# Patient Record
Sex: MALE | Race: WHITE | NOT HISPANIC OR LATINO | Employment: FULL TIME | URBAN - METROPOLITAN AREA
[De-identification: names, ages, dates, MRNs, and addresses within clinical notes are randomized per-mention and may not be internally consistent; named-entity substitution may affect disease eponyms.]

---

## 2017-03-13 ENCOUNTER — OFFICE VISIT (OUTPATIENT)
Dept: FAMILY MEDICINE | Facility: CLINIC | Age: 42
End: 2017-03-13
Payer: COMMERCIAL

## 2017-03-13 ENCOUNTER — LAB VISIT (OUTPATIENT)
Dept: LAB | Facility: HOSPITAL | Age: 42
End: 2017-03-13
Attending: FAMILY MEDICINE
Payer: COMMERCIAL

## 2017-03-13 VITALS
SYSTOLIC BLOOD PRESSURE: 126 MMHG | HEIGHT: 72 IN | BODY MASS INDEX: 35.1 KG/M2 | OXYGEN SATURATION: 99 % | WEIGHT: 259.13 LBS | HEART RATE: 56 BPM | TEMPERATURE: 98 F | DIASTOLIC BLOOD PRESSURE: 68 MMHG

## 2017-03-13 DIAGNOSIS — Z23 NEED FOR TDAP VACCINATION: ICD-10-CM

## 2017-03-13 DIAGNOSIS — Z23 NEED FOR PROPHYLACTIC VACCINATION AND INOCULATION AGAINST INFLUENZA: ICD-10-CM

## 2017-03-13 DIAGNOSIS — Z00.00 ROUTINE PHYSICAL EXAMINATION: ICD-10-CM

## 2017-03-13 DIAGNOSIS — Z00.00 ROUTINE PHYSICAL EXAMINATION: Primary | ICD-10-CM

## 2017-03-13 DIAGNOSIS — J30.9 ALLERGIC RHINITIS, UNSPECIFIED ALLERGIC RHINITIS TRIGGER, UNSPECIFIED RHINITIS SEASONALITY: ICD-10-CM

## 2017-03-13 LAB
ALBUMIN SERPL BCP-MCNC: 4 G/DL
ALP SERPL-CCNC: 71 U/L
ALT SERPL W/O P-5'-P-CCNC: 22 U/L
ANION GAP SERPL CALC-SCNC: 10 MMOL/L
AST SERPL-CCNC: 30 U/L
BASOPHILS # BLD AUTO: 0.07 K/UL
BASOPHILS NFR BLD: 1.1 %
BILIRUB SERPL-MCNC: 0.8 MG/DL
BUN SERPL-MCNC: 10 MG/DL
CALCIUM SERPL-MCNC: 9.8 MG/DL
CHLORIDE SERPL-SCNC: 105 MMOL/L
CHOLEST/HDLC SERPL: 3.9 {RATIO}
CO2 SERPL-SCNC: 29 MMOL/L
CREAT SERPL-MCNC: 0.9 MG/DL
DIFFERENTIAL METHOD: ABNORMAL
EOSINOPHIL # BLD AUTO: 0.3 K/UL
EOSINOPHIL NFR BLD: 3.9 %
ERYTHROCYTE [DISTWIDTH] IN BLOOD BY AUTOMATED COUNT: 13 %
EST. GFR  (AFRICAN AMERICAN): >60 ML/MIN/1.73 M^2
EST. GFR  (NON AFRICAN AMERICAN): >60 ML/MIN/1.73 M^2
GLUCOSE SERPL-MCNC: 92 MG/DL
HAV IGM SERPL QL IA: NEGATIVE
HBV CORE IGM SERPL QL IA: NEGATIVE
HBV SURFACE AG SERPL QL IA: NEGATIVE
HCT VFR BLD AUTO: 42.8 %
HCV AB SERPL QL IA: NEGATIVE
HDL/CHOLESTEROL RATIO: 25.4 %
HDLC SERPL-MCNC: 177 MG/DL
HDLC SERPL-MCNC: 45 MG/DL
HGB BLD-MCNC: 14.1 G/DL
HIV 1+2 AB+HIV1 P24 AG SERPL QL IA: NEGATIVE
LDLC SERPL CALC-MCNC: 123.4 MG/DL
LYMPHOCYTES # BLD AUTO: 1.8 K/UL
LYMPHOCYTES NFR BLD: 27.2 %
MCH RBC QN AUTO: 30.8 PG
MCHC RBC AUTO-ENTMCNC: 32.9 %
MCV RBC AUTO: 93 FL
MONOCYTES # BLD AUTO: 0.8 K/UL
MONOCYTES NFR BLD: 11.4 %
NEUTROPHILS # BLD AUTO: 3.8 K/UL
NEUTROPHILS NFR BLD: 56.2 %
NONHDLC SERPL-MCNC: 132 MG/DL
PLATELET # BLD AUTO: 310 K/UL
PMV BLD AUTO: 9.6 FL
POTASSIUM SERPL-SCNC: 4.1 MMOL/L
PROT SERPL-MCNC: 7.5 G/DL
RBC # BLD AUTO: 4.58 M/UL
SODIUM SERPL-SCNC: 144 MMOL/L
T4 FREE SERPL-MCNC: 0.93 NG/DL
TRIGL SERPL-MCNC: 43 MG/DL
TSH SERPL DL<=0.005 MIU/L-ACNC: 2.56 UIU/ML
WBC # BLD AUTO: 6.66 K/UL

## 2017-03-13 PROCEDURE — 36415 COLL VENOUS BLD VENIPUNCTURE: CPT | Mod: PO

## 2017-03-13 PROCEDURE — 90472 IMMUNIZATION ADMIN EACH ADD: CPT | Mod: S$GLB,,, | Performed by: FAMILY MEDICINE

## 2017-03-13 PROCEDURE — 84439 ASSAY OF FREE THYROXINE: CPT

## 2017-03-13 PROCEDURE — 85025 COMPLETE CBC W/AUTO DIFF WBC: CPT

## 2017-03-13 PROCEDURE — 90715 TDAP VACCINE 7 YRS/> IM: CPT | Mod: S$GLB,,, | Performed by: FAMILY MEDICINE

## 2017-03-13 PROCEDURE — 80074 ACUTE HEPATITIS PANEL: CPT

## 2017-03-13 PROCEDURE — 99395 PREV VISIT EST AGE 18-39: CPT | Mod: S$GLB,,, | Performed by: FAMILY MEDICINE

## 2017-03-13 PROCEDURE — 84443 ASSAY THYROID STIM HORMONE: CPT

## 2017-03-13 PROCEDURE — 80061 LIPID PANEL: CPT

## 2017-03-13 PROCEDURE — 86703 HIV-1/HIV-2 1 RESULT ANTBDY: CPT

## 2017-03-13 PROCEDURE — 90471 IMMUNIZATION ADMIN: CPT | Mod: S$GLB,,, | Performed by: FAMILY MEDICINE

## 2017-03-13 PROCEDURE — 80053 COMPREHEN METABOLIC PANEL: CPT

## 2017-03-13 PROCEDURE — 90686 IIV4 VACC NO PRSV 0.5 ML IM: CPT | Mod: S$GLB,,, | Performed by: FAMILY MEDICINE

## 2017-03-13 PROCEDURE — 83036 HEMOGLOBIN GLYCOSYLATED A1C: CPT

## 2017-03-13 PROCEDURE — 86592 SYPHILIS TEST NON-TREP QUAL: CPT

## 2017-03-13 PROCEDURE — 99999 PR PBB SHADOW E&M-EST. PATIENT-LVL III: CPT | Mod: PBBFAC,,, | Performed by: FAMILY MEDICINE

## 2017-03-13 RX ORDER — FLUTICASONE PROPIONATE 50 MCG
SPRAY, SUSPENSION (ML) NASAL
Qty: 1 BOTTLE | Refills: 2 | Status: ON HOLD | OUTPATIENT
Start: 2017-03-13 | End: 2018-05-14

## 2017-03-13 NOTE — PROGRESS NOTES
[facility]  Progress Note    SUBJECTIVE:     Chief Complaint   Patient presents with    Annual Exam       HPI   Jered Contreras  is a 41 y.o. male here for routine physical. He is doing well, still losing weight. He used to be over 400 lbs, now 259 today. Gave positive reinforcement. Patient has no other new complaints/problems at this time.      Review of patient's allergies indicates:   Allergen Reactions    Ceclor [cefaclor] Anaphylaxis    Penicillins        Past Medical History:   Diagnosis Date    Allergic rhinitis     Asthma     Morbid obesity      Past Surgical History:   Procedure Laterality Date    APPENDECTOMY       Family History   Problem Relation Age of Onset    Diabetes Father     Hypertension Father     Melanoma Neg Hx      Social History   Substance Use Topics    Smoking status: Never Smoker    Smokeless tobacco: Never Used    Alcohol use 0.6 oz/week     1 Cans of beer per week        Review of Systems   Constitutional: Negative for chills, diaphoresis and fever.   HENT: Negative for congestion, ear pain and sore throat.    Eyes: Negative for photophobia and discharge.   Respiratory: Negative for cough, shortness of breath and wheezing.    Cardiovascular: Negative for chest pain and palpitations.   Gastrointestinal: Negative for abdominal pain, constipation, diarrhea, nausea and vomiting.   Genitourinary: Negative for dysuria and hematuria.   Musculoskeletal: Negative for back pain and myalgias.   Skin: Negative for itching and rash.   Neurological: Negative for dizziness, sensory change, focal weakness, weakness and headaches.   All other systems reviewed and are negative.    OBJECTIVE:     Vitals:    03/13/17 0809   BP: 126/68   Pulse: (!) 56   Temp: 97.6 °F (36.4 °C)     Body mass index is 35.15 kg/(m^2).    Physical Exam   Constitutional: He is oriented to person, place, and time and well-developed, well-nourished, and in no distress. No distress.   HENT:   Head:  Normocephalic and atraumatic.   Right Ear: Tympanic membrane is not perforated, not erythematous and not bulging. No hemotympanum.   Left Ear: Tympanic membrane is not perforated, not erythematous and not bulging. No hemotympanum.   Mouth/Throat: Oropharynx is clear and moist. No oropharyngeal exudate.   Eyes: Conjunctivae and EOM are normal. Pupils are equal, round, and reactive to light.   Neck: No thyromegaly present.   Cardiovascular: Normal rate, regular rhythm and normal heart sounds.  Exam reveals no gallop and no friction rub.    No murmur heard.  Pulmonary/Chest: Effort normal and breath sounds normal. No respiratory distress. He has no wheezes. He has no rales.   Abdominal: Soft. Bowel sounds are normal. He exhibits no distension. There is no tenderness. There is no rebound and no guarding.   Musculoskeletal: Normal range of motion. He exhibits no edema or tenderness.   Lymphadenopathy:     He has no cervical adenopathy.   Neurological: He is alert and oriented to person, place, and time.   Skin: Skin is warm. No rash noted. He is not diaphoretic. No erythema.       Old records were reviewed. Labs and/or images were independently reviewed.    ASSESSMENT     1. Routine physical examination    2. Allergic rhinitis, unspecified allergic rhinitis trigger, unspecified rhinitis seasonality    3. Need for prophylactic vaccination and inoculation against influenza        PLAN:     Routine physical examination  -     CBC auto differential; Future  -     Comprehensive metabolic panel; Future  -     Hemoglobin A1c; Future  -     Lipid panel; Future  -     TSH; Future  -     T4, free; Future  -     Hepatitis panel, acute; Future; Expected date: 3/13/17  -     HIV-1 and HIV-2 antibodies; Future; Expected date: 3/13/17  -     RPR; Future; Expected date: 3/13/17  -     We briefly discussed diet, exercise, and routine preventive exams. All questions and comments addressed.    Allergic rhinitis, unspecified allergic  rhinitis trigger, unspecified rhinitis seasonality  -     fluticasone (FLONASE) 50 mcg/actuation nasal spray; 1-2 sprays @ lateral nostril once  a day  Dispense: 1 Bottle; Refill: 2  -     Stable. Continue current regimen.    Need for prophylactic vaccination and inoculation against influenza  -     Influenza - Quadrivalent (3 years & older) (PF)      RTC PRN    Vik Santana MD  03/13/2017 8:55 AM

## 2017-03-13 NOTE — PROGRESS NOTES
Influenza vaccine administered, familia well. Instructed to wait 15mins for observation, no reaction noted @ time of discharge.

## 2017-03-14 ENCOUNTER — OFFICE VISIT (OUTPATIENT)
Dept: SLEEP MEDICINE | Facility: CLINIC | Age: 42
End: 2017-03-14
Payer: COMMERCIAL

## 2017-03-14 VITALS
BODY MASS INDEX: 35.13 KG/M2 | HEART RATE: 57 BPM | WEIGHT: 259 LBS | SYSTOLIC BLOOD PRESSURE: 136 MMHG | DIASTOLIC BLOOD PRESSURE: 78 MMHG | OXYGEN SATURATION: 100 %

## 2017-03-14 DIAGNOSIS — G47.33 OSA (OBSTRUCTIVE SLEEP APNEA): Primary | ICD-10-CM

## 2017-03-14 DIAGNOSIS — E66.09 NON MORBID OBESITY DUE TO EXCESS CALORIES: ICD-10-CM

## 2017-03-14 LAB
ESTIMATED AVG GLUCOSE: 91 MG/DL
HBA1C MFR BLD HPLC: 4.8 %
RPR SER QL: NORMAL

## 2017-03-14 PROCEDURE — 99999 PR PBB SHADOW E&M-EST. PATIENT-LVL III: CPT | Mod: PBBFAC,,, | Performed by: INTERNAL MEDICINE

## 2017-03-14 PROCEDURE — 1160F RVW MEDS BY RX/DR IN RCRD: CPT | Mod: S$GLB,,, | Performed by: INTERNAL MEDICINE

## 2017-03-14 PROCEDURE — 99214 OFFICE O/P EST MOD 30 MIN: CPT | Mod: S$GLB,,, | Performed by: INTERNAL MEDICINE

## 2017-03-14 NOTE — PROGRESS NOTES
Jered Contreras  was seen as a follow up.    CHIEF COMPLAINT:    Chief Complaint   Patient presents with    Sleep Apnea       HISTORY OF PRESENT ILLNESS: Jered Contreras is a 41 y.o. male is here for sleep evaluation.   Patient with loud snoring and witnessed sleep apnea.  +fatigue upon awakening 3-4 times per week.  No sleepiness a/w driving.  Occasional sleep talking.  No cataplexy.    [place] Sleepiness Scale score during initial sleep evaluation was 11.     During last appointment 10/14, patient was recommended psg.  Sleep study was delayed in part due to change in insurance.  Patient s/p sleep study 8/15 with ahi of 27.  Patient was set up cpap 9 cm H20.  Doing well with cpap.  Feeling rested with new cpap.  Patient lost significant weight with exercise.      SLEEP ROUTINE:  Activity the hour prior to sleep: talk and face book in bed    Bed partner:  josé manuel  Time to bed:  10 pm   Lights off:  yes  Sleep onset latency:  10 minutes        Disruptions or awakenings:    0-1 time (no difficulty going back to sleep)    Wakeup time:      5 am   Perceived sleep quality:  Rested upon awake.         Daytime naps:      none  Weekend sleep routine:      11 pm till 7 am (still tire)  Caffeine use: 3-4 cups of coffee per day  exercise habit:   Walking 8 miles per day.    PAST MEDICAL HISTORY:    Active Ambulatory Problems     Diagnosis Date Noted    AR (allergic rhinitis) 09/12/2013    Morbid obesity with BMI of 45.0-49.9, adult 12/27/2013    Heart palpitations 12/27/2013     Resolved Ambulatory Problems     Diagnosis Date Noted    No Resolved Ambulatory Problems     Past Medical History:   Diagnosis Date    Allergic rhinitis     Asthma     Morbid obesity                 PAST SURGICAL HISTORY:    Past Surgical History:   Procedure Laterality Date    APPENDECTOMY           FAMILY HISTORY:                Family History   Problem Relation Age of Onset    Diabetes Father     Hypertension Father     Melanoma  Neg Hx        SOCIAL HISTORY:          Tobacco:   History   Smoking Status    Never Smoker   Smokeless Tobacco    Never Used       alcohol use:    History   Alcohol Use    0.6 oz/week    1 Cans of beer per week                 Occupation:  IT at [employer]    ALLERGIES:    Review of patient's allergies indicates:   Allergen Reactions    Ceclor [cefaclor] Anaphylaxis    Penicillins        CURRENT MEDICATIONS:    Current Outpatient Prescriptions   Medication Sig Dispense Refill    cetirizine (ZYRTEC) 10 MG tablet Take 10 mg by mouth once daily.      fluticasone (FLONASE) 50 mcg/actuation nasal spray 1-2 sprays @ lateral nostril once  a day 1 Bottle 2     No current facility-administered medications for this visit.                   REVIEW OF SYSTEMS:     Sleep related symptoms as per HPI.  CONST: 25# weight gain since 2014  HEENT: + sinus congestion txed with flonase and zyrtec  PULM: Denies dyspnea  CARD:  Denies palpitations   GI:  occasional acid reflux  : Denies polyuria  NEURO: Denies headaches  PSYCH: Denies mood disturbance  HEME: Denies anemia   Otherwise, a balance of systems reviewed is negative.          PHYSICAL EXAM:  Vitals:    03/14/17 0907   BP: 136/78   Pulse: (!) 57   SpO2: 100%   Weight: 117.5 kg (259 lb)   PainSc: 0-No pain     Body mass index is 35.13 kg/(m^2).     GENERAL: Normal development, well groomed  HEENT:  Conjunctivae are non-erythematous; Pupils equal, round, and reactive to light; Nose is symmetrical; Nasal mucosa is pink and moist; Septum is midline; Inferior turbinates are normal; Nasal airflow is normal; Posterior pharynx is pink; Modified Mallampati: 2; Posterior palate is normal; Tonsils +2; Uvula is normal and pink;Tongue is normal; Dentition is fair; No TMJ tenderness; Jaw opening and protrusion without click and without discomfort.  retronagthia  NECK: Supple. Neck circumference is 16.5 inches. No thyromegaly. No palpable nodes.     SKIN: On face and neck: No abrasions,  no rashes, no lesions.  No subcutaneous nodules are palpable.  RESPIRATORY: Chest is clear to auscultation.  Normal chest expansion and non-labored breathing at rest.  CARDIOVASCULAR: Normal S1, S2.  No murmurs, gallops or rubs. No carotid bruits bilaterally.  EXTREMITIES: No edema. No clubbing. No cyanosis. Station normal. Gait normal.        NEURO/PSYCH: Oriented to time, place and person. Normal attention span and concentration. Affect is full. Mood is normal.                                              DATA   8/10/15 ahi of 26 with optimal cpap of 9    Lab Results   Component Value Date    TSH 2.559 03/13/2017     ASSESSMENT    ICD-10-CM ICD-9-CM    1. TAYO (obstructive sleep apnea) G47.33 327.23 Ambulatory consult to Dentistry   2. Non morbid obesity due to excess calories E66.09 278.00        PLAN:    Sleep Apnea - compliance requirements d/w patient.   Doing well with cpap when using it.   Will switch to apap trial to re-explore new settings.  Patient is interested in alternative therapy.  Will refer to dentist.      Obesity - s/p nutritional evaluation.  Patient will resume a more intensive exercise regimen.  Lost 60# since exercising.     Sinus congestion - zyrtec and flonase.  Recommend sinus irrigation.      Education: During our discussion today, we talked about the etiology of obstructive sleep apnea as well as the potential ramifications of untreated sleep apnea, which could include daytime sleepiness, hypertension, heart disease and/or stroke.        Patient will Return in about 6 months (around 9/14/2017).

## 2017-04-15 ENCOUNTER — OFFICE VISIT (OUTPATIENT)
Dept: INTERNAL MEDICINE | Facility: CLINIC | Age: 42
End: 2017-04-15
Payer: COMMERCIAL

## 2017-04-15 VITALS
DIASTOLIC BLOOD PRESSURE: 74 MMHG | BODY MASS INDEX: 35.35 KG/M2 | HEART RATE: 91 BPM | SYSTOLIC BLOOD PRESSURE: 138 MMHG | TEMPERATURE: 100 F | HEIGHT: 72 IN | RESPIRATION RATE: 16 BRPM | WEIGHT: 261 LBS | OXYGEN SATURATION: 99 %

## 2017-04-15 DIAGNOSIS — H66.92 LEFT OTITIS MEDIA, UNSPECIFIED CHRONICITY, UNSPECIFIED OTITIS MEDIA TYPE: Primary | ICD-10-CM

## 2017-04-15 PROCEDURE — 1160F RVW MEDS BY RX/DR IN RCRD: CPT | Mod: S$GLB,,, | Performed by: INTERNAL MEDICINE

## 2017-04-15 PROCEDURE — 99213 OFFICE O/P EST LOW 20 MIN: CPT | Mod: S$GLB,,, | Performed by: INTERNAL MEDICINE

## 2017-04-15 PROCEDURE — 99999 PR PBB SHADOW E&M-EST. PATIENT-LVL III: CPT | Mod: PBBFAC,,, | Performed by: INTERNAL MEDICINE

## 2017-04-15 RX ORDER — AZITHROMYCIN 250 MG/1
TABLET, FILM COATED ORAL
Qty: 6 TABLET | Refills: 0 | Status: SHIPPED | OUTPATIENT
Start: 2017-04-15 | End: 2017-04-21

## 2017-04-15 NOTE — PROGRESS NOTES
Subjective:       Patient ID: Jered Contreras is a 41 y.o. male.    Chief Complaint: Cough; Nasal Congestion; and Headache    HPI - 3 days of cough, congestion, runny nose and ear pain/congestion.  Green mucous production.  Low grade fever.  No abdominal pain.  Not a smoker.      Pmh/meds:  Reviewed and reconciled in EPIC with patient during visit today.    Review of Systems   Constitutional: Negative for fever.   HENT: Negative for congestion.    Respiratory: Positive for cough.    Cardiovascular: Negative for chest pain.   Gastrointestinal: Negative for abdominal pain and diarrhea.   Genitourinary: Negative for difficulty urinating.   Musculoskeletal: Negative for arthralgias.   Skin: Negative for rash.   Neurological: Negative for headaches.   Psychiatric/Behavioral: Negative for sleep disturbance.       Objective:      Physical Exam   Constitutional: He is oriented to person, place, and time. He appears well-developed and well-nourished. No distress.   HENT:   Head: Normocephalic and atraumatic.   Mouth/Throat: No oropharyngeal exudate.   Right TM with serous effusion; left erythematous, bulging - grayish effusion c/w otitis media   Neck: No thyromegaly present.   Cardiovascular: Normal rate, regular rhythm and normal heart sounds.  Exam reveals no gallop and no friction rub.    No murmur heard.  Pulmonary/Chest: No respiratory distress. He has no wheezes. He has no rales. He exhibits no tenderness.   Lymphadenopathy:     He has no cervical adenopathy.   Neurological: He is alert and oriented to person, place, and time.   Skin: Skin is warm. He is not diaphoretic. No erythema.   Psychiatric: He has a normal mood and affect. Thought content normal.   Nursing note and vitals reviewed.      Assessment:       1. Left otitis media, unspecified chronicity, unspecified otitis media type        Plan:       Jered was seen today for cough, nasal congestion and headache.    Diagnoses and all orders for this  visit:    Left otitis media, unspecified chronicity, unspecified otitis media type -  some OTC cough syrup.  Stay warm, dry.  Fluids, tylenol for aches and pains.  Rest, stay off work until recovered.  -     azithromycin (Z-KELY) 250 MG tablet; Take 2 tablets by mouth on day 1; Take 1 tablet by mouth on days 2-5    rtc prn.    G Mike Lozano MD MPH  Staff Internist

## 2017-04-15 NOTE — MR AVS SNAPSHOT
[facility]  [address]  [facility] [address]  Phone: [phone]  Fax: [phone]                  Jered Contreras   4/15/2017 8:00 AM   Office Visit    Description:  Male : 1975   Provider:  Roman Lozano II, MD   Department:  [facility]           Reason for Visit     Cough     Nasal Congestion     Headache           Diagnoses this Visit        Comments    Left otitis media, unspecified chronicity, unspecified otitis media type    -  Primary            To Do List           Future Appointments        Provider Department Dept Phone    2017 9:45 AM Cristian Dc Jr., MD [facility]rology 4th Floor [phone]      Goals (5 Years of Data)     None      Follow-Up and Disposition     Return if symptoms worsen or fail to improve.       These Medications        Disp Refills Start End    azithromycin (Z-KELY) 250 MG tablet 6 tablet 0 4/15/2017 2017    Take 2 tablets by mouth on day 1; Take 1 tablet by mouth on days 2-5    Pharmacy: [facility] [address] [place] [address] AT [facility] #: [phone]         [facility]     [facility] On Call Nurse Care Line -  Assistance  Unless otherwise directed by your provider, please contact [facility], our nurse care line that is available for  assistance.     Registered nurses in the [facility] provide: appointment scheduling, clinical advisement, health education, and other advisory services.  Call: [phone] (toll free)               Medications           Message regarding Medications     Verify the changes and/or additions to your medication regime listed below are the same as discussed with your clinician today.  If any of these changes or additions are incorrect, please notify your healthcare provider.        START taking these NEW medications        Refills    azithromycin (Z-KELY) 250 MG tablet 0    Sig: Take 2  tablets by mouth on day 1; Take 1 tablet by mouth on days 2-5    Class: Normal           Verify that the below list of medications is an accurate representation of the medications you are currently taking.  If none reported, the list may be blank. If incorrect, please contact your healthcare provider. Carry this list with you in case of emergency.           Current Medications     azithromycin (Z-KELY) 250 MG tablet Take 2 tablets by mouth on day 1; Take 1 tablet by mouth on days 2-5    cetirizine (ZYRTEC) 10 MG tablet Take 10 mg by mouth once daily.    fluticasone (FLONASE) 50 mcg/actuation nasal spray 1-2 sprays @ lateral nostril once  a day           Clinical Reference Information           Your Vitals Were     BP Pulse Temp Resp Height Weight    138/74 (BP Location: Left arm, Patient Position: Sitting) 91 99.7 °F (37.6 °C) (Oral) 16 6' (1.829 m) 118.4 kg (261 lb 0.4 oz)    SpO2 BMI             99% 35.4 kg/m2         Blood Pressure          Most Recent Value    BP  138/74      Allergies as of 4/15/2017     Ceclor [Cefaclor]    Penicillins      Immunizations Administered on Date of Encounter - 4/15/2017     None      Language Assistance Services     ATTENTION: Language assistance services are available, free of charge. Please call [phone].      ATENCIÓN: Si marcela chandrika, tiene a dumont disposición servicios gratuitos de asistencia lingüística. Llame al [phone].     DANA Ý: N?u b?n nói Ti?ng Vi?t, có các d?ch v? h? tr? ngôn ng? mi?n phí dành cho b?n. G?i s? [phone].         [facility] complies with applicable Federal civil rights laws and does not discriminate on the basis of race, color, national origin, age, disability, or sex.

## 2017-04-17 ENCOUNTER — TELEPHONE (OUTPATIENT)
Dept: INTERNAL MEDICINE | Facility: CLINIC | Age: 42
End: 2017-04-17

## 2017-04-17 NOTE — TELEPHONE ENCOUNTER
No.  I gave him a prescription, and he was supposed to  over the counter cough syrup.    Please call and inform him

## 2017-04-17 NOTE — TELEPHONE ENCOUNTER
----- Message from Evelina Diallo sent at 4/15/2017  9:22 AM CDT -----  Contact: Self/[phone]  Pt states that he was suppose to be prescribed two scripts and when he arrived at the pharmacy they stated they only received one.   Please advise.

## 2017-04-17 NOTE — TELEPHONE ENCOUNTER
Patient seen you on Saturday and states he was supposed to get two medications from you but when he got to the pharmacy it was only the z donny. Please clarify

## 2017-04-18 ENCOUNTER — PATIENT MESSAGE (OUTPATIENT)
Dept: INTERNAL MEDICINE | Facility: CLINIC | Age: 42
End: 2017-04-18

## 2017-04-19 NOTE — TELEPHONE ENCOUNTER
Hello, i did not see the patient for this problem, and i am currently out of office for the whole week.

## 2017-04-20 ENCOUNTER — PATIENT MESSAGE (OUTPATIENT)
Dept: INTERNAL MEDICINE | Facility: CLINIC | Age: 42
End: 2017-04-20

## 2017-04-21 ENCOUNTER — NURSE TRIAGE (OUTPATIENT)
Dept: ADMINISTRATIVE | Facility: CLINIC | Age: 42
End: 2017-04-21

## 2017-04-21 ENCOUNTER — OFFICE VISIT (OUTPATIENT)
Dept: INTERNAL MEDICINE | Facility: CLINIC | Age: 42
End: 2017-04-21
Payer: COMMERCIAL

## 2017-04-21 ENCOUNTER — PATIENT MESSAGE (OUTPATIENT)
Dept: INTERNAL MEDICINE | Facility: CLINIC | Age: 42
End: 2017-04-21

## 2017-04-21 VITALS
WEIGHT: 265.63 LBS | TEMPERATURE: 98 F | BODY MASS INDEX: 35.98 KG/M2 | DIASTOLIC BLOOD PRESSURE: 76 MMHG | OXYGEN SATURATION: 98 % | HEART RATE: 56 BPM | SYSTOLIC BLOOD PRESSURE: 123 MMHG | HEIGHT: 72 IN

## 2017-04-21 DIAGNOSIS — J30.9 ALLERGIC RHINITIS, UNSPECIFIED ALLERGIC RHINITIS TRIGGER, UNSPECIFIED RHINITIS SEASONALITY: Primary | ICD-10-CM

## 2017-04-21 DIAGNOSIS — E66.01 MORBID OBESITY WITH BMI OF 45.0-49.9, ADULT: ICD-10-CM

## 2017-04-21 DIAGNOSIS — R05.8 ALLERGIC COUGH: ICD-10-CM

## 2017-04-21 PROCEDURE — 99999 PR PBB SHADOW E&M-EST. PATIENT-LVL IV: CPT | Mod: PBBFAC,,, | Performed by: NURSE PRACTITIONER

## 2017-04-21 PROCEDURE — 99213 OFFICE O/P EST LOW 20 MIN: CPT | Mod: S$GLB,,, | Performed by: NURSE PRACTITIONER

## 2017-04-21 PROCEDURE — 1160F RVW MEDS BY RX/DR IN RCRD: CPT | Mod: S$GLB,,, | Performed by: NURSE PRACTITIONER

## 2017-04-21 RX ORDER — LEVOCETIRIZINE DIHYDROCHLORIDE 5 MG/1
5 TABLET, FILM COATED ORAL NIGHTLY
Qty: 30 TABLET | Refills: 3 | Status: SHIPPED | OUTPATIENT
Start: 2017-04-21 | End: 2018-05-10 | Stop reason: CLARIF

## 2017-04-21 RX ORDER — AZELASTINE 1 MG/ML
1 SPRAY, METERED NASAL 2 TIMES DAILY
Qty: 30 ML | Refills: 0 | Status: SHIPPED | OUTPATIENT
Start: 2017-04-21 | End: 2018-03-08 | Stop reason: DRUGHIGH

## 2017-04-21 NOTE — PATIENT INSTRUCTIONS
Allergic Rhinitis  Allergic rhinitis is an allergic reaction that affects the nose, and often the eyes. Its often known as nasal allergies. Nasal allergies are often due to things in the environment that are breathed in. Depending what you are sensitive to, nasal allergies may occur only during certain seasons. Or they may occur year round. Common indoor allergens include house dust mites, mold, cockroaches, and pet dander. Outdoor allergens include pollen from trees, grasses, and weeds.   Symptoms include a drippy, stuffy, and itchy nose. They also include sneezing and red and itchy eyes. You may feel tired more often. Severe allergies may also affect your breathing and trigger a condition called asthma.   Tests can be done to see what allergens are affecting you. You may be referred to an allergy specialist for testing and further evaluation.  Home care  The healthcare provider may prescribe medicines to help relieve allergy symptoms.   Ask the provider for advice on how to avoid substances that you are allergic to. Below are a few tips for each type of allergen.  Pet dander:  · Do not have pets with fur and feathers.  · If you cannot avoid having a pet, keep it out of your bedroom and off upholstered furniture.  Pollen:  · When pollen counts are high, keep windows of your car and home closed. If possible, use an air conditioner instead.  · Wear a filter mask when mowing or doing yard work.  House dust mites:  · Wash bedding every week in warm water and detergent and dry on a hot setting.  · Cover the mattress, box spring, and pillows with allergy covers.   · If possible, sleep in a room with no carpet, curtains, or upholstered furniture.  Cockroaches:  · Store food in sealed containers.  · Remove garbage from the home promptly.  · Fix water leaks  Mold:  · Keep humidity low by using a dehumidifier or air conditioner. Keep the dehumidifier and air conditioner clean and free of mold.  · Clean moldy areas with  bleach and water.  In general:  · Vacuum once or twice a week. If possible, use a vacuum with a high-efficiency particulate air (HEPA) filter.  · Do not smoke. Avoid cigarette smoke. Cigarette smoke is an irritant that can make symptoms worse.  Follow-up care  Follow up as advised by the health care provider or our staff. If you were referred to an allergy specialist, make this appointment promptly.  When to seek medical advice  Call your healthcare provider right away if the following occur:  · Coughing or wheezing  · Fever greater than 100.4°F (38°C)  · Continuing symptoms, new symptoms, or worsening symptoms  Call 911 right away if you have:  · Trouble breathing  · Hives (raised red bumps)  · Severe swelling of the face or severe itching of the eyes or mouth  Date Last Reviewed: 4/26/2015  © 9723-7430 [employer]. [address] [place] [address], [place] [address]. All rights reserved. This information is not intended as a substitute for professional medical care. Always follow your healthcare professional's instructions.

## 2017-04-21 NOTE — PROGRESS NOTES
Subjective:       Patient ID: Jered Contreras is a 41 y.o. male.    Chief Complaint: Cough    Cough   This is a new problem. The current episode started 1 to 4 weeks ago (2 weeks ago). The problem has been unchanged. The problem occurs constantly. The cough is non-productive. Associated symptoms include postnasal drip, rhinorrhea and a sore throat. Pertinent negatives include no chest pain, chills, ear pain, eye redness, fever, headaches, myalgias, rash, shortness of breath or wheezing. Treatments tried: z-pack, sudafed, robitussin. The treatment provided no relief.     Review of Systems   Constitutional: Negative for activity change, appetite change, chills, fever and unexpected weight change.   HENT: Positive for congestion, postnasal drip, rhinorrhea, sinus pressure, sneezing, sore throat and voice change. Negative for ear discharge, ear pain and nosebleeds.    Eyes: Positive for discharge (watery) and itching. Negative for photophobia, redness and visual disturbance.   Respiratory: Positive for cough. Negative for chest tightness, shortness of breath, wheezing and stridor.    Cardiovascular: Negative for chest pain, palpitations and leg swelling.   Gastrointestinal: Negative for abdominal distention, abdominal pain, blood in stool, constipation, diarrhea, nausea and vomiting.   Genitourinary: Negative for dysuria, frequency, hematuria, scrotal swelling, testicular pain and urgency.   Musculoskeletal: Negative for arthralgias, back pain, myalgias, neck pain and neck stiffness.   Skin: Negative for rash and wound.   Neurological: Negative for dizziness, light-headedness, numbness and headaches.   Hematological: Negative for adenopathy. Does not bruise/bleed easily.   Psychiatric/Behavioral: Negative for suicidal ideas.       Objective:      Physical Exam   Constitutional: He is oriented to person, place, and time. Vital signs are normal. He appears well-developed and well-nourished. He is cooperative.   Non-toxic appearance. He does not have a sickly appearance. He does not appear ill. No distress.   HENT:   Head: Normocephalic and atraumatic.   Right Ear: Hearing, tympanic membrane, external ear and ear canal normal.   Left Ear: Hearing, tympanic membrane, external ear and ear canal normal.   Nose: Nose normal.   Mouth/Throat: Oropharynx is clear and moist.   Eyes: Conjunctivae and EOM are normal. Pupils are equal, round, and reactive to light. Right eye exhibits no discharge. Left eye exhibits no discharge. No scleral icterus.   Neck: Normal range of motion. Neck supple.   Cardiovascular: Normal rate, regular rhythm, normal heart sounds and intact distal pulses.  Exam reveals no gallop and no friction rub.    No murmur heard.  Pulmonary/Chest: Effort normal and breath sounds normal. No respiratory distress. He has no decreased breath sounds. He has no wheezes. He has no rhonchi. He has no rales. He exhibits no tenderness.   Abdominal: Soft. Bowel sounds are normal. He exhibits no distension. There is no guarding.   Musculoskeletal: Normal range of motion.   Neurological: He is alert and oriented to person, place, and time. He has normal reflexes. No cranial nerve deficit. Coordination normal.   Skin: Skin is warm and dry.       Assessment:       1. Allergic rhinitis, unspecified allergic rhinitis trigger, unspecified rhinitis seasonality    2. Allergic cough    3. Morbid obesity with BMI of 45.0-49.9, adult        Plan:       Jered was seen today for cough.    Diagnoses and all orders for this visit:    Allergic rhinitis, unspecified allergic rhinitis trigger, unspecified rhinitis seasonality  -     azelastine (ASTELIN) 137 mcg (0.1 %) nasal spray; 1 spray (137 mcg total) by Nasal route 2 (two) times daily.  -     levocetirizine (XYZAL) 5 MG tablet; Take 1 tablet (5 mg total) by mouth every evening.    Allergic cough  -     azelastine (ASTELIN) 137 mcg (0.1 %) nasal spray; 1 spray (137 mcg total) by Nasal route  "2 (two) times daily.  -     levocetirizine (XYZAL) 5 MG tablet; Take 1 tablet (5 mg total) by mouth every evening.    Morbid obesity with BMI of 45.0-49.9, adult  -     Ambulatory Referral to Bariatric Medicine        Pt has been given instructions populated from [employer] database and has verbalized understanding of the after visit summary and information contained wherein.    Follow up with a primary care provider. May go to ER for acute shortness of breath, lightheadedness, fever, or any other emergent complaints or changes in condition.    "This note will be shared with the patient"    The [employer] medical Dictation software was used during the dictation of portions or the entirety of this medical record.  Phonetic or grammatic errors may exist due to the use of this software. For clarification, refer to the author of the document.             "

## 2017-04-21 NOTE — MR AVS SNAPSHOT
[facility]  [address]  [facility] [address]  Phone: [phone]  Fax: [phone]                  Jered Contreras   2017 6:00 PM   Office Visit    Description:  Male : 1975   Provider:  Sly Reed DNP   Department:  [facility]           Reason for Visit     Cough           Diagnoses this Visit        Comments    Allergic rhinitis, unspecified allergic rhinitis trigger, unspecified rhinitis seasonality    -  Primary     Allergic cough         Morbid obesity with BMI of 45.0-49.9, adult                To Do List           Future Appointments        Provider Department Dept Phone    2017 9:45 AM MD Juan Thomas Jr. [facility] - Urology 4th Floor [phone]      Goals (5 Years of Data)     None       These Medications        Disp Refills Start End    azelastine (ASTELIN) 137 mcg (0.1 %) nasal spray 30 mL 0 2017    1 spray (137 mcg total) by Nasal route 2 (two) times daily. - Nasal    Pharmacy: [facility] [address] [place] [address] [place] [facility] Ph #: [phone]       levocetirizine (XYZAL) 5 MG tablet 30 tablet 3 2017    Take 1 tablet (5 mg total) by mouth every evening. - Oral    Pharmacy: [facility] [employer] [facility] [facility] [address] [place] [address] [place] [facility] Ph #: [phone]         [facility]     [facility] -  Assistance  Unless otherwise directed by your provider, please contact [facility], our nurse care line that is available for  assistance.     Registered nurses in the [facility] provide: appointment scheduling, clinical advisement, health education, and other advisory services.  Call: [phone] (toll free)               Medications           Message regarding Medications     Verify the changes and/or additions to your medication regime listed below are the same as  discussed with your clinician today.  If any of these changes or additions are incorrect, please notify your healthcare provider.        START taking these NEW medications        Refills    azelastine (ASTELIN) 137 mcg (0.1 %) nasal spray 0    Si spray (137 mcg total) by Nasal route 2 (two) times daily.    Class: Normal    Route: Nasal    levocetirizine (XYZAL) 5 MG tablet 3    Sig: Take 1 tablet (5 mg total) by mouth every evening.    Class: Normal    Route: Oral      STOP taking these medications     azithromycin (Z-KELY) 250 MG tablet Take 2 tablets by mouth on day 1; Take 1 tablet by mouth on days 2-5    cetirizine (ZYRTEC) 10 MG tablet Take 10 mg by mouth once daily.           Verify that the below list of medications is an accurate representation of the medications you are currently taking.  If none reported, the list may be blank. If incorrect, please contact your healthcare provider. Carry this list with you in case of emergency.           Current Medications     azelastine (ASTELIN) 137 mcg (0.1 %) nasal spray 1 spray (137 mcg total) by Nasal route 2 (two) times daily.    fluticasone (FLONASE) 50 mcg/actuation nasal spray 1-2 sprays @ lateral nostril once  a day    levocetirizine (XYZAL) 5 MG tablet Take 1 tablet (5 mg total) by mouth every evening.           Clinical Reference Information           Your Vitals Were     BP Pulse Temp Height Weight SpO2    123/76 56 98.3 °F (36.8 °C) (Oral) 6' (1.829 m) 120.5 kg (265 lb 10.5 oz) 98%    BMI                36.03 kg/m2          Blood Pressure          Most Recent Value    BP  123/76      Allergies as of 2017     Ceclor [Cefaclor]    Penicillins      Immunizations Administered on Date of Encounter - 2017     None      Orders Placed During Today's Visit      Normal Orders This Visit    Ambulatory Referral to Bariatric Medicine       Instructions      Allergic Rhinitis  Allergic rhinitis is an allergic reaction that affects the nose, and often the  eyes. Its often known as nasal allergies. Nasal allergies are often due to things in the environment that are breathed in. Depending what you are sensitive to, nasal allergies may occur only during certain seasons. Or they may occur year round. Common indoor allergens include house dust mites, mold, cockroaches, and pet dander. Outdoor allergens include pollen from trees, grasses, and weeds.   Symptoms include a drippy, stuffy, and itchy nose. They also include sneezing and red and itchy eyes. You may feel tired more often. Severe allergies may also affect your breathing and trigger a condition called asthma.   Tests can be done to see what allergens are affecting you. You may be referred to an allergy specialist for testing and further evaluation.  Home care  The healthcare provider may prescribe medicines to help relieve allergy symptoms.   Ask the provider for advice on how to avoid substances that you are allergic to. Below are a few tips for each type of allergen.  Pet dander:  · Do not have pets with fur and feathers.  · If you cannot avoid having a pet, keep it out of your bedroom and off upholstered furniture.  Pollen:  · When pollen counts are high, keep windows of your car and home closed. If possible, use an air conditioner instead.  · Wear a filter mask when mowing or doing yard work.  House dust mites:  · Wash bedding every week in warm water and detergent and dry on a hot setting.  · Cover the mattress, box spring, and pillows with allergy covers.   · If possible, sleep in a room with no carpet, curtains, or upholstered furniture.  Cockroaches:  · Store food in sealed containers.  · Remove garbage from the home promptly.  · Fix water leaks  Mold:  · Keep humidity low by using a dehumidifier or air conditioner. Keep the dehumidifier and air conditioner clean and free of mold.  · Clean moldy areas with bleach and water.  In general:  · Vacuum once or twice a week. If possible, use a vacuum with a  high-efficiency particulate air (HEPA) filter.  · Do not smoke. Avoid cigarette smoke. Cigarette smoke is an irritant that can make symptoms worse.  Follow-up care  Follow up as advised by the health care provider or our staff. If you were referred to an allergy specialist, make this appointment promptly.  When to seek medical advice  Call your healthcare provider right away if the following occur:  · Coughing or wheezing  · Fever greater than 100.4°F (38°C)  · Continuing symptoms, new symptoms, or worsening symptoms  Call 911 right away if you have:  · Trouble breathing  · Hives (raised red bumps)  · Severe swelling of the face or severe itching of the eyes or mouth  Date Last Reviewed: 4/26/2015 © 2000-2016 [employer]. [address] [place] [address], [place] [address]. All rights reserved. This information is not intended as a substitute for professional medical care. Always follow your healthcare professional's instructions.             Language Assistance Services     ATTENTION: Language assistance services are available, free of charge. Please call [phone].      ATENCIÓN: Si marcela chandrika, tiene a dumont disposición servicios gratuitos de asistencia lingüística. Llame al [phone].     DANA Ý: N?u b?n nói Ti?ng Vi?t, có các d?ch v? h? tr? ngôn ng? mi?n phí dành cho b?n. G?i s? [phone].         [facility] complies with applicable Federal civil rights laws and does not discriminate on the basis of race, color, national origin, age, disability, or sex.

## 2017-04-22 NOTE — TELEPHONE ENCOUNTER
Pt called re rx. Pharm having problem with escribed scripts. Pt would like rx left on VM for am. Call back with questions.     Reason for Disposition   Caller has medication question about med not prescribed by PCP and triager unable to answer question (e.g., compatibility with other med, storage)    Protocols used: ST MEDICATION QUESTION CALL-A-AH    Left rx on pharm VM at 908 pm.

## 2017-04-24 ENCOUNTER — OFFICE VISIT (OUTPATIENT)
Dept: UROLOGY | Facility: CLINIC | Age: 42
End: 2017-04-24
Payer: COMMERCIAL

## 2017-04-24 VITALS
HEIGHT: 72 IN | BODY MASS INDEX: 35.95 KG/M2 | HEART RATE: 51 BPM | SYSTOLIC BLOOD PRESSURE: 138 MMHG | WEIGHT: 265.44 LBS | DIASTOLIC BLOOD PRESSURE: 79 MMHG

## 2017-04-24 DIAGNOSIS — Z30.09 VASECTOMY EVALUATION: Primary | ICD-10-CM

## 2017-04-24 PROCEDURE — 99203 OFFICE O/P NEW LOW 30 MIN: CPT | Mod: S$GLB,,, | Performed by: UROLOGY

## 2017-04-24 PROCEDURE — 1160F RVW MEDS BY RX/DR IN RCRD: CPT | Mod: S$GLB,,, | Performed by: UROLOGY

## 2017-04-24 PROCEDURE — 99999 PR PBB SHADOW E&M-EST. PATIENT-LVL III: CPT | Mod: PBBFAC,,, | Performed by: UROLOGY

## 2017-04-24 RX ORDER — DIAZEPAM 10 MG/1
10 TABLET ORAL ONCE
Qty: 2 TABLET | Refills: 0 | Status: SHIPPED | OUTPATIENT
Start: 2017-04-24 | End: 2018-05-10 | Stop reason: CLARIF

## 2017-04-24 NOTE — PROGRESS NOTES
Subjective:       Patient ID: Jered Contreras is a 41 y.o. male.    Chief Complaint: Advice Only (pt here to discuss poss vasectomy he state he have no children . he would like to adopted a child.)    HPI patient is here for evaluation for vasectomy.  He has no children and would possibly like to adopted 1 Sunday.  We just did discuss the possibility of sperm banking he will let me know if he is interested in this.    Past Medical History:   Diagnosis Date    Allergic rhinitis     Asthma     Morbid obesity        Past Surgical History:   Procedure Laterality Date    APPENDECTOMY         Family History   Problem Relation Age of Onset    Diabetes Father     Hypertension Father     Melanoma Neg Hx        Social History     Social History    Marital status: Single     Spouse name: N/A    Number of children: N/A    Years of education: N/A     Occupational History     [employer]     Social History Main Topics    Smoking status: Never Smoker    Smokeless tobacco: Never Used    Alcohol use 0.6 oz/week     1 Cans of beer per week    Drug use: No    Sexual activity: Yes     Partners: Female     Other Topics Concern    Not on file     Social History Narrative       Allergies:  Ceclor [cefaclor] and Penicillins    Medications:    Current Outpatient Prescriptions:     azelastine (ASTELIN) 137 mcg (0.1 %) nasal spray, 1 spray (137 mcg total) by Nasal route 2 (two) times daily., Disp: 30 mL, Rfl: 0    fluticasone (FLONASE) 50 mcg/actuation nasal spray, 1-2 sprays @ lateral nostril once  a day, Disp: 1 Bottle, Rfl: 2    levocetirizine (XYZAL) 5 MG tablet, Take 1 tablet (5 mg total) by mouth every evening., Disp: 30 tablet, Rfl: 3    diazePAM (VALIUM) 10 MG Tab, Take 1 tablet (10 mg total) by mouth once. Take 2 pills 1 hour before procedure, Disp: 2 tablet, Rfl: 0    Review of Systems   Constitutional: Negative for activity change, appetite change, chills, diaphoresis, fatigue, fever  and unexpected weight change.   HENT: Negative for congestion, dental problem, hearing loss, mouth sores, postnasal drip, rhinorrhea, sinus pressure and trouble swallowing.    Eyes: Negative for pain, discharge and itching.   Respiratory: Negative for apnea, cough, choking, chest tightness, shortness of breath and wheezing.    Cardiovascular: Negative for chest pain, palpitations and leg swelling.   Gastrointestinal: Negative for abdominal distention, abdominal pain, anal bleeding, blood in stool, constipation, diarrhea, nausea, rectal pain and vomiting.   Endocrine: Negative for polydipsia and polyuria.   Genitourinary: Negative for decreased urine volume, difficulty urinating, discharge, dysuria, enuresis, flank pain, frequency, genital sores, hematuria, penile pain, penile swelling, scrotal swelling, testicular pain and urgency.   Musculoskeletal: Negative for arthralgias, back pain and myalgias.   Skin: Negative for color change, rash and wound.   Neurological: Negative for dizziness, syncope, speech difficulty, light-headedness and headaches.   Hematological: Negative for adenopathy. Does not bruise/bleed easily.   Psychiatric/Behavioral: Negative for behavioral problems, confusion, hallucinations and sleep disturbance.       Objective:      Physical Exam   Constitutional: He appears well-developed.   HENT:   Head: Normocephalic.   Cardiovascular: Normal rate.    Pulmonary/Chest: Effort normal.   Abdominal: Soft.   Genitourinary:   Genitourinary Comments: Bilateral vasa are palpable one on the right is slightly tender and difficult to grasp but definitely present   Neurological: He is alert.   Skin: Skin is warm.     Psychiatric: He has a normal mood and affect.       Assessment:       1. Vasectomy evaluation        Plan:       Jered was seen today for advice only.    Diagnoses and all orders for this visit:    Vasectomy evaluation  -     Vasectomy; Future    Other orders  -     diazePAM (VALIUM) 10 MG Tab;  Take 1 tablet (10 mg total) by mouth once. Take 2 pills 1 hour before procedure        The risks and benefits of vasectomy were discussed with the patient in detail.  The risks include bleeding, infection, pain, scarring, chronic pain, sperm granuloma, recannulization and loss of testicular function.  The patient was informed that the long term effects of vasectomy are unknown and could be associated with a higher risk of prostate cancer.  He will make sure he is off all blood thinners 1 week prior.  Consent was obtained.  The patient will be scheduled for vasectomy.   A prescription was written for Valium and Doxycyline pre-op and Hydrocodone post-vasectomy.

## 2017-04-24 NOTE — MR AVS SNAPSHOT
[facility] - Urology 4th Floor  [address]  [facility] [address]  Phone: [phone]                  Jered Contreras   2017 9:45 AM   Office Visit    Description:  Male : 1975   Provider:  Cristian Dc Jr., MD   Department:  [facility] - Urolog 4th Floor           Reason for Visit     Advice Only           Diagnoses this Visit        Comments    Vasectomy evaluation    -  Primary            To Do List           Future Appointments        Provider Department Dept Phone    2017 8:00 AM Belinda Collier MD [facility] - Bariatric Surgery [phone]      Goals (5 Years of Data)     None       These Medications        Disp Refills Start End    diazePAM (VALIUM) 10 MG Tab 2 tablet 0 2017    Take 1 tablet (10 mg total) by mouth once. Take 2 pills 1 hour before procedure - Oral    Pharmacy: [facility] [address] [place] [address] AT [facility] Ph #: [phone]         [facility]     [facility] -  Assistance  Unless otherwise directed by your provider, please contact [facility], our nurse care line that is available for  assistance.     Registered nurses in the [facility] provide: appointment scheduling, clinical advisement, health education, and other advisory services.  Call: [phone] (toll free)               Medications           Message regarding Medications     Verify the changes and/or additions to your medication regime listed below are the same as discussed with your clinician today.  If any of these changes or additions are incorrect, please notify your healthcare provider.        START taking these NEW medications        Refills    diazePAM (VALIUM) 10 MG Tab 0    Sig: Take 1 tablet (10 mg total) by mouth once. Take 2 pills 1 hour before procedure    Class: Print    Route: Oral           Verify that the below list of medications is an accurate  representation of the medications you are currently taking.  If none reported, the list may be blank. If incorrect, please contact your healthcare provider. Carry this list with you in case of emergency.           Current Medications     azelastine (ASTELIN) 137 mcg (0.1 %) nasal spray 1 spray (137 mcg total) by Nasal route 2 (two) times daily.    fluticasone (FLONASE) 50 mcg/actuation nasal spray 1-2 sprays @ lateral nostril once  a day    levocetirizine (XYZAL) 5 MG tablet Take 1 tablet (5 mg total) by mouth every evening.    diazePAM (VALIUM) 10 MG Tab Take 1 tablet (10 mg total) by mouth once. Take 2 pills 1 hour before procedure           Clinical Reference Information           Your Vitals Were     BP Pulse Height Weight BMI    138/79 51 6' (1.829 m) 120.4 kg (265 lb 6.9 oz) 36 kg/m2      Blood Pressure          Most Recent Value    BP  138/79      Allergies as of 4/24/2017     Ceclor [Cefaclor]    Penicillins      Immunizations Administered on Date of Encounter - 4/24/2017     None      Orders Placed During Today's Visit     Future Labs/Procedures Expected by Expires    Vasectomy  As directed 4/24/2018      Language Assistance Services     ATTENTION: Language assistance services are available, free of charge. Please call [phone].      ATENCIÓN: Si piter chandrika, tiene a dumont disposición servicios gratuitos de asistencia lingüística. Llame al [phone].     DANA Ý: N?u b?n nói Ti?ng Vi?t, có các d?ch v? h? tr? ngôn ng? mi?n phí dành cho b?n. G?i s? [phone].         [facility] 4[facility] complies with applicable Federal civil rights laws and does not discriminate on the basis of race, color, national origin, age, disability, or sex.

## 2017-05-02 ENCOUNTER — TELEPHONE (OUTPATIENT)
Dept: BARIATRICS | Facility: CLINIC | Age: 42
End: 2017-05-02

## 2017-05-02 NOTE — TELEPHONE ENCOUNTER
Called patient to set up med wt loss appt from referral received. Left vm and my tele number. While leaving message saw that he has appt already. Left msg stating I saw he already had the appt. And apologized for the call.

## 2017-05-03 ENCOUNTER — PATIENT MESSAGE (OUTPATIENT)
Dept: INTERNAL MEDICINE | Facility: CLINIC | Age: 42
End: 2017-05-03

## 2017-05-03 RX ORDER — CODEINE PHOSPHATE AND GUAIFENESIN 10; 100 MG/5ML; MG/5ML
5-10 SOLUTION ORAL NIGHTLY
Qty: 120 ML | Refills: 0 | Status: SHIPPED | OUTPATIENT
Start: 2017-05-03 | End: 2017-05-30 | Stop reason: ALTCHOICE

## 2017-05-03 RX ORDER — BENZONATATE 200 MG/1
200 CAPSULE ORAL NIGHTLY
Qty: 30 CAPSULE | Refills: 0 | Status: SHIPPED | OUTPATIENT
Start: 2017-05-03 | End: 2017-05-03

## 2017-05-05 ENCOUNTER — OFFICE VISIT (OUTPATIENT)
Dept: INTERNAL MEDICINE | Facility: CLINIC | Age: 42
End: 2017-05-05
Payer: COMMERCIAL

## 2017-05-05 ENCOUNTER — PATIENT MESSAGE (OUTPATIENT)
Dept: INTERNAL MEDICINE | Facility: CLINIC | Age: 42
End: 2017-05-05

## 2017-05-05 VITALS
HEART RATE: 60 BPM | BODY MASS INDEX: 35.74 KG/M2 | SYSTOLIC BLOOD PRESSURE: 132 MMHG | OXYGEN SATURATION: 99 % | RESPIRATION RATE: 16 BRPM | DIASTOLIC BLOOD PRESSURE: 74 MMHG | WEIGHT: 263.88 LBS | HEIGHT: 72 IN | TEMPERATURE: 98 F

## 2017-05-05 DIAGNOSIS — J31.0 CHRONIC RHINITIS: Primary | ICD-10-CM

## 2017-05-05 PROCEDURE — 99213 OFFICE O/P EST LOW 20 MIN: CPT | Mod: S$GLB,,, | Performed by: INTERNAL MEDICINE

## 2017-05-05 PROCEDURE — 1160F RVW MEDS BY RX/DR IN RCRD: CPT | Mod: S$GLB,,, | Performed by: INTERNAL MEDICINE

## 2017-05-05 PROCEDURE — 99999 PR PBB SHADOW E&M-EST. PATIENT-LVL IV: CPT | Mod: PBBFAC,,, | Performed by: INTERNAL MEDICINE

## 2017-05-05 NOTE — PROGRESS NOTES
"Subjective:       Patient ID: Jered Contreras is a 41 y.o. male.    Chief Complaint: Cough and Nasal Congestion    HPI Comments: Alos request something for his "nerves" which I declined    Cough   This is a new problem. The current episode started more than 1 month ago. The problem has been unchanged. The problem occurs every few minutes. The cough is non-productive. Associated symptoms include nasal congestion and rhinorrhea. Pertinent negatives include no chest pain, fever, headaches, postnasal drip, sore throat, shortness of breath, sweats, weight loss or wheezing. Nothing aggravates the symptoms. He has tried cool air and OTC cough suppressant (z pack, OTC allergy meds, rx cough syrup) for the symptoms. The treatment provided no relief. His past medical history is significant for environmental allergies.     Review of Systems   Constitutional: Negative for activity change, appetite change, fever and weight loss.   HENT: Positive for rhinorrhea. Negative for congestion, postnasal drip and sore throat.    Respiratory: Positive for cough. Negative for shortness of breath and wheezing.    Cardiovascular: Negative for chest pain and palpitations.   Gastrointestinal: Negative for abdominal pain, blood in stool, constipation, diarrhea, nausea and vomiting.   Genitourinary: Negative for decreased urine volume, difficulty urinating, flank pain and frequency.   Musculoskeletal: Negative for arthralgias.   Allergic/Immunologic: Positive for environmental allergies.   Neurological: Negative for dizziness, weakness and headaches.       Objective:      Physical Exam   Constitutional: He is oriented to person, place, and time. He appears well-developed and well-nourished. No distress.   HENT:   Head: Normocephalic and atraumatic.   Right Ear: External ear normal.   Left Ear: External ear normal.   Eyes: Conjunctivae and EOM are normal. Pupils are equal, round, and reactive to light.   Neck: Normal range of motion. Neck " supple. No thyromegaly present.   Cardiovascular: Normal rate and regular rhythm.    Pulmonary/Chest: Effort normal and breath sounds normal.   Abdominal: Soft. Bowel sounds are normal. He exhibits no mass. There is no tenderness. There is no rebound and no guarding.   Musculoskeletal: Normal range of motion.   Lymphadenopathy:     He has no cervical adenopathy.   Neurological: He is alert and oriented to person, place, and time. He has normal reflexes. He displays normal reflexes. No cranial nerve deficit. He exhibits normal muscle tone. Coordination normal.   Skin: Skin is warm and dry.       Assessment:       1. Chronic rhinitis        Plan:   Jered was seen today for cough and nasal congestion.    Diagnoses and all orders for this visit:    Chronic rhinitis  -     Ambulatory consult to ENT  -     Ambulatory consult to Allergy

## 2017-05-09 ENCOUNTER — OFFICE VISIT (OUTPATIENT)
Dept: INTERNAL MEDICINE | Facility: CLINIC | Age: 42
End: 2017-05-09
Payer: COMMERCIAL

## 2017-05-09 VITALS
WEIGHT: 269.81 LBS | HEART RATE: 73 BPM | HEIGHT: 72 IN | SYSTOLIC BLOOD PRESSURE: 148 MMHG | BODY MASS INDEX: 36.54 KG/M2 | DIASTOLIC BLOOD PRESSURE: 78 MMHG

## 2017-05-09 DIAGNOSIS — F32.A DEPRESSION, UNSPECIFIED DEPRESSION TYPE: ICD-10-CM

## 2017-05-09 DIAGNOSIS — R03.0 ELEVATED BLOOD PRESSURE READING IN OFFICE WITHOUT DIAGNOSIS OF HYPERTENSION: Primary | ICD-10-CM

## 2017-05-09 DIAGNOSIS — F41.9 ANXIETY: ICD-10-CM

## 2017-05-09 PROCEDURE — 99999 PR PBB SHADOW E&M-EST. PATIENT-LVL III: CPT | Mod: PBBFAC,,, | Performed by: INTERNAL MEDICINE

## 2017-05-09 PROCEDURE — 99213 OFFICE O/P EST LOW 20 MIN: CPT | Mod: S$GLB,,, | Performed by: INTERNAL MEDICINE

## 2017-05-09 PROCEDURE — 1160F RVW MEDS BY RX/DR IN RCRD: CPT | Mod: S$GLB,,, | Performed by: INTERNAL MEDICINE

## 2017-05-09 RX ORDER — ESCITALOPRAM OXALATE 10 MG/1
10 TABLET ORAL DAILY
Qty: 30 TABLET | Refills: 5 | Status: SHIPPED | OUTPATIENT
Start: 2017-05-09 | End: 2017-10-27 | Stop reason: SDUPTHER

## 2017-05-09 RX ORDER — HYDROXYZINE HYDROCHLORIDE 25 MG/1
25 TABLET, FILM COATED ORAL 3 TIMES DAILY PRN
Qty: 45 TABLET | Refills: 1 | Status: SHIPPED | OUTPATIENT
Start: 2017-05-09 | End: 2017-05-24

## 2017-05-09 NOTE — MR AVS SNAPSHOT
[facility]  [address]  [facility] [address]  Phone: [phone]  Fax: [phone]                  Jered Contreras   2017 2:45 PM   Office Visit    Description:  Male : 1975   Provider:  DIANN Flynn   Department:  [facility] Internal Medicine           Reason for Visit     Stress           Diagnoses this Visit        Comments    Anxiety         Depression, unspecified depression type                To Do List           Future Appointments        Provider Department Dept Phone    2017 12:30 PM MAHESH, UROLOGY [facility] [phone]      Goals (5 Years of Data)     None      Follow-Up and Disposition     Return in about 5 weeks (around 2017).       These Medications        Disp Refills Start End    escitalopram oxalate (LEXAPRO) 10 MG tablet 30 tablet 5 2017    Take 1 tablet (10 mg total) by mouth once daily. - Oral    Pharmacy: [facility] [address] [place] [address] EXP AT [facility] Ph #: [phone]       hydrOXYzine HCl (ATARAX) 25 MG tablet 45 tablet 1 2017    Take 1 tablet (25 mg total) by mouth 3 (three) times daily as needed for Itching. - Oral    Pharmacy: [facility] [address] EXP AT [facility] Ph #: [phone]         [facility]     [facility] -  Assistance  Unless otherwise directed by your provider, please contact [facility], our nurse care line that is available for  assistance.     Registered nurses in the [facility] provide: appointment scheduling, clinical advisement, health education, and other advisory services.  Call: [phone] (toll free)               Medications           Message regarding Medications     Verify the changes and/or additions to your medication regime listed below are the same as discussed with your  clinician today.  If any of these changes or additions are incorrect, please notify your healthcare provider.        START taking these NEW medications        Refills    escitalopram oxalate (LEXAPRO) 10 MG tablet 5    Sig: Take 1 tablet (10 mg total) by mouth once daily.    Class: Normal    Route: Oral    hydrOXYzine HCl (ATARAX) 25 MG tablet 1    Sig: Take 1 tablet (25 mg total) by mouth 3 (three) times daily as needed for Itching.    Class: Normal    Route: Oral           Verify that the below list of medications is an accurate representation of the medications you are currently taking.  If none reported, the list may be blank. If incorrect, please contact your healthcare provider. Carry this list with you in case of emergency.           Current Medications     azelastine (ASTELIN) 137 mcg (0.1 %) nasal spray 1 spray (137 mcg total) by Nasal route 2 (two) times daily.    diazePAM (VALIUM) 10 MG Tab Take 1 tablet (10 mg total) by mouth once. Take 2 pills 1 hour before procedure    escitalopram oxalate (LEXAPRO) 10 MG tablet Take 1 tablet (10 mg total) by mouth once daily.    fluticasone (FLONASE) 50 mcg/actuation nasal spray 1-2 sprays @ lateral nostril once  a day    guaifenesin-codeine 100-10 mg/5 ml (TUSSI-ORGANIDIN NR)  mg/5 mL syrup Take 5-10 mLs by mouth every evening.    hydrOXYzine HCl (ATARAX) 25 MG tablet Take 1 tablet (25 mg total) by mouth 3 (three) times daily as needed for Itching.    levocetirizine (XYZAL) 5 MG tablet Take 1 tablet (5 mg total) by mouth every evening.           Clinical Reference Information           Your Vitals Were     BP Pulse Height Weight BMI    148/78 73 6' (1.829 m) 122.4 kg (269 lb 13.5 oz) 36.6 kg/m2      Blood Pressure          Most Recent Value    BP  (!)  148/78      Allergies as of 5/9/2017     Ceclor [Cefaclor]    Penicillins      Immunizations Administered on Date of Encounter - 5/9/2017     None      Language Assistance Services     ATTENTION: Language  assistance services are available, free of charge. Please call [phone].      ATENCIÓN: Si habla chandrika, tiene a dumont disposición servicios gratuitos de asistencia lingüística. Llame al [phone].     CHÚ Ý: N?u b?n nói Ti?ng Vi?t, có các d?ch v? h? tr? ngôn ng? mi?n phí dành cho b?n. G?i s? [phone].         [facility] complies with applicable Federal civil rights laws and does not discriminate on the basis of race, color, national origin, age, disability, or sex.

## 2017-05-10 NOTE — PROGRESS NOTES
Subjective:       Patient ID: Jered Contreras is a 41 y.o. male.    Chief Complaint: Stress    HPI     This is a pleasant 41 year old male with hx of morbid obesity and TAYO on CPAP at night who comes in today to clinic for dysphoric mood. He notes that over the past 2 months he has been faced with multitude of stress that began with his mother's illness followed by his wife's in addition to increasing load of work that coincided with his graduate studies' finals. He reports having more arguments with his wife and being short-tempered.Of note, he denies any SI or HI. He is requesting for a pharmacological treatment that will help him through this difficult time.    Review of Systems   Constitutional: Negative for activity change and unexpected weight change.   HENT: Negative for rhinorrhea and trouble swallowing.    Eyes: Negative for discharge and visual disturbance.   Respiratory: Negative for chest tightness and wheezing.    Cardiovascular: Negative for chest pain and palpitations.   Gastrointestinal: Negative for blood in stool, constipation, diarrhea and vomiting.   Endocrine: Negative for polydipsia and polyuria.   Genitourinary: Negative for difficulty urinating, hematuria and urgency.   Musculoskeletal: Negative for arthralgias and joint swelling.   Neurological: Negative for weakness and headaches.   Psychiatric/Behavioral: Positive for dysphoric mood. Negative for confusion.       Objective:      Physical Exam    General: obese male in NAD  Eyes: conjunctivae/corneas clear. PERRL..  Neck: supple, symmetrical, trachea midline, no JVD  Cardiovascular: Heart: regular rate and rhythm, S1, S2 normal, no murmur, click, rub or gallop.  Lungs: clear to auscultation bilaterally and normal respiratory effort  Chest Wall: no tenderness  Abdomen/Rectal: Abdomen: Non distended. + BS. No masses. No TTP. No rebound or guarding. Negative Spencer's sign. Rectal: not examined  Extremities: no edema, redness or  tenderness in the calves or thighs. Pulses: 2+ and symmetric  Skin: Skin color, texture, turgor normal. No rashes or lesions  Musculoskeletal: full range of motion of joints  Lymph Nodes: No cervical or supraclavicular adenopathy  Psych/Behavioral: Normal. Alert and oriented, appropriate affect.    Assessment:       1. Anxiety    2. Depression, unspecified depression type        Plan:       1. Mood disorder:  With this he has scored 10 on PHQ9 and 9 on WAYNE 7 (moderate symptoms of anxiety and depression)  -- likely stress related with adjustment disorder.  -- starting lexapro 10mg and increasing gradually to max dose of 20mg daily. Atarax 25-50mg tidprn until lexapro reaches effective pharmacokinetics.    Follow in clinic in 5 weeks    Case and plan d.w Dr. Lopez.      DIANN Platt, PGY-2  [phone]

## 2017-05-11 NOTE — PROGRESS NOTES
Preceptor note    Patient's history and physical discussed, please refer to resident physician's note for specific details. Pt seen with resident physician. Medical record reviewed. I agree with resident's assessment and plan. If no symptom relief on maximum dose within 12 weeks of starting Lexapro, then will need to re-evaluate regimen at that time.

## 2017-05-12 ENCOUNTER — PATIENT MESSAGE (OUTPATIENT)
Dept: FAMILY MEDICINE | Facility: CLINIC | Age: 42
End: 2017-05-12

## 2017-05-12 ENCOUNTER — PATIENT MESSAGE (OUTPATIENT)
Dept: SLEEP MEDICINE | Facility: CLINIC | Age: 42
End: 2017-05-12

## 2017-05-30 ENCOUNTER — OFFICE VISIT (OUTPATIENT)
Dept: ALLERGY | Facility: CLINIC | Age: 42
End: 2017-05-30
Payer: COMMERCIAL

## 2017-05-30 ENCOUNTER — LAB VISIT (OUTPATIENT)
Dept: LAB | Facility: HOSPITAL | Age: 42
End: 2017-05-30
Payer: COMMERCIAL

## 2017-05-30 VITALS
DIASTOLIC BLOOD PRESSURE: 62 MMHG | WEIGHT: 273.13 LBS | SYSTOLIC BLOOD PRESSURE: 104 MMHG | HEART RATE: 56 BPM | HEIGHT: 72 IN | BODY MASS INDEX: 36.99 KG/M2

## 2017-05-30 DIAGNOSIS — J31.0 CHRONIC RHINITIS: Primary | ICD-10-CM

## 2017-05-30 DIAGNOSIS — J31.0 CHRONIC RHINITIS: ICD-10-CM

## 2017-05-30 DIAGNOSIS — Z86.19 FREQUENT INFECTIONS: ICD-10-CM

## 2017-05-30 DIAGNOSIS — H10.403 CHRONIC CONJUNCTIVITIS OF BOTH EYES, UNSPECIFIED CHRONIC CONJUNCTIVITIS TYPE: ICD-10-CM

## 2017-05-30 DIAGNOSIS — J45.909 CHILDHOOD ASTHMA, UNSPECIFIED ASTHMA SEVERITY, UNCOMPLICATED: ICD-10-CM

## 2017-05-30 DIAGNOSIS — G47.30 SLEEP APNEA, UNSPECIFIED TYPE: ICD-10-CM

## 2017-05-30 PROCEDURE — 36415 COLL VENOUS BLD VENIPUNCTURE: CPT

## 2017-05-30 PROCEDURE — 86632 CHLAMYDIA IGM ANTIBODY: CPT

## 2017-05-30 PROCEDURE — 99999 PR PBB SHADOW E&M-EST. PATIENT-LVL III: CPT | Mod: PBBFAC,,, | Performed by: ALLERGY & IMMUNOLOGY

## 2017-05-30 PROCEDURE — 86317 IMMUNOASSAY INFECTIOUS AGENT: CPT | Mod: 59

## 2017-05-30 PROCEDURE — 99244 OFF/OP CNSLTJ NEW/EST MOD 40: CPT | Mod: S$GLB,,, | Performed by: ALLERGY & IMMUNOLOGY

## 2017-05-30 PROCEDURE — 82785 ASSAY OF IGE: CPT

## 2017-05-30 PROCEDURE — 86631 CHLAMYDIA ANTIBODY: CPT | Mod: 91

## 2017-05-30 NOTE — PROGRESS NOTES
"Jered Contreras is referred by Dr. Yudi Bae for a consult regarding chronic rhinitis and a history of asthma. He is here alone.    He grew up in [place], [place]. He first developed asthma when he was about [age] years old. He remembers sleeping under a tent. He also had an inhaler.    He thinks he saw an allergist and had testing done. He was on immunotherapy weekly from about age 5 to age 11. He took them in his pediatrician's office, Dr. Carrillo. He does think that he improved.    In high school he needed an inhaler when he played sports. He has not had any asthma since around age 16.    He has had recurrent rhinitis with pruritus of the nose, eyes, ears, and throat, sneezing, bilateral nasal congestion that alternates, postnasal drip, throat clearing, hoarseness, and occasional cough.    He is currently taking Xyzal and Flonase. These do help but does not completely control his symptoms. He has had Astelin in the past but just recently found his prescription.    He has seen Dr. De La Rosa in the past and told that he had a nasal septal deviation. It is more congested on the left.    He has a "sinus infection" about every 3 months. He would like to try to prevent these.    For ROS, FH, SH please see Allergy and Asthma Questionnaire dated today.    Some relevant pertinent positives:    Review of Systems:  He has sleep apnea and wears CPAP.  He has lost about 150 pounds over the last year 2. He now runs and watches his diet. He has had reflux in the past and taking Zantac and Tagamet particularly when he was in high school. He has not needed this recently.    Family History: His mother has seasonal rhinitis.     Home environment: He has lived in the same location for the past 3 years. There was no water damage. There is no evidence of mold. There is carpeting in the bedroom. There is one cat and 2 dogs that live inside the house. He does not have any problems around the " pets.    Social History: He works in [place] for [employer]. He just graduated with a master's in [employer] arts from [employer]. He likes to write. He is a nonsmoker.    Physical Examination:  General: Well-developed, well-nourished, no acute distress. 273 pounds.  Head: No sinus tenderness.  Eyes: Conjunctivae:  No bulbar or palpebral conjunctival injection.  Ears: EAC's clear.  TM's clear.  No pre-auricular nodes.  Nose: Nasal Mucosa:  Pink.  Septum: Deviation present.  Turbinates:  No significant edema.  Polyps/Mass:  None visible.  Teeth/Gums:  No bleeding noted.  Oropharynx: No exudates.  Neck: Supple without thyromegaly. No cervical lymphadenopathy.    Respiratory/Chest: Effort: Good.  Auscultation:  Clear bilaterally.  Cardiovascular:  No murmur, rubs, or gallop heard.   GI:  Non-tender.  No masses.  No organomegaly.  Extremities:  No swelling.  No cyanosis, clubbing, or edema.  Skin: Good turgor.  No urticaria or angioedema.  Neuro/Psych: Oriented x 3.    Assessment:  1. Chronic rhinitis, consider allergic.  2. Chronic conjunctivitis, consider allergic.  3. NSD.  4. Recurrent upper respiratory infections.  5. Childhood asthma.  6. Sleep apnea on CPAP.  7. History of GERD.    Recommendations:  1. Laboratory as ordered.  2. Continue Xyzal for now.  3. Continue Flonase daily.  4. Azelastine 1-2 sprays each nostril twice a day when necessary rhinitis.  5. Return to clinic in 2 weeks.

## 2017-05-31 LAB — IGE SERPL-ACNC: <35 IU/ML

## 2017-06-03 LAB
Lab: NORMAL

## 2017-06-05 LAB
DEPRECATED S PNEUM 1 IGG SER-MCNC: <0.3 MCG/ML
DEPRECATED S PNEUM12 IGG SER-MCNC: 0.6 MCG/ML
DEPRECATED S PNEUM14 IGG SER-MCNC: 2.3 MCG/ML
DEPRECATED S PNEUM19 IGG SER-MCNC: 0.8 MCG/ML
DEPRECATED S PNEUM23 IGG SER-MCNC: <0.3 MCG/ML
DEPRECATED S PNEUM3 IGG SER-MCNC: 0.4 MCG/ML
DEPRECATED S PNEUM4 IGG SER-MCNC: <0.3 MCG/ML
DEPRECATED S PNEUM5 IGG SER-MCNC: <0.3 MCG/ML
DEPRECATED S PNEUM8 IGG SER-MCNC: <0.3 MCG/ML
DEPRECATED S PNEUM9 IGG SER-MCNC: 0.4 MCG/ML
S PNEUM DA 18C IGG SER-MCNC: <0.3 MCG/ML
S PNEUM DA 6B IGG SER-MCNC: <0.3 MCG/ML
S PNEUM DA 7F IGG SER-MCNC: 0.3 MCG/ML
S PNEUM DA 9V IGG SER-MCNC: <0.3 MCG/ML

## 2017-06-13 ENCOUNTER — OFFICE VISIT (OUTPATIENT)
Dept: INTERNAL MEDICINE | Facility: CLINIC | Age: 42
End: 2017-06-13
Payer: COMMERCIAL

## 2017-06-13 ENCOUNTER — OFFICE VISIT (OUTPATIENT)
Dept: ALLERGY | Facility: CLINIC | Age: 42
End: 2017-06-13
Payer: COMMERCIAL

## 2017-06-13 ENCOUNTER — LAB VISIT (OUTPATIENT)
Dept: LAB | Facility: HOSPITAL | Age: 42
End: 2017-06-13
Attending: ALLERGY & IMMUNOLOGY
Payer: COMMERCIAL

## 2017-06-13 VITALS
DIASTOLIC BLOOD PRESSURE: 70 MMHG | HEART RATE: 60 BPM | BODY MASS INDEX: 38.85 KG/M2 | SYSTOLIC BLOOD PRESSURE: 120 MMHG | HEIGHT: 72 IN | WEIGHT: 286.81 LBS

## 2017-06-13 VITALS
OXYGEN SATURATION: 97 % | SYSTOLIC BLOOD PRESSURE: 134 MMHG | HEART RATE: 79 BPM | WEIGHT: 287.25 LBS | DIASTOLIC BLOOD PRESSURE: 62 MMHG | BODY MASS INDEX: 38.96 KG/M2

## 2017-06-13 DIAGNOSIS — Z86.19 FREQUENT INFECTIONS: ICD-10-CM

## 2017-06-13 DIAGNOSIS — E66.01 MORBID OBESITY WITH BMI OF 45.0-49.9, ADULT: ICD-10-CM

## 2017-06-13 DIAGNOSIS — H10.403 CHRONIC CONJUNCTIVITIS OF BOTH EYES, UNSPECIFIED CHRONIC CONJUNCTIVITIS TYPE: ICD-10-CM

## 2017-06-13 DIAGNOSIS — F32.A DEPRESSION, UNSPECIFIED DEPRESSION TYPE: ICD-10-CM

## 2017-06-13 DIAGNOSIS — R76.0 ABNORMAL ANTIBODY TITER: ICD-10-CM

## 2017-06-13 DIAGNOSIS — J31.0 CHRONIC RHINITIS: Primary | ICD-10-CM

## 2017-06-13 DIAGNOSIS — F41.9 ANXIETY: Primary | ICD-10-CM

## 2017-06-13 DIAGNOSIS — M25.561 ACUTE PAIN OF RIGHT KNEE: ICD-10-CM

## 2017-06-13 DIAGNOSIS — J31.0 CHRONIC RHINITIS: ICD-10-CM

## 2017-06-13 LAB
IGA SERPL-MCNC: 219 MG/DL
IGG SERPL-MCNC: 1152 MG/DL
IGM SERPL-MCNC: 115 MG/DL

## 2017-06-13 PROCEDURE — 99214 OFFICE O/P EST MOD 30 MIN: CPT | Mod: 25,S$GLB,, | Performed by: ALLERGY & IMMUNOLOGY

## 2017-06-13 PROCEDURE — 82784 ASSAY IGA/IGD/IGG/IGM EACH: CPT | Mod: 59

## 2017-06-13 PROCEDURE — 99999 PR PBB SHADOW E&M-EST. PATIENT-LVL III: CPT | Mod: PBBFAC,,, | Performed by: ALLERGY & IMMUNOLOGY

## 2017-06-13 PROCEDURE — 82784 ASSAY IGA/IGD/IGG/IGM EACH: CPT

## 2017-06-13 PROCEDURE — 86003 ALLG SPEC IGE CRUDE XTRC EA: CPT

## 2017-06-13 PROCEDURE — 99213 OFFICE O/P EST LOW 20 MIN: CPT | Mod: S$GLB,,, | Performed by: INTERNAL MEDICINE

## 2017-06-13 PROCEDURE — 86003 ALLG SPEC IGE CRUDE XTRC EA: CPT | Mod: 59

## 2017-06-13 PROCEDURE — 99999 PR PBB SHADOW E&M-EST. PATIENT-LVL III: CPT | Mod: PBBFAC,,, | Performed by: INTERNAL MEDICINE

## 2017-06-13 PROCEDURE — 90732 PPSV23 VACC 2 YRS+ SUBQ/IM: CPT | Mod: S$GLB,,, | Performed by: ALLERGY & IMMUNOLOGY

## 2017-06-13 PROCEDURE — 90471 IMMUNIZATION ADMIN: CPT | Mod: S$GLB,,, | Performed by: ALLERGY & IMMUNOLOGY

## 2017-06-13 NOTE — PROGRESS NOTES
Subjective:       Patient ID: Jered Contreras is a 41 y.o. male.    Chief Complaint: Follow-up and Knee Pain (right)    HPI     This is a 41 year old male with hx of obesity, TAYO on CPAP, who comes in today for a 5 week follow up. He has done great in the interim on lexapro. He has only required 10mg and has not used atrax except once. He has progressed well at work, family wise, and with his studies. He has been able to graduate and earn a Masters degree. His wife is in today with him who concurs and is appreciative of the changes her  has made since being on lexapro.     He has developed sudden onset right sided knee pain after waking up in the morning 4 days ago. With this he has noticed sensory loss to fine touch. No weakness.     Review of Systems    Constitutional: no fever or chills, + 19 lbs weight gain.   ENT: no nasal congestion or sore throat  Respiratory: no cough or shortness of breath  Cardiovascular: no chest pain or palpitations  Gastrointestinal: no nausea or vomiting, no abdominal pain or abdominal distention   Genitourinary: no hematuria or dysuria  Integument/Breast: no rash or pruritis  Hematologic/Lymphatic: no easy bruising or lymphadenopathy  Musculoskeletal: no arthralgias or myalgias  Neurological: no seizures or tremors  Endocrine: no heat or cold intolerance    Objective:      Physical Exam    General: obese, NAD  Eyes: conjunctivae/corneas clear. PERRL..  Neck: supple, symmetrical, trachea midline, no JVD  Cardiovascular: Heart: regular rate and rhythm, S1, S2 normal, no murmur, click, rub or gallop.  Lungs: clear to auscultation bilaterally and normal respiratory effort  Chest Wall: no tenderness  Abdomen/Rectal: Abdomen: Non distended. + BS. No masses. No TTP. No rebound or guarding. Negative Spencer's sign. Rectal: not examined  Extremities: no edema, redness or tenderness in the calves or thighs. Pulses: 2+ and symmetric  Skin: Skin color, texture, turgor normal. No  rashes or lesions  Musculoskeletal: full range of motion of joints  Lymph Nodes: No cervical or supraclavicular adenopathy  Psych/Behavioral: Normal. Alert and oriented, appropriate affect.    Assessment:       1. Anxiety    2. Depression, unspecified depression type    3. Morbid obesity with BMI of 45.0-49.9, adult    4. Acute pain of right knee        Plan:         Mood disorder:  -- continue lexapro 10mg daily, follow up in 6 months  -- d/c atarax    Weight gain:  -- 18 lbs gain in 5 weeks  -- likely side effect of lexapro  -- counseled on life style modifications  -- he is undergoing workup for gastric balloon    Knee pain:  -- likely msk  -- tylenol and motrin prn  -- exercise  -- call with no improvement       Follow in 6 months    Case and plan d/w Dr. sina Travis, DIANN, PGY-2  [phone]

## 2017-06-13 NOTE — PROGRESS NOTES
I have personally interviewed and examined this patient and agree with resident's note as below.   Clarisa Wagner MD

## 2017-06-13 NOTE — PROGRESS NOTES
Jered Contreras returns to clinic today for continued evaluation of chronic rhinitis and a history of asthma. He is here with his wife Erica. He was last seen May 30, 2017.    After his last visit, he started taking Xyzal, Flonase, and azelastine. He is tolerating these without any difficulty.    He says that his rhinitis is now completely controlled. He is no longer having any nasal congestion, postnasal drip, throat clearing, or cough. He is not having any conjunctivitis. He denies any wheezing or shortness of breath.    His wife is very pleased. She says that he is symptom-free now.    He has not had any upper respiratory infections.    They do have 2 indoor dogs. He continues to have a cat that lives in a separate residence. He visits the cat by himself. If his wife go she will have increased rhinitis.    They are both getting to graduate degrees.    [facility] PEQ ALLERGY QUESTIONNAIRE SHORT 6/13/2017   Are you taking any new medications since your last visit? No   Constitution: Unexpected weight change   Head or facial pain: No symptoms   Eyes: No symptoms   Ears: No symptoms   Nose: Snoring   Throat: Frequent clearing   Sinuses: No symptoms   Lungs: Cough, Asthma   Skin: No symptoms   Cardiovascular: No symptoms   Gastrointestinal: No symptoms   Genital/ urinary No symptoms   Musculoskeletal: Joint pain   Neurologic: No symptoms   Endocrine: No symptoms   Hematologic: No symptoms     Physical Examination:  General: Well-developed, well-nourished, no acute distress.   Head: No sinus tenderness.  Eyes: Conjunctivae:  No bulbar or palpebral conjunctival injection.  Ears: EAC's clear.  TM's clear.  No pre-auricular nodes.  Nose: Nasal Mucosa:  Pink.  Septum: Deviation present.  Turbinates:  No significant edema.  Polyps/Mass:  None visible.  Teeth/Gums:  No bleeding noted.  Oropharynx: No exudates.  Neck: Supple without thyromegaly. No cervical lymphadenopathy.    Respiratory/Chest: Effort: Good.  Auscultation:   Clear bilaterally.  Skin: Good turgor.  No urticaria or angioedema.  Neuro/Psych: Oriented x 3.    Laboratory 5/30/2017:  IgE level: Less than 35.  ImmunoCAP: Not done.  Pneumococcal titers: Not protective.    Assessment:  1. Chronic rhinitis, consider allergic, improved.  2. Chronic conjunctivitis, consider allergic, improved.  3. NSD.  4. Recurrent upper respiratory infections, improved.  5. Childhood asthma.  6. Sleep apnea on CPAP.  7. History of GERD.  8. Low pneumococcal titers.    Recommendations:  1. Continue Xyzal daily.  2. Continue Flonase daily.  3. Azelastine as needed.  4. Laboratory as ordered today.  5. Pneumovax.  6. Repeat titers in 6-8 weeks.  7. Return to clinic in 6-8 weeks.

## 2017-06-15 LAB
A ALTERNATA IGE QN: <0.35 KU/L
A FUMIGATUS IGE QN: <0.35 KU/L
BERMUDA GRASS IGE QN: <0.35 KU/L
CAT DANDER IGE QN: <0.35 KU/L
CEDAR IGE QN: <0.35 KU/L
D FARINAE IGE QN: <0.35 KU/L
D PTERONYSS IGE QN: <0.35 KU/L
DEPRECATED A ALTERNATA IGE RAST QL: NORMAL
DEPRECATED A FUMIGATUS IGE RAST QL: NORMAL
DEPRECATED BERMUDA GRASS IGE RAST QL: NORMAL
DEPRECATED CAT DANDER IGE RAST QL: NORMAL
DEPRECATED CEDAR IGE RAST QL: NORMAL
DEPRECATED D FARINAE IGE RAST QL: NORMAL
DEPRECATED D PTERONYSS IGE RAST QL: NORMAL
DEPRECATED DOG DANDER IGE RAST QL: NORMAL
DEPRECATED ENGL PLANTAIN IGE RAST QL: NORMAL
DEPRECATED MARSH ELDER IGE RAST QL: NORMAL
DEPRECATED ROACH IGE RAST QL: NORMAL
DEPRECATED TIMOTHY IGE RAST QL: NORMAL
DEPRECATED WHITE OAK IGE RAST QL: NORMAL
DOG DANDER IGE QN: <0.35 KU/L
ENGL PLANTAIN IGE QN: <0.35 KU/L
MARSH ELDER IGE QN: <0.35 KU/L
RAGWEED, WESTERN IGE: <0.35 KU/L
RAGWEED, WESTERN, CLASS: NORMAL
ROACH IGE QN: <0.35 KU/L
TIMOTHY IGE QN: <0.35 KU/L
WHITE OAK IGE QN: <0.35 KU/L

## 2017-06-21 ENCOUNTER — HOSPITAL ENCOUNTER (OUTPATIENT)
Dept: UROLOGY | Facility: HOSPITAL | Age: 42
Discharge: HOME | End: 2017-06-21
Attending: UROLOGY
Payer: COMMERCIAL

## 2017-06-21 VITALS
SYSTOLIC BLOOD PRESSURE: 130 MMHG | WEIGHT: 287.25 LBS | BODY MASS INDEX: 38.91 KG/M2 | TEMPERATURE: 98 F | RESPIRATION RATE: 18 BRPM | HEART RATE: 53 BPM | DIASTOLIC BLOOD PRESSURE: 69 MMHG | HEIGHT: 72 IN

## 2017-06-21 DIAGNOSIS — Z30.2 ENCOUNTER FOR VASECTOMY: Primary | ICD-10-CM

## 2017-06-21 DIAGNOSIS — Z30.09 VASECTOMY EVALUATION: ICD-10-CM

## 2017-06-21 PROCEDURE — 27200994 HC ELECTROCAUTERY DEVICE

## 2017-06-21 PROCEDURE — 55250 REMOVAL OF SPERM DUCT(S): CPT | Mod: ,,, | Performed by: UROLOGY

## 2017-06-21 PROCEDURE — 55250 REMOVAL OF SPERM DUCT(S): CPT

## 2017-06-21 RX ORDER — DOXYCYCLINE 100 MG/1
100 CAPSULE ORAL DAILY
Qty: 3 CAPSULE | Refills: 0 | Status: SHIPPED | OUTPATIENT
Start: 2017-06-21 | End: 2017-06-24

## 2017-06-21 RX ORDER — OXYCODONE AND ACETAMINOPHEN 10; 325 MG/1; MG/1
1 TABLET ORAL EVERY 4 HOURS PRN
Qty: 20 TABLET | Refills: 0 | Status: SHIPPED | OUTPATIENT
Start: 2017-06-21 | End: 2017-07-11

## 2017-06-21 RX ORDER — LIDOCAINE HYDROCHLORIDE 20 MG/ML
10 INJECTION, SOLUTION INFILTRATION; PERINEURAL ONCE
Status: COMPLETED | OUTPATIENT
Start: 2017-06-21 | End: 2017-06-21

## 2017-06-21 RX ADMIN — LIDOCAINE HYDROCHLORIDE 10 ML: 20 INJECTION, SOLUTION INFILTRATION; PERINEURAL at 01:06

## 2017-06-21 NOTE — OP NOTE
This note has been moved from another encounter. If you have any questions, please contact [facility] at [phone]. Thank you.

## 2017-06-21 NOTE — PATIENT INSTRUCTIONS
What to Expect After a Vasectomy  You cannot drive or operate heavy machinery on the day of the procedure.    Apply ice packs to the scrotal area for 24-48 hours. Avoid direct contact of the ice pack with the skin. Scrotal supports, jock straps, or fitted underwear help elevate the scrotum and reduce discomfort.    You may shower the next day. Gently apply soapy water to the scrotum to wash. Rinse and dry yourself by blotting the skin, not rubbing.    Avoid strenuous physical exercises or sexual relations for at least one week after a vasectomy.    Continue to use birth control for at least 6 weeks or 10-20 ejaculations. You are still considered fertile until your urologist examines a post-vasectomy semen analysis at 6 weeks and perhaps one at 8 weeks as well. Drop off the specimen at the 4th floor [facility] between 8am and 4pm.    Do NOT resume unprotected sexual activity until your physician finds no sperm in your semen.    All stitches will dissolve on their own in 1-2 weeks.    Signs and Symptoms to Report  · A large amount of bleeding at the site  · An unusual amount of pain  · A large amount of swelling in the scrotum  · Fever and chills  · Any signs of infection, such as redness at the site or foul-smelling drainage    Risks  The risks of complication after vasectomy are very low. A few of the risks include:  · Bleeding  · Infection  · Scrotal hematoma - a collection of blood in the scrotum  · Inflammation of the epididymis - inflammation of a structure next to the testicle that helps in maturation of sperm  · Sperm granuloma - a collection of sperm that leaks out from the vas deferens, forming a small nodule or lump. This does not usually cause any discomfort but you may feel it in the scrotum.  · Recanalization - the restoration of the lumen or transport tube between the two ends of the vas deferens, possibly causing fertility    If you have any questions or concerns, please call your [facility]ist at [phone].

## 2017-06-21 NOTE — OP NOTE
DATE OF PROCEDURE:  06/21/2017    PREOPERATIVE DIAGNOSIS:  Undesired fertility.    POSTOPERATIVE DIAGNOSIS:  Undesired fertility.    OPERATION PERFORMED:  Bilateral vasectomy.    DESCRIPTION OF PROCEDURE:  The patient was prepped and draped in supine   position.  The left vas was grasped and rotated to the midline.  An infiltration   of 3 mL of 2% plain Xylocaine was made into the skin and subcutaneous tissue,   vas and perivasal tissue.  A stab wound was made.  A 2.5 cm was excised after   being delivered into the field.  The ends were fulgurated, suture ligated,   folded back upon themselves, and buried separately.  The right side was done   through the same incision, 3-0 chromic was used throughout the procedure, and to   close the skin in an interrupted type fashion.  The patient tolerated the   procedure well.  He was discharged on Percocet and doxycycline and has the usual   post-vas precautions.  He will use some method of birth control and bring us in   his post vas specimen at 6 weeks.      PAULINO  dd: 06/21/2017 14:10:04 (CDT)  td: 06/21/2017 18:25:16 (MARLONT)  Doc ID   #[id number]  Job ID #[id number]    CC:

## 2017-07-11 ENCOUNTER — PATIENT MESSAGE (OUTPATIENT)
Dept: INTERNAL MEDICINE | Facility: CLINIC | Age: 42
End: 2017-07-11

## 2017-08-17 ENCOUNTER — PATIENT MESSAGE (OUTPATIENT)
Dept: INTERNAL MEDICINE | Facility: CLINIC | Age: 42
End: 2017-08-17

## 2017-08-21 ENCOUNTER — PATIENT MESSAGE (OUTPATIENT)
Dept: CRITICAL CARE MEDICINE | Facility: HOSPITAL | Age: 42
End: 2017-08-21

## 2017-08-21 RX ORDER — HYDROXYZINE HYDROCHLORIDE 25 MG/1
25 TABLET, FILM COATED ORAL 3 TIMES DAILY
Qty: 90 TABLET | Refills: 2 | Status: SHIPPED | OUTPATIENT
Start: 2017-08-21 | End: 2017-09-20

## 2017-10-23 ENCOUNTER — PATIENT MESSAGE (OUTPATIENT)
Dept: INTERNAL MEDICINE | Facility: CLINIC | Age: 42
End: 2017-10-23

## 2017-10-27 RX ORDER — ESCITALOPRAM OXALATE 10 MG/1
10 TABLET ORAL DAILY
Qty: 30 TABLET | Refills: 5 | Status: SHIPPED | OUTPATIENT
Start: 2017-10-27 | End: 2017-12-19

## 2017-10-31 ENCOUNTER — LAB VISIT (OUTPATIENT)
Dept: LAB | Facility: HOSPITAL | Age: 42
End: 2017-10-31
Payer: COMMERCIAL

## 2017-10-31 ENCOUNTER — OFFICE VISIT (OUTPATIENT)
Dept: INTERNAL MEDICINE | Facility: CLINIC | Age: 42
End: 2017-10-31
Payer: COMMERCIAL

## 2017-10-31 VITALS
SYSTOLIC BLOOD PRESSURE: 120 MMHG | HEART RATE: 50 BPM | HEIGHT: 72 IN | DIASTOLIC BLOOD PRESSURE: 70 MMHG | OXYGEN SATURATION: 99 % | BODY MASS INDEX: 38.22 KG/M2 | WEIGHT: 282.19 LBS

## 2017-10-31 DIAGNOSIS — G25.9 ABNORMAL LEG MOVEMENT: Primary | ICD-10-CM

## 2017-10-31 DIAGNOSIS — G25.9 ABNORMAL LEG MOVEMENT: ICD-10-CM

## 2017-10-31 LAB
ALBUMIN SERPL BCP-MCNC: 3.8 G/DL
ALP SERPL-CCNC: 68 U/L
ALT SERPL W/O P-5'-P-CCNC: 18 U/L
ANION GAP SERPL CALC-SCNC: 7 MMOL/L
AST SERPL-CCNC: 22 U/L
BASOPHILS # BLD AUTO: 0.09 K/UL
BASOPHILS NFR BLD: 1.2 %
BILIRUB SERPL-MCNC: 0.6 MG/DL
BUN SERPL-MCNC: 14 MG/DL
CALCIUM SERPL-MCNC: 9.7 MG/DL
CHLORIDE SERPL-SCNC: 104 MMOL/L
CO2 SERPL-SCNC: 29 MMOL/L
CREAT SERPL-MCNC: 1 MG/DL
DIFFERENTIAL METHOD: ABNORMAL
EOSINOPHIL # BLD AUTO: 0.4 K/UL
EOSINOPHIL NFR BLD: 4.8 %
ERYTHROCYTE [DISTWIDTH] IN BLOOD BY AUTOMATED COUNT: 12.8 %
EST. GFR  (AFRICAN AMERICAN): >60 ML/MIN/1.73 M^2
EST. GFR  (NON AFRICAN AMERICAN): >60 ML/MIN/1.73 M^2
FERRITIN SERPL-MCNC: 103 NG/ML
FOLATE SERPL-MCNC: 13.4 NG/ML
GLUCOSE SERPL-MCNC: 84 MG/DL
HCT VFR BLD AUTO: 42.1 %
HGB BLD-MCNC: 13.7 G/DL
IRON SERPL-MCNC: 81 UG/DL
LYMPHOCYTES # BLD AUTO: 3 K/UL
LYMPHOCYTES NFR BLD: 39.5 %
MAGNESIUM SERPL-MCNC: 1.9 MG/DL
MCH RBC QN AUTO: 30.4 PG
MCHC RBC AUTO-ENTMCNC: 32.5 G/DL
MCV RBC AUTO: 93 FL
MONOCYTES # BLD AUTO: 0.7 K/UL
MONOCYTES NFR BLD: 9.2 %
NEUTROPHILS # BLD AUTO: 3.4 K/UL
NEUTROPHILS NFR BLD: 45.2 %
NRBC BLD-RTO: 0 /100 WBC
PHOSPHATE SERPL-MCNC: 3 MG/DL
PLATELET # BLD AUTO: 297 K/UL
PMV BLD AUTO: 9.9 FL
POTASSIUM SERPL-SCNC: 4.1 MMOL/L
PROT SERPL-MCNC: 7.4 G/DL
RBC # BLD AUTO: 4.51 M/UL
SATURATED IRON: 22 %
SODIUM SERPL-SCNC: 140 MMOL/L
TOTAL IRON BINDING CAPACITY: 373 UG/DL
TRANSFERRIN SERPL-MCNC: 252 MG/DL
VIT B12 SERPL-MCNC: 419 PG/ML
WBC # BLD AUTO: 7.57 K/UL

## 2017-10-31 PROCEDURE — 36415 COLL VENOUS BLD VENIPUNCTURE: CPT

## 2017-10-31 PROCEDURE — 84100 ASSAY OF PHOSPHORUS: CPT

## 2017-10-31 PROCEDURE — 83540 ASSAY OF IRON: CPT

## 2017-10-31 PROCEDURE — 85025 COMPLETE CBC W/AUTO DIFF WBC: CPT

## 2017-10-31 PROCEDURE — 82746 ASSAY OF FOLIC ACID SERUM: CPT

## 2017-10-31 PROCEDURE — 83735 ASSAY OF MAGNESIUM: CPT

## 2017-10-31 PROCEDURE — 99213 OFFICE O/P EST LOW 20 MIN: CPT | Mod: S$GLB,,, | Performed by: INTERNAL MEDICINE

## 2017-10-31 PROCEDURE — 82728 ASSAY OF FERRITIN: CPT

## 2017-10-31 PROCEDURE — 82607 VITAMIN B-12: CPT

## 2017-10-31 PROCEDURE — 99999 PR PBB SHADOW E&M-EST. PATIENT-LVL IV: CPT | Mod: PBBFAC,,, | Performed by: INTERNAL MEDICINE

## 2017-10-31 PROCEDURE — 80053 COMPREHEN METABOLIC PANEL: CPT

## 2017-10-31 NOTE — PROGRESS NOTES
Subjective:       Patient ID: Jered Contreras is a 42 y.o. male.    Chief Complaint: Follow-up    HPI     A 42 year old male with hx of obesity and WAYNE who comes in for an urgent evaluation for recent leg movements. Reports his symptoms started 2 weeks ago in the form of ankle/leg jerking. Symptoms occur overnight and patient may not be aware of it. Reports most of jerking occurs late in the night around 3-4am. Reports worse occurring two nights ago waking up patient's wife. Denies any confusion following episodes. No fevers or chills. No recent changes in meds. Was wondering if Lexapro could be causing this. Has read about RLS.     Patient has underwent OBALON ballooning of the stomach for weight loss started 6/2017 and concluded late 7/2017 with a loss of ~55-60 ibs so far.     Review of Systems   Constitutional: Negative for activity change and unexpected weight change.   HENT: Negative for hearing loss, rhinorrhea and trouble swallowing.    Eyes: Negative for discharge and visual disturbance.   Respiratory: Negative for chest tightness and wheezing.    Cardiovascular: Negative for chest pain and palpitations.   Gastrointestinal: Negative for blood in stool, constipation, diarrhea and vomiting.   Endocrine: Negative for polydipsia and polyuria.   Genitourinary: Negative for difficulty urinating, hematuria and urgency.   Musculoskeletal: Negative for arthralgias, joint swelling and neck pain.   Neurological: Negative for weakness and headaches.   Psychiatric/Behavioral: Positive for dysphoric mood. Negative for confusion.       Objective:      Physical Exam    General: overweight patient. appears comfortable and in no apparent distress   Eyes: conjunctivae/corneas clear. PERRL. EOMI.  Neck: supple, symmetrical, trachea midline, no JVD  Cardiovascular: Heart: regular rate and rhythm, S1, S2 normal, no murmur, click, rub or gallop.  Lungs: clear to auscultation bilaterally and normal respiratory effort  Chest  Wall: no tenderness  Abdomen/Rectal: Abdomen: Non distended. + BS. No masses. No TTP. No rebound or guarding. Negative Spencer's sign. Rectal: not examined  Extremities: no edema, redness or tenderness in the calves or thighs. Pulses: 2+ and symmetric  Skin: Skin color, texture, turgor normal. No rashes or lesions  Musculoskeletal: full range of motion of joints  Lymph Nodes: No cervical or supraclavicular adenopathy  Psych/Behavioral: Normal. Alert and oriented, appropriate affect.    Assessment:       1. Abnormal leg movement        Plan:         Jered was seen today for follow-up.    Diagnoses and all orders for this visit:    Abnormal leg movement  -     Myoclonus vs RLS. Given recent bariatric procedure with acute weight loss suspect electrolyte derangement. Proceed with following:  -     Comprehensive metabolic panel; Future  -     Magnesium; Future  -     PHOSPHORUS; Future  -     CBC auto differential; Future  -     Iron and TIBC; Future  -     Ferritin; Future  -     Folate; Future  -     Vitamin B12; Future    Case and plan d/w DR. Zhang.    DIANN Platt  PGY-3  Internal Medicine  Pager: [phone]     RTC in 3 months

## 2017-11-01 ENCOUNTER — TELEPHONE (OUTPATIENT)
Dept: INTERNAL MEDICINE | Facility: CLINIC | Age: 42
End: 2017-11-01

## 2017-11-01 RX ORDER — ROPINIROLE 0.5 MG/1
0.5 TABLET, FILM COATED ORAL NIGHTLY
Qty: 30 TABLET | Refills: 5 | Status: SHIPPED | OUTPATIENT
Start: 2017-11-01 | End: 2017-12-19 | Stop reason: SDUPTHER

## 2017-11-01 NOTE — TELEPHONE ENCOUNTER
Called patient and informed about normal lab results. I informed him that I have searched about his symptoms being a result of his recent bariatric ballooning but found no good correlation. I further recommended him about discussing these new symptoms with his bariatric surgeon.     Will proceed with starting Requip 0.5mg qhs and follow up on patient's symptoms.     DIANN Platt  PGY-3  Internal Medicine  Pager: [phone]

## 2017-11-08 ENCOUNTER — OFFICE VISIT (OUTPATIENT)
Dept: INTERNAL MEDICINE | Facility: CLINIC | Age: 42
End: 2017-11-08
Payer: COMMERCIAL

## 2017-11-08 VITALS
DIASTOLIC BLOOD PRESSURE: 68 MMHG | TEMPERATURE: 98 F | OXYGEN SATURATION: 98 % | SYSTOLIC BLOOD PRESSURE: 110 MMHG | BODY MASS INDEX: 38.66 KG/M2 | HEART RATE: 74 BPM | WEIGHT: 285.06 LBS

## 2017-11-08 DIAGNOSIS — R05.9 COUGH: ICD-10-CM

## 2017-11-08 DIAGNOSIS — J06.9 UPPER RESPIRATORY TRACT INFECTION, UNSPECIFIED TYPE: ICD-10-CM

## 2017-11-08 DIAGNOSIS — R09.81 HEAD CONGESTION: Primary | ICD-10-CM

## 2017-11-08 DIAGNOSIS — J30.9 CHRONIC ALLERGIC RHINITIS, UNSPECIFIED SEASONALITY, UNSPECIFIED TRIGGER: ICD-10-CM

## 2017-11-08 DIAGNOSIS — J02.9 SORE THROAT: ICD-10-CM

## 2017-11-08 LAB
DEPRECATED S PYO AG THROAT QL EIA: NEGATIVE
FLUAV AG SPEC QL IA: NEGATIVE
FLUBV AG SPEC QL IA: NEGATIVE
SPECIMEN SOURCE: NORMAL

## 2017-11-08 PROCEDURE — 99213 OFFICE O/P EST LOW 20 MIN: CPT | Mod: S$GLB,,, | Performed by: INTERNAL MEDICINE

## 2017-11-08 PROCEDURE — 87880 STREP A ASSAY W/OPTIC: CPT

## 2017-11-08 PROCEDURE — 99999 PR PBB SHADOW E&M-EST. PATIENT-LVL IV: CPT | Mod: PBBFAC,,, | Performed by: INTERNAL MEDICINE

## 2017-11-08 PROCEDURE — 87081 CULTURE SCREEN ONLY: CPT

## 2017-11-08 PROCEDURE — 87400 INFLUENZA A/B EACH AG IA: CPT | Mod: 59

## 2017-11-08 NOTE — PROGRESS NOTES
Subjective:       Patient ID: Jered Contreras is a 42 y.o. male.    Chief Complaint: Nasal Congestion; Ear Problem; and Cough    Scratchy throat, ear congestion, sinus pressure. Mild headache and cough. Runny nose. Started about 48 hours ago. Throat was first, used antihistamine/decongestant. No known exposure to the Flu. He is exposed to people at his work who may have been ill.   Possible low-grade or subjective temperature He is worried about flu, strep throat.  He has a big art project coming up through work and says he has difficulty taking time off because of this obligation.      Cough   Associated symptoms include a fever ( possible low-grade subjective), headaches, postnasal drip, rhinorrhea and a sore throat. Pertinent negatives include no chest pain, chills, rash, shortness of breath or wheezing.     Review of Systems   Constitutional: Positive for fatigue and fever ( possible low-grade subjective). Negative for chills and unexpected weight change.   HENT: Positive for congestion, postnasal drip, rhinorrhea, sinus pressure and sore throat. Negative for trouble swallowing.    Eyes: Negative for visual disturbance.   Respiratory: Positive for cough. Negative for shortness of breath and wheezing.    Cardiovascular: Negative for chest pain and palpitations.   Gastrointestinal: Negative for abdominal pain, constipation, diarrhea, nausea and vomiting.   Genitourinary: Negative for difficulty urinating.   Musculoskeletal: Negative for neck pain.   Skin: Negative for rash.   Neurological: Positive for headaches. Negative for dizziness.       Objective:      Physical Exam   Constitutional: He is oriented to person, place, and time. He appears well-developed and well-nourished. No distress.   HENT:   Head: Normocephalic and atraumatic.   Right Ear: External ear normal.   Left Ear: External ear normal.   Mouth/Throat: No oropharyngeal exudate.   TM's clear, pharynx Red with small amount of exudate.  Rapid  strep swab was taken along with rapid flu swab.   Eyes: Conjunctivae and EOM are normal. Pupils are equal, round, and reactive to light. No scleral icterus.   Neck: Normal range of motion. Neck supple. No thyromegaly present.   No supraclavicular nodes palpated   Cardiovascular: Normal rate, regular rhythm and normal heart sounds.    No murmur heard.  Pulmonary/Chest: Effort normal and breath sounds normal. He has no wheezes.   Abdominal: Soft. Bowel sounds are normal. He exhibits no mass. There is no tenderness.   Musculoskeletal: He exhibits no edema.   Lymphadenopathy:     He has no cervical adenopathy.   Neurological: He is alert and oriented to person, place, and time.   Skin: No pallor.   Psychiatric: He has a normal mood and affect.       Assessment:       1. Head congestion    2. Chronic allergic rhinitis, unspecified seasonality, unspecified trigger    3. Cough    4. Upper respiratory tract infection, unspecified type    5. Sore throat        Plan:       Jered was seen today for nasal congestion, ear problem and cough.    Diagnoses and all orders for this visit:    Head congestion  -     Influenza antigen Nasal Swab  -     Throat Screen, Rapid    Chronic allergic rhinitis, unspecified seasonality, unspecified trigger  -     Influenza antigen Nasal Swab  -     Throat Screen, Rapid    Cough  -     Influenza antigen Nasal Swab  -     Throat Screen, Rapid    Upper respiratory tract infection, unspecified type  -     Influenza antigen Nasal Swab  -     Throat Screen, Rapid    Sore throat  -     Influenza antigen Nasal Swab  -     Throat Screen, Rapid    Other orders  -     Strep A culture, throat         Cap review results and treat appropriately.  Symptomatic treatment.     addendum:  Flu and strep were negative.  Patient notified.  Symptomatic treatment

## 2017-11-10 LAB — BACTERIA THROAT CULT: NORMAL

## 2017-12-19 ENCOUNTER — OFFICE VISIT (OUTPATIENT)
Dept: INTERNAL MEDICINE | Facility: CLINIC | Age: 42
End: 2017-12-19
Payer: COMMERCIAL

## 2017-12-19 VITALS
HEIGHT: 72 IN | WEIGHT: 301.81 LBS | HEART RATE: 61 BPM | SYSTOLIC BLOOD PRESSURE: 126 MMHG | BODY MASS INDEX: 40.88 KG/M2 | OXYGEN SATURATION: 98 % | DIASTOLIC BLOOD PRESSURE: 78 MMHG

## 2017-12-19 DIAGNOSIS — E66.01 MORBID OBESITY WITH BMI OF 45.0-49.9, ADULT: ICD-10-CM

## 2017-12-19 DIAGNOSIS — F32.A DEPRESSION, UNSPECIFIED DEPRESSION TYPE: ICD-10-CM

## 2017-12-19 DIAGNOSIS — F41.9 ANXIETY: Primary | ICD-10-CM

## 2017-12-19 PROCEDURE — 99999 PR PBB SHADOW E&M-EST. PATIENT-LVL IV: CPT | Mod: PBBFAC,,, | Performed by: INTERNAL MEDICINE

## 2017-12-19 PROCEDURE — 99214 OFFICE O/P EST MOD 30 MIN: CPT | Mod: S$GLB,,, | Performed by: INTERNAL MEDICINE

## 2017-12-19 RX ORDER — HYDROXYZINE HYDROCHLORIDE 25 MG/1
TABLET, FILM COATED ORAL
COMMUNITY
Start: 2017-11-19 | End: 2017-12-19 | Stop reason: SDUPTHER

## 2017-12-19 RX ORDER — OMEPRAZOLE 40 MG/1
CAPSULE, DELAYED RELEASE ORAL
COMMUNITY
Start: 2017-11-15 | End: 2018-05-10 | Stop reason: CLARIF

## 2017-12-19 RX ORDER — ROPINIROLE 0.5 MG/1
0.5 TABLET, FILM COATED ORAL NIGHTLY
Qty: 30 TABLET | Refills: 5 | Status: SHIPPED | OUTPATIENT
Start: 2017-12-19 | End: 2018-06-11

## 2017-12-19 RX ORDER — ESCITALOPRAM OXALATE 20 MG/1
20 TABLET ORAL DAILY
Qty: 30 TABLET | Refills: 11 | Status: SHIPPED | OUTPATIENT
Start: 2017-12-19 | End: 2018-06-11

## 2017-12-19 RX ORDER — HYDROXYZINE HYDROCHLORIDE 25 MG/1
25 TABLET, FILM COATED ORAL 3 TIMES DAILY PRN
Qty: 30 TABLET | Refills: 5 | Status: SHIPPED | OUTPATIENT
Start: 2017-12-19 | End: 2018-01-18

## 2017-12-19 NOTE — PROGRESS NOTES
Answers for HPI/ROS submitted by the patient on 12/18/2017   activity change: No  unexpected weight change: No  neck pain: No  hearing loss: No  rhinorrhea: No  trouble swallowing: No  eye discharge: No  visual disturbance: No  chest tightness: No  wheezing: No  chest pain: No  palpitations: No  blood in stool: No  constipation: No  vomiting: No  diarrhea: No  polydipsia: No  polyuria: No  difficulty urinating: No  urgency: No  hematuria: No  joint swelling: No  arthralgias: No  headaches: No  weakness: No  confusion: No  dysphoric mood: Yes

## 2017-12-19 NOTE — PROGRESS NOTES
Subjective:       Patient ID: Jered Contreras is a 42 y.o. male.    Chief Complaint: Follow-up    HPI       This is a 42 year old male with hx of WAYNE, obesity undergoing Obalon gastric ballooning, TAYO on CPAP, and RLS who comes in today for 2 month follow-up. Reports symptoms of anxiety have recurred around last month. Currently with short attention span, multiple quarrels with wife, and decreased focus capacity.   Has gained weight over past 2 months. Reports not always pertaining to strict 1400 evonne per day.  Reports symptoms of RLS improved with Requip.    Review of Systems   Constitutional: Negative for activity change and unexpected weight change.   HENT: Negative for hearing loss, rhinorrhea and trouble swallowing.    Eyes: Negative for discharge and visual disturbance.   Respiratory: Negative for chest tightness and wheezing.    Cardiovascular: Negative for chest pain and palpitations.   Gastrointestinal: Negative for blood in stool, constipation, diarrhea and vomiting.   Endocrine: Negative for polydipsia and polyuria.   Genitourinary: Negative for difficulty urinating, hematuria and urgency.   Musculoskeletal: Negative for arthralgias, joint swelling and neck pain.   Neurological: Negative for weakness and headaches.   Psychiatric/Behavioral: Positive for dysphoric mood. Negative for confusion.       Objective:      Physical Exam    General: obese male, appears to be in NAD  Eyes: conjunctivae/corneas clear. PERRL. EOMI.  Neck: supple, symmetrical, trachea midline, no JVD  Cardiovascular: Heart: regular rate and rhythm, S1, S2 normal, no murmur, click, rub or gallop.  Lungs: clear to auscultation bilaterally and normal respiratory effort  Chest Wall: no tenderness  Abdomen/Rectal: Abdomen: Non distended. + BS. No masses. No TTP. No rebound or guarding. Negative Spencer's sign. Rectal: not examined  Extremities: no edema, redness or tenderness in the calves or thighs. Pulses: 2+ and symmetric  Skin: Skin  color, texture, turgor normal. No rashes or lesions  Musculoskeletal: full range of motion of joints  Lymph Nodes: No cervical or supraclavicular adenopathy  Psych/Behavioral: Normal. Alert and oriented, appropriate affect.    Assessment:       1. Anxiety    2. Depression, unspecified depression type    3. Morbid obesity with BMI of 45.0-49.9, adult        Plan:       Jered was seen today for follow-up.    Diagnoses and all orders for this visit:    Anxiety   - Increase lexapro to 20mg daily. Consider adding wellbutrin if symptoms not improved.     Depression, unspecified depression type    Morbid obesity with BMI of 45.0-49.9, adult   - Concern over weight gain. Recommended continue weight loss through exercise.     Other orders  -     hydrOXYzine HCl (ATARAX) 25 MG tablet; Take 1 tablet (25 mg total) by mouth 3 (three) times daily as needed for Itching.  -     escitalopram oxalate (LEXAPRO) 20 MG tablet; Take 1 tablet (20 mg total) by mouth once daily.  -     rOPINIRole (REQUIP) 0.5 MG tablet; Take 1 tablet (0.5 mg total) by mouth every evening.        Case and plan d/w Dr. Meredith.

## 2018-02-15 ENCOUNTER — OFFICE VISIT (OUTPATIENT)
Dept: OTOLARYNGOLOGY | Facility: CLINIC | Age: 43
End: 2018-02-15
Payer: COMMERCIAL

## 2018-02-15 VITALS
SYSTOLIC BLOOD PRESSURE: 130 MMHG | HEIGHT: 72 IN | DIASTOLIC BLOOD PRESSURE: 78 MMHG | TEMPERATURE: 99 F | HEART RATE: 60 BPM

## 2018-02-15 DIAGNOSIS — R43.8 HYPOSMIA: ICD-10-CM

## 2018-02-15 DIAGNOSIS — R09.81 NASAL CONGESTION: Primary | ICD-10-CM

## 2018-02-15 DIAGNOSIS — G47.33 OSA ON CPAP: ICD-10-CM

## 2018-02-15 PROCEDURE — 99204 OFFICE O/P NEW MOD 45 MIN: CPT | Mod: S$GLB,,, | Performed by: OTOLARYNGOLOGY

## 2018-02-15 PROCEDURE — 3008F BODY MASS INDEX DOCD: CPT | Mod: S$GLB,,, | Performed by: OTOLARYNGOLOGY

## 2018-02-15 RX ORDER — AZELASTINE 1 MG/ML
2 SPRAY, METERED NASAL 2 TIMES DAILY
Qty: 30 ML | Refills: 2 | Status: ON HOLD | OUTPATIENT
Start: 2018-02-15 | End: 2018-05-14

## 2018-02-15 RX ORDER — CETIRIZINE HYDROCHLORIDE 5 MG/1
5 TABLET ORAL DAILY
COMMUNITY
End: 2018-06-12 | Stop reason: ALTCHOICE

## 2018-02-15 RX ORDER — HYDROXYZINE HYDROCHLORIDE 10 MG/1
25 TABLET, FILM COATED ORAL 3 TIMES DAILY PRN
COMMUNITY
End: 2018-06-19

## 2018-02-15 NOTE — PROGRESS NOTES
Subjective:       Patient ID: Jered Contreras is a 42 y.o. male.    Chief Complaint: Other (left nasal passage is blocked)    He is a new patient for me here today complaining of nasal blockage on and off for several years and gradually worsening.  He has been on Flonase and Zyrtec regularly for several years.  However overall he believes his symptoms have worsened and he feels more blocked more often with some reduction in his sense of smell at times.  There is also occasional sneezing although he states he underwent ALLERGY testing about a year to year and a half ago which was all negative.  He has been on CPAP nightly with a nasal interface.  He states he was unable to tolerate the full face mask but has been able to comply with the nasal interface using it nightly.  He believes he does not snore with this in place except occasionally according to his wife although there has been a 50 pound weight gain over the past few months and has been working with his PCP on this.  He reports compliance with cleaning and maintenance of the CPAP machine and no environmental changes.  There are no other otolaryngologic complaints.          Review of Systems   Ears: Positive for ear pressure and family history of hearing loss.  Negative for hearing loss, ear pain, ringing in ear, ear discharge, ear infections, dizziness, head trauma and taken gentramycin/streptomycin.    Nose:  Positive for nasal obstruction, loss of smell, postnasal drip and snoring. Negative for nosebleeds and nasal or sinus surgery.    Mouth/Throat: Negative for pain swallowing, impaired swallowing, hoarse voice, throat mass, neck mass, oral ulcers and neck lumps.   Constitutional: Negative for recent unexplained weight loss, fever, chills and night sweats.    Eyes:  Negative for history of glaucoma and visual change.   Cardiovascular:  Negative for chest pain, palpitations and history of high blood pressure.   Respiratory:  Positive for asthma.  Negative for emphysema, history of tuberculosis, recent cough and shortness of breath.    Gastrointestinal:  Positive for history of stomach ulcers or pain. Negative for acid reflux, indigestion and blood in stool.   Other:  Negative for kidney problem, bladder problem, prostate disease, arthritis, new or changing moles, weakness, disturbances in coordination, slurred, confusion, swollen glands, anemia and persistent infections.           Objective:      Physical Exam   Constitutional: He is oriented to person, place, and time. He appears well-developed and well-nourished. No distress.   HENT:   Head: Normocephalic and atraumatic.   Right Ear: Tympanic membrane, external ear and ear canal normal.   Left Ear: Tympanic membrane, external ear and ear canal normal.   Nose: No rhinorrhea or nasal deformity. No epistaxis.   Mouth/Throat: Uvula is midline, oropharynx is clear and moist and mucous membranes are normal. No oral lesions. No trismus in the jaw. No uvula swelling. No oropharyngeal exudate, posterior oropharyngeal edema or posterior oropharyngeal erythema.   There is mild nasal mucosal congestion anteriorly with mucoid secretions bilaterally.   Eyes: Conjunctivae and EOM are normal. Right eye exhibits no discharge. Left eye exhibits no discharge. No scleral icterus.   Neck: Normal range of motion and phonation normal. Neck supple. No tracheal deviation present. No thyromegaly present.   Pulmonary/Chest: Effort normal. No stridor. No respiratory distress.   Musculoskeletal: Normal range of motion. He exhibits no deformity.   Lymphadenopathy:     He has no cervical adenopathy.   Neurological: He is alert and oriented to person, place, and time. He displays no weakness. Coordination normal.   Skin: Skin is warm and dry. He is not diaphoretic.   Psychiatric: He has a normal mood and affect. His behavior is normal. His speech is not slurred.       Assessment:       1. Nasal congestion    2. TAYO on CPAP    3. Hyposmia         Plan:        Reviewed above and considerations and recommendations.  Azelastine nasal spray 2 sprays in each nostril twice a day.  Okay to continue Zyrtec versus Zyrtec-D regularly which he states does not keep him awake or impair his sleep in any way.  Elevate head as tolerated when sleeping.  Continue recommended care and maintenance of CPAP machine.  Follow-up in 3 weeks.

## 2018-02-15 NOTE — PATIENT INSTRUCTIONS
Azelastine nasal spray 2 sprays in each nostril twice a day.  Elevate head when sleeping as tolerated.  Okay to continue OTC Zyrtec vs Zyrtec-D daily.  Care and maintenance of CPAP.  Follow up in 3 weeks.

## 2018-03-08 ENCOUNTER — PATIENT MESSAGE (OUTPATIENT)
Dept: OTOLARYNGOLOGY | Facility: CLINIC | Age: 43
End: 2018-03-08

## 2018-03-08 ENCOUNTER — OFFICE VISIT (OUTPATIENT)
Dept: OTOLARYNGOLOGY | Facility: CLINIC | Age: 43
End: 2018-03-08
Payer: COMMERCIAL

## 2018-03-08 VITALS
DIASTOLIC BLOOD PRESSURE: 80 MMHG | HEIGHT: 72 IN | SYSTOLIC BLOOD PRESSURE: 127 MMHG | TEMPERATURE: 98 F | HEART RATE: 49 BPM

## 2018-03-08 DIAGNOSIS — E66.01 MORBID OBESITY WITH BMI OF 45.0-49.9, ADULT: ICD-10-CM

## 2018-03-08 DIAGNOSIS — G47.33 OSA ON CPAP: ICD-10-CM

## 2018-03-08 DIAGNOSIS — R09.81 NASAL CONGESTION: Primary | ICD-10-CM

## 2018-03-08 DIAGNOSIS — R43.8 HYPOSMIA: ICD-10-CM

## 2018-03-08 DIAGNOSIS — J34.2 NASAL SEPTAL DEVIATION: ICD-10-CM

## 2018-03-08 PROCEDURE — 31231 NASAL ENDOSCOPY DX: CPT | Mod: S$GLB,,, | Performed by: OTOLARYNGOLOGY

## 2018-03-08 PROCEDURE — 99214 OFFICE O/P EST MOD 30 MIN: CPT | Mod: 25,S$GLB,, | Performed by: OTOLARYNGOLOGY

## 2018-03-08 NOTE — PROGRESS NOTES
Subjective:       Patient ID: Jered Contreras is a 42 y.o. male.    Chief Complaint: Other (back for follow up/scope/weather change nose running)    He is here for follow-up.  He took the azelastine nasal spray twice a day and continued Zyrtec-D regularly as well.  His nose is more open but there is still nasal congestion and more so on the left.  His sense of smell is somewhat improved but remains reduced.  He continues with CPAP every evening for the past 2 years using a nasal interface.  At his last visit he reported tolerating this well with only occasional breakthrough snoring and mouth opening.  However since then he realizes he probably has a bit more problems with the nasal interface due to his nasal symptoms and is considering trying a fullface mask again as previously discussed.          Review of Systems   Ears: Positive for family history of hearing loss.  Negative for hearing loss, ear pain, ear pressure, ringing in ear, ear discharge, ear infections, dizziness, head trauma and taken gentramycin/streptomycin.    Nose:  Positive for nasal obstruction, loss of smell, postnasal drip and snoring. Negative for nosebleeds and nasal or sinus surgery.    Mouth/Throat: Negative for pain swallowing, impaired swallowing, hoarse voice, throat mass, neck mass, oral ulcers and neck lumps.   Constitutional: Negative for recent unexplained weight loss, fever, chills and night sweats.    Eyes:  Negative for history of glaucoma and visual change.   Cardiovascular:  Negative for chest pain, palpitations and history of high blood pressure.   Respiratory:  Negative for asthma, emphysema, history of tuberculosis, recent cough and shortness of breath.    Gastrointestinal:  Positive for history of stomach ulcers or pain. Negative for acid reflux, indigestion and blood in stool.   Other:  Negative for kidney problem, bladder problem, prostate disease, arthritis, new or changing moles, weakness, disturbances in  coordination, slurred, confusion, swollen glands, anemia and persistent infections.           Objective:      Physical Exam   Constitutional: He is oriented to person, place, and time. He appears well-developed and well-nourished. No distress.   HENT:   Head: Normocephalic and atraumatic.   Right Ear: Tympanic membrane, external ear and ear canal normal.   Left Ear: Tympanic membrane, external ear and ear canal normal.   Nose: No rhinorrhea or nasal deformity. No epistaxis.   Mouth/Throat: Uvula is midline, oropharynx is clear and moist and mucous membranes are normal. No oral lesions. No trismus in the jaw. No uvula swelling. No oropharyngeal exudate, posterior oropharyngeal edema or posterior oropharyngeal erythema.   See nasal endoscopy below.  Posterior tonsillar pillars are generous with tonsils 2+ out of 4 bilaterally.  Somewhat receding chin.   Eyes: Conjunctivae and EOM are normal. Right eye exhibits no discharge. Left eye exhibits no discharge. No scleral icterus.   Neck: Normal range of motion and phonation normal. Neck supple. No tracheal deviation present. No thyromegaly present.   Pulmonary/Chest: Effort normal. No stridor. No respiratory distress.   Musculoskeletal: Normal range of motion. He exhibits no deformity.   Lymphadenopathy:     He has no cervical adenopathy.   Neurological: He is alert and oriented to person, place, and time. He displays no weakness. Coordination normal.   Skin: Skin is warm and dry. He is not diaphoretic.   Psychiatric: He has a normal mood and affect. His behavior is normal. His speech is not slurred.       Assessment:       1. Nasal congestion    2. Nasal septal deviation    3. TAYO on CPAP    4. Hyposmia    5. Morbid obesity with BMI of 45.0-49.9, adult        Plan:        Reviewed above and nasal endoscopy and he would like to proceed.  See procedure note.    Reviewed all above and options and pros and cons and answered questions.  Discussed surgery as well as continued  nonsurgical options.  He states he will think it over but for now would like to continue with nonsurgical therapies.  Will continue azelastine nasal spray and Zyrtec-D.  Add over-the-counter Flonase nasal spray 2 sprays in each nostril every evening.  Continue CPAP every evening and consider fullface mask as discussed.  Continue to work on weight loss and keep follow up with sleep disorders specialist and PCP.  Surgical evaluation discussed versus follow-up with me in 4-6 weeks.

## 2018-03-08 NOTE — PATIENT INSTRUCTIONS
Continue azelastine nasal spray 2 sprays in each nostril twice a day.  Add Flonase nasal spray 2 sprays in each nostril every evening.  Elevate head when sleeping as tolerated.  Continue OTC Zyrtec vs Zyrtec-D.  Full face mask for CPAP.  Surgery discussed.  Otherwise follow up in 4 - 6 weeks.

## 2018-03-08 NOTE — PROCEDURES
Nasal/sinus endoscopy  Date/Time: 3/8/2018 5:33 PM  Performed by: BJORN EDWARDS  Authorized by: BJORN EDWARDS     Consent Done?:  Yes (Verbal)  Anesthesia:     Local anesthetic:  Lidocaine 2% and Ethan-Synephrine 1/2%    Patient tolerance:  Patient tolerated the procedure well with no immediate complications  Nose:     Procedure Performed:  Nasal Endoscopy  External:      No external nasal deformity  Intranasal:      Mucosa no polyps     Mucosa ulcers not present     No mucosa lesions present     Septum gross deformity  Nasopharynx:      No mucosa lesions     Adenoids not present     Posterior choanae patent     Bilateral nasal endoscopy performed revealing nasal mucosa and secretions clear bilaterally, nasal septal deviation with spurring to the right, and large mid to posterior septal spur on the left with narrowing of the nasal airway left greater than right.

## 2018-03-21 ENCOUNTER — PATIENT MESSAGE (OUTPATIENT)
Dept: INTERNAL MEDICINE | Facility: CLINIC | Age: 43
End: 2018-03-21

## 2018-03-22 ENCOUNTER — OFFICE VISIT (OUTPATIENT)
Dept: INTERNAL MEDICINE | Facility: CLINIC | Age: 43
End: 2018-03-22
Payer: COMMERCIAL

## 2018-03-22 VITALS
HEART RATE: 65 BPM | HEIGHT: 73 IN | WEIGHT: 315 LBS | DIASTOLIC BLOOD PRESSURE: 75 MMHG | SYSTOLIC BLOOD PRESSURE: 132 MMHG | BODY MASS INDEX: 41.75 KG/M2 | OXYGEN SATURATION: 97 %

## 2018-03-22 DIAGNOSIS — E66.01 MORBID OBESITY: ICD-10-CM

## 2018-03-22 DIAGNOSIS — Z00.00 HEALTHCARE MAINTENANCE: ICD-10-CM

## 2018-03-22 DIAGNOSIS — F41.9 ANXIETY: Primary | ICD-10-CM

## 2018-03-22 DIAGNOSIS — E66.01 MORBID OBESITY WITH BMI OF 45.0-49.9, ADULT: ICD-10-CM

## 2018-03-22 PROCEDURE — 99214 OFFICE O/P EST MOD 30 MIN: CPT | Mod: S$GLB,,, | Performed by: INTERNAL MEDICINE

## 2018-03-22 PROCEDURE — 99999 PR PBB SHADOW E&M-EST. PATIENT-LVL IV: CPT | Mod: PBBFAC,,, | Performed by: INTERNAL MEDICINE

## 2018-03-22 RX ORDER — BUPROPION HYDROCHLORIDE 150 MG/1
150 TABLET ORAL DAILY
Qty: 30 TABLET | Refills: 11 | Status: CANCELLED | OUTPATIENT
Start: 2018-03-22 | End: 2019-03-22

## 2018-03-22 NOTE — PROGRESS NOTES
Subjective:       Patient ID: Jered Contreras is a 42 y.o. male.    Chief Complaint: Follow-up    HPI     This is a 42 year old male with hx of WAYNE, morbid obesity completed Obalon gastric ballooning1/2018, TAYO on CPAP, and RLS who comes in today for 3 month follow-up. Reports symptoms of anxiety remarkably improved after alast month. Currently his mood is otherwise controlled and he has found coping measures that has helped him greatly especially after having some hardships with his ex-wife last week.   He has unfortunately gained 13 kg since his visit 3 months ago. Reports not adhering to strict 1400 evonne per day and not exercising due to the cold weather.   Reports symptoms of RLS improved with Requip.    Review of Systems   Constitutional: Negative for activity change and unexpected weight change.   HENT: Negative for hearing loss, rhinorrhea and trouble swallowing.    Eyes: Negative for discharge and visual disturbance.   Respiratory: Negative for chest tightness and wheezing.    Cardiovascular: Negative for chest pain and palpitations.   Gastrointestinal: Negative for blood in stool, constipation, diarrhea and vomiting.   Endocrine: Negative for polydipsia and polyuria.   Genitourinary: Negative for difficulty urinating, hematuria and urgency.   Musculoskeletal: Negative for arthralgias, joint swelling and neck pain.   Neurological: Negative for weakness and headaches.   Psychiatric/Behavioral: Positive for dysphoric mood. Negative for confusion.       Objective:      Physical Exam    General: obese male, appears to be in NAD.   Eyes: conjunctivae/corneas clear. PERRL. EOMI.  Neck: supple, symmetrical, trachea midline, no JVD.  Cardiovascular: Heart: regular rate and rhythm, S1, S2 normal, no murmur, click, rub or gallop.  Lungs: clear to auscultation bilaterally and normal respiratory effort.  Chest Wall: no tenderness.  Abdomen/Rectal: Abdomen: Non distended. + BS. No masses. No TTP. No rebound or  guarding. Negative Spencer's sign. Rectal: not examined  Extremities: no edema, redness or tenderness in the calves or thighs. Pulses: 2+ and symmetric.  Skin: Skin color, texture, turgor normal. No rashes or lesions.  Musculoskeletal: full range of motion of joints.  Lymph Nodes: No cervical or supraclavicular adenopathy.  Psych/Behavioral: Normal. Alert and oriented, appropriate affect.    Assessment:       1. Anxiety    2. Morbid obesity    3. Morbid obesity with BMI of 45.0-49.9, adult    4. Healthcare maintenance        Plan:         Jered was seen today for follow-up.    Diagnoses and all orders for this visit:    Anxiety   - Better controlled however patient is still using atarax once weekly for better control of symptoms.    - Hold off on Wellbutrin. Continue lexapro 20mg daily.    Morbid obesity with BMI of 45.0-49.9, adult   - Counseled patient at length. Patient motivated to begin calorie counting, exercise, and hopefully better sleep following ENT procedure.    - Patient no longer interested in following with Bariatric surgery for surgical solution.   - Refer to bariatric medicine.     Healthcare maintenance   - hgb a1c, and lipid panel. Recommended receiving flu shot next year. Wellness visit 3 months from now.    Other orders  -     Hemoglobin A1c; Future  -     Lipid panel; Future      Case and plan d/w Dr. Flynn.       DIANN Platt  PGY-3  Internal Medicine  Pager: [phone]

## 2018-04-23 ENCOUNTER — OFFICE VISIT (OUTPATIENT)
Dept: OTOLARYNGOLOGY | Facility: CLINIC | Age: 43
End: 2018-04-23
Payer: COMMERCIAL

## 2018-04-23 VITALS
WEIGHT: 315 LBS | DIASTOLIC BLOOD PRESSURE: 76 MMHG | HEIGHT: 73 IN | HEART RATE: 56 BPM | BODY MASS INDEX: 41.75 KG/M2 | SYSTOLIC BLOOD PRESSURE: 119 MMHG

## 2018-04-23 DIAGNOSIS — J30.9 ALLERGIC RHINITIS, UNSPECIFIED SEASONALITY, UNSPECIFIED TRIGGER: ICD-10-CM

## 2018-04-23 DIAGNOSIS — E66.01 MORBID OBESITY WITH BMI OF 40.0-44.9, ADULT: ICD-10-CM

## 2018-04-23 DIAGNOSIS — J34.89 NASAL OBSTRUCTION WITHOUT CHOANAL ATRESIA: ICD-10-CM

## 2018-04-23 DIAGNOSIS — G47.33 OBSTRUCTIVE SLEEP APNEA TREATED WITH CONTINUOUS POSITIVE AIRWAY PRESSURE (CPAP): ICD-10-CM

## 2018-04-23 DIAGNOSIS — J34.3 HYPERTROPHY OF BOTH INFERIOR NASAL TURBINATES: ICD-10-CM

## 2018-04-23 DIAGNOSIS — J34.2 NASAL SEPTAL DEVIATION: Primary | ICD-10-CM

## 2018-04-23 PROCEDURE — 99213 OFFICE O/P EST LOW 20 MIN: CPT | Mod: S$GLB,,, | Performed by: SPECIALIST

## 2018-04-23 RX ORDER — HYDROXYZINE HYDROCHLORIDE 25 MG/1
TABLET, FILM COATED ORAL
COMMUNITY
Start: 2018-03-27 | End: 2018-05-10 | Stop reason: CLARIF

## 2018-04-23 NOTE — PATIENT INSTRUCTIONS
Nasal Surgery: Septoplasty    Youre scheduled to have nasal surgery. The type of nasal surgery youre having is called septoplasty. Read on to learn more about what to expect during this surgery.During surgery, the surgeon may remove cartilage and bone to reshape the deviated septum. After surgery, there is more breathing space. Enough cartilage and bone remain to give the nose support.  What to expect during septoplasty  This surgery repairs a blockage inside the nose caused by a deviated septum. With a deviated septum, there is a problem with the wall that divides the nose into two chambers. A deviated septum may block air coming through one or both nostrils. This makes it harder for you to breathe through your nose. During septoplasty, the surgeon makes incisions inside the nose. Then the surgeon trims, reshapes, moves, or removes cartilage and sometimes bone from the septum.  Risks and possible complications  As with any surgery, nasal surgery has some risks. These include a slight risk of bleeding and infection. Your doctor will discuss any other risks and complications with you.   After septoplasty  After septoplasty, youll be taken to a recovery area or to your hospital room. Your experience may be as follows:  · You may have packing material inside your nose. This reduces bleeding and promotes healing. You may also have bandages (dressings) on the outside of your nose.  · Its normal to have some mucus and blood drain from your nose. Until packing is removed, you may have to breathe through your mouth.  · You may have some swelling or bruising around the eyelids if a rhinoplasty was also done.  · Expect some throat dryness and irritation.  · Pain medicine will be prescribed as needed. Dont take medicine that contains aspirin or ibuprofen. These can cause increased bleeding.  Follow-up care  Youll need to follow up with your doctor within a week after your surgery. Here is what to expect:  · Any  packing, splint, or dressings will probably be removed. You may feel slight discomfort and bleed a little when this is done.  · After the splint or packing is removed, youll most likely breathe better than you did before surgery.  · You may have minor numbness, pain, swelling, and a little stiffness under the tip of the nose.  · In a few days, the inside of your nose may swell and briefly block your breathing. Or a scab or crust may block breathing for a short time. Leave the scab alone. Your doctor can remove it. Using saline (irrigation or aerosol) regularly after surgery helps to reduce the amount of crusting at each visit.  · Contact your healthcare provider if you have any questions or concerns.  Date Last Reviewed: 11/1/2016  © 4699-8252 [employer], [email or web address]. [address] [place] [address], [place] [address]. All rights reserved. This information is not intended as a substitute for professional medical care. Always follow your healthcare professional's instructions.

## 2018-04-23 NOTE — LETTER
April 25, 2018      Sasha Perea MD  [address]  [address]  [facility] [address]           [facility]  [address], [address]  [facility] [address]  Phone: [phone]  Fax: [phone]          Patient: Jered Contreras   MR Number: [id number]   YOB: 1975   Date of Visit: 4/23/2018       Dear Dr. Sasha Perea:    Thank you for referring Jered Contreras to me for evaluation. Attached you will find relevant portions of my assessment and plan of care.    If you have questions, please do not hesitate to call me. I look forward to following Jered Cotnreras along with you.    Sincerely,    JAN Arredondo MD    Enclosure  CC:  No Recipients    If you would like to receive this communication electronically, please contact [email or web address] or [phone] to request more information on [employer] access.    For providers and/or their staff who would like to refer a patient to [facility], please contact us through our one-stop-shop provider referral line, [facility], at [phone].    If you feel you have received this communication in error or would no longer like to receive these types of communications, please e-mail [email or web address]

## 2018-04-25 ENCOUNTER — TELEPHONE (OUTPATIENT)
Dept: OTOLARYNGOLOGY | Facility: CLINIC | Age: 43
End: 2018-04-25

## 2018-04-25 NOTE — PROGRESS NOTES
Subjective:       Patient ID: Jered Contreras is a 42 y.o. male.    Chief Complaint: Sinus Problem    HPI  Review of Systems    Objective:      Physical Exam    Assessment:       1. Nasal septal deviation    2. Allergic rhinitis, unspecified seasonality, unspecified trigger    3. Hypertrophy of both inferior nasal turbinates    4. Nasal obstruction without choanal atresia    5. Obstructive sleep apnea treated with continuous positive airway pressure (CPAP)        Plan:       ***

## 2018-04-25 NOTE — PROGRESS NOTES
Subjective:       Patient ID: Jered Contreras is a 42 y.o. male.    Chief Complaint: Sinus Problem    The patient uses CPAP for obstructive sleep apnea.  He has chronic nasal obstruction that varies in degree.  Usually the left side is worse than the right.  He is referred here for evaluation of surgical correction of his airway.  He does use Astelin and Flonase on a daily basis.  Although they do it improve his nasal breathing it is never clear.  In the past she has used a [place] pot with little improvement in symptoms.      Review of Systems   Constitutional: Positive for fatigue. Negative for activity change, appetite change, chills, fever and unexpected weight change.   HENT: Positive for congestion, postnasal drip, sinus pressure and sore throat. Negative for ear discharge, ear pain, facial swelling, hearing loss, mouth sores, rhinorrhea, sinus pain, sneezing, tinnitus, trouble swallowing and voice change.    Eyes: Negative for photophobia, pain, discharge, redness, itching and visual disturbance.   Respiratory: Negative for apnea, cough, choking, shortness of breath and wheezing.    Cardiovascular: Negative for chest pain and palpitations.   Gastrointestinal: Negative for abdominal distention, abdominal pain, nausea and vomiting.   Musculoskeletal: Negative for arthralgias, myalgias, neck pain and neck stiffness.   Skin: Negative.  Negative for color change, pallor and rash.   Allergic/Immunologic: Negative for environmental allergies, food allergies and immunocompromised state.   Neurological: Positive for headaches. Negative for dizziness, facial asymmetry, speech difficulty, weakness, light-headedness and numbness.   Hematological: Negative for adenopathy. Does not bruise/bleed easily.   Psychiatric/Behavioral: Negative for agitation, confusion, decreased concentration and sleep disturbance.       Objective:      Physical Exam   Constitutional: He is oriented to person, place, and time. He appears  well-developed and well-nourished. He is cooperative.   HENT:   Head: Normocephalic.   Right Ear: External ear and ear canal normal. Tympanic membrane is retracted.   Left Ear: External ear and ear canal normal. Tympanic membrane is retracted.   Nose: Mucosal edema (cyanotic, boggy, hypertrophic and obstructive inferior turbinates bilaterally ), rhinorrhea (clear mucus bilaterally) and septal deviation (deviated to the left with 90% obstruction of the left nasal airway) present.   Mouth/Throat: Uvula is midline, oropharynx is clear and moist and mucous membranes are normal. No oral lesions.   Oral cavity-Costello class III, long thin palate with thick uvula, enlargement of the base of tongue with narrowing of the oropharyngeal inlet   Eyes: EOM and lids are normal. Pupils are equal, round, and reactive to light. Right eye exhibits no discharge and no exudate. Left eye exhibits no discharge and no exudate. Right conjunctiva is injected. Left conjunctiva is injected.   Neck: Trachea normal and normal range of motion. No muscular tenderness present. No tracheal deviation present. No thyroid mass and no thyromegaly present.   Cardiovascular: Normal rate, regular rhythm, normal heart sounds and normal pulses.    Pulmonary/Chest: Effort normal and breath sounds normal. No stridor. He has no wheezes. He has no rhonchi. He has no rales.   Abdominal: Soft. Bowel sounds are normal. There is no tenderness.   Musculoskeletal: Normal range of motion.   Lymphadenopathy:        Head (right side): No submental, no submandibular, no preauricular, no posterior auricular and no occipital adenopathy present.        Head (left side): No submental, no submandibular, no preauricular, no posterior auricular and no occipital adenopathy present.     He has no cervical adenopathy.   Neurological: He is alert and oriented to person, place, and time. He has normal strength. No cranial nerve deficit or sensory deficit. Gait normal.   Skin: Skin  is warm and dry. No petechiae and no rash noted. No cyanosis. Nails show no clubbing.   Psychiatric: He has a normal mood and affect. His speech is normal and behavior is normal. Judgment and thought content normal. Cognition and memory are normal.       Assessment:       1. Nasal septal deviation    2. Allergic rhinitis, unspecified seasonality, unspecified trigger    3. Hypertrophy of both inferior nasal turbinates    4. Nasal obstruction without choanal atresia    5. Obstructive sleep apnea treated with continuous positive airway pressure (CPAP)    6. Morbid obesity with BMI of 40.0-44.9, adult        Plan:       I have discussed with the patient the potential benefit to him of performing nasal surgery to improve his airway.  His CPAP should be more effective and should be able to operate at a lower pressure.  I discussed the risks and benefits of septoplasty and submucous resection of the inferior turbinates with the patient in full detail, discussing each complication listed on the operative consent.  I've answered all of his questions.  The operative consent was signed and witnessed.  He would like to proceed with surgery.

## 2018-04-25 NOTE — PROGRESS NOTES
"History & Physical    SUBJECTIVE:     History of Present Illness:  Patient is a 42 y.o. male presents with ***. Onset of symptoms was {desc; hpi onset:69038} with {Desc; clinical condition:17::"unchanged"} course since that time. Patient denies ***. Symptoms are aggravated by ***. Symptoms improve with ***. ***     Chief Complaint   Patient presents with    Sinus Problem       Review of patient's allergies indicates:   Allergen Reactions    Ceclor [cefaclor] Anaphylaxis    Penicillins        Current Outpatient Prescriptions   Medication Sig Dispense Refill    ASCORBATE CALCIUM (VITAMIN C ORAL) Take by mouth.      azelastine (ASTELIN) 137 mcg (0.1 %) nasal spray 2 sprays (274 mcg total) by Nasal route 2 (two) times daily. 30 mL 2    cetirizine (ZYRTEC) 5 MG tablet Take 5 mg by mouth once daily.      escitalopram oxalate (LEXAPRO) 20 MG tablet Take 1 tablet (20 mg total) by mouth once daily. 30 tablet 11    fluticasone (FLONASE) 50 mcg/actuation nasal spray 1-2 sprays @ lateral nostril once  a day 1 Bottle 2    hydrOXYzine HCl (ATARAX) 10 MG Tab Take 25 mg by mouth 3 (three) times daily as needed.      hydrOXYzine HCl (ATARAX) 25 MG tablet       multivitamin capsule Take 1 capsule by mouth once daily.      omeprazole (PRILOSEC) 40 MG capsule       diazePAM (VALIUM) 10 MG Tab Take 1 tablet (10 mg total) by mouth once. Take 2 pills 1 hour before procedure 2 tablet 0    levocetirizine (XYZAL) 5 MG tablet Take 1 tablet (5 mg total) by mouth every evening. 30 tablet 3    rOPINIRole (REQUIP) 0.5 MG tablet Take 1 tablet (0.5 mg total) by mouth every evening. 30 tablet 5     No current facility-administered medications for this visit.        Past Medical History:   Diagnosis Date    Allergic rhinitis     Asthma     Morbid obesity      Past Surgical History:   Procedure Laterality Date    APPENDECTOMY       Family History   Problem Relation Age of Onset    Diabetes Father     Hypertension Father     " "Melanoma Neg Hx      Social History   Substance Use Topics    Smoking status: Never Smoker    Smokeless tobacco: Never Used    Alcohol use 0.6 oz/week     1 Cans of beer per week        Review of Systems:  Review of Systems    OBJECTIVE:     Vital Signs (Most Recent)  Pulse: (!) 56 (04/23/18 1517)  BP: 119/76 (04/23/18 1517)  6' 1" (1.854 m)  (!) 150.1 kg (331 lb)     Physical Exam:  Physical Exam    Laboratory  {Labs Reviewed:08273::"***"}    Diagnostic Results:  {Results; Diagnostics:34655187::"***"}    ASSESSMENT/PLAN:     ***    PLAN:Plan     {Plan; Diagnostics:92785::"***"}       "

## 2018-04-26 DIAGNOSIS — J34.2 DEVIATED NASAL SEPTUM: Primary | ICD-10-CM

## 2018-04-26 DIAGNOSIS — J34.89 NASAL OBSTRUCTION: ICD-10-CM

## 2018-04-26 DIAGNOSIS — J34.3 NASAL TURBINATE HYPERTROPHY: ICD-10-CM

## 2018-05-10 ENCOUNTER — ANESTHESIA EVENT (OUTPATIENT)
Dept: SURGERY | Facility: OTHER | Age: 43
End: 2018-05-10
Payer: COMMERCIAL

## 2018-05-10 ENCOUNTER — HOSPITAL ENCOUNTER (OUTPATIENT)
Dept: PREADMISSION TESTING | Facility: OTHER | Age: 43
Discharge: HOME | End: 2018-05-10
Attending: SPECIALIST
Payer: COMMERCIAL

## 2018-05-10 VITALS
HEIGHT: 72 IN | TEMPERATURE: 98 F | DIASTOLIC BLOOD PRESSURE: 66 MMHG | SYSTOLIC BLOOD PRESSURE: 135 MMHG | WEIGHT: 310 LBS | HEART RATE: 64 BPM | OXYGEN SATURATION: 97 % | BODY MASS INDEX: 41.99 KG/M2

## 2018-05-10 RX ORDER — SODIUM CHLORIDE, SODIUM LACTATE, POTASSIUM CHLORIDE, CALCIUM CHLORIDE 600; 310; 30; 20 MG/100ML; MG/100ML; MG/100ML; MG/100ML
INJECTION, SOLUTION INTRAVENOUS CONTINUOUS
Status: CANCELLED | OUTPATIENT
Start: 2018-05-10

## 2018-05-10 RX ORDER — FAMOTIDINE 20 MG/1
20 TABLET, FILM COATED ORAL
Status: CANCELLED | OUTPATIENT
Start: 2018-05-10 | End: 2018-05-10

## 2018-05-10 RX ORDER — OXYCODONE HYDROCHLORIDE 5 MG/1
5 TABLET ORAL ONCE AS NEEDED
Status: CANCELLED | OUTPATIENT
Start: 2018-05-10 | End: 2018-05-10

## 2018-05-10 RX ORDER — LIDOCAINE HYDROCHLORIDE 10 MG/ML
0.5 INJECTION, SOLUTION EPIDURAL; INFILTRATION; INTRACAUDAL; PERINEURAL ONCE
Status: CANCELLED | OUTPATIENT
Start: 2018-05-10 | End: 2018-05-10

## 2018-05-10 NOTE — ANESTHESIA PREPROCEDURE EVALUATION
05/10/2018  Jered Contreras is a 42 y.o., male.    Anesthesia Evaluation    I have reviewed the Patient Summary Reports.    I have reviewed the Nursing Notes.   I have reviewed the Medications.     Review of Systems  Anesthesia Hx:  No problems with previous Anesthesia    Social:  Social Alcohol Use, Non-Smoker    Hematology/Oncology:  Hematology Normal   Oncology Normal     EENT/Dental:   chronic allergic rhinitis   Cardiovascular:  Cardiovascular Normal Exercise tolerance: good     Pulmonary:   Asthma asymptomatic Childhood asthma.   Renal/:  Renal/ Normal     Hepatic/GI:  Hepatic/GI Normal    Musculoskeletal:  Musculoskeletal Normal    Neurological:  Neurology Normal    Endocrine:  Endocrine Normal    Psych:   anxiety depression          Physical Exam  General:  Morbid Obesity    Airway/Jaw/Neck:  Airway Findings: Mouth Opening: Normal Tongue: Normal  General Airway Assessment: Adult  Mallampati: I  TM Distance: Normal, at least 6 cm  Jaw/Neck Findings:  Neck Findings:      Dental:  Dental Findings: In tact        Mental Status:  Mental Status Findings:  Cooperative, Alert and Oriented         Anesthesia Plan  Type of Anesthesia, risks & benefits discussed:  Anesthesia Type:  general  Patient's Preference:   Intra-op Monitoring Plan: standard ASA monitors  Intra-op Monitoring Plan Comments:   Post Op Pain Control Plan: per primary service following discharge from PACU  Post Op Pain Control Plan Comments:   Induction:    Beta Blocker:         Informed Consent: Patient understands risks and agrees with Anesthesia plan.  Questions answered. Anesthesia consent signed with patient.  ASA Score: 3     Day of Surgery Review of History & Physical:    H&P update referred to the surgeon.     Anesthesia Plan Notes: No labs.        Ready For Surgery From Anesthesia Perspective.

## 2018-05-10 NOTE — DISCHARGE INSTRUCTIONS
PRE-ADMIT TESTING -  [phone]    [address]  [place] [facility]          Your surgery has been scheduled at [facility]. We are pleased to have the opportunity to serve you. For Further Information please call [phone].    On the day of surgery please report to the Information Desk on the 1st floor.    · CONTACT YOUR PHYSICIAN'S OFFICE THE DAY PRIOR TO YOUR SURGERY TO OBTAIN YOUR ARRIVAL TIME.     · The evening before surgery do not eat anything after 9 p.m. ( this includes hard candy, chewing gum and mints).  You may only have GATORADE, POWERADE AND WATER  from 9 p.m. until you leave your home.   DO NOT DRINK ANY LIQUIDS ON THE WAY TO THE HOSPITAL.      SPECIAL MEDICATION INSTRUCTIONS: TAKE medications checked off by the Anesthesiologist on your Medication List.    Angiogram Patients: Take medications as instructed by your physician, including aspirin.     Surgery Patients:    If you take ASPIRIN - Your PHYSICIAN/SURGEON will need to inform you IF/OR when you need to stop taking aspirin prior to your surgery.     Do Not take any medications containing IBUPROFEN.  Do Not Wear any make-up or dark nail polish   (especially eye make-up) to surgery. If you come to surgery with makeup on you will be required to remove the makeup or nail polish.    Do not shave your surgical area at least 5 days prior to your surgery. The surgical prep will be performed at the hospital according to Infection Control regulations.    Leave all valuables at home.   Do Not wear any jewelry or watches, including any metal in body piercings.  Contact Lens must be removed before surgery. Either do not wear the contact lens or bring a case and solution for storage.  Please bring a container for eyeglasses or dentures as required.  Bring any paperwork your physician has provided, such as consent forms,  history and physicals, doctor's orders, etc.   Bring comfortable clothes that are loose fitting to wear upon  discharge. Take into consideration the type of surgery being performed.  Maintain your diet as advised per your physician the day prior to surgery.      Adequate rest the night before surgery is advised.   Park in the Parking lot behind the hospital or in the [facility] across the street from the parking lot. Parking is complimentary.  If you will be discharged the same day as your procedure, please arrange for a responsible adult to drive you home or to accompany you if traveling by taxi.   YOU WILL NOT BE PERMITTED TO DRIVE OR TO LEAVE THE HOSPITAL ALONE AFTER SURGERY.   It is strongly recommended that you arrange for someone to remain with you for the first 24 hrs following your surgery.       Thank you for your cooperation.  The Staff of [facility].        Bathing Instructions                                                                 Please shower the evening before and morning of your procedure with    ANTIBACTERIAL SOAP. ( DIAL, etc )  Concentrate on the surgical area   for at least 3 minutes and rinse completely. Dry off as usual.   Do not use any deodorant, powder, body lotions, perfume, after shave or    cologne.

## 2018-05-11 ENCOUNTER — PATIENT MESSAGE (OUTPATIENT)
Dept: SURGERY | Facility: OTHER | Age: 43
End: 2018-05-11

## 2018-05-11 ENCOUNTER — TELEPHONE (OUTPATIENT)
Dept: OTOLARYNGOLOGY | Facility: CLINIC | Age: 43
End: 2018-05-11

## 2018-05-11 NOTE — TELEPHONE ENCOUNTER
----- Message from Theresa Garcia sent at 5/11/2018  3:30 PM CDT -----  Contact: SHARMILA GAUTAM [[id number]]            Name of Who is Calling:SHARMILA GAUTAM [[id number]]    What is the request in detail: Returning a call.    Can the clinic reply by [facility]: no    What Number to Call Back if not in [facility]: [phone]

## 2018-05-14 ENCOUNTER — ANESTHESIA (OUTPATIENT)
Dept: SURGERY | Facility: OTHER | Age: 43
End: 2018-05-14
Payer: COMMERCIAL

## 2018-05-14 ENCOUNTER — HOSPITAL ENCOUNTER (OUTPATIENT)
Facility: OTHER | Age: 43
LOS: 1 days | Discharge: HOME | End: 2018-05-15
Attending: SPECIALIST | Admitting: SPECIALIST
Payer: COMMERCIAL

## 2018-05-14 ENCOUNTER — SURGERY (OUTPATIENT)
Age: 43
End: 2018-05-14

## 2018-05-14 DIAGNOSIS — J34.2 NASAL SEPTAL DEVIATION: Primary | ICD-10-CM

## 2018-05-14 DIAGNOSIS — E66.01 MORBID OBESITY WITH BMI OF 40.0-44.9, ADULT: ICD-10-CM

## 2018-05-14 DIAGNOSIS — G47.33 OBSTRUCTIVE SLEEP APNEA TREATED WITH CONTINUOUS POSITIVE AIRWAY PRESSURE (CPAP): ICD-10-CM

## 2018-05-14 PROCEDURE — 63600175 PHARM REV CODE 636 W HCPCS: Performed by: NURSE ANESTHETIST, CERTIFIED REGISTERED

## 2018-05-14 PROCEDURE — S0077 INJECTION, CLINDAMYCIN PHOSP: HCPCS | Performed by: NURSE ANESTHETIST, CERTIFIED REGISTERED

## 2018-05-14 PROCEDURE — 25000003 PHARM REV CODE 250: Performed by: NURSE ANESTHETIST, CERTIFIED REGISTERED

## 2018-05-14 PROCEDURE — 25000003 PHARM REV CODE 250: Performed by: SPECIALIST

## 2018-05-14 PROCEDURE — 37000008 HC ANESTHESIA 1ST 15 MINUTES: Performed by: SPECIALIST

## 2018-05-14 PROCEDURE — 25000003 PHARM REV CODE 250: Performed by: ANESTHESIOLOGY

## 2018-05-14 PROCEDURE — 63600175 PHARM REV CODE 636 W HCPCS: Performed by: SPECIALIST

## 2018-05-14 PROCEDURE — 94761 N-INVAS EAR/PLS OXIMETRY MLT: CPT

## 2018-05-14 PROCEDURE — 37000009 HC ANESTHESIA EA ADD 15 MINS: Performed by: SPECIALIST

## 2018-05-14 PROCEDURE — 71000039 HC RECOVERY, EACH ADD'L HOUR: Performed by: SPECIALIST

## 2018-05-14 PROCEDURE — 30140 RESECT INFERIOR TURBINATE: CPT | Mod: 50,51,, | Performed by: SPECIALIST

## 2018-05-14 PROCEDURE — 94799 UNLISTED PULMONARY SVC/PX: CPT

## 2018-05-14 PROCEDURE — 30520 REPAIR OF NASAL SEPTUM: CPT | Mod: ,,, | Performed by: SPECIALIST

## 2018-05-14 PROCEDURE — 36000707: Performed by: SPECIALIST

## 2018-05-14 PROCEDURE — 71000033 HC RECOVERY, INTIAL HOUR: Performed by: SPECIALIST

## 2018-05-14 PROCEDURE — 36000706: Performed by: SPECIALIST

## 2018-05-14 RX ORDER — HYDROXYZINE HYDROCHLORIDE 25 MG/1
25 TABLET, FILM COATED ORAL 3 TIMES DAILY PRN
Status: DISCONTINUED | OUTPATIENT
Start: 2018-05-14 | End: 2018-05-15 | Stop reason: HOSPADM

## 2018-05-14 RX ORDER — OXYMETAZOLINE HCL 0.05 %
SPRAY, NON-AEROSOL (ML) NASAL
Status: DISCONTINUED | OUTPATIENT
Start: 2018-05-14 | End: 2018-05-14 | Stop reason: HOSPADM

## 2018-05-14 RX ORDER — DEXAMETHASONE SODIUM PHOSPHATE 4 MG/ML
INJECTION, SOLUTION INTRA-ARTICULAR; INTRALESIONAL; INTRAMUSCULAR; INTRAVENOUS; SOFT TISSUE
Status: DISCONTINUED | OUTPATIENT
Start: 2018-05-14 | End: 2018-05-14

## 2018-05-14 RX ORDER — ESCITALOPRAM OXALATE 10 MG/1
20 TABLET ORAL DAILY
Status: DISCONTINUED | OUTPATIENT
Start: 2018-05-15 | End: 2018-05-15 | Stop reason: HOSPADM

## 2018-05-14 RX ORDER — ASCORBIC ACID 250 MG
1000 TABLET ORAL 2 TIMES DAILY
Status: DISCONTINUED | OUTPATIENT
Start: 2018-05-14 | End: 2018-05-15 | Stop reason: HOSPADM

## 2018-05-14 RX ORDER — FAMOTIDINE 20 MG/1
20 TABLET, FILM COATED ORAL
Status: COMPLETED | OUTPATIENT
Start: 2018-05-14 | End: 2018-05-14

## 2018-05-14 RX ORDER — SODIUM CHLORIDE, SODIUM LACTATE, POTASSIUM CHLORIDE, CALCIUM CHLORIDE 600; 310; 30; 20 MG/100ML; MG/100ML; MG/100ML; MG/100ML
INJECTION, SOLUTION INTRAVENOUS CONTINUOUS
Status: DISCONTINUED | OUTPATIENT
Start: 2018-05-14 | End: 2018-05-15 | Stop reason: HOSPADM

## 2018-05-14 RX ORDER — ONDANSETRON 2 MG/ML
4 INJECTION INTRAMUSCULAR; INTRAVENOUS DAILY PRN
Status: DISCONTINUED | OUTPATIENT
Start: 2018-05-14 | End: 2018-05-14 | Stop reason: HOSPADM

## 2018-05-14 RX ORDER — NEOSTIGMINE METHYLSULFATE 1 MG/ML
INJECTION, SOLUTION INTRAVENOUS
Status: DISCONTINUED | OUTPATIENT
Start: 2018-05-14 | End: 2018-05-14

## 2018-05-14 RX ORDER — MEPERIDINE HYDROCHLORIDE 50 MG/ML
12.5 INJECTION INTRAMUSCULAR; INTRAVENOUS; SUBCUTANEOUS ONCE AS NEEDED
Status: DISCONTINUED | OUTPATIENT
Start: 2018-05-14 | End: 2018-05-14 | Stop reason: HOSPADM

## 2018-05-14 RX ORDER — FENTANYL CITRATE 50 UG/ML
INJECTION, SOLUTION INTRAMUSCULAR; INTRAVENOUS
Status: DISCONTINUED | OUTPATIENT
Start: 2018-05-14 | End: 2018-05-14

## 2018-05-14 RX ORDER — SODIUM CHLORIDE, SODIUM LACTATE, POTASSIUM CHLORIDE, CALCIUM CHLORIDE 600; 310; 30; 20 MG/100ML; MG/100ML; MG/100ML; MG/100ML
INJECTION, SOLUTION INTRAVENOUS CONTINUOUS
Status: DISCONTINUED | OUTPATIENT
Start: 2018-05-14 | End: 2018-05-14

## 2018-05-14 RX ORDER — HYDROCODONE BITARTRATE AND ACETAMINOPHEN 5; 325 MG/1; MG/1
1 TABLET ORAL EVERY 4 HOURS PRN
Status: DISCONTINUED | OUTPATIENT
Start: 2018-05-14 | End: 2018-05-15 | Stop reason: HOSPADM

## 2018-05-14 RX ORDER — CETIRIZINE HYDROCHLORIDE 5 MG/1
5 TABLET ORAL DAILY
Status: DISCONTINUED | OUTPATIENT
Start: 2018-05-15 | End: 2018-05-15 | Stop reason: HOSPADM

## 2018-05-14 RX ORDER — OXYCODONE HYDROCHLORIDE 5 MG/1
5 TABLET ORAL
Status: DISCONTINUED | OUTPATIENT
Start: 2018-05-14 | End: 2018-05-14 | Stop reason: HOSPADM

## 2018-05-14 RX ORDER — ACETAMINOPHEN 10 MG/ML
INJECTION, SOLUTION INTRAVENOUS
Status: DISCONTINUED | OUTPATIENT
Start: 2018-05-14 | End: 2018-05-14

## 2018-05-14 RX ORDER — LIDOCAINE HYDROCHLORIDE AND EPINEPHRINE 10; 10 MG/ML; UG/ML
INJECTION, SOLUTION INFILTRATION; PERINEURAL
Status: DISCONTINUED | OUTPATIENT
Start: 2018-05-14 | End: 2018-05-14 | Stop reason: HOSPADM

## 2018-05-14 RX ORDER — GLYCOPYRROLATE 0.2 MG/ML
INJECTION INTRAMUSCULAR; INTRAVENOUS
Status: DISCONTINUED | OUTPATIENT
Start: 2018-05-14 | End: 2018-05-14

## 2018-05-14 RX ORDER — OXYCODONE HYDROCHLORIDE 5 MG/1
5 TABLET ORAL ONCE AS NEEDED
Status: DISCONTINUED | OUTPATIENT
Start: 2018-05-14 | End: 2018-05-14 | Stop reason: HOSPADM

## 2018-05-14 RX ORDER — MIDAZOLAM HYDROCHLORIDE 1 MG/ML
INJECTION INTRAMUSCULAR; INTRAVENOUS
Status: DISCONTINUED | OUTPATIENT
Start: 2018-05-14 | End: 2018-05-14

## 2018-05-14 RX ORDER — HYDROMORPHONE HYDROCHLORIDE 2 MG/ML
0.4 INJECTION, SOLUTION INTRAMUSCULAR; INTRAVENOUS; SUBCUTANEOUS EVERY 5 MIN PRN
Status: DISCONTINUED | OUTPATIENT
Start: 2018-05-14 | End: 2018-05-14 | Stop reason: HOSPADM

## 2018-05-14 RX ORDER — PROPOFOL 10 MG/ML
VIAL (ML) INTRAVENOUS
Status: DISCONTINUED | OUTPATIENT
Start: 2018-05-14 | End: 2018-05-14

## 2018-05-14 RX ORDER — ONDANSETRON 2 MG/ML
4 INJECTION INTRAMUSCULAR; INTRAVENOUS EVERY 6 HOURS PRN
Status: DISCONTINUED | OUTPATIENT
Start: 2018-05-14 | End: 2018-05-15 | Stop reason: HOSPADM

## 2018-05-14 RX ORDER — LIDOCAINE HCL/PF 100 MG/5ML
SYRINGE (ML) INTRAVENOUS
Status: DISCONTINUED | OUTPATIENT
Start: 2018-05-14 | End: 2018-05-14

## 2018-05-14 RX ORDER — CLINDAMYCIN PHOSPHATE 600 MG/50ML
INJECTION, SOLUTION INTRAVENOUS
Status: DISCONTINUED | OUTPATIENT
Start: 2018-05-14 | End: 2018-05-14

## 2018-05-14 RX ORDER — ROPINIROLE 0.25 MG/1
0.5 TABLET, FILM COATED ORAL NIGHTLY
Status: DISCONTINUED | OUTPATIENT
Start: 2018-05-14 | End: 2018-05-15 | Stop reason: HOSPADM

## 2018-05-14 RX ORDER — LIDOCAINE HYDROCHLORIDE 10 MG/ML
0.5 INJECTION, SOLUTION EPIDURAL; INFILTRATION; INTRACAUDAL; PERINEURAL ONCE
Status: DISCONTINUED | OUTPATIENT
Start: 2018-05-14 | End: 2018-05-14 | Stop reason: HOSPADM

## 2018-05-14 RX ORDER — HYDROCODONE BITARTRATE AND ACETAMINOPHEN 10; 325 MG/1; MG/1
1 TABLET ORAL EVERY 4 HOURS PRN
Status: DISCONTINUED | OUTPATIENT
Start: 2018-05-14 | End: 2018-05-15 | Stop reason: HOSPADM

## 2018-05-14 RX ORDER — ONDANSETRON HYDROCHLORIDE 2 MG/ML
INJECTION, SOLUTION INTRAMUSCULAR; INTRAVENOUS
Status: DISCONTINUED | OUTPATIENT
Start: 2018-05-14 | End: 2018-05-14

## 2018-05-14 RX ORDER — FENTANYL CITRATE 50 UG/ML
25 INJECTION, SOLUTION INTRAMUSCULAR; INTRAVENOUS EVERY 5 MIN PRN
Status: DISCONTINUED | OUTPATIENT
Start: 2018-05-14 | End: 2018-05-14 | Stop reason: HOSPADM

## 2018-05-14 RX ORDER — ROCURONIUM BROMIDE 10 MG/ML
INJECTION, SOLUTION INTRAVENOUS
Status: DISCONTINUED | OUTPATIENT
Start: 2018-05-14 | End: 2018-05-14

## 2018-05-14 RX ORDER — SODIUM CHLORIDE 0.9 % (FLUSH) 0.9 %
3 SYRINGE (ML) INJECTION
Status: DISCONTINUED | OUTPATIENT
Start: 2018-05-14 | End: 2018-05-15 | Stop reason: HOSPADM

## 2018-05-14 RX ADMIN — PROPOFOL 300 MG: 10 INJECTION, EMULSION INTRAVENOUS at 01:05

## 2018-05-14 RX ADMIN — GLYCOPYRROLATE 0.8 MG: 0.2 INJECTION, SOLUTION INTRAMUSCULAR; INTRAVENOUS at 02:05

## 2018-05-14 RX ADMIN — MIDAZOLAM HYDROCHLORIDE 2 MG: 1 INJECTION, SOLUTION INTRAMUSCULAR; INTRAVENOUS at 01:05

## 2018-05-14 RX ADMIN — ONDANSETRON 4 MG: 2 INJECTION, SOLUTION INTRAMUSCULAR; INTRAVENOUS at 05:05

## 2018-05-14 RX ADMIN — FAMOTIDINE 20 MG: 20 TABLET ORAL at 12:05

## 2018-05-14 RX ADMIN — HYDROCODONE BITARTRATE AND ACETAMINOPHEN 1 TABLET: 10; 325 TABLET ORAL at 10:05

## 2018-05-14 RX ADMIN — FENTANYL CITRATE 100 MCG: 50 INJECTION, SOLUTION INTRAMUSCULAR; INTRAVENOUS at 02:05

## 2018-05-14 RX ADMIN — OXYMETAZOLINE HYDROCHLORIDE 30 ML: 0.05 SPRAY NASAL at 01:05

## 2018-05-14 RX ADMIN — ACETAMINOPHEN 1000 MG: 10 INJECTION, SOLUTION INTRAVENOUS at 01:05

## 2018-05-14 RX ADMIN — FENTANYL CITRATE 150 MCG: 50 INJECTION, SOLUTION INTRAMUSCULAR; INTRAVENOUS at 01:05

## 2018-05-14 RX ADMIN — CLINDAMYCIN PHOSPHATE 600 MG: 12 INJECTION, SOLUTION INTRAVENOUS at 01:05

## 2018-05-14 RX ADMIN — DEXAMETHASONE SODIUM PHOSPHATE 8 MG: 4 INJECTION, SOLUTION INTRAMUSCULAR; INTRAVENOUS at 01:05

## 2018-05-14 RX ADMIN — HYDROCODONE BITARTRATE AND ACETAMINOPHEN 1 TABLET: 10; 325 TABLET ORAL at 05:05

## 2018-05-14 RX ADMIN — ROCURONIUM BROMIDE 50 MG: 10 INJECTION, SOLUTION INTRAVENOUS at 01:05

## 2018-05-14 RX ADMIN — ONDANSETRON 4 MG: 2 INJECTION, SOLUTION INTRAMUSCULAR; INTRAVENOUS at 01:05

## 2018-05-14 RX ADMIN — OXYCODONE HYDROCHLORIDE 5 MG: 5 TABLET ORAL at 03:05

## 2018-05-14 RX ADMIN — NEOSTIGMINE METHYLSULFATE 5 MG: 1 INJECTION INTRAVENOUS at 02:05

## 2018-05-14 RX ADMIN — LIDOCAINE HYDROCHLORIDE AND EPINEPHRINE 40 ML: 10; 10 INJECTION, SOLUTION INFILTRATION; PERINEURAL at 01:05

## 2018-05-14 RX ADMIN — CARBOXYMETHYLCELLULOSE SODIUM 2 DROP: 2.5 SOLUTION/ DROPS OPHTHALMIC at 01:05

## 2018-05-14 RX ADMIN — ONDANSETRON 4 MG: 2 INJECTION, SOLUTION INTRAMUSCULAR; INTRAVENOUS at 02:05

## 2018-05-14 RX ADMIN — LIDOCAINE HYDROCHLORIDE 80 MG: 20 INJECTION, SOLUTION INTRAVENOUS at 01:05

## 2018-05-14 RX ADMIN — GLYCOPYRROLATE 0.2 MG: 0.2 INJECTION, SOLUTION INTRAMUSCULAR; INTRAVENOUS at 01:05

## 2018-05-14 RX ADMIN — SODIUM CHLORIDE, SODIUM LACTATE, POTASSIUM CHLORIDE, AND CALCIUM CHLORIDE: 600; 310; 30; 20 INJECTION, SOLUTION INTRAVENOUS at 01:05

## 2018-05-14 RX ADMIN — SODIUM CHLORIDE, SODIUM LACTATE, POTASSIUM CHLORIDE, AND CALCIUM CHLORIDE: .6; .31; .03; .02 INJECTION, SOLUTION INTRAVENOUS at 04:05

## 2018-05-14 NOTE — ANESTHESIA POSTPROCEDURE EVALUATION
Anesthesia Post Evaluation    Patient: Jered Contreras    Procedure(s) Performed: Procedure(s) (LRB):  SEPTOPLASTY (N/A)  RESECTION-TURBINATES (SMR) (N/A)    Final Anesthesia Type: general  Patient location during evaluation: PACU  Patient participation: Yes- Able to Participate  Level of consciousness: awake and alert  Post-procedure vital signs: reviewed and stable  Pain management: adequate  Airway patency: patent  PONV status at discharge: No PONV  Anesthetic complications: no      Cardiovascular status: blood pressure returned to baseline and stable  Respiratory status: unassisted, spontaneous ventilation and room air  Hydration status: euvolemic  Follow-up not needed.        Visit Vitals  BP (!) 164/78 (Patient Position: Lying)   Pulse (!) 54   Temp 36.7 °C (98.1 °F) (Oral)   Resp 18   Ht 6' (1.829 m)   Wt (!) 140.2 kg (309 lb)   SpO2 96%   BMI 41.91 kg/m²       Pain/Bridgette Score: Pain Assessment Performed: Yes (5/14/2018  4:10 PM)  Presence of Pain: complains of pain/discomfort (5/14/2018  4:10 PM)  Pain Rating Prior to Med Admin: 9 (5/14/2018  3:45 PM)  Pain Rating Post Med Admin: 5 (5/14/2018  4:10 PM)  Bridgette Score: 10 (5/14/2018  4:10 PM)  Modified Bridgette Score: 18 (5/14/2018  3:45 PM)

## 2018-05-14 NOTE — H&P (VIEW-ONLY)
Subjective:       Patient ID: Jered Contreras is a 42 y.o. male.    Chief Complaint: Sinus Problem    The patient uses CPAP for obstructive sleep apnea.  He has chronic nasal obstruction that varies in degree.  Usually the left side is worse than the right.  He is referred here for evaluation of surgical correction of his airway.  He does use Astelin and Flonase on a daily basis.  Although they do it improve his nasal breathing it is never clear.  In the past she has used a [place] pot with little improvement in symptoms.      Review of Systems   Constitutional: Positive for fatigue. Negative for activity change, appetite change, chills, fever and unexpected weight change.   HENT: Positive for congestion, postnasal drip, sinus pressure and sore throat. Negative for ear discharge, ear pain, facial swelling, hearing loss, mouth sores, rhinorrhea, sinus pain, sneezing, tinnitus, trouble swallowing and voice change.    Eyes: Negative for photophobia, pain, discharge, redness, itching and visual disturbance.   Respiratory: Negative for apnea, cough, choking, shortness of breath and wheezing.    Cardiovascular: Negative for chest pain and palpitations.   Gastrointestinal: Negative for abdominal distention, abdominal pain, nausea and vomiting.   Musculoskeletal: Negative for arthralgias, myalgias, neck pain and neck stiffness.   Skin: Negative.  Negative for color change, pallor and rash.   Allergic/Immunologic: Negative for environmental allergies, food allergies and immunocompromised state.   Neurological: Positive for headaches. Negative for dizziness, facial asymmetry, speech difficulty, weakness, light-headedness and numbness.   Hematological: Negative for adenopathy. Does not bruise/bleed easily.   Psychiatric/Behavioral: Negative for agitation, confusion, decreased concentration and sleep disturbance.       Objective:      Physical Exam   Constitutional: He is oriented to person, place, and time. He appears  well-developed and well-nourished. He is cooperative.   HENT:   Head: Normocephalic.   Right Ear: External ear and ear canal normal. Tympanic membrane is retracted.   Left Ear: External ear and ear canal normal. Tympanic membrane is retracted.   Nose: Mucosal edema (cyanotic, boggy, hypertrophic and obstructive inferior turbinates bilaterally ), rhinorrhea (clear mucus bilaterally) and septal deviation (deviated to the left with 90% obstruction of the left nasal airway) present.   Mouth/Throat: Uvula is midline, oropharynx is clear and moist and mucous membranes are normal. No oral lesions.   Oral cavity-Costello class III, long thin palate with thick uvula, enlargement of the base of tongue with narrowing of the oropharyngeal inlet   Eyes: EOM and lids are normal. Pupils are equal, round, and reactive to light. Right eye exhibits no discharge and no exudate. Left eye exhibits no discharge and no exudate. Right conjunctiva is injected. Left conjunctiva is injected.   Neck: Trachea normal and normal range of motion. No muscular tenderness present. No tracheal deviation present. No thyroid mass and no thyromegaly present.   Cardiovascular: Normal rate, regular rhythm, normal heart sounds and normal pulses.    Pulmonary/Chest: Effort normal and breath sounds normal. No stridor. He has no wheezes. He has no rhonchi. He has no rales.   Abdominal: Soft. Bowel sounds are normal. There is no tenderness.   Musculoskeletal: Normal range of motion.   Lymphadenopathy:        Head (right side): No submental, no submandibular, no preauricular, no posterior auricular and no occipital adenopathy present.        Head (left side): No submental, no submandibular, no preauricular, no posterior auricular and no occipital adenopathy present.     He has no cervical adenopathy.   Neurological: He is alert and oriented to person, place, and time. He has normal strength. No cranial nerve deficit or sensory deficit. Gait normal.   Skin: Skin  is warm and dry. No petechiae and no rash noted. No cyanosis. Nails show no clubbing.   Psychiatric: He has a normal mood and affect. His speech is normal and behavior is normal. Judgment and thought content normal. Cognition and memory are normal.       Assessment:       1. Nasal septal deviation    2. Allergic rhinitis, unspecified seasonality, unspecified trigger    3. Hypertrophy of both inferior nasal turbinates    4. Nasal obstruction without choanal atresia    5. Obstructive sleep apnea treated with continuous positive airway pressure (CPAP)    6. Morbid obesity with BMI of 40.0-44.9, adult        Plan:       I have discussed with the patient the potential benefit to him of performing nasal surgery to improve his airway.  His CPAP should be more effective and should be able to operate at a lower pressure.  I discussed the risks and benefits of septoplasty and submucous resection of the inferior turbinates with the patient in full detail, discussing each complication listed on the operative consent.  I've answered all of his questions.  The operative consent was signed and witnessed.  He would like to proceed with surgery.

## 2018-05-14 NOTE — TRANSFER OF CARE
Anesthesia Transfer of Care Note    Patient: Jered Contreras    Procedure(s) Performed: Procedure(s) (LRB):  SEPTOPLASTY (N/A)  RESECTION-TURBINATES (SMR) (N/A)    Patient location: PACU    Anesthesia Type: general    Transport from OR: Transported from OR on room air with adequate spontaneous ventilation    Post pain: adequate analgesia    Post assessment: no apparent anesthetic complications    Post vital signs: stable    Level of consciousness: responds to stimulation    Nausea/Vomiting: no nausea/vomiting    Transfer of care protocol was followed      Last vitals:   Visit Vitals  /71 (BP Location: Right arm, Patient Position: Sitting)   Pulse (!) 54   Temp 36.7 °C (98.1 °F) (Oral)   Resp 16   Ht 6' (1.829 m)   Wt (!) 140.2 kg (309 lb)   SpO2 95%   BMI 41.91 kg/m²

## 2018-05-14 NOTE — BRIEF OP NOTE
[facility]  Brief Operative Note     SUMMARY     Surgery Date: 5/14/2018     Surgeon(s) and Role:     * JAN Arredondo MD - Primary    Assisting Surgeon: None    Pre-op Diagnosis:  Deviated nasal septum [J34.2]  Nasal obstruction [J34.89]  Nasal turbinate hypertrophy [J34.3]    Post-op Diagnosis:  Post-Op Diagnosis Codes:     * Deviated nasal septum [J34.2]     * Nasal obstruction [J34.89]     * Nasal turbinate hypertrophy [J34.3]    Procedure(s) (LRB):  SEPTOPLASTY (N/A)  RESECTION-TURBINATES (SMR) (N/A)    Anesthesia: General    Description of the findings of the procedure: Nasal septal deviation    Findings/Key Components: same    Estimated Blood Loss: * No values recorded between 5/14/2018  1:46 PM and 5/14/2018  2:51 PM *         Specimens:   Specimen (12h ago through future)    Start     Ordered    05/14/18 1420  Specimen to Pathology - Surgery  Once     Comments:  Nasal bone and septum      05/14/18 8300

## 2018-05-14 NOTE — INTERVAL H&P NOTE
The patient has been examined and the H&P has been reviewed:    I concur with the findings and no changes have occurred since H&P was written.    Anesthesia/Surgery risks, benefits and alternative options discussed and understood by patient/family.          Active Hospital Problems    Diagnosis  POA    Nasal septal deviation [J34.2]  Yes      Resolved Hospital Problems    Diagnosis Date Resolved POA   No resolved problems to display.

## 2018-05-15 VITALS
DIASTOLIC BLOOD PRESSURE: 58 MMHG | SYSTOLIC BLOOD PRESSURE: 122 MMHG | RESPIRATION RATE: 18 BRPM | OXYGEN SATURATION: 95 % | BODY MASS INDEX: 41.85 KG/M2 | TEMPERATURE: 98 F | WEIGHT: 309 LBS | HEIGHT: 72 IN | HEART RATE: 60 BPM

## 2018-05-15 PROCEDURE — 88311 DECALCIFY TISSUE: CPT | Mod: 26,,, | Performed by: PATHOLOGY

## 2018-05-15 PROCEDURE — 88305 TISSUE EXAM BY PATHOLOGIST: CPT | Mod: 26,,, | Performed by: PATHOLOGY

## 2018-05-15 PROCEDURE — 25000003 PHARM REV CODE 250: Performed by: SPECIALIST

## 2018-05-15 PROCEDURE — 88305 TISSUE EXAM BY PATHOLOGIST: CPT | Performed by: PATHOLOGY

## 2018-05-15 RX ORDER — CLARITHROMYCIN 500 MG/1
500 TABLET, FILM COATED ORAL EVERY 12 HOURS
Qty: 20 TABLET | Refills: 0 | Status: SHIPPED | OUTPATIENT
Start: 2018-05-15 | End: 2018-06-19

## 2018-05-15 RX ORDER — HYDROCODONE BITARTRATE AND ACETAMINOPHEN 7.5; 325 MG/1; MG/1
TABLET ORAL
Qty: 30 TABLET | Refills: 0 | Status: SHIPPED | OUTPATIENT
Start: 2018-05-15 | End: 2018-06-19

## 2018-05-15 RX ADMIN — ESCITALOPRAM 20 MG: 10 TABLET, FILM COATED ORAL at 08:05

## 2018-05-15 RX ADMIN — CETIRIZINE HYDROCHLORIDE 5 MG: 5 TABLET, FILM COATED ORAL at 08:05

## 2018-05-15 RX ADMIN — THERA TABS 1 TABLET: TAB at 08:05

## 2018-05-15 RX ADMIN — Medication 1000 MG: at 08:05

## 2018-05-15 NOTE — DISCHARGE SUMMARY
[facility]  Otorhinolaryngology-Head & Neck Surgery  Discharge Summary      Patient Name: Jered Contreras  MRN: [id number]  Admission Date: 5/14/2018  Hospital Length of Stay: 1 days  Discharge Date and Time:  05/15/2018 7:34 AM  Attending Physician: JAN Arredondo MD   Discharging Provider: RUBY Arredondo MD  Primary Care Provider: Agustina Travis MD         Procedure(s) (LRB):  SEPTOPLASTY (N/A)  RESECTION-TURBINATES (SMR) (N/A)     Hospital Course: admitted for nasal surgery, which proceeded uneventfully. Patient has Obstructive sleep apnea so he was kept overnight for observation. There were no problems and he is being discharged the morning after surgery.        Pending Diagnostic Studies:     None        Final Active Diagnoses:    Diagnosis Date Noted POA    PRINCIPAL PROBLEM:  Nasal septal deviation [J34.2] 05/14/2018 Yes      Problems Resolved During this Admission:    Diagnosis Date Noted Date Resolved POA      Discharged Condition: good    Disposition: Home or Self Care    Follow Up:  Follow-up Information     RUBY Arredondo MD In 1 week.    Specialty:  Otolaryngology  Contact information:  [address] [facility] [address] [place] [id number]  [phone]                 Patient Instructions:     Diet Adult Regular     Lifting restrictions   Order Comments: Do not lift heavier than 10 pounds     Other restrictions (specify):   Order Comments: Cough or sneeze with open mouth     Lifting restrictions   Order Comments: No lifting heavier than 10 pounds     Other restrictions (specify):   Order Comments: No blowing nose for 2 weeks, Cough or sneeze with open mouth for 2 weeks     Notify your health care provider if you experience any of the following:  temperature >100.4     Notify your health care provider if you experience any of the following:  persistent nausea and vomiting or diarrhea     Notify your health care provider if you experience any of the following:  severe  uncontrolled pain     Notify your health care provider if you experience any of the following:  redness, tenderness, or signs of infection (pain, swelling, redness, odor or green/yellow discharge around incision site)     Notify your health care provider if you experience any of the following:  difficulty breathing or increased cough     Notify your health care provider if you experience any of the following:  severe persistent headache     Change dressing (specify)   Order Comments: Dressing change: as needed       Medications:  Reconciled Home Medications:      Medication List      START taking these medications    clarithromycin 500 MG tablet  Commonly known as:  BIAXIN  Take 1 tablet (500 mg total) by mouth every 12 (twelve) hours.     hydrocodone-acetaminophen 7.5-325mg 7.5-325 mg per tablet  Commonly known as:  NORCO  1-2 by mouth every 4-6 hours, as needed for pain        CONTINUE taking these medications    cetirizine 5 MG tablet  Commonly known as:  ZYRTEC  Take 5 mg by mouth once daily.     escitalopram oxalate 20 MG tablet  Commonly known as:  LEXAPRO  Take 1 tablet (20 mg total) by mouth once daily.     hydrOXYzine HCl 10 MG Tab  Commonly known as:  ATARAX  Take 25 mg by mouth 3 (three) times daily as needed.     multivitamin capsule  Take 1 capsule by mouth once daily.     rOPINIRole 0.5 MG tablet  Commonly known as:  REQUIP  Take 1 tablet (0.5 mg total) by mouth every evening.     VITAMIN C ORAL  Take by mouth.        STOP taking these medications    azelastine 137 mcg (0.1 %) nasal spray  Commonly known as:  ASTELIN     fluticasone 50 mcg/actuation nasal spray  Commonly known as:  NATALIE Arredondo MD  Otorhinolaryngology-Head & Neck Surgery  [facility]

## 2018-05-15 NOTE — NURSING
Pt alert, oriented, c/o pain once overnight and medicated with good relief, pt 02 sat >95% pt refused continuous pulse ox as did not want beeping per pt baseline heart rate is bradycardia, pt denied SOB throughout shift, dressing to nose with scant drainage, pt voiding, fall precautions maintained at all times, gait steady,  purposeful rounding q2 hrs, call bell at bedside, POC reviewed with pt and pt verbalized understanding, pt resting comfortably at this time, no signs of distress.

## 2018-05-15 NOTE — OP NOTE
DATE OF PROCEDURE:  05/14/2018    PREOPERATIVE DIAGNOSES:  1.  Nasal septal deviation.  2.  Inferior turbinate hypertrophy.  3.  Nasal airway obstruction bilaterally.  4.  Obstructive sleep apnea -- using CPAP.    POSTOPERATIVE DIAGNOSES:  1.  Nasal septal deviation.  2.  Inferior turbinate hypertrophy.  3.  Nasal airway obstruction bilaterally.  4.  Obstructive sleep apnea -- using CPAP.    PROCEDURE PERFORMED:  1.  Septoplasty.  2.  Submucous resection of inferior turbinates bilaterally.  3.  Outfracture of inferior turbinates.    SURGEON:  Dimitrios Arredondo MD.    ASSISTANT:  None.    ANESTHESIA:  General endotracheal.    PROCEDURE IN DETAIL:  The patient was placed on the operating table in supine   position.  Adequate general endotracheal anesthesia was administered.  He was   prepped and draped.  The face was prepped with Phisoderm and the nasal passages   packed with neurosurgical cottonoids moistened 1:1000 epinephrine.    Xylocaine 1% with 1:100,000 epinephrine was used as infiltration of the   columella, nasal base and sill.  The packs were removed from the nose and the   mucoperichondrial flaps of the septum.  The mucoperiosteal flaps of the nasal   floor and posterior septum were infiltrated with the same local anesthetic.    Packs moistened with epinephrine were replaced in the nose, the face was   reprepped and the patient was sterilely draped.  A left hemitransfixion incision   was made.  Mucoperichondrial and mucoperiosteal flaps were developed   bilaterally using minimum sharp and primarily blunt dissection.  On the left   side of the septum, the mucoperichondrial and mucoperiosteal flaps were   completely elevated off the septum.  An incision was made in the posterior   cartilage leaving an L-shaped strut to support the tip.  The contralateral   mucoperichondrial flap was elevated through this incision.  A swivel knife was   used to remove the posterior cartilaginous septum.  Mucoperichondrium and    mucoperiosteum were further elevated from the right side after removal of the   window of cartilage.  The perpendicular plate of the ethmoid and vomer bones   were cross cut with double action scissors and deviated portions of bony septum   were removed in a piecemeal fashion.  There was a very large bony spur on the   right, which was removed separately.  These maneuvers achieved a straightening   of the nasal septum.  The mucoperichondrial flaps were then replaced in their   anatomic position.  The hemitransfixion incision was closed with interrupted   simple stitches of 4-0 chromic catgut.  A 4-0 plain catgut on a Robbie needle was   used to make a large horizontal mattress anterior, posterior   through-and-through stitch to the septum to hold the mucoperichondrial flaps in   place.  An additional superior-inferior stitch of through and through   horizontal mattress stitch of 4-0 plain catgut was placed to further secure the   flaps.  The nose was suctioned of blood and secretion.  The turbinates were then   treated, first the left and then the right as follows.  The turbinate was   infiltrated with 3 mL of Xylocaine 1% with 1:100,000 epinephrine and a puncture   wound was made in its anterior end.  Using a [place] elevator, the mucosa was   dissected off the underlying soft tissue and bone and then a microdebrider with   a 2.9 mm turbinate blade was used to debride submucosally the inferior turbinate   from anteriorly to posteriorly.  When debridement was completed, the turbinate   was further outfractured with a [place]mann elevator.  After completion of the left   turbinate, the right inferior turbinate was treated in identical fashion.    Khanna Silastic splints were then coated on their medial surface with antibiotic   ointment and placed in the nose.  They were affixed each other anteriorly with a   horizontal mattress stitch of 2-0 silk.  The nose was suctioned of blood and   secretions as was the mouth and  the procedure terminated.  The face was cleansed   of blood and preop solution and a mustache dressing was placed beneath the   nose.    ESTIMATED BLOOD LOSS:  60 mL.    There were no complications.              /silviano [id number] blank(s)        RAMON/LOREE  dd: 05/14/2018 14:50:56 (CDT)  td: 05/14/2018 22:34:00 (CDT)  Doc ID   #[id number]  Job ID #[id number]    CC:

## 2018-05-15 NOTE — NURSING
Discharge instructions reviewed and patient verbalized understanding. Follow-up appointments reviewed with patient. Patient educated on new home medications and their side effects. IV catheter removed with tip intact and no signs of swelling or redness. Nose dressing changed. Patient is in room awaiting transport.

## 2018-05-16 ENCOUNTER — TELEPHONE (OUTPATIENT)
Dept: OTOLARYNGOLOGY | Facility: CLINIC | Age: 43
End: 2018-05-16

## 2018-05-16 NOTE — TELEPHONE ENCOUNTER
----- Message from Rain Metz sent at 5/16/2018  4:11 PM CDT -----  Contact: self  Pt is calling in regards to scheduling a post op appt. Pt would like a call back to do so.    Pt can be reached at [phone].    Thank you

## 2018-05-21 ENCOUNTER — OFFICE VISIT (OUTPATIENT)
Dept: OTOLARYNGOLOGY | Facility: CLINIC | Age: 43
End: 2018-05-21
Payer: COMMERCIAL

## 2018-05-21 VITALS
DIASTOLIC BLOOD PRESSURE: 85 MMHG | WEIGHT: 309 LBS | HEIGHT: 72 IN | BODY MASS INDEX: 41.85 KG/M2 | SYSTOLIC BLOOD PRESSURE: 132 MMHG | HEART RATE: 58 BPM | TEMPERATURE: 98 F

## 2018-05-21 DIAGNOSIS — J34.3 HYPERTROPHY OF BOTH INFERIOR NASAL TURBINATES: ICD-10-CM

## 2018-05-21 DIAGNOSIS — J30.9 ALLERGIC RHINITIS, UNSPECIFIED SEASONALITY, UNSPECIFIED TRIGGER: ICD-10-CM

## 2018-05-21 DIAGNOSIS — J34.2 NASAL SEPTAL DEVIATION: Primary | ICD-10-CM

## 2018-05-21 PROCEDURE — 99024 POSTOP FOLLOW-UP VISIT: CPT | Mod: S$GLB,,, | Performed by: SPECIALIST

## 2018-05-22 NOTE — PROGRESS NOTES
Subjective:       Patient ID: Jered Contreras is a 42 y.o. male.    Chief Complaint: Follow-up (Nasal Surgery)    The patient is now 1 week postop.  He is having significant pressure in the facial area from the nasal splints.  He has not required analgesic pain medicine for the last 3 days.      Review of Systems   HENT: Positive for postnasal drip, sinus pain and sinus pressure.        Objective:      Physical Exam   HENT:   Nose-Silastic splints removed without difficulty, midline septum, mucus suctioned from the nasal passages       Assessment:       1. Nasal septal deviation    2. Hypertrophy of both inferior nasal turbinates    3. Allergic rhinitis, unspecified seasonality, unspecified trigger        Plan:       I will recheck patient in 1 week.  He is to continue using saline spray and sniffing.  He should refrain from blowing his nose one additional week he should continue with light activity for 1 additional week.  He may begin non-exercise walking from 2-4 weeks postop, light aerobics for the 2 weeks following that and then full resistance exercise and swimming after 6 weeks postop.

## 2018-05-28 ENCOUNTER — OFFICE VISIT (OUTPATIENT)
Dept: OTOLARYNGOLOGY | Facility: CLINIC | Age: 43
End: 2018-05-28
Payer: COMMERCIAL

## 2018-05-28 VITALS
HEART RATE: 70 BPM | BODY MASS INDEX: 41.85 KG/M2 | SYSTOLIC BLOOD PRESSURE: 124 MMHG | HEIGHT: 72 IN | WEIGHT: 309 LBS | DIASTOLIC BLOOD PRESSURE: 81 MMHG

## 2018-05-28 DIAGNOSIS — J34.3 HYPERTROPHY OF BOTH INFERIOR NASAL TURBINATES: ICD-10-CM

## 2018-05-28 DIAGNOSIS — J30.9 ALLERGIC RHINITIS, UNSPECIFIED SEASONALITY, UNSPECIFIED TRIGGER: ICD-10-CM

## 2018-05-28 DIAGNOSIS — J34.89 NASAL CRUSTING: ICD-10-CM

## 2018-05-28 DIAGNOSIS — J34.2 NASAL SEPTAL DEVIATION: Primary | ICD-10-CM

## 2018-05-28 PROCEDURE — 31237 NSL/SINS NDSC SURG BX POLYPC: CPT | Mod: 79,LT,S$GLB, | Performed by: SPECIALIST

## 2018-05-28 PROCEDURE — 99213 OFFICE O/P EST LOW 20 MIN: CPT | Mod: 25,24,S$GLB, | Performed by: SPECIALIST

## 2018-05-28 PROCEDURE — 3008F BODY MASS INDEX DOCD: CPT | Mod: CPTII,S$GLB,, | Performed by: SPECIALIST

## 2018-05-28 RX ORDER — HYDROXYZINE HYDROCHLORIDE 25 MG/1
25 TABLET, FILM COATED ORAL 3 TIMES DAILY PRN
COMMUNITY
Start: 2018-05-26 | End: 2018-08-27 | Stop reason: SDUPTHER

## 2018-05-28 NOTE — PROCEDURES
"Nasal/sinus endoscopy  Date/Time: 5/28/2018 11:50 AM    Time out: Immediately prior to procedure a "time out" was called to verify the correct patient, procedure, equipment, support staff and site/side marked as required.  Performed by: JAN BONILLA  Authorized by: JAN BONILLA     Consent Done?:  Yes (Verbal)  Anesthesia:     Local anesthetic:  Lidocaine 2% and Ethan-Synephrine 1/2% (Topical aerosol)    Location: Bilateral rigid nasal endoscopy with video.    Endoscope type: 0 degree, 3 mm rigid nasal telescope.    Patient tolerance:  Patient tolerated the procedure well with no immediate complications  Nose:     Procedure Performed:  Removal of Debridement  External:      No external nasal deformity  Intranasal:      Mucosa no polyps     No mucosa lesions present (Clear mucus and debris in both nasal passages, large hard crust in left posterior nasal passage)     Enlarged turbinates ( significantly reduced from preoperative state)     No septum gross deformity ( midline septum)  Nasopharynx:      No mucosa lesions     Adenoids not present     Posterior choanae patent     Eustachian tube patent     Left nasal crusting removed under endoscopic guidance, nasal passages clear bilaterally under endoscopic guidance      "

## 2018-05-29 NOTE — PROGRESS NOTES
Subjective:       Patient ID: Jered Contreras is a 42 y.o. male.    Chief Complaint: Follow-up and Sinus Problem    The patient is now 2 weeks postop.  His breathing on the left side has actually worsened slightly since removal of the splints.  He is getting some hard crusted material from the left nasal passage.  He is not having significant headaches since removal of the splints.  He has returned to work and is doing well with regard to energy levels.  Thus far, he is pleased with his results.      Review of Systems   HENT: Positive for congestion, postnasal drip, rhinorrhea and sinus pressure.    Neurological: Positive for headaches.       Objective:      Physical Exam   HENT:   HEENT-midline septum, inferior turbinates smaller than preop, decreased airflow on the left with large crust present    Rigid nasal endoscopy with debridement-crusting on the left removed under endoscopic guidance, debris removed from the right under endoscopic guidance       Assessment:       1. Nasal septal deviation    2. Allergic rhinitis, unspecified seasonality, unspecified trigger    3. Nasal crusting    4. Hypertrophy of both inferior nasal turbinates        Plan:       I'll recheck patient in 2 weeks.  He is to continue with nasal irrigation.  He will resume his nasal steroid and antihistamine use.

## 2018-06-11 ENCOUNTER — OFFICE VISIT (OUTPATIENT)
Dept: INTERNAL MEDICINE | Facility: CLINIC | Age: 43
End: 2018-06-11
Payer: COMMERCIAL

## 2018-06-11 ENCOUNTER — LAB VISIT (OUTPATIENT)
Dept: LAB | Facility: HOSPITAL | Age: 43
End: 2018-06-11
Attending: INTERNAL MEDICINE
Payer: COMMERCIAL

## 2018-06-11 VITALS
HEIGHT: 72 IN | HEART RATE: 49 BPM | WEIGHT: 315 LBS | DIASTOLIC BLOOD PRESSURE: 76 MMHG | BODY MASS INDEX: 42.66 KG/M2 | SYSTOLIC BLOOD PRESSURE: 122 MMHG

## 2018-06-11 DIAGNOSIS — Z00.00 HEALTHCARE MAINTENANCE: ICD-10-CM

## 2018-06-11 DIAGNOSIS — R35.0 URINARY FREQUENCY: ICD-10-CM

## 2018-06-11 DIAGNOSIS — F41.9 ANXIETY: Primary | ICD-10-CM

## 2018-06-11 DIAGNOSIS — E66.01 MORBID OBESITY WITH BMI OF 40.0-44.9, ADULT: ICD-10-CM

## 2018-06-11 LAB
ALBUMIN SERPL BCP-MCNC: 3.6 G/DL
ALP SERPL-CCNC: 56 U/L
ALT SERPL W/O P-5'-P-CCNC: 19 U/L
ANION GAP SERPL CALC-SCNC: 5 MMOL/L
AST SERPL-CCNC: 22 U/L
BILIRUB SERPL-MCNC: 0.8 MG/DL
BUN SERPL-MCNC: 10 MG/DL
CALCIUM SERPL-MCNC: 9.1 MG/DL
CHLORIDE SERPL-SCNC: 106 MMOL/L
CHOLEST SERPL-MCNC: 203 MG/DL
CHOLEST/HDLC SERPL: 4.1 {RATIO}
CO2 SERPL-SCNC: 29 MMOL/L
CREAT SERPL-MCNC: 0.9 MG/DL
EST. GFR  (AFRICAN AMERICAN): >60 ML/MIN/1.73 M^2
EST. GFR  (NON AFRICAN AMERICAN): >60 ML/MIN/1.73 M^2
ESTIMATED AVG GLUCOSE: 91 MG/DL
GLUCOSE SERPL-MCNC: 86 MG/DL
HBA1C MFR BLD HPLC: 4.8 %
HDLC SERPL-MCNC: 50 MG/DL
HDLC SERPL: 24.6 %
LDLC SERPL CALC-MCNC: 137.2 MG/DL
NONHDLC SERPL-MCNC: 153 MG/DL
POTASSIUM SERPL-SCNC: 4.1 MMOL/L
PROT SERPL-MCNC: 6.3 G/DL
SODIUM SERPL-SCNC: 140 MMOL/L
TRIGL SERPL-MCNC: 79 MG/DL

## 2018-06-11 PROCEDURE — 99214 OFFICE O/P EST MOD 30 MIN: CPT | Mod: S$GLB,,, | Performed by: INTERNAL MEDICINE

## 2018-06-11 PROCEDURE — 99999 PR PBB SHADOW E&M-EST. PATIENT-LVL V: CPT | Mod: PBBFAC,,, | Performed by: INTERNAL MEDICINE

## 2018-06-11 PROCEDURE — 83036 HEMOGLOBIN GLYCOSYLATED A1C: CPT

## 2018-06-11 PROCEDURE — 80061 LIPID PANEL: CPT

## 2018-06-11 PROCEDURE — 36415 COLL VENOUS BLD VENIPUNCTURE: CPT

## 2018-06-11 PROCEDURE — 80053 COMPREHEN METABOLIC PANEL: CPT

## 2018-06-11 PROCEDURE — 3008F BODY MASS INDEX DOCD: CPT | Mod: CPTII,S$GLB,, | Performed by: INTERNAL MEDICINE

## 2018-06-11 RX ORDER — SERTRALINE HYDROCHLORIDE 25 MG/1
25 TABLET, FILM COATED ORAL DAILY
Qty: 30 TABLET | Refills: 6 | Status: SHIPPED | OUTPATIENT
Start: 2018-06-11 | End: 2019-01-11 | Stop reason: SDUPTHER

## 2018-06-11 NOTE — PROGRESS NOTES
Subjective:       Patient ID: Jered Contreras is a 42 y.o. male.    Chief Complaint: Follow-up    HPI     This is a 42 year old male with hx of WAYNE, morbid obesity completed Obalon gastric ballooning1/2018, and TAYO on CPAP who comes in today for 3 month follow-up. He has had a septoplasty on 5/14/2018 and currently is able to breathe more comfortably. He still has the same level of snoring though.   He has noted increase in his weight over the past 3 months. He reports not counting calories consistently and sometimes eating unhealthy dietary options. When he does this, he is always mindful of eating small proportions. He has not been able to see bariatric medicine as he has been focused on keeping his follow up appt with his ENT following the septoplasty.  Patient reports intolerance to Lexapro as his anxiety is not controlled as previously in the last two weeks. Despite this, he is not loosing his temper easily or having repeated domestic disputes with his wife. Also, he has been having worsening insomnia over the past 2 weeks, similarly. He is having a slightly increased stress level with his parents aging and having a new boss at work. He is still able to execute his work duties with no issues.   He is no longer having RLS and has not refilled Requip.  He also reports urinary frequency with needing to urinate every 1-2 hours. He denies any dysuria, hematuria, abdo or flank pain. No fevers or chills.       Review of Systems   Constitutional: Negative for activity change and unexpected weight change.   HENT: Negative for hearing loss, rhinorrhea and trouble swallowing.    Eyes: Negative for discharge and visual disturbance.   Respiratory: Negative for chest tightness and wheezing.    Cardiovascular: Negative for chest pain and palpitations.   Gastrointestinal: Negative for blood in stool, constipation, diarrhea and vomiting.   Endocrine: Negative for polydipsia and polyuria.   Genitourinary: Negative for  difficulty urinating, hematuria and urgency.   Musculoskeletal: Negative for arthralgias, joint swelling and neck pain.   Neurological: Negative for weakness and headaches.   Psychiatric/Behavioral: Positive for dysphoric mood. Negative for confusion.       Objective:      Physical Exam    Vitals:    06/11/18 1336   BP: 122/76   Pulse:        General: obese male, appears to be in NAD.   Eyes: conjunctivae/corneas clear. PERRL. EOMI.  Neck: supple, symmetrical, trachea midline, no JVD.  Cardiovascular: Heart: regular rhythm, bradycardic, S1, S2 normal, no murmur, click, rub or gallop.  Lungs: clear to auscultation bilaterally and normal respiratory effort.  Chest Wall: no tenderness.  Abdomen/Rectal: Abdomen: Non distended. + BS. No masses. No TTP. No rebound or guarding. Rectal: not examined  Extremities: no edema, redness or tenderness in the calves or thighs. Pulses: 2+ and symmetric.  Skin: Skin color, texture, turgor normal. No rashes or lesions.  Musculoskeletal: full range of motion of joints.  Lymph Nodes: No cervical or supraclavicular adenopathy.  Psych/Behavioral: Normal. Alert and oriented, appropriate affect.    Assessment:       1. Anxiety    2. Morbid obesity with BMI of 40.0-44.9, adult    3. Healthcare maintenance    4. Urinary frequency        Plan:         Jered was seen today for follow-up.    Diagnoses and all orders for this visit:    Anxiety   - Switching Lexapro to Zoloft 25mg and increasing 25mg per month until symptoms controlled.   - Continue atarax prn    Morbid obesity with BMI of 45.0-49.9, adult  TAYO   - Counseled patient at length. Patient motivated to begin calorie counting, and exercise.   - Patient no longer interested in following with Bariatric surgery for surgical solution.   - Refer to bariatric medicine.    - Refer to sleep medicine.     Healthcare maintenance   - hgb a1c, CMP, and lipid panel. Recommended receiving flu shot next year.     Urinary frequency   -     Likely due  to increasing abdominal girth. Weight loss. No need for UA at this time.       Case and plan d/w Dr. Lynn.    RTC in 3 months.      DIANN Platt  PGY-3  Internal Medicine  Pager: [phone]

## 2018-06-11 NOTE — PROGRESS NOTES
I have personally taken the history and examined Jeerd Contreras and agree with the findings, assessment and plan.

## 2018-06-12 ENCOUNTER — OFFICE VISIT (OUTPATIENT)
Dept: OTOLARYNGOLOGY | Facility: CLINIC | Age: 43
End: 2018-06-12
Payer: COMMERCIAL

## 2018-06-12 VITALS
DIASTOLIC BLOOD PRESSURE: 86 MMHG | HEART RATE: 55 BPM | TEMPERATURE: 98 F | BODY MASS INDEX: 42.66 KG/M2 | WEIGHT: 315 LBS | HEIGHT: 72 IN | SYSTOLIC BLOOD PRESSURE: 123 MMHG

## 2018-06-12 DIAGNOSIS — J30.9 ALLERGIC RHINITIS, UNSPECIFIED SEASONALITY, UNSPECIFIED TRIGGER: ICD-10-CM

## 2018-06-12 DIAGNOSIS — J34.2 NASAL SEPTAL DEVIATION: Primary | ICD-10-CM

## 2018-06-12 DIAGNOSIS — J34.3 HYPERTROPHY OF BOTH INFERIOR NASAL TURBINATES: ICD-10-CM

## 2018-06-12 PROCEDURE — 99024 POSTOP FOLLOW-UP VISIT: CPT | Mod: S$GLB,,, | Performed by: SPECIALIST

## 2018-06-12 RX ORDER — LEVOCETIRIZINE DIHYDROCHLORIDE 5 MG/1
5 TABLET, FILM COATED ORAL NIGHTLY
Qty: 30 TABLET | Refills: 11 | Status: SHIPPED | OUTPATIENT
Start: 2018-06-12 | End: 2021-10-27

## 2018-06-12 RX ORDER — FLUTICASONE PROPIONATE 50 MCG
2 SPRAY, SUSPENSION (ML) NASAL DAILY
Qty: 1 BOTTLE | Refills: 11 | Status: SHIPPED | OUTPATIENT
Start: 2018-06-12 | End: 2020-04-13 | Stop reason: SDUPTHER

## 2018-06-13 NOTE — PROGRESS NOTES
Subjective:       Patient ID: Jered Contreras is a 42 y.o. male.    Chief Complaint: Follow-up (post op)    The patient is now 3 weeks postop.  He is breathing well through his nose.  He is not having significant pain or headaches.  He would like to resume using his CPAP as he is having significant fatigue from lack of using it.  He has been using Xyzal, Flonase and Astelin before surgery. He has not used them since.      Review of Systems   Constitutional: Positive for fatigue.   HENT: Negative for congestion, postnasal drip and rhinorrhea.    Neurological: Negative for headaches. Speech difficulty:  basis and supplement with the Astelin when needed.  He may resume using his CPAP with a caviat that he needs to postpone for at least week if the devices painful.        Obstructive sleep apnea-uses CPAP       Objective:      Physical Exam   HENT:   Nose-midline septum, no synechia, inferior turbinates significantly smaller than preoperative.  No crusting or debris, hemitransfixion an incision healing well       Assessment:       1. Nasal septal deviation    2. Allergic rhinitis, unspecified seasonality, unspecified trigger    3. Hypertrophy of both inferior nasal turbinates        Plan:       I will recheck the patient in 4 weeks.  He may resume using his CPAP, with the caveat that he needs to wait at least 1 more week if the devices painful when he places at on his face.  He is to resume daily use of Flonase in Xyzal and will supplement with Astelin when needed.

## 2018-06-19 ENCOUNTER — OFFICE VISIT (OUTPATIENT)
Dept: SLEEP MEDICINE | Facility: CLINIC | Age: 43
End: 2018-06-19
Payer: COMMERCIAL

## 2018-06-19 VITALS
SYSTOLIC BLOOD PRESSURE: 130 MMHG | HEIGHT: 72 IN | BODY MASS INDEX: 42.66 KG/M2 | HEART RATE: 60 BPM | WEIGHT: 315 LBS | DIASTOLIC BLOOD PRESSURE: 70 MMHG

## 2018-06-19 DIAGNOSIS — G47.33 OBSTRUCTIVE SLEEP APNEA: Primary | ICD-10-CM

## 2018-06-19 DIAGNOSIS — E66.01 MORBID OBESITY WITH BMI OF 40.0-44.9, ADULT: ICD-10-CM

## 2018-06-19 DIAGNOSIS — F32.A DEPRESSION, UNSPECIFIED DEPRESSION TYPE: ICD-10-CM

## 2018-06-19 PROCEDURE — 3008F BODY MASS INDEX DOCD: CPT | Mod: CPTII,S$GLB,, | Performed by: NURSE PRACTITIONER

## 2018-06-19 PROCEDURE — 99214 OFFICE O/P EST MOD 30 MIN: CPT | Mod: S$GLB,,, | Performed by: NURSE PRACTITIONER

## 2018-06-19 PROCEDURE — 99999 PR PBB SHADOW E&M-EST. PATIENT-LVL III: CPT | Mod: PBBFAC,,, | Performed by: NURSE PRACTITIONER

## 2018-06-19 NOTE — PROGRESS NOTES
Jered Contreras  was seen as a follow up for mgt of TAYO. Last seen by Dr. Berkowitz 3/14/17, this is my initial visit with him.     He continued to use c-check PAP after seen here and there. Mouth kept openingwiht use of nasal mask. He had devd' septal repair and SMTR 5/18 by Dr. Arredondo. Not able to resume PAP just yet, but when he does wants FFM. Having snoring, daytime sleepiness ,poor sleep quality w/o therapy. Wants to see if still has or reassess since ENT surgery. ESS=5, Wgt similar to today when had study 2015. He has gained 109# since last seen 2017.     Machine good condition, nuance, c-check 90% tile 5cm    HISTORY  3/14/17  CHIEF COMPLAINT:    Chief Complaint   Patient presents with    Sleep Apnea       HISTORY OF PRESENT ILLNESS: Jered Contreras is a 42 y.o. male is here for sleep evaluation.   Patient with loud snoring and witnessed sleep apnea.  +fatigue upon awakening 3-4 times per week.  No sleepiness a/w driving.  Occasional sleep talking.  No cataplexy.    [place] Sleepiness Scale score during initial sleep evaluation was 11.     During last appointment 10/14, patient was recommended psg.  Sleep study was delayed in part due to change in insurance.  Patient s/p sleep study 8/15 with ahi of 27.  Patient was set up cpap 9 cm H20.  Doing well with cpap.  Feeling rested with new cpap.  Patient lost significant weight with exercise.      SLEEP ROUTINE:  Activity the hour prior to sleep: talk and face book in bed    Bed partner:  fiance  Time to bed:  10 pm   Lights off:  yes  Sleep onset latency:  10 minutes        Disruptions or awakenings:    0-1 time (no difficulty going back to sleep)    Wakeup time:      5 am   Perceived sleep quality:  Rested upon awake.         Daytime naps:      none  Weekend sleep routine:      11 pm till 7 am (still tire)  Caffeine use: 3-4 cups of coffee per day  exercise habit:   Walking 8 miles per day.    PAST MEDICAL HISTORY:    Active Ambulatory Problems      Diagnosis Date Noted    AR (allergic rhinitis) 09/12/2013    Morbid obesity with BMI of 40.0-44.9, adult 12/27/2013    Heart palpitations 12/27/2013    Anxiety 05/09/2017    Depression 05/09/2017    Acute pain of right knee 06/13/2017    Healthcare maintenance 03/22/2018    Nasal septal deviation 05/14/2018    Hypertrophy of both inferior nasal turbinates 06/12/2018     Resolved Ambulatory Problems     Diagnosis Date Noted    Morbid obesity 03/22/2018     Past Medical History:   Diagnosis Date    Allergic rhinitis     Asthma     Morbid obesity                 PAST SURGICAL HISTORY:    Past Surgical History:   Procedure Laterality Date    APPENDECTOMY      SINUS SURGERY           FAMILY HISTORY:                Family History   Problem Relation Age of Onset    Diabetes Father     Hypertension Father     Melanoma Neg Hx        SOCIAL HISTORY:          Tobacco:   History   Smoking Status    Never Smoker   Smokeless Tobacco    Never Used       alcohol use:    History   Alcohol Use    0.6 oz/week    1 Cans of beer per week                 Occupation:  IT at [employer]    ALLERGIES:    Review of patient's allergies indicates:   Allergen Reactions    Ceclor [cefaclor] Anaphylaxis    Penicillins        CURRENT MEDICATIONS:    Current Outpatient Prescriptions   Medication Sig Dispense Refill    ASCORBATE CALCIUM (VITAMIN C ORAL) Take by mouth.      fluticasone (FLONASE) 50 mcg/actuation nasal spray 2 sprays (100 mcg total) by Each Nare route once daily. 1 Bottle 11    hydrOXYzine HCl (ATARAX) 25 MG tablet       levocetirizine (XYZAL) 5 MG tablet Take 1 tablet (5 mg total) by mouth every evening. 30 tablet 11    multivitamin capsule Take 1 capsule by mouth once daily.      sertraline (ZOLOFT) 25 MG tablet Take 1 tablet (25 mg total) by mouth once daily. 30 tablet 6     No current facility-administered medications for this visit.                   REVIEW OF SYSTEMS: Sleep related symptoms as per HPI,  sinus symptoms, wgt gain, hard to exercise, Otherwise a balance review of 10-systems is negative.     PHYSICAL EXAM:  Vitals:    06/19/18 1340   BP: 130/70   Pulse: 60   Weight: (!) 167.3 kg (368 lb 13.3 oz)   Height: 6' (1.829 m)   PainSc: 0-No pain     Body mass index is 50.02 kg/m².     GENERAL: Normal development, well groomed, morbid obese    DATA   8/10/15 ahi of 26 with optimal cpap of 9    Lab Results   Component Value Date    TSH 2.559 03/13/2017     ASSESSMENT    ICD-10-CM ICD-9-CM    1. Obstructive sleep apnea G47.33 327.23 CPAP/BIPAP SUPPLIES      Home Sleep Studies     Medical comorbiditiy-- morbid obesity, anxiety and depression (on SSRI)    PLAN:  We discussed potential alternative treatment options, which could include weight loss (10-15%), or referral for surgical consideration. Discussed etiology of TAYO and potential ramifications of untreated TAYO, including heart disease, hypertension, cognitive difficulties, stroke, and diabetes.      HST to reassess TAYO s/p ENT, get FFM today   Encouraged weight loss efforts for potential improvement of TAYO and overall health benefits  See Dr. Arredondo as advised surgery f/.u and PCP re: mood disturbance

## 2018-06-25 PROBLEM — Z00.00 HEALTHCARE MAINTENANCE: Status: RESOLVED | Noted: 2018-03-22 | Resolved: 2018-06-25

## 2018-07-02 ENCOUNTER — TELEPHONE (OUTPATIENT)
Dept: SLEEP MEDICINE | Facility: OTHER | Age: 43
End: 2018-07-02

## 2018-07-10 ENCOUNTER — OFFICE VISIT (OUTPATIENT)
Dept: OTOLARYNGOLOGY | Facility: CLINIC | Age: 43
End: 2018-07-10
Payer: COMMERCIAL

## 2018-07-10 VITALS
BODY MASS INDEX: 42.66 KG/M2 | HEART RATE: 54 BPM | HEIGHT: 72 IN | WEIGHT: 315 LBS | DIASTOLIC BLOOD PRESSURE: 92 MMHG | SYSTOLIC BLOOD PRESSURE: 138 MMHG

## 2018-07-10 DIAGNOSIS — R51.9 NONINTRACTABLE EPISODIC HEADACHE, UNSPECIFIED HEADACHE TYPE: ICD-10-CM

## 2018-07-10 DIAGNOSIS — E66.01 MORBID OBESITY WITH BMI OF 40.0-44.9, ADULT: ICD-10-CM

## 2018-07-10 DIAGNOSIS — J34.2 NASAL SEPTAL DEVIATION: Primary | ICD-10-CM

## 2018-07-10 DIAGNOSIS — G47.33 OBSTRUCTIVE SLEEP APNEA TREATED WITH CONTINUOUS POSITIVE AIRWAY PRESSURE (CPAP): ICD-10-CM

## 2018-07-10 DIAGNOSIS — J30.9 ALLERGIC RHINITIS, UNSPECIFIED SEASONALITY, UNSPECIFIED TRIGGER: ICD-10-CM

## 2018-07-10 DIAGNOSIS — J34.3 HYPERTROPHY OF BOTH INFERIOR NASAL TURBINATES: ICD-10-CM

## 2018-07-10 PROCEDURE — 3008F BODY MASS INDEX DOCD: CPT | Mod: CPTII,S$GLB,, | Performed by: SPECIALIST

## 2018-07-10 PROCEDURE — 99213 OFFICE O/P EST LOW 20 MIN: CPT | Mod: 25,S$GLB,, | Performed by: SPECIALIST

## 2018-07-10 RX ORDER — MONTELUKAST SODIUM 10 MG/1
10 TABLET ORAL NIGHTLY
Qty: 30 TABLET | Refills: 12 | Status: SHIPPED | OUTPATIENT
Start: 2018-07-10 | End: 2018-08-09

## 2018-07-10 RX ORDER — HYDROCODONE BITARTRATE AND ACETAMINOPHEN 7.5; 325 MG/1; MG/1
TABLET ORAL
COMMUNITY
Start: 2018-05-15 | End: 2018-08-27

## 2018-07-10 RX ORDER — ROPINIROLE 0.5 MG/1
0.5 TABLET, FILM COATED ORAL NIGHTLY
COMMUNITY
Start: 2018-07-07 | End: 2019-01-11 | Stop reason: SDUPTHER

## 2018-07-10 NOTE — PROGRESS NOTES
Subjective:       Patient ID: Jered Contreras is a 42 y.o. male.    Chief Complaint: Follow-up    The patient has multiple issues to discuss:  1.  He is now 7 weeks postop septoplasty and submucous resection of the inferior turbinates.  He feels that his nasal breathing has improved greatly.  He is pleased with the results of his surgery.  2.  He does have allergic rhinitis for which she takes eyes are Flonase.  During rainy weather and whether fronts he is still developing some headaches in the frontal area.  Typically the Xyzal Flonase control symptoms, but have not been doing so lately.  3.  He is scheduled to undergo another home sleep study at the end of the week.  He has also changed the mask on his CPAP.  He feels that his CPAP seems to be working better since his nasal surgery.  4.  He is making a series effort to lose weight through diet and exercise.  He is asking if there are any limitations for his exercising.      Review of Systems   Constitutional: Positive for fatigue. Negative for activity change, appetite change, chills, fever and unexpected weight change.   HENT: Positive for congestion, postnasal drip, rhinorrhea, sinus pain, sinus pressure and sore throat. Negative for ear discharge, ear pain, facial swelling, hearing loss, mouth sores, sneezing, tinnitus, trouble swallowing and voice change.    Eyes: Negative for photophobia, pain, discharge, redness, itching and visual disturbance.   Respiratory: Positive for cough. Negative for apnea, choking, shortness of breath and wheezing.    Cardiovascular: Negative for chest pain and palpitations.   Gastrointestinal: Negative for abdominal distention, abdominal pain, nausea and vomiting.   Musculoskeletal: Negative for arthralgias, myalgias, neck pain and neck stiffness.   Skin: Negative.  Negative for color change, pallor and rash.   Allergic/Immunologic: Negative for environmental allergies, food allergies and immunocompromised state.    Neurological: Positive for headaches. Negative for dizziness, facial asymmetry, speech difficulty, weakness, light-headedness and numbness.   Hematological: Negative for adenopathy. Does not bruise/bleed easily.   Psychiatric/Behavioral: Negative for agitation, confusion, decreased concentration and sleep disturbance.       Objective:      Physical Exam   Constitutional: He is oriented to person, place, and time. He appears well-developed and well-nourished. He is cooperative.   HENT:   Head: Normocephalic.   Right Ear: External ear and ear canal normal. Tympanic membrane is retracted.   Left Ear: External ear and ear canal normal. Tympanic membrane is retracted.   Nose: Mucosal edema (cyanotic  inferior turbinates bilaterally that are significantly reduced in size from preoperative state and no longer enlarged) and rhinorrhea (clear mucus bilaterally) present. No septal deviation (Midline septum).   Mouth/Throat: Uvula is midline, oropharynx is clear and moist and mucous membranes are normal. No oral lesions.   Eyes: EOM and lids are normal. Pupils are equal, round, and reactive to light. Right eye exhibits no discharge and no exudate. Left eye exhibits no discharge and no exudate. Right conjunctiva is injected. Left conjunctiva is injected.   Neck: Trachea normal and normal range of motion. No muscular tenderness present. No tracheal deviation present. No thyroid mass and no thyromegaly present.   Cardiovascular: Normal rate, regular rhythm, normal heart sounds and normal pulses.    Pulmonary/Chest: Effort normal and breath sounds normal. No stridor. He has no decreased breath sounds. He has no wheezes. He has no rhonchi. He has no rales.   Abdominal: Soft. Bowel sounds are normal. There is no tenderness.   Musculoskeletal: Normal range of motion.   Lymphadenopathy:        Head (right side): No submental, no submandibular, no preauricular, no posterior auricular and no occipital adenopathy present.        Head  (left side): No submental, no submandibular, no preauricular, no posterior auricular and no occipital adenopathy present.     He has no cervical adenopathy.   Neurological: He is alert and oriented to person, place, and time. He has normal strength. No cranial nerve deficit or sensory deficit. Gait normal.   Skin: Skin is warm and dry. No petechiae and no rash noted. No cyanosis. Nails show no clubbing.   Psychiatric: He has a normal mood and affect. His speech is normal and behavior is normal. Judgment and thought content normal. Cognition and memory are normal.       Assessment:       1. Nasal septal deviation    2. Hypertrophy of both inferior nasal turbinates    3. Morbid obesity with BMI of 40.0-44.9, adult    4. Obstructive sleep apnea treated with continuous positive airway pressure (CPAP)    5. Allergic rhinitis, unspecified seasonality, unspecified trigger    6. Nonintractable episodic headache, unspecified headache type        Plan:       I will add a nighttime dose Singulair to the Flonase and Xyzal to be used when they are not controlling his allergy/sinus symptoms.  With regard exercise use freed to resume full activity with the exception of boxing.  I will recheck him in 6 weeks.

## 2018-07-12 ENCOUNTER — TELEPHONE (OUTPATIENT)
Dept: SLEEP MEDICINE | Facility: OTHER | Age: 43
End: 2018-07-12

## 2018-07-13 ENCOUNTER — HOSPITAL ENCOUNTER (OUTPATIENT)
Dept: SLEEP MEDICINE | Facility: OTHER | Age: 43
Discharge: HOME | End: 2018-07-13
Attending: NURSE PRACTITIONER
Payer: COMMERCIAL

## 2018-07-13 DIAGNOSIS — G47.33 OBSTRUCTIVE SLEEP APNEA: ICD-10-CM

## 2018-07-13 DIAGNOSIS — G47.20 SLEEP STAGE DYSFUNCTION: ICD-10-CM

## 2018-07-13 PROCEDURE — 95800 SLP STDY UNATTENDED: CPT

## 2018-07-13 PROCEDURE — 95806 SLEEP STUDY UNATT&RESP EFFT: CPT | Mod: 26,,, | Performed by: PSYCHIATRY & NEUROLOGY

## 2018-07-24 DIAGNOSIS — G47.33 OBSTRUCTIVE SLEEP APNEA: Primary | ICD-10-CM

## 2018-07-24 NOTE — PROGRESS NOTES
Back using his pap with FFM, but wants to make sure he's at right pressure etc... NEEDS [facility] for confirmation of his recent Negative HST.

## 2018-08-20 ENCOUNTER — TELEPHONE (OUTPATIENT)
Dept: SLEEP MEDICINE | Facility: OTHER | Age: 43
End: 2018-08-20

## 2018-08-23 ENCOUNTER — OFFICE VISIT (OUTPATIENT)
Dept: OTOLARYNGOLOGY | Facility: CLINIC | Age: 43
End: 2018-08-23
Payer: COMMERCIAL

## 2018-08-23 VITALS — HEIGHT: 72 IN | BODY MASS INDEX: 42.66 KG/M2 | WEIGHT: 315 LBS

## 2018-08-23 DIAGNOSIS — G47.33 OBSTRUCTIVE SLEEP APNEA: ICD-10-CM

## 2018-08-23 DIAGNOSIS — J30.9 ALLERGIC RHINITIS, UNSPECIFIED SEASONALITY, UNSPECIFIED TRIGGER: Primary | ICD-10-CM

## 2018-08-23 PROBLEM — J34.3 HYPERTROPHY OF BOTH INFERIOR NASAL TURBINATES: Status: RESOLVED | Noted: 2018-06-12 | Resolved: 2018-08-23

## 2018-08-23 PROBLEM — J34.2 NASAL SEPTAL DEVIATION: Status: RESOLVED | Noted: 2018-05-14 | Resolved: 2018-08-23

## 2018-08-23 PROCEDURE — 3008F BODY MASS INDEX DOCD: CPT | Mod: CPTII,S$GLB,, | Performed by: SPECIALIST

## 2018-08-23 PROCEDURE — 99213 OFFICE O/P EST LOW 20 MIN: CPT | Mod: S$GLB,,, | Performed by: SPECIALIST

## 2018-08-23 RX ORDER — AZELASTINE 1 MG/ML
SPRAY, METERED NASAL
Refills: 2 | COMMUNITY
Start: 2018-07-10 | End: 2020-11-11

## 2018-08-23 NOTE — PROGRESS NOTES
Subjective:       Patient ID: Jered Contreras is a 43 y.o. male.    Chief Complaint: Follow-up and Post-op Evaluation    The patient is coming in for multiple issues:  1.  His allergy symptoms are flaring.  He is taking Singulair, Xyzal and Flonase.  He does have some nasal congestion and postnasal drip.  In general, since his surgery his nasal breathing is much improved and he is tolerating his allergies better.  2.  He continues to use CPAP for his sleep apnea.  Since the nasal surgery feels that the pressure is too high.  The sleep doctors have scheduled him for home sleep study.  If he does not use his CPAP he still does snore.  3.  The patient is now 3 months postop septoplasty.  His breathing as markedly improved.  He is very pleased with the results of his surgery.      Review of Systems   Constitutional: Negative for activity change, appetite change, chills, fatigue, fever and unexpected weight change.   HENT: Positive for congestion, postnasal drip and sinus pressure. Negative for ear discharge, ear pain, facial swelling, hearing loss, mouth sores, rhinorrhea, sinus pain, sneezing, sore throat, tinnitus, trouble swallowing and voice change.    Eyes: Negative for photophobia, pain, discharge, redness, itching and visual disturbance.   Respiratory: Positive for cough. Negative for apnea, choking, shortness of breath and wheezing.    Cardiovascular: Negative for chest pain and palpitations.   Gastrointestinal: Negative for abdominal distention, abdominal pain, nausea and vomiting.   Musculoskeletal: Positive for arthralgias. Negative for myalgias, neck pain and neck stiffness.   Skin: Negative.  Negative for color change, pallor and rash.   Allergic/Immunologic: Negative for environmental allergies, food allergies and immunocompromised state.   Neurological: Positive for headaches. Negative for dizziness, facial asymmetry, speech difficulty, weakness, light-headedness and numbness.        Sleep apnea  on CPAP   Hematological: Negative for adenopathy. Does not bruise/bleed easily.   Psychiatric/Behavioral: Positive for sleep disturbance. Negative for agitation, confusion and decreased concentration.       Objective:      Physical Exam   Constitutional: He is oriented to person, place, and time. He appears well-developed and well-nourished. He is cooperative.   HENT:   Head: Normocephalic.   Right Ear: External ear and ear canal normal. Tympanic membrane is retracted.   Left Ear: External ear and ear canal normal. Tympanic membrane is retracted.   Nose: Mucosal edema (cyanotic,   inferior turbinates bilaterally significantly reduce from preoperative size) and rhinorrhea (clear mucus bilaterally) present. No septal deviation (Midline septum).   Mouth/Throat: Uvula is midline, oropharynx is clear and moist and mucous membranes are normal. No oral lesions. Tonsils are 1+ on the right. Tonsils are 1+ on the left. No tonsillar exudate.   Oral cavity-Costello class 1, tonsils 1+/4+ enlarged, long thin palate with normal size uvula, base of tongue enlarged with narrowing of the oropharyngeal inlet   Eyes: EOM and lids are normal. Pupils are equal, round, and reactive to light. Right eye exhibits no discharge and no exudate. Left eye exhibits no discharge and no exudate. Right conjunctiva is injected. Left conjunctiva is injected.   Neck: Trachea normal and normal range of motion. No muscular tenderness present. No tracheal deviation present. No thyroid mass and no thyromegaly present.   Cardiovascular: Normal rate, regular rhythm, normal heart sounds and normal pulses.   Pulmonary/Chest: Effort normal and breath sounds normal. No stridor. He has no decreased breath sounds. He has no wheezes. He has no rhonchi. He has no rales.   Abdominal: Soft. Bowel sounds are normal. There is no tenderness.   Musculoskeletal: Normal range of motion.   Lymphadenopathy:        Head (right side): No submental, no submandibular, no  preauricular, no posterior auricular and no occipital adenopathy present.        Head (left side): No submental, no submandibular, no preauricular, no posterior auricular and no occipital adenopathy present.     He has no cervical adenopathy.   Neurological: He is alert and oriented to person, place, and time. He has normal strength. No cranial nerve deficit or sensory deficit. Gait normal.   Skin: Skin is warm and dry. No petechiae and no rash noted. No cyanosis. Nails show no clubbing.   Psychiatric: He has a normal mood and affect. His speech is normal and behavior is normal. Judgment and thought content normal. Cognition and memory are normal.       Assessment:       1. Allergic rhinitis, unspecified seasonality, unspecified trigger    2. Obstructive sleep apnea        Plan:       I will recheck the patient in 3 months.  He is to continue using his current allergy sinus medical regimen.

## 2018-08-27 ENCOUNTER — OFFICE VISIT (OUTPATIENT)
Dept: INTERNAL MEDICINE | Facility: CLINIC | Age: 43
End: 2018-08-27
Payer: COMMERCIAL

## 2018-08-27 VITALS
HEART RATE: 60 BPM | OXYGEN SATURATION: 96 % | BODY MASS INDEX: 42.66 KG/M2 | SYSTOLIC BLOOD PRESSURE: 120 MMHG | HEIGHT: 72 IN | WEIGHT: 315 LBS | DIASTOLIC BLOOD PRESSURE: 72 MMHG

## 2018-08-27 DIAGNOSIS — F41.9 ANXIETY: ICD-10-CM

## 2018-08-27 DIAGNOSIS — E66.01 MORBID OBESITY WITH BMI OF 50.0-59.9, ADULT: ICD-10-CM

## 2018-08-27 DIAGNOSIS — F32.A DEPRESSION, UNSPECIFIED DEPRESSION TYPE: ICD-10-CM

## 2018-08-27 DIAGNOSIS — Z00.00 ENCOUNTER FOR HEALTH MAINTENANCE EXAMINATION IN ADULT: Primary | ICD-10-CM

## 2018-08-27 DIAGNOSIS — G47.33 OBSTRUCTIVE SLEEP APNEA: ICD-10-CM

## 2018-08-27 PROCEDURE — 99999 PR PBB SHADOW E&M-EST. PATIENT-LVL III: CPT | Mod: PBBFAC,,, | Performed by: NURSE PRACTITIONER

## 2018-08-27 PROCEDURE — 99386 PREV VISIT NEW AGE 40-64: CPT | Mod: S$GLB,,, | Performed by: NURSE PRACTITIONER

## 2018-08-27 RX ORDER — HYDROXYZINE HYDROCHLORIDE 25 MG/1
25 TABLET, FILM COATED ORAL 3 TIMES DAILY PRN
Qty: 60 TABLET | Refills: 6 | Status: SHIPPED | OUTPATIENT
Start: 2018-08-27 | End: 2018-09-26

## 2018-08-27 NOTE — PATIENT INSTRUCTIONS
Refilled meds. Continue current therapy. Will be due next June for annual with labs.    MDs I work with: Dr. Segal, Dr. Dacosta, Dr. Rangel, Dr. Estevez, or Dr. Reyes    RTC next year in [place] for annual

## 2018-08-27 NOTE — PROGRESS NOTES
Subjective:       Patient ID: Jered Contreras is a 43 y.o. male.    Chief Complaint: Annual Exam    Pt here to establish care, has been seen in resident clinic recently and resident has graduated so needs a PCP for ongoing care.    Pt has no complaints. Mainly has been tx for anxiety in resident clinic. Doing well of zoloft 25mg daily. Uses Hydroxyzine as needed for situational anxiety. Needs refills on prn med.    Last labs 6/2018 with resident were stable.      Review of Systems   Constitutional: Negative for activity change, appetite change and unexpected weight change.   HENT: Negative for hearing loss and voice change.    Eyes: Negative for visual disturbance.   Respiratory: Negative for apnea, cough, chest tightness and shortness of breath.    Cardiovascular: Negative for chest pain, palpitations and leg swelling.   Gastrointestinal: Negative for abdominal distention, abdominal pain, blood in stool, constipation, diarrhea, nausea and vomiting.   Endocrine: Negative for cold intolerance, heat intolerance, polydipsia, polyphagia and polyuria.   Genitourinary: Negative for difficulty urinating, dysuria and penile pain.   Musculoskeletal: Negative for arthralgias and myalgias.   Skin: Negative for color change, pallor, rash and wound.   Allergic/Immunologic: Negative for environmental allergies, food allergies and immunocompromised state.   Neurological: Negative for dizziness and weakness.   Hematological: Negative for adenopathy. Does not bruise/bleed easily.   Psychiatric/Behavioral: Negative for agitation, behavioral problems, sleep disturbance and suicidal ideas.       Review of patient's allergies indicates:   Allergen Reactions    Ceclor [cefaclor] Anaphylaxis    Penicillins      Current Outpatient Medications:     ASCORBATE CALCIUM (VITAMIN C ORAL), Take by mouth., Disp: , Rfl:     azelastine (ASTELIN) 137 mcg (0.1 %) nasal spray, , Disp: , Rfl: 2    fluticasone (FLONASE) 50 mcg/actuation  nasal spray, 2 sprays (100 mcg total) by Each Nare route once daily., Disp: 1 Bottle, Rfl: 11    hydrOXYzine HCl (ATARAX) 25 MG tablet, 25 mg 3 (three) times daily as needed for Anxiety. , Disp: , Rfl:     levocetirizine (XYZAL) 5 MG tablet, Take 1 tablet (5 mg total) by mouth every evening., Disp: 30 tablet, Rfl: 11    multivitamin capsule, Take 1 capsule by mouth once daily., Disp: , Rfl:     rOPINIRole (REQUIP) 0.5 MG tablet, 0.5 mg every evening. , Disp: , Rfl:     sertraline (ZOLOFT) 25 MG tablet, Take 1 tablet (25 mg total) by mouth once daily., Disp: 30 tablet, Rfl: 6    Past Surgical History:   Procedure Laterality Date    APPENDECTOMY      SINUS SURGERY       Social History     Socioeconomic History    Marital status:      Spouse name: None    Number of children: None    Years of education: None    Highest education level: None   Social Needs    Financial resource strain: None    Food insecurity - worry: None    Food insecurity - inability: None    Transportation needs - medical: None    Transportation needs - non-medical: None   Occupational History    Occupation: Information Tech     Employer: [employer]   Tobacco Use    Smoking status: Never Smoker    Smokeless tobacco: Never Used   Substance and Sexual Activity    Alcohol use: Yes     Alcohol/week: 0.6 oz     Types: 1 Cans of beer per week    Drug use: No    Sexual activity: Yes     Partners: Female   Other Topics Concern    None   Social History Narrative    None     Family History   Problem Relation Age of Onset    Diabetes Father     Hypertension Father     Melanoma Neg Hx        Objective:       Vitals:    08/27/18 0809   BP: 120/72   Pulse: 60   SpO2: 96%   Weight: (!) 177 kg (390 lb 3.4 oz)   Height: 6' (1.829 m)   PainSc: 0-No pain     Body mass index is 52.92 kg/m².    Physical Exam   Constitutional: He is oriented to person, place, and time. He appears well-developed and well-nourished.   Morbidly obese    HENT:   Head: Normocephalic.   Eyes: Conjunctivae, EOM and lids are normal. Pupils are equal, round, and reactive to light. Lids are everted and swept, no foreign bodies found.   Neck: Trachea normal, normal range of motion and full passive range of motion without pain. Neck supple. No JVD present. Carotid bruit is not present.   Cardiovascular: Normal rate, regular rhythm, normal heart sounds, intact distal pulses and normal pulses.   Pulmonary/Chest: Effort normal and breath sounds normal.   Abdominal: Soft. Normal appearance and bowel sounds are normal. There is no hepatosplenomegaly. There is no CVA tenderness.   obese   Musculoskeletal: Normal range of motion.   Neurological: He is alert and oriented to person, place, and time.   Skin: Skin is warm, dry and intact. Capillary refill takes less than 2 seconds.   Psychiatric: He has a normal mood and affect. His speech is normal and behavior is normal. Judgment and thought content normal. Cognition and memory are normal.   Vitals reviewed.      Assessment:       1. Encounter for health maintenance examination in adult    2. Anxiety    3. Depression, unspecified depression type    4. Obstructive sleep apnea    5. BMI 50.0-59.9, adult    6. Morbid obesity with BMI of 50.0-59.9, adult        Plan:       Jered was seen today for annual exam.    Diagnoses and all orders for this visit:    Encounter for health maintenance examination in adult  Annual wellness exam completed.    All medications, histories, and concerns reviewed, reconciled, and addressed.    Appropriate Screenings per pt's sex and age have been reviewed and discussed with pt.    BMI reviewed.    Anxiety  -     hydrOXYzine HCl (ATARAX) 25 MG tablet; Take 1 tablet (25 mg total) by mouth 3 (three) times daily as needed for Anxiety.    Continue Daily Zoloft    Depression, unspecified depression type  Stable on meds, no suicidal ideations    Obstructive sleep apnea  Stable    BMI 50.0-59.9, adult  BMI  reviewed    Morbid obesity with BMI of 50.0-59.9, adult  BMI reviewed.    Diet and exercise to lose weight.    Refilled meds. Continue current therapy. Will be due next June for annual with labs.    MDs I work with: Dr. Segal, Dr. Dacosta, Dr. Rangel, Dr. Estevez, or Dr. Reyes    RTC next year in June for annual

## 2018-08-31 ENCOUNTER — HOSPITAL ENCOUNTER (OUTPATIENT)
Dept: SLEEP MEDICINE | Facility: OTHER | Age: 43
Discharge: HOME | End: 2018-08-31
Attending: NURSE PRACTITIONER
Payer: COMMERCIAL

## 2018-08-31 DIAGNOSIS — G47.33 OBSTRUCTIVE SLEEP APNEA: ICD-10-CM

## 2018-08-31 PROCEDURE — 95810 POLYSOM 6/> YRS 4/> PARAM: CPT

## 2018-08-31 PROCEDURE — 95810 POLYSOM 6/> YRS 4/> PARAM: CPT | Mod: 26,,, | Performed by: INTERNAL MEDICINE

## 2018-09-01 NOTE — PROGRESS NOTES
Jered Contreras to [facility] for an overnight sleep study on 08/31/2018.   Pt education, setup and cpap explanations given prior to study.      Pt did not meet criteria for cpap.  Most significant events observed  with supine REM sleep.  Soft snore also noted.      EKG- Lead 1 appears with sinus arrythmia, rare pac. Pt with hx of bradycardia.     Low saturation noted 86%.      Post study information given in am

## 2018-09-14 ENCOUNTER — TELEPHONE (OUTPATIENT)
Dept: SLEEP MEDICINE | Facility: CLINIC | Age: 43
End: 2018-09-14

## 2018-09-14 NOTE — PROCEDURES
"See imported Sleep Study result in "Chart Review" under the "Media tab".     (This Sleep Study was interpreted by a Board Eligible / Certified Sleep Specialist who conducted an epoch-by-epoch review of the entire raw data recording.)    (The indication for this sleep study was reviewed and deemed appropriate by [facility] Practice Parameters or other reasons by a Board Certified Sleep Specialist.)'  "

## 2018-09-14 NOTE — TELEPHONE ENCOUNTER
Tianna Aguilera NP pt.     Please notify pt that 08/31/2018 PSG still consistent with TAYO.     Overall AHI is 14.5 and lowest oxygen is 86%. AHI is amount of breathing events per hour.     He would still benefit from CPAP, especially since he still has symptoms.

## 2018-09-28 ENCOUNTER — OFFICE VISIT (OUTPATIENT)
Dept: OPTOMETRY | Facility: CLINIC | Age: 43
End: 2018-09-28
Payer: COMMERCIAL

## 2018-09-28 DIAGNOSIS — Z01.00 ROUTINE EYE EXAM: Primary | ICD-10-CM

## 2018-09-28 DIAGNOSIS — Z83.511 FAMILY HISTORY OF GLAUCOMA: ICD-10-CM

## 2018-09-28 DIAGNOSIS — H52.223 REGULAR ASTIGMATISM OF BOTH EYES: ICD-10-CM

## 2018-09-28 PROCEDURE — 92004 COMPRE OPH EXAM NEW PT 1/>: CPT | Mod: S$GLB,,, | Performed by: OPTOMETRIST

## 2018-09-28 PROCEDURE — 92015 DETERMINE REFRACTIVE STATE: CPT | Mod: S$GLB,,, | Performed by: OPTOMETRIST

## 2018-09-28 PROCEDURE — 99999 PR PBB SHADOW E&M-EST. PATIENT-LVL III: CPT | Mod: PBBFAC,,, | Performed by: OPTOMETRIST

## 2018-09-28 NOTE — PATIENT INSTRUCTIONS
ASTIGMATISM    Hyperopia (far-sightedness), myopia (near-sightedness), and astigmatism (distorted vision) are known as refractive errors.    Definition:  Astigmatism usually occurs when the front surface of the eye, the cornea, has an irregular curvature. Astigmatism is one of a group of eye conditions known as refractive errors. Refractive errors cause a disturbance in the way that light rays are focused within the eye. Astigmatism often occurs with nearsightedness and farsightedness, conditions also resulting from refractive errors. Astigmatism is not a disease nor does it mean that you have bad eyes. It simply means that you have a variation or disturbance in the shape of your cornea.    Causes:  Astigmatism usually occurs when the front surface of the eye, the cornea, has an irregular curvature. Normally the cornea is smooth and equally curved in all directions and light entering the cornea is focused equally on all planes, or in all directions. In astigmatism, the front surface of the cornea is curved more in one direction than in the other. This abnormality may result in vision that is much like looking into a distorted, wavy mirror. The distortion results because of an inability of the eye to focus light rays to a point.     If the corneal surface has a high degree of variation in its curvature, light refraction may be impaired to the degree that corrective lenses are needed to help focus light rays better. At any time, only a small proportion of the rays are focused and the remainder are not, so that the image formed is always blurred.  Usually, astigmatism causes blurred vision at all distances. Some people with very high degrees of astigmatism may have cornea problems such as keratoconus.     Astigmatism is very common. Some experts believe that almost everyone has a degree of astigmatism, often from birth, which may remain the same throughout life. The exact reason for differences in corneal shape remains  unknown, but the tendency to develop astigmatism is inherited. For that reason, some people are more prone to develop astigmatism than others.     Symptoms:  Distortion or blurring of images at all distances   Headache and fatigue   Squinting and eye discomfort or irritation   The symptoms described above may not necessarily mean that you have astigmatism. However, if you experience one or more of these symptoms, contact your eye doctor for a complete exam.     Treatment:  If the degree of astigmatism is slight and no other problems of refraction, such as nearsightedness or farsightedness, are present, corrective lenses may not be needed. If the degree of astigmatism is great enough to cause eyestrain, headache, or distortion of vision, prescription lenses will be needed for clear and comfortable vision.     The corrective lenses needed when astigmatism is present are called Toric lenses and have an additional power element called a cylinder. They have greater light-bending power in one axis than in others. Your eye care professional will perform precise tests during your eye examination to determine the ideal lens prescription. Refractive surgery may be able to correct some forms of astigmatism.     Astigmatism may increase slowly. Regular eye care can help to insure that proper vision is maintained. You may have to adjust to wearing eyeglasses or contact lenses if you do not wear them now. Other than that, astigmatism probably will not significantly affect your lifestyle at all.       ==============================================    Computer Use Tips     1.   Position your computer monitor five to nine inches below the horizontal line of sight so that when you look straight ahead, you look just over the top of the monitor. An upward gaze exposes 40% more of the cornea, which dries out the tear film and compounds the effects of the already dry environment in many office buildings.  2.   Sit far enough away that  "you can't touch the monitor without leaning forward. A good minimum working distance is about 24 to 26 inches.   3.   If you use a laptop frequently, get a separate monitor and/or keyboard.   4.   Set computer monitor contrast at high.   5.   Turn down overall room lighting and use a task lamp for reading papers.   6.   If you have a window, position the computer so that the window is off to the side, not directly in front of, or behind the desk.   7.   Put a note that says "Blink" next to your computer screen.  Studies show that we spontaneously blink 18 to 22 times per minute under normal conditions. But during computer use, most likely due to the increased visual attention required, the average blink rate drops to seven per minute, or about one-third the normal rate.  8.   Every 20 minutes, take a 20-second break and look at something at least 20 feet away.   "

## 2018-09-28 NOTE — PROGRESS NOTES
HPI     Mr. Jered Contreras is here for a routine eye exam.    Patient complains of blurry vision at all ranges, he says this is improved   with his old glasses but he does not wear them full time (now lost), he   has a pair of Rx sunglasses for distance (left them in the car) and Rx   readers.    He also reports headaches a few times per week from eye strain, headaches   last for a few hours, usually resolve on their own, located behind both   eyes. He spends many hours per day reading and on the computer (he is   taking online courses towards his master's degree).     Would patient like a refraction today? yes    (-)drops  (-)flashes  (-)floaters  (-)diplopia    Diabetic no   Hemoglobin A1C       Date                     Value               Ref Range             Status           06/11/2018               4.8                 4.0 - 5.6 %         Final  03/13/2017               4.8                 4.5 - 6.2 %         Final  09/16/2015               5.0                 4.5 - 6.2 %         Final    OCULAR HISTORY  Last Eye Exam: December 2016 at [facility]   (-)eye surgery   (-)diagnosed or treated for any eye conditions or diseases none     FAMILY HISTORY  (+)Glaucoma: mother          Last edited by Mayra Hummel, OD on 9/28/2018  2:16 PM. (History)            Assessment /Plan     For exam results, see Encounter Report.    Routine eye exam   Spectera.    Regular astigmatism of both eyes   New glasses prescription released, adaptation expected.  Full-time or part-time okay.  Eyeglass Final Rx     Eyeglass Final Rx       Sphere Cylinder Axis Dist VA    Right -0.25 +1.00 040 20/20    Left -0.25 +0.75 150 20/20    Type:  SVL    Expiration Date:  9/29/2019            Family history of glaucoma   No signs of glaucoma today OU. Monitor yearly.        RTC 1 year

## 2018-12-06 ENCOUNTER — PATIENT MESSAGE (OUTPATIENT)
Dept: PULMONOLOGY | Facility: CLINIC | Age: 43
End: 2018-12-06

## 2018-12-06 NOTE — TELEPHONE ENCOUNTER
Spoke with patient he is requesting to see BENJAMIN Aguilera to have CPAP pressure adjusted. Patient scheduled for f/u w/ BENJAMIN Aguilera 12/12/18.

## 2018-12-17 ENCOUNTER — OFFICE VISIT (OUTPATIENT)
Dept: PODIATRY | Facility: CLINIC | Age: 43
End: 2018-12-17
Payer: COMMERCIAL

## 2018-12-17 VITALS — BODY MASS INDEX: 42.66 KG/M2 | WEIGHT: 315 LBS | HEIGHT: 72 IN

## 2018-12-17 DIAGNOSIS — M79.672 LEFT FOOT PAIN: ICD-10-CM

## 2018-12-17 DIAGNOSIS — L84 CORNS AND CALLUS: Primary | ICD-10-CM

## 2018-12-17 PROCEDURE — 3008F BODY MASS INDEX DOCD: CPT | Mod: CPTII,S$GLB,, | Performed by: PODIATRIST

## 2018-12-17 PROCEDURE — 99213 OFFICE O/P EST LOW 20 MIN: CPT | Mod: S$GLB,,, | Performed by: PODIATRIST

## 2018-12-17 PROCEDURE — 99999 PR PBB SHADOW E&M-EST. PATIENT-LVL III: CPT | Mod: PBBFAC,,, | Performed by: PODIATRIST

## 2018-12-17 NOTE — PROGRESS NOTES
Chief Complaint   Patient presents with    Foot Pain     Left foot             HPI:   Jered Contreras is a 43 y.o. male here for Left foot pain sub 5th metatarsal head x couple of weeks.  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  No acute trauma noted.  No self treatment at home yet.   Pain is worse with weight bearing.       Past Medical History:   Diagnosis Date    Allergic rhinitis     Asthma     Morbid obesity     Obstructive sleep apnea treated with continuous positive airway pressure (CPAP) 7/10/2018       Current Outpatient Medications on File Prior to Visit   Medication Sig Dispense Refill    ASCORBATE CALCIUM (VITAMIN C ORAL) Take by mouth.      azelastine (ASTELIN) 137 mcg (0.1 %) nasal spray   2    fluticasone (FLONASE) 50 mcg/actuation nasal spray 2 sprays (100 mcg total) by Each Nare route once daily. 1 Bottle 11    levocetirizine (XYZAL) 5 MG tablet Take 1 tablet (5 mg total) by mouth every evening. 30 tablet 11    multivitamin capsule Take 1 capsule by mouth once daily.      rOPINIRole (REQUIP) 0.5 MG tablet 0.5 mg every evening.       sertraline (ZOLOFT) 25 MG tablet Take 1 tablet (25 mg total) by mouth once daily. 30 tablet 6     No current facility-administered medications on file prior to visit.        ALLG:   Review of patient's allergies indicates:   Allergen Reactions    Ceclor [cefaclor] Anaphylaxis    Penicillins            ROS:   General ROS: negative for - chills, fever or night sweats  Respiratory ROS: no cough, shortness of breath, or wheezing  Cardiovascular ROS: no chest pain or dyspnea on exertion  Musculoskeletal ROS: negative  Neurological ROS: no TIA or stroke symptoms  Dermatological ROS: negative      EXAM:   Vitals:    12/17/18 1156   Weight: (!) 176.9 kg (390 lb)   Height: 6' (1.829 m)        General:  Alert, oriented, no acute distress      Bilateral  Lower extremity exam:    Vascular:   Dorsalis Pedis:  present   Posterior Tibial:   present  capillary refill time:  3 seconds  Temperature of toes warm to touch  Hair on toes:  present    Trace Edema to affected area.        Neurological:     sharp dull - B/L normal and light touch - B/L normal  No LOPS  No pain with lateral metatarsal compression      Dermatological:    There is intact skin tone, turgor, and temperature.    The skin lesion/ callus sub left foot, sub metatarsal head 5,  No redness, swelling, or evidence of drainage.         Musculoskeletal:   Metatarsophalangeal, subtalar, and ankle range of motion are 5/5, adequate ROM, adequate strength  Right foot: no gross deformity  Left foot: no gross deformity                  ASSESSMENT/PLAN:    Corns and callus - Left Foot    Left foot pain      Patient education/counseling.    Discussed calluses and different treatment.  Urea 40% daily and off-load with supportive thick sole shoes.  Wider toe-box.   Call or return to clinic prn if these symptoms worsen or fail to improve as anticipated.

## 2018-12-24 ENCOUNTER — PATIENT MESSAGE (OUTPATIENT)
Dept: SLEEP MEDICINE | Facility: CLINIC | Age: 43
End: 2018-12-24

## 2018-12-26 ENCOUNTER — OFFICE VISIT (OUTPATIENT)
Dept: SLEEP MEDICINE | Facility: CLINIC | Age: 43
End: 2018-12-26
Payer: COMMERCIAL

## 2018-12-26 VITALS
DIASTOLIC BLOOD PRESSURE: 70 MMHG | HEART RATE: 82 BPM | BODY MASS INDEX: 42.66 KG/M2 | SYSTOLIC BLOOD PRESSURE: 130 MMHG | HEIGHT: 72 IN | WEIGHT: 315 LBS

## 2018-12-26 DIAGNOSIS — E66.01 MORBID OBESITY WITH BMI OF 40.0-44.9, ADULT: ICD-10-CM

## 2018-12-26 DIAGNOSIS — G47.33 OBSTRUCTIVE SLEEP APNEA: Primary | ICD-10-CM

## 2018-12-26 DIAGNOSIS — F41.9 ANXIETY: ICD-10-CM

## 2018-12-26 PROCEDURE — 99999 PR PBB SHADOW E&M-EST. PATIENT-LVL III: CPT | Mod: PBBFAC,,, | Performed by: NURSE PRACTITIONER

## 2018-12-26 PROCEDURE — 3008F BODY MASS INDEX DOCD: CPT | Mod: CPTII,S$GLB,, | Performed by: NURSE PRACTITIONER

## 2018-12-26 PROCEDURE — 99214 OFFICE O/P EST MOD 30 MIN: CPT | Mod: S$GLB,,, | Performed by: NURSE PRACTITIONER

## 2018-12-26 NOTE — PROGRESS NOTES
Jered Contreras  was seen as a follow up for mgt of TAYO.Last seen 6/19/18    He has since undergone a HST revealing persistent sleep apnea with AHI 14.5/low sat 86% (8/31/18) and got new mask/supplies. Recently not been able to use much due to feeling like he needs more pressure, suffocating sensation. Gained 52# last 6-mos. Is considering bariatric surgery. ESS=4. Sleeps better witih PAP than w/o. Has breakthrough snoring at times, not sure if snoring past the pressure or when mask off.     Machine good condition, dreamwear [place].  c-check 90% tile 11cm    HISTORY  3/14/17  CHIEF COMPLAINT:    Chief Complaint   Patient presents with    Sleep Apnea       HISTORY OF PRESENT ILLNESS: Jered Contreras is a 43 y.o. male is here for sleep evaluation.   Patient with loud snoring and witnessed sleep apnea.  +fatigue upon awakening 3-4 times per week.  No sleepiness a/w driving.  Occasional sleep talking.  No cataplexy.    [place] Sleepiness Scale score during initial sleep evaluation was 11.     During last appointment 10/14, patient was recommended psg.  Sleep study was delayed in part due to change in insurance.  Patient s/p sleep study 8/15 with ahi of 27.  Patient was set up cpap 9 cm H20.  Doing well with cpap.  Feeling rested with new cpap.  Patient lost significant weight with exercise.      SLEEP ROUTINE:  Activity the hour prior to sleep: talk and face book in bed    Bed partner:  josé manuel  Time to bed:  10 pm   Lights off:  yes  Sleep onset latency:  10 minutes        Disruptions or awakenings:    0-1 time (no difficulty going back to sleep)    Wakeup time:      5 am   Perceived sleep quality:  Rested upon awake.         Daytime naps:      none  Weekend sleep routine:      11 pm till 7 am (still tire)  Caffeine use: 3-4 cups of coffee per day  exercise habit:   Walking 8 miles per day.    PAST MEDICAL HISTORY:    Active Ambulatory Problems     Diagnosis Date Noted    AR (allergic rhinitis)  09/12/2013    Morbid obesity with BMI of 40.0-44.9, adult 12/27/2013    Heart palpitations 12/27/2013    Anxiety 05/09/2017    Depression 05/09/2017    Acute pain of right knee 06/13/2017    Obstructive sleep apnea 07/10/2018    Sleep stage dysfunction      Resolved Ambulatory Problems     Diagnosis Date Noted    Morbid obesity 03/22/2018    Healthcare maintenance 03/22/2018    Nasal septal deviation 05/14/2018    Hypertrophy of both inferior nasal turbinates 06/12/2018     Past Medical History:   Diagnosis Date    Allergic rhinitis     Asthma     Morbid obesity     Obstructive sleep apnea treated with continuous positive airway pressure (CPAP) 7/10/2018                PAST SURGICAL HISTORY:    Past Surgical History:   Procedure Laterality Date    APPENDECTOMY      RESECTION-TURBINATES (SMR) N/A 5/14/2018    [facility]    SEPTOPLASTY N/A 5/14/2018    [facility]    SINUS SURGERY           FAMILY HISTORY:                Family History   Problem Relation Age of Onset    Diabetes Father     Hypertension Father     Melanoma Neg Hx        SOCIAL HISTORY:          Tobacco:   Social History     Tobacco Use   Smoking Status Never Smoker   Smokeless Tobacco Never Used       alcohol use:    Social History     Substance and Sexual Activity   Alcohol Use Yes    Alcohol/week: 0.6 oz    Types: 1 Cans of beer per week                 Occupation:  IT at [employer]    ALLERGIES:    Review of patient's allergies indicates:   Allergen Reactions    Ceclor [cefaclor] Anaphylaxis    Penicillins        CURRENT MEDICATIONS:    Current Outpatient Medications   Medication Sig Dispense Refill    ASCORBATE CALCIUM (VITAMIN C ORAL) Take by mouth.      azelastine (ASTELIN) 137 mcg (0.1 %) nasal spray   2    fluticasone (FLONASE) 50 mcg/actuation nasal spray 2 sprays (100 mcg total) by Each Nare route once daily. 1 Bottle 11    levocetirizine (XYZAL) 5 MG  tablet Take 1 tablet (5 mg total) by mouth every evening. 30 tablet 11    multivitamin capsule Take 1 capsule by mouth once daily.      rOPINIRole (REQUIP) 0.5 MG tablet 0.5 mg every evening.       sertraline (ZOLOFT) 25 MG tablet Take 1 tablet (25 mg total) by mouth once daily. 30 tablet 6     No current facility-administered medications for this visit.                   REVIEW OF SYSTEMS: Sleep related symptoms as per HPI, sinus symptoms, wgt gain, hard to exercise, Otherwise a balance review of 10-systems is negative.     PHYSICAL EXAM:  Vitals:    12/26/18 0903   BP: 130/70   Pulse: 82   Weight: (!) 190.8 kg (420 lb 10.2 oz)   Height: 6' (1.829 m)   PainSc: 0-No pain     Body mass index is 57.05 kg/m².   GENERAL: Normal development, well groomed, morbid obese    DATA   8/10/15 ahi of 26 with optimal cpap of 9  2018 AHI 14.5      Lab Results   Component Value Date    TSH 2.559 03/13/2017     ASSESSMENT  TAYO, mild  Medical comorbiditiy-- morbid obesity, anxiety and depression (on SSRI)    PLAN:  NEW cpap machine apap 11-20cm. Set old machine todal 11-20 cm until get new machine. RTC 5 wks adherence  Encouraged continued weight loss efforts for potential improvement of TAYO and overall health benefits  See PCP re: mood mgt/continue meds

## 2019-01-10 DIAGNOSIS — F41.9 ANXIETY: ICD-10-CM

## 2019-01-10 RX ORDER — ROPINIROLE 0.5 MG/1
0.5 TABLET, FILM COATED ORAL NIGHTLY
Qty: 90 TABLET | Refills: 3 | Status: CANCELLED | OUTPATIENT
Start: 2019-01-10

## 2019-01-10 RX ORDER — SERTRALINE HYDROCHLORIDE 25 MG/1
25 TABLET, FILM COATED ORAL DAILY
Qty: 30 TABLET | Refills: 6 | Status: CANCELLED | OUTPATIENT
Start: 2019-01-10 | End: 2020-01-10

## 2019-01-11 ENCOUNTER — PATIENT MESSAGE (OUTPATIENT)
Dept: INTERNAL MEDICINE | Facility: CLINIC | Age: 44
End: 2019-01-11

## 2019-01-11 DIAGNOSIS — F41.9 ANXIETY: ICD-10-CM

## 2019-01-11 RX ORDER — SERTRALINE HYDROCHLORIDE 25 MG/1
25 TABLET, FILM COATED ORAL DAILY
Qty: 30 TABLET | Refills: 6 | Status: SHIPPED | OUTPATIENT
Start: 2019-01-11 | End: 2019-04-11 | Stop reason: SDUPTHER

## 2019-01-11 RX ORDER — ROPINIROLE 0.5 MG/1
0.5 TABLET, FILM COATED ORAL NIGHTLY
Qty: 30 TABLET | Refills: 6 | Status: SHIPPED | OUTPATIENT
Start: 2019-01-11 | End: 2020-10-12 | Stop reason: SDUPTHER

## 2019-01-11 NOTE — TELEPHONE ENCOUNTER
Will refill but call pt and ask him what is he taking the requip for so I can link a dx to the script. He also will need to see one of the doctors in June for his annual that I gave him a list of from my visit with him in June 2018.

## 2019-04-11 ENCOUNTER — HOSPITAL ENCOUNTER (OUTPATIENT)
Dept: RADIOLOGY | Facility: HOSPITAL | Age: 44
Discharge: HOME | End: 2019-04-11
Attending: INTERNAL MEDICINE
Payer: COMMERCIAL

## 2019-04-11 ENCOUNTER — OFFICE VISIT (OUTPATIENT)
Dept: INTERNAL MEDICINE | Facility: CLINIC | Age: 44
End: 2019-04-11
Payer: COMMERCIAL

## 2019-04-11 VITALS
HEIGHT: 73 IN | WEIGHT: 315 LBS | BODY MASS INDEX: 41.75 KG/M2 | DIASTOLIC BLOOD PRESSURE: 80 MMHG | SYSTOLIC BLOOD PRESSURE: 134 MMHG

## 2019-04-11 DIAGNOSIS — M79.645 PAIN OF LEFT THUMB: ICD-10-CM

## 2019-04-11 DIAGNOSIS — F41.9 ANXIETY: ICD-10-CM

## 2019-04-11 DIAGNOSIS — G47.33 OBSTRUCTIVE SLEEP APNEA: ICD-10-CM

## 2019-04-11 DIAGNOSIS — M77.8 TENDINITIS OF THUMB: ICD-10-CM

## 2019-04-11 DIAGNOSIS — Z00.00 HEALTH CARE MAINTENANCE: Primary | ICD-10-CM

## 2019-04-11 PROCEDURE — 99999 PR PBB SHADOW E&M-EST. PATIENT-LVL III: ICD-10-PCS | Mod: PBBFAC,,, | Performed by: INTERNAL MEDICINE

## 2019-04-11 PROCEDURE — 73140 X-RAY EXAM OF FINGER(S): CPT | Mod: 26,FA,, | Performed by: RADIOLOGY

## 2019-04-11 PROCEDURE — 73140 XR FINGER 2 OR MORE VIEWS LEFT: ICD-10-PCS | Mod: 26,FA,, | Performed by: RADIOLOGY

## 2019-04-11 PROCEDURE — 99396 PREV VISIT EST AGE 40-64: CPT | Mod: S$GLB,,, | Performed by: INTERNAL MEDICINE

## 2019-04-11 PROCEDURE — 73140 X-RAY EXAM OF FINGER(S): CPT | Mod: TC,LT

## 2019-04-11 PROCEDURE — 99999 PR PBB SHADOW E&M-EST. PATIENT-LVL III: CPT | Mod: PBBFAC,,, | Performed by: INTERNAL MEDICINE

## 2019-04-11 PROCEDURE — 99396 PR PREVENTIVE VISIT,EST,40-64: ICD-10-PCS | Mod: S$GLB,,, | Performed by: INTERNAL MEDICINE

## 2019-04-11 RX ORDER — HYDROXYZINE HYDROCHLORIDE 25 MG/1
TABLET, FILM COATED ORAL
Qty: 30 TABLET | Refills: 6 | Status: SHIPPED | OUTPATIENT
Start: 2019-04-11 | End: 2020-04-10 | Stop reason: SDUPTHER

## 2019-04-11 RX ORDER — SERTRALINE HYDROCHLORIDE 50 MG/1
50 TABLET, FILM COATED ORAL DAILY
Qty: 30 TABLET | Refills: 11 | Status: SHIPPED | OUTPATIENT
Start: 2019-04-11 | End: 2020-04-13 | Stop reason: SDUPTHER

## 2019-04-11 RX ORDER — HYDROXYZINE HYDROCHLORIDE 25 MG/1
TABLET, FILM COATED ORAL
Refills: 6 | COMMUNITY
Start: 2019-03-08 | End: 2019-04-11 | Stop reason: SDUPTHER

## 2019-04-11 RX ORDER — MELOXICAM 15 MG/1
15 TABLET ORAL DAILY
Qty: 30 TABLET | Refills: 1 | Status: SHIPPED | OUTPATIENT
Start: 2019-04-11 | End: 2020-01-29

## 2019-04-11 NOTE — PROGRESS NOTES
"Subjective:       Patient ID: Jered Contreras is a 43 y.o. male.    Chief Complaint: Establish Care (establishing care as a new patient. ) and Hand Pain (thumb finger on R hand, hard grasping certain things. when hitting thumb, gets shooting pain up R arm.)    HPI   Jered Contreras is a 43 y.o. male here to establish care and have yearly preventative healthcare visit.     Hand pain - left thumb  If touches thumb wrong it's very painful, causes him to drop things.   Going on for few months. Taking ibuprofen without relief.   Was helping wife take apart book for crafting when it started  Works in [employer].    BMI 56  Weight up from 331 to 424 since 3/2018.   [place] gastric ballooning 1/2018 without any improvement, now removed.   Working with [facility] weight loss clinic. Considering bariatric surgery. Sleeve gastrectomy vs gastric bypass.   Has lost some weight with the weight loss clinic so far with diet. Has been with them 4 months. Down 10 lbs since working with them.     Anxiety  Sertraline 25mg  Hydroxyzine 25mg prn  - about once or twice per week  Started new job.      TAYO on CPAP    Initial had FFM and it wouldn't hold seal. Now on firmer mask which is working well.     Rarely needs astelin and flonase since nasal surgery.     Ropinirole qhs prn RLS. When he has been very active it flares.     Review of Systems   Constitutional: Negative for fever.   HENT: Negative.    Eyes: Negative.    Respiratory: Negative for shortness of breath.    Cardiovascular: Negative for chest pain and leg swelling.   Gastrointestinal: Negative for abdominal pain, diarrhea, nausea and vomiting.   Genitourinary: Negative.    Musculoskeletal: Negative for arthralgias.        Left thumb pain   Skin: Negative for rash.   Psychiatric/Behavioral: Negative.        Objective:   /80 (BP Location: Right arm, Patient Position: Sitting, BP Method: Large (Manual))   Ht 6' 1" (1.854 m)   Wt (!) 192.6 kg " (424 lb 9.7 oz)   BMI 56.02 kg/m²      Physical Exam   Constitutional: He is oriented to person, place, and time. He appears well-developed and well-nourished. No distress.   HENT:   Head: Normocephalic and atraumatic.   Cardiovascular: Normal rate and regular rhythm.   No murmur heard.  Pulmonary/Chest: Effort normal. No respiratory distress. He has no wheezes. He has no rales.   Musculoskeletal:   Intact ROM of left thumb, nl strength of all digits, intact ROM of wrist   Neurological: He is alert and oriented to person, place, and time.   Skin: Skin is warm and dry. He is not diaphoretic.   Psychiatric: He has a normal mood and affect. His behavior is normal.       Assessment:       1. Health care maintenance    2. Anxiety    3. Obstructive sleep apnea    4. BMI 50.0-59.9, adult    5. Tendinitis of thumb    6. Pain of left thumb        Plan:       Jered was seen today for establish care and hand pain.    Diagnoses and all orders for this visit:    Health care maintenance  -     CBC auto differential; Future  -     Hemoglobin A1c; Future  -     Comprehensive metabolic panel; Future  -     Lipid panel; Future  -     TSH; Future  -     Vitamin D; Future    Anxiety  -     hydrOXYzine HCl (ATARAX) 25 MG tablet; TK 1 T PO  TID PRN ANXIETY  -    Increase from 25mg to sertraline (ZOLOFT) 50 MG tablet; Take 1 tablet (50 mg total) by mouth once daily.    Obstructive sleep apnea  Continue cpap    BMI 50.0-59.9, adult  -   Working on weight loss, considering bariatric surgery. Previously balloon ineffective and has been removed.     Tendinitis of thumb  Pain of left thumb  -     X-Ray Finger 2 or More Views Left; Future  -     meloxicam (MOBIC) 15 MG tablet; Take 1 tablet (15 mg total) by mouth once daily. X 2 weeks with food then daily prn         Hand ortho if fails to improve

## 2019-04-12 ENCOUNTER — LAB VISIT (OUTPATIENT)
Dept: LAB | Facility: HOSPITAL | Age: 44
End: 2019-04-12
Attending: INTERNAL MEDICINE
Payer: COMMERCIAL

## 2019-04-12 DIAGNOSIS — Z00.00 HEALTH CARE MAINTENANCE: ICD-10-CM

## 2019-04-12 LAB
25(OH)D3+25(OH)D2 SERPL-MCNC: 12 NG/ML (ref 30–96)
ALBUMIN SERPL BCP-MCNC: 3.7 G/DL (ref 3.5–5.2)
ALP SERPL-CCNC: 69 U/L (ref 55–135)
ALT SERPL W/O P-5'-P-CCNC: 26 U/L (ref 10–44)
ANION GAP SERPL CALC-SCNC: 9 MMOL/L (ref 8–16)
AST SERPL-CCNC: 20 U/L (ref 10–40)
BASOPHILS # BLD AUTO: 0.05 K/UL (ref 0–0.2)
BASOPHILS NFR BLD: 0.8 % (ref 0–1.9)
BILIRUB SERPL-MCNC: 0.7 MG/DL (ref 0.1–1)
BUN SERPL-MCNC: 13 MG/DL (ref 6–20)
CALCIUM SERPL-MCNC: 9.5 MG/DL (ref 8.7–10.5)
CHLORIDE SERPL-SCNC: 106 MMOL/L (ref 95–110)
CHOLEST SERPL-MCNC: 187 MG/DL (ref 120–199)
CHOLEST/HDLC SERPL: 5.5 {RATIO} (ref 2–5)
CO2 SERPL-SCNC: 25 MMOL/L (ref 23–29)
CREAT SERPL-MCNC: 0.9 MG/DL (ref 0.5–1.4)
DIFFERENTIAL METHOD: ABNORMAL
EOSINOPHIL # BLD AUTO: 0.2 K/UL (ref 0–0.5)
EOSINOPHIL NFR BLD: 2.6 % (ref 0–8)
ERYTHROCYTE [DISTWIDTH] IN BLOOD BY AUTOMATED COUNT: 13.4 % (ref 11.5–14.5)
EST. GFR  (AFRICAN AMERICAN): >60 ML/MIN/1.73 M^2
EST. GFR  (NON AFRICAN AMERICAN): >60 ML/MIN/1.73 M^2
ESTIMATED AVG GLUCOSE: 100 MG/DL (ref 68–131)
GLUCOSE SERPL-MCNC: 93 MG/DL (ref 70–110)
HBA1C MFR BLD HPLC: 5.1 % (ref 4–5.6)
HCT VFR BLD AUTO: 41.6 % (ref 40–54)
HDLC SERPL-MCNC: 34 MG/DL (ref 40–75)
HDLC SERPL: 18.2 % (ref 20–50)
HGB BLD-MCNC: 13.5 G/DL (ref 14–18)
LDLC SERPL CALC-MCNC: 135.2 MG/DL (ref 63–159)
LYMPHOCYTES # BLD AUTO: 1.9 K/UL (ref 1–4.8)
LYMPHOCYTES NFR BLD: 29.6 % (ref 18–48)
MCH RBC QN AUTO: 29.4 PG (ref 27–31)
MCHC RBC AUTO-ENTMCNC: 32.5 G/DL (ref 32–36)
MCV RBC AUTO: 91 FL (ref 82–98)
MONOCYTES # BLD AUTO: 0.7 K/UL (ref 0.3–1)
MONOCYTES NFR BLD: 11.7 % (ref 4–15)
NEUTROPHILS # BLD AUTO: 3.5 K/UL (ref 1.8–7.7)
NEUTROPHILS NFR BLD: 55.1 % (ref 38–73)
NONHDLC SERPL-MCNC: 153 MG/DL
PLATELET # BLD AUTO: 321 K/UL (ref 150–350)
PMV BLD AUTO: 9.6 FL (ref 9.2–12.9)
POTASSIUM SERPL-SCNC: 4 MMOL/L (ref 3.5–5.1)
PROT SERPL-MCNC: 7.2 G/DL (ref 6–8.4)
RBC # BLD AUTO: 4.59 M/UL (ref 4.6–6.2)
SODIUM SERPL-SCNC: 140 MMOL/L (ref 136–145)
TRIGL SERPL-MCNC: 89 MG/DL (ref 30–150)
TSH SERPL DL<=0.005 MIU/L-ACNC: 3.08 UIU/ML (ref 0.4–4)
WBC # BLD AUTO: 6.26 K/UL (ref 3.9–12.7)

## 2019-04-12 PROCEDURE — 82306 VITAMIN D 25 HYDROXY: CPT

## 2019-04-12 PROCEDURE — 36415 COLL VENOUS BLD VENIPUNCTURE: CPT

## 2019-04-12 PROCEDURE — 80061 LIPID PANEL: CPT

## 2019-04-12 PROCEDURE — 84443 ASSAY THYROID STIM HORMONE: CPT

## 2019-04-12 PROCEDURE — 85025 COMPLETE CBC W/AUTO DIFF WBC: CPT

## 2019-04-12 PROCEDURE — 80053 COMPREHEN METABOLIC PANEL: CPT

## 2019-04-12 PROCEDURE — 83036 HEMOGLOBIN GLYCOSYLATED A1C: CPT

## 2019-09-05 ENCOUNTER — OFFICE VISIT (OUTPATIENT)
Dept: PSYCHIATRY | Facility: CLINIC | Age: 44
End: 2019-09-05
Payer: COMMERCIAL

## 2019-09-05 DIAGNOSIS — F32.A DEPRESSION, UNSPECIFIED DEPRESSION TYPE: Primary | ICD-10-CM

## 2019-09-05 PROCEDURE — 90791 PSYCH DIAGNOSTIC EVALUATION: CPT | Mod: S$GLB,,, | Performed by: SOCIAL WORKER

## 2019-09-05 PROCEDURE — 99999 PR PBB SHADOW E&M-EST. PATIENT-LVL II: ICD-10-PCS | Mod: PBBFAC,,, | Performed by: SOCIAL WORKER

## 2019-09-05 PROCEDURE — 90791 PR PSYCHIATRIC DIAGNOSTIC EVALUATION: ICD-10-PCS | Mod: S$GLB,,, | Performed by: SOCIAL WORKER

## 2019-09-05 PROCEDURE — 99999 PR PBB SHADOW E&M-EST. PATIENT-LVL II: CPT | Mod: PBBFAC,,, | Performed by: SOCIAL WORKER

## 2019-09-05 NOTE — PROGRESS NOTES
Psychiatry Initial Visit (PhD/LCSW)  Diagnostic Interview - CPT 96793    Date: 9/5/2019    Site: [facility]    Referral source: himself  Clinical status of patient: Outpatient    Jered Contreras, a 44 y.o. male, for initial evaluation visit.  Met with patient.    Chief complaint/reason for encounter: depression and behavior    History of present illness: here due to wanting to manager anger feelings better, on zoloft from PCP of 25 MG. 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 And no children, wife and he have had weight loss surgery, and his was only two days ago, so in some pain today, was pleasant and a nice person, and his father had an anger issues. He is wanting to lose weight, some self-esteem issues, is hard on himself at times.    Pain: 0    Symptoms:   · Mood: depressed mood and weight loss  · Anxiety: decreased memory, excessive anxiety/worry, restlessness/keyed up, irritability and muscle tension  · Substance abuse: denied  · Cognitive functioning: denied  · Health behaviors: stress, weight, recent weigth loss surgery, his wife had weight loss surgery in 2016.    Psychiatric history: has participated in counseling/psychotherapy on an outpatient basis in the past    Medical history: weight loss and weight loss surgery.    Family history of psychiatric illness: mother has depression, father has anger issues    Social history (marriage, employment, etc.):  five years, no children, and works full time, and wife works full time and likes music, reading and pod casts and likes to write.    Substance use:   Alcohol: social   Drugs: none   Tobacco: none   Caffeine: some    Current medications and drug reactions (include OTC, herbal): see medication list  zoloft 25 mg from PCP.    Strengths and liabilities: Strength: Patient accepts guidance/feedback, Strength: Patient is expressive/articulate., Strength: Patient is intelligent., Strength: Patient is motivated for change., Strength: Patient is physically healthy., Strength: Patient has positive support network., Strength: Patient is stable., Liability: Patient lacks coping skills.    Current Evaluation:     Mental Status Exam:  General Appearance:  unremarkable, age appropriate   Speech: normal tone, normal rate, normal pitch, normal volume      Level of Cooperation: cooperative      Thought Processes: normal and logical   Mood: anxious, depressed, irritable      Thought Content: normal, no suicidality, no homicidality, delusions, or paranoia   Affect: congruent and appropriate   Orientation: Oriented x3   Memory: recent >  intact, remote >  intact   Attention Span & Concentration: intact   Fund of General Knowledge: intact and appropriate to age and level of education   Abstract Reasoning: interpretation of similarities was concrete   Judgment & Insight: good     Language  intact     Diagnostic Impression - Plan:       ICD-10-CM ICD-9-CM   1. Depression, unspecified depression type F32.9 311       Plan:individual psychotherapy and medication management by physician    Return to Clinic: 2 weeks    Length of Service (minutes): 45  Will do individual therapy, teach anger management skills, and how to relax, and calming down skills.

## 2019-11-11 ENCOUNTER — OFFICE VISIT (OUTPATIENT)
Dept: PSYCHIATRY | Facility: CLINIC | Age: 44
End: 2019-11-11
Payer: COMMERCIAL

## 2019-11-11 DIAGNOSIS — F32.A DEPRESSION, UNSPECIFIED DEPRESSION TYPE: Primary | ICD-10-CM

## 2019-11-11 PROCEDURE — 90834 PR PSYCHOTHERAPY W/PATIENT, 45 MIN: ICD-10-PCS | Mod: S$GLB,,, | Performed by: SOCIAL WORKER

## 2019-11-11 PROCEDURE — 99999 PR PBB SHADOW E&M-EST. PATIENT-LVL II: CPT | Mod: PBBFAC,,, | Performed by: SOCIAL WORKER

## 2019-11-11 PROCEDURE — 99999 PR PBB SHADOW E&M-EST. PATIENT-LVL II: ICD-10-PCS | Mod: PBBFAC,,, | Performed by: SOCIAL WORKER

## 2019-11-11 PROCEDURE — 90834 PSYTX W PT 45 MINUTES: CPT | Mod: S$GLB,,, | Performed by: SOCIAL WORKER

## 2019-11-11 NOTE — PROGRESS NOTES
Individual Psychotherapy (PhD/LCSW)    11/11/2019    Site:  [facility]         Therapeutic Intervention: Met with patient.  Outpatient - Insight oriented psychotherapy 45 min - CPT code 08712    Chief complaint/reason for encounter: depression, anxiety, behavior and interpersonal     Interval history and content of current session: discussed coping skills, marriage, job, wife's job, parents and over all doing better, mother complains all the time, and is negative, and sounds depressed, parents are older and have health problems and require time, attention and care. So his most recent large stress is care taking and having a hard time due to the issues of his parents, especially his mother, I suggested they get professional help for them like marriage counseling and also have mother evaluated for depression and this may help, he is an only child, his job is okay, and wife is looking for another job, they are doing better, he is less angry and has lost some weight, suggested he keep doing what he is doing , and taking care of himself and see me in three or four weeks.    Treatment plan:  · Target symptoms: depression, recurrent depression, anxiety , mood swings, mood disorder, adjustment, grief, work stress  · Why chosen therapy is appropriate versus another modality: relevant to diagnosis, patient responds to this modality, evidence based practice  · Outcome monitoring methods: self-report, observation  · Therapeutic intervention type: insight oriented psychotherapy, behavior modifying psychotherapy, supportive psychotherapy    Risk parameters:  Patient reports no suicidal ideation  Patient reports no homicidal ideation  Patient reports no self-injurious behavior  Patient reports no violent behavior    Verbal deficits: None    Patient's response to intervention:  The patient's response to intervention is accepting, motivated.    Progress toward goals and other mental status changes:  The patient's progress  toward goals is fair .    Diagnosis:     ICD-10-CM ICD-9-CM   1. Depression, unspecified depression type F32.9 311       Plan:  individual psychotherapy and medication management by physician    Return to clinic: 3 weeks    Length of Service (minutes): 45

## 2020-01-28 ENCOUNTER — PATIENT OUTREACH (OUTPATIENT)
Dept: ADMINISTRATIVE | Facility: OTHER | Age: 45
End: 2020-01-28

## 2020-01-29 ENCOUNTER — OFFICE VISIT (OUTPATIENT)
Dept: OPTOMETRY | Facility: CLINIC | Age: 45
End: 2020-01-29
Payer: COMMERCIAL

## 2020-01-29 DIAGNOSIS — H52.223 REGULAR ASTIGMATISM OF BOTH EYES: Primary | ICD-10-CM

## 2020-01-29 PROCEDURE — 99999 PR PBB SHADOW E&M-EST. PATIENT-LVL II: CPT | Mod: PBBFAC,,, | Performed by: OPTOMETRIST

## 2020-01-29 PROCEDURE — 99999 PR PBB SHADOW E&M-EST. PATIENT-LVL II: ICD-10-PCS | Mod: PBBFAC,,, | Performed by: OPTOMETRIST

## 2020-01-29 PROCEDURE — 92014 PR EYE EXAM, EST PATIENT,COMPREHESV: ICD-10-PCS | Mod: S$GLB,,, | Performed by: OPTOMETRIST

## 2020-01-29 PROCEDURE — 92014 COMPRE OPH EXAM EST PT 1/>: CPT | Mod: S$GLB,,, | Performed by: OPTOMETRIST

## 2020-01-29 PROCEDURE — 92015 DETERMINE REFRACTIVE STATE: CPT | Mod: S$GLB,,, | Performed by: OPTOMETRIST

## 2020-01-29 PROCEDURE — 92015 PR REFRACTION: ICD-10-PCS | Mod: S$GLB,,, | Performed by: OPTOMETRIST

## 2020-01-29 NOTE — PROGRESS NOTES
HPI     45 y/o male here today for an annual exam  DLS 9/28/18 Dr Hummel  Pt. States he is having decreased vision distance and near, he has   prescription sunglasses, he can see clearer when he wears them, rx reading   glasses give him headaches. He doesn't wear them any more.  Occasional floaters and light flashes  No itching burning or tearing  No drops  No eye surgeries  No contact lens wear    Last edited by Rox Garza, OD on 1/29/2020  3:44 PM. (History)            Assessment /Plan     For exam results, see Encounter Report.    Regular astigmatism of both eyes      Educated pt on findings. Updated SRx, FTW. Trail framed in office with good result at all distances. No complaints at near with SV lenses. Educated on future presbyopic symptoms but no need for PALs at this time. Monitor yearly    DFE unremarkable.       RTC in 1 year for annual eye exam or prn.

## 2020-01-30 ENCOUNTER — OFFICE VISIT (OUTPATIENT)
Dept: PSYCHIATRY | Facility: CLINIC | Age: 45
End: 2020-01-30
Payer: COMMERCIAL

## 2020-01-30 DIAGNOSIS — F32.A DEPRESSION, UNSPECIFIED DEPRESSION TYPE: Primary | ICD-10-CM

## 2020-01-30 PROCEDURE — 90834 PSYTX W PT 45 MINUTES: CPT | Mod: S$GLB,,, | Performed by: SOCIAL WORKER

## 2020-01-30 PROCEDURE — 99999 PR PBB SHADOW E&M-EST. PATIENT-LVL II: ICD-10-PCS | Mod: PBBFAC,,, | Performed by: SOCIAL WORKER

## 2020-01-30 PROCEDURE — 90834 PR PSYCHOTHERAPY W/PATIENT, 45 MIN: ICD-10-PCS | Mod: S$GLB,,, | Performed by: SOCIAL WORKER

## 2020-01-30 PROCEDURE — 99999 PR PBB SHADOW E&M-EST. PATIENT-LVL II: CPT | Mod: PBBFAC,,, | Performed by: SOCIAL WORKER

## 2020-01-30 NOTE — PROGRESS NOTES
Individual Psychotherapy (PhD/LCSW)    1/30/2020    Site:  [facility]         Therapeutic Intervention: Met with patient.  Outpatient - Insight oriented psychotherapy 45 min - CPT code 10529    Chief complaint/reason for encounter: depression and anxiety     Interval history and content of current session: discussed taking care of himself, how to relax, how top calm down, stress management skills, and claming skills, he and wife good, sleep is okay, and health is improving, and concerns today were aging parents, how to cope, and how to take care of self and them and not over do this and medical needs of parents, and how to have balance, in his life, feelings, health, coping skills all focused on and being more positive and how to keep his progress going all addressed.    Treatment plan:  · Target symptoms: depression, anxiety , adjustment  · Why chosen therapy is appropriate versus another modality: relevant to diagnosis, patient responds to this modality, evidence based practice  · Outcome monitoring methods: self-report, observation  · Therapeutic intervention type: insight oriented psychotherapy, behavior modifying psychotherapy    Risk parameters:  Patient reports no suicidal ideation  Patient reports no homicidal ideation  Patient reports no self-injurious behavior  Patient reports no violent behavior    Verbal deficits: None    Patient's response to intervention:  The patient's response to intervention is accepting, motivated.    Progress toward goals and other mental status changes:  The patient's progress toward goals is fair .    Diagnosis:     ICD-10-CM ICD-9-CM   1. Depression, unspecified depression type F32.9 311       Plan:  individual psychotherapy    Return to clinic: 3 weeks    Length of Service (minutes): 45

## 2020-04-10 DIAGNOSIS — F41.9 ANXIETY: ICD-10-CM

## 2020-04-11 ENCOUNTER — PATIENT MESSAGE (OUTPATIENT)
Dept: OTOLARYNGOLOGY | Facility: CLINIC | Age: 45
End: 2020-04-11

## 2020-04-11 ENCOUNTER — PATIENT MESSAGE (OUTPATIENT)
Dept: INTERNAL MEDICINE | Facility: CLINIC | Age: 45
End: 2020-04-11

## 2020-04-11 DIAGNOSIS — F41.9 ANXIETY: ICD-10-CM

## 2020-04-11 RX ORDER — HYDROXYZINE HYDROCHLORIDE 25 MG/1
TABLET, FILM COATED ORAL
Qty: 30 TABLET | Refills: 6 | Status: SHIPPED | OUTPATIENT
Start: 2020-04-11 | End: 2021-10-27 | Stop reason: SDUPTHER

## 2020-04-13 RX ORDER — SERTRALINE HYDROCHLORIDE 100 MG/1
100 TABLET, FILM COATED ORAL DAILY
Qty: 90 TABLET | Refills: 3 | Status: SHIPPED | OUTPATIENT
Start: 2020-04-13 | End: 2020-11-24 | Stop reason: SDUPTHER

## 2020-04-13 RX ORDER — FLUTICASONE PROPIONATE 50 MCG
2 SPRAY, SUSPENSION (ML) NASAL DAILY
Qty: 18.2 ML | Refills: 11 | Status: SHIPPED | OUTPATIENT
Start: 2020-04-13 | End: 2021-06-29 | Stop reason: SDUPTHER

## 2020-05-26 ENCOUNTER — OFFICE VISIT (OUTPATIENT)
Dept: PSYCHIATRY | Facility: CLINIC | Age: 45
End: 2020-05-26
Payer: COMMERCIAL

## 2020-05-26 DIAGNOSIS — F32.A DEPRESSION, UNSPECIFIED DEPRESSION TYPE: Primary | ICD-10-CM

## 2020-05-26 PROCEDURE — 90834 PR PSYCHOTHERAPY W/PATIENT, 45 MIN: ICD-10-PCS | Mod: S$GLB,,, | Performed by: SOCIAL WORKER

## 2020-05-26 PROCEDURE — 99999 PR PBB SHADOW E&M-EST. PATIENT-LVL II: ICD-10-PCS | Mod: PBBFAC,,, | Performed by: SOCIAL WORKER

## 2020-05-26 PROCEDURE — 99999 PR PBB SHADOW E&M-EST. PATIENT-LVL II: CPT | Mod: PBBFAC,,, | Performed by: SOCIAL WORKER

## 2020-05-26 PROCEDURE — 90834 PSYTX W PT 45 MINUTES: CPT | Mod: S$GLB,,, | Performed by: SOCIAL WORKER

## 2020-05-26 NOTE — PROGRESS NOTES
Individual Psychotherapy (PhD/LCSW)    5/26/2020    Site:  [facility]         Therapeutic Intervention: Met with patient.  Outpatient - Insight oriented psychotherapy 45 min - CPT code 19543    Chief complaint/reason for encounter: depression, anxiety, behavior and interpersonal     Interval history and content of current session: discussed coping skills, family, job, doing okay, memories from the past when father was hard on him and overly critical and father has never accepted him and always wanted the client, how son to be just like him, does not work that way, so client has had self-esteem concerns, and also re-occurring memories of father saying mean things to him, does no like being around father now, so coping skills, work in being in the present, mindfulness, breathing, ref-framing and distracting his thoughts and feelings all taught and worked on today and also take care of himself and let himself know he is okay the way he is, does not have to seek father's and or anyone's approval, how to relax addressed also.    Treatment plan:  · Target symptoms: depression, anxiety , adjustment, grief  · Why chosen therapy is appropriate versus another modality: relevant to diagnosis, patient responds to this modality, evidence based practice  · Outcome monitoring methods: self-report, observation  · Therapeutic intervention type: insight oriented psychotherapy, behavior modifying psychotherapy, supportive psychotherapy    Risk parameters:  Patient reports no suicidal ideation  Patient reports no homicidal ideation  Patient reports no self-injurious behavior  Patient reports no violent behavior    Verbal deficits: None    Patient's response to intervention:  The patient's response to intervention is accepting, motivated.    Progress toward goals and other mental status changes:  The patient's progress toward goals is fair .    Diagnosis:     ICD-10-CM ICD-9-CM   1. Depression, unspecified depression type F32.9  311       Plan:  individual psychotherapy, consult psychiatrist for medication evaluation and medication management by physician    Return to clinic: 2 weeks    Length of Service (minutes): 45

## 2020-06-11 ENCOUNTER — OFFICE VISIT (OUTPATIENT)
Dept: OTOLARYNGOLOGY | Facility: CLINIC | Age: 45
End: 2020-06-11
Payer: COMMERCIAL

## 2020-06-11 VITALS
HEART RATE: 62 BPM | TEMPERATURE: 98 F | HEIGHT: 73 IN | WEIGHT: 315 LBS | DIASTOLIC BLOOD PRESSURE: 91 MMHG | SYSTOLIC BLOOD PRESSURE: 140 MMHG | BODY MASS INDEX: 41.75 KG/M2

## 2020-06-11 DIAGNOSIS — M95.0 NASAL VALVE COLLAPSE: ICD-10-CM

## 2020-06-11 DIAGNOSIS — J30.9 ALLERGIC RHINITIS, UNSPECIFIED SEASONALITY, UNSPECIFIED TRIGGER: Primary | ICD-10-CM

## 2020-06-11 DIAGNOSIS — J34.89 NASAL OBSTRUCTION WITHOUT CHOANAL ATRESIA: ICD-10-CM

## 2020-06-11 DIAGNOSIS — G47.33 OBSTRUCTIVE SLEEP APNEA TREATED WITH CONTINUOUS POSITIVE AIRWAY PRESSURE (CPAP): ICD-10-CM

## 2020-06-11 PROCEDURE — 99213 OFFICE O/P EST LOW 20 MIN: CPT | Mod: S$GLB,,, | Performed by: SPECIALIST

## 2020-06-11 PROCEDURE — 3008F BODY MASS INDEX DOCD: CPT | Mod: CPTII,S$GLB,, | Performed by: SPECIALIST

## 2020-06-11 PROCEDURE — 99213 PR OFFICE/OUTPT VISIT, EST, LEVL III, 20-29 MIN: ICD-10-PCS | Mod: S$GLB,,, | Performed by: SPECIALIST

## 2020-06-11 PROCEDURE — 3008F PR BODY MASS INDEX (BMI) DOCUMENTED: ICD-10-PCS | Mod: CPTII,S$GLB,, | Performed by: SPECIALIST

## 2020-06-11 RX ORDER — MONTELUKAST SODIUM 10 MG/1
TABLET ORAL
Qty: 30 TABLET | Refills: 11 | Status: SHIPPED | OUTPATIENT
Start: 2020-06-11 | End: 2020-11-11

## 2020-06-11 NOTE — PROGRESS NOTES
Subjective:       Patient ID: Jered Contreras is a 44 y.o. male.    Chief Complaint: nasal blockage    The patient has been experiencing nasal blockage which is worse on the left for the last 6 months.  He has been taking Zyrtec and Flonase.  He underwent a septoplasty in 2018.  Following surgery he did well until this year.  He has sleep apnea and uses a full face mask for his CPAP.    Review of Systems   Constitutional: Positive for fatigue. Negative for activity change, appetite change, chills, fever and unexpected weight change.   HENT: Positive for congestion, postnasal drip, sinus pressure and sore throat. Negative for ear discharge, ear pain, facial swelling, hearing loss, mouth sores, rhinorrhea, sinus pain, sneezing, tinnitus, trouble swallowing and voice change.         Obstructive sleep apnea on CPAP   Eyes: Negative for photophobia, pain, discharge, redness, itching and visual disturbance.   Respiratory: Positive for cough. Negative for apnea, choking, shortness of breath and wheezing.    Cardiovascular: Negative for chest pain and palpitations.   Gastrointestinal: Negative for abdominal distention, abdominal pain, nausea and vomiting.   Musculoskeletal: Positive for arthralgias and joint swelling. Negative for myalgias, neck pain and neck stiffness.   Skin: Negative.  Negative for color change, pallor and rash.   Allergic/Immunologic: Positive for environmental allergies. Negative for food allergies and immunocompromised state.   Neurological: Positive for headaches. Negative for dizziness, facial asymmetry, speech difficulty, weakness, light-headedness and numbness.   Hematological: Negative for adenopathy. Does not bruise/bleed easily.   Psychiatric/Behavioral: Negative for agitation, confusion, decreased concentration and sleep disturbance.       Objective:      Physical Exam   Constitutional: He is oriented to person, place, and time. He appears well-developed and well-nourished. He is  cooperative.   HENT:   Head: Normocephalic.   Right Ear: External ear and ear canal normal. Tympanic membrane is retracted.   Left Ear: External ear and ear canal normal. Tympanic membrane is retracted.   Nose: Mucosal edema (cyanotic, boggy inferior turbinates bilaterally), rhinorrhea (clear mucus bilaterally) and nasal deformity ( internal nasal valve collapse with deep inspiration bilaterally, more marked on the left, both sides reverse with Shayne maneuver) present. No septal deviation (Midline septum).   Mouth/Throat: Uvula is midline, oropharynx is clear and moist and mucous membranes are normal. No oral lesions.   Oral cavity-long thick palate and uvula, base of tongue enlarged with narrowing of the oropharyngeal inlet   Eyes: Pupils are equal, round, and reactive to light. EOM and lids are normal. Right eye exhibits no discharge and no exudate. Left eye exhibits no discharge and no exudate. Right conjunctiva is injected. Left conjunctiva is injected.   Neck: Trachea normal and normal range of motion. No muscular tenderness present. No tracheal deviation present. No thyroid mass and no thyromegaly present.   Cardiovascular: Normal rate, regular rhythm, normal heart sounds and normal pulses.   Pulmonary/Chest: Effort normal and breath sounds normal. No stridor. He has no decreased breath sounds. He has no wheezes. He has no rhonchi. He has no rales.   Abdominal: Soft. Bowel sounds are normal. There is no tenderness.   Musculoskeletal: Normal range of motion.   Lymphadenopathy:        Head (right side): No submental, no submandibular, no preauricular, no posterior auricular and no occipital adenopathy present.        Head (left side): No submental, no submandibular, no preauricular, no posterior auricular and no occipital adenopathy present.     He has no cervical adenopathy.   Neurological: He is alert and oriented to person, place, and time. He has normal strength. No cranial nerve deficit or sensory deficit.  Gait normal.   Skin: Skin is warm and dry. No petechiae and no rash noted. No cyanosis. Nails show no clubbing.   Psychiatric: He has a normal mood and affect. His speech is normal and behavior is normal. Judgment and thought content normal. Cognition and memory are normal.          Assessment:       1. Allergic rhinitis, unspecified seasonality, unspecified trigger    2. Nasal valve collapse    3. Nasal obstruction without choanal atresia    4. Obstructive sleep apnea treated with continuous positive airway pressure (CPAP)        Plan:       I am starting the patient on a nighttime dose of Singulair in addition to his daily doses Zyrtec and Flonase.  He will use breathe right strips at night.  I will recheck him in 4 weeks.  If the enhanced allergy management and breathe right strips do not control his problem he may need to undergo internal nasal valve reconstruction with autologous cartilage  grafts from the external ear.

## 2020-06-18 ENCOUNTER — OFFICE VISIT (OUTPATIENT)
Dept: PODIATRY | Facility: CLINIC | Age: 45
End: 2020-06-18
Payer: COMMERCIAL

## 2020-06-18 VITALS
SYSTOLIC BLOOD PRESSURE: 121 MMHG | DIASTOLIC BLOOD PRESSURE: 58 MMHG | BODY MASS INDEX: 41.75 KG/M2 | HEIGHT: 73 IN | HEART RATE: 48 BPM | WEIGHT: 315 LBS

## 2020-06-18 DIAGNOSIS — M79.672 LEFT FOOT PAIN: Primary | ICD-10-CM

## 2020-06-18 DIAGNOSIS — L85.1 PLANTAR POROKERATOSIS, ACQUIRED: ICD-10-CM

## 2020-06-18 PROCEDURE — 99213 OFFICE O/P EST LOW 20 MIN: CPT | Mod: S$GLB,,, | Performed by: PODIATRIST

## 2020-06-18 PROCEDURE — 99999 PR PBB SHADOW E&M-EST. PATIENT-LVL III: CPT | Mod: PBBFAC,,, | Performed by: PODIATRIST

## 2020-06-18 PROCEDURE — 3008F PR BODY MASS INDEX (BMI) DOCUMENTED: ICD-10-PCS | Mod: CPTII,S$GLB,, | Performed by: PODIATRIST

## 2020-06-18 PROCEDURE — 99213 PR OFFICE/OUTPT VISIT, EST, LEVL III, 20-29 MIN: ICD-10-PCS | Mod: S$GLB,,, | Performed by: PODIATRIST

## 2020-06-18 PROCEDURE — 99999 PR PBB SHADOW E&M-EST. PATIENT-LVL III: ICD-10-PCS | Mod: PBBFAC,,, | Performed by: PODIATRIST

## 2020-06-18 PROCEDURE — 3008F BODY MASS INDEX DOCD: CPT | Mod: CPTII,S$GLB,, | Performed by: PODIATRIST

## 2020-06-18 RX ORDER — HYDROXYZINE HYDROCHLORIDE 25 MG/1
TABLET, FILM COATED ORAL
COMMUNITY
Start: 2020-06-11 | End: 2020-11-11 | Stop reason: SDUPTHER

## 2020-06-18 NOTE — PROGRESS NOTES
Chief Complaint   Patient presents with    Foot Pain     left foot    Callouses             HPI:   Jered Contreras is a 44 y.o. male here for Left foot pain sub 3rd metatarsal head area.  Pain started about around March.  He does not recall any new injury or trauma to his feet.  He does have pets at home.  No self-treatment yet.          Patient Active Problem List   Diagnosis    AR (allergic rhinitis)    Morbid obesity with BMI of 40.0-44.9, adult    Heart palpitations    Anxiety    Depression    Acute pain of right knee    Obstructive sleep apnea    Sleep stage dysfunction             Current Outpatient Medications on File Prior to Visit   Medication Sig Dispense Refill    ASCORBATE CALCIUM (VITAMIN C ORAL) Take by mouth.      azelastine (ASTELIN) 137 mcg (0.1 %) nasal spray   2    fluticasone propionate (FLONASE) 50 mcg/actuation nasal spray 2 sprays (100 mcg total) by Each Nostril route once daily. 18.2 mL 11    hydroxyzine HCL (ATARAX) 25 MG tablet TK 1 T PO  TID PRN ANXIETY 30 tablet 6    montelukast (SINGULAIR) 10 mg tablet One tablet by mouth every evening 30 tablet 11    multivitamin capsule Take 1 capsule by mouth once daily.      sertraline (ZOLOFT) 100 MG tablet Take 1 tablet (100 mg total) by mouth once daily. 90 tablet 3    hydrOXYzine HCL (ATARAX) 25 MG tablet       levocetirizine (XYZAL) 5 MG tablet Take 1 tablet (5 mg total) by mouth every evening. 30 tablet 11    rOPINIRole (REQUIP) 0.5 MG tablet Take 1 tablet (0.5 mg total) by mouth every evening. 30 tablet 6     No current facility-administered medications on file prior to visit.          ALLG:   Review of patient's allergies indicates:   Allergen Reactions    Ceclor [cefaclor] Anaphylaxis    Penicillins            ROS:   General ROS: negative for - chills, fever or night sweats  Respiratory ROS: no cough, shortness of breath, or wheezing  Cardiovascular ROS: no chest pain or dyspnea on exertion  Musculoskeletal  "ROS: negative  Neurological ROS: no TIA or stroke symptoms  Dermatological ROS: negative        EXAM:   Vitals:    06/18/20 1208   BP: (!) 121/58   BP Location: Right arm   Patient Position: Sitting   BP Method: Large (Automatic)   Pulse: (!) 48   Weight: (!) 158.8 kg (350 lb)   Height: 6' 1" (1.854 m)        General:  Alert, oriented, no acute distress      Bilateral  Lower extremity exam:    · Vascular:   · Dorsalis Pedis:  present     · Posterior Tibial:  present  · capillary refill time:  3 seconds  · Temperature of toes warm to touch  · Hair on toes:  present    · No edema to the affected area.        Neurological:     · sharp dull - B/L normal   · light touch - B/L normal  · No LOPS  · No pain with lateral metatarsal compression      Dermatological:   · There is intact skin tone, turgor, and temperature.    · There is a nucleated lesion/ callus sub left foot, sub metatarsal head 3  · No redness, swelling, or evidence of drainage.   No foreign body noted.      · Does not appear verrucous.  No petechiae.   · No open wounds.    · No bruising noted        Musculoskeletal:   · Metatarsophalangeal, subtalar, and ankle range of motion are 5/5.  · Adequate ROM, adequate strength  · Right foot: no gross deformity  · Left foot: no gross deformity              ASSESSMENT/PLAN:    Problem List Items Addressed This Visit     None      Visit Diagnoses     Left foot pain    -  Primary    Plantar porokeratosis, acquired              I counseled the patient on the patient's conditions, their implications and medical management.      Porokeratosis trimmed as a courtesy.   Patient reported relief afterwards without immediate complications.     Consider Urea 10% daily and off-load with supportive thick sole shoes.  Wider toe-box.  Avoid barefoot walking.      Call or return to clinic prn if these symptoms worsen or fail to improve as anticipated.   "

## 2020-08-31 ENCOUNTER — OFFICE VISIT (OUTPATIENT)
Dept: PSYCHIATRY | Facility: CLINIC | Age: 45
End: 2020-08-31
Payer: COMMERCIAL

## 2020-08-31 DIAGNOSIS — F32.A DEPRESSION, UNSPECIFIED DEPRESSION TYPE: Primary | ICD-10-CM

## 2020-08-31 PROCEDURE — 90834 PSYTX W PT 45 MINUTES: CPT | Mod: S$GLB,,, | Performed by: SOCIAL WORKER

## 2020-08-31 PROCEDURE — 90834 PR PSYCHOTHERAPY W/PATIENT, 45 MIN: ICD-10-PCS | Mod: S$GLB,,, | Performed by: SOCIAL WORKER

## 2020-08-31 PROCEDURE — 99999 PR PBB SHADOW E&M-EST. PATIENT-LVL I: ICD-10-PCS | Mod: PBBFAC,,, | Performed by: SOCIAL WORKER

## 2020-08-31 PROCEDURE — 99999 PR PBB SHADOW E&M-EST. PATIENT-LVL I: CPT | Mod: PBBFAC,,, | Performed by: SOCIAL WORKER

## 2020-08-31 NOTE — PROGRESS NOTES
Individual Psychotherapy (PhD/LCSW)    8/31/2020    Site:  [facility]         Therapeutic Intervention: Met with patient.  Outpatient - Insight oriented psychotherapy 45 min - CPT code 58608    Chief complaint/reason for encounter: depression, anxiety, behavior and interpersonal     Interval history and content of current session: discussed how to let the past go, how to not be like his father, and anger management skills, how to be who he wants to be, and letting go of beliefs that are not working for, him, ways to say thoughts to help him be more positive, have hope and make changes, job, marriage, taking care of himself, and family issues, and over all is doing better, so keep coping and being more positive and build his self-esteem all suggested.    Treatment plan:  · Target symptoms: depression, anxiety , adjustment  · Why chosen therapy is appropriate versus another modality: relevant to diagnosis, patient responds to this modality, evidence based practice  · Outcome monitoring methods: self-report, observation  · Therapeutic intervention type: insight oriented psychotherapy, behavior modifying psychotherapy, supportive psychotherapy    Risk parameters:  Patient reports no suicidal ideation  Patient reports no homicidal ideation  Patient reports no self-injurious behavior  Patient reports no violent behavior    Verbal deficits: None    Patient's response to intervention:  The patient's response to intervention is accepting, motivated.    Progress toward goals and other mental status changes:  The patient's progress toward goals is limited.    Diagnosis:     ICD-10-CM ICD-9-CM   1. Depression, unspecified depression type  F32.9 311       Plan:  individual psychotherapy, consult psychiatrist for medication evaluation and medication management by physician    Return to clinic: 2 weeks    Length of Service (minutes): 45

## 2020-09-21 ENCOUNTER — PATIENT MESSAGE (OUTPATIENT)
Dept: PSYCHIATRY | Facility: CLINIC | Age: 45
End: 2020-09-21

## 2020-09-30 ENCOUNTER — PATIENT MESSAGE (OUTPATIENT)
Dept: INTERNAL MEDICINE | Facility: CLINIC | Age: 45
End: 2020-09-30

## 2020-09-30 RX ORDER — PANTOPRAZOLE SODIUM 40 MG/1
TABLET, DELAYED RELEASE ORAL
Qty: 30 TABLET | Refills: 2 | Status: SHIPPED | OUTPATIENT
Start: 2020-09-30 | End: 2020-11-11 | Stop reason: SDUPTHER

## 2020-09-30 RX ORDER — PANTOPRAZOLE SODIUM 40 MG/1
TABLET, DELAYED RELEASE ORAL
COMMUNITY
Start: 2020-09-03 | End: 2020-09-30 | Stop reason: SDUPTHER

## 2020-09-30 NOTE — TELEPHONE ENCOUNTER
Care Due:                  Date            Visit Type   Department     Provider  --------------------------------------------------------------------------------                                [facility]                              PATIENT      [facility] INTERNAL  Last Visit: 04-      VISIT/[facility]   MEDICINE       LAKISHA GREENWOOD  Next Visit: None Scheduled  None         None Found                                                            Last  Test          Frequency    Reason                     Performed    Due Date  --------------------------------------------------------------------------------    Office Visit  12 months..  sertraline...............  04- 04-    Powered by [employer] Reference number: [id number]. 9/30/2020 1:11:18 PM CDT

## 2020-10-05 ENCOUNTER — PATIENT MESSAGE (OUTPATIENT)
Dept: ADMINISTRATIVE | Facility: HOSPITAL | Age: 45
End: 2020-10-05

## 2020-10-13 ENCOUNTER — OFFICE VISIT (OUTPATIENT)
Dept: PSYCHIATRY | Facility: CLINIC | Age: 45
End: 2020-10-13
Payer: COMMERCIAL

## 2020-10-13 DIAGNOSIS — Z86.59 HISTORY OF PANIC ATTACKS: ICD-10-CM

## 2020-10-13 DIAGNOSIS — F41.1 GAD (GENERALIZED ANXIETY DISORDER): ICD-10-CM

## 2020-10-13 DIAGNOSIS — F33.1 MODERATE EPISODE OF RECURRENT MAJOR DEPRESSIVE DISORDER: Primary | ICD-10-CM

## 2020-10-13 PROCEDURE — 99214 PR OFFICE/OUTPT VISIT, EST, LEVL IV, 30-39 MIN: ICD-10-PCS | Mod: 95,,, | Performed by: NURSE PRACTITIONER

## 2020-10-13 PROCEDURE — 99214 OFFICE O/P EST MOD 30 MIN: CPT | Mod: 95,,, | Performed by: NURSE PRACTITIONER
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
## 2020-10-13 NOTE — PATIENT INSTRUCTIONS
Sertraline tablets  What is this medicine?  SERTRALINE (SER tra channing) is used to treat depression. It may also be used to treat obsessive compulsive disorder, panic disorder, post-trauma stress, premenstrual dysphoric disorder (PMDD) or social anxiety.  How should I use this medicine?  Take this medicine by mouth with a glass of water. Follow the directions on the prescription label. You can take it with or without food. Take your medicine at regular intervals. Do not take your medicine more often than directed. Do not stop taking this medicine suddenly except upon the advice of your doctor. Stopping this medicine too quickly may cause serious side effects or your condition may worsen.  A special MedGuide will be given to you by the pharmacist with each prescription and refill. Be sure to read this information carefully each time.  Talk to your pediatrician regarding the use of this medicine in children. While this drug may be prescribed for children as young as 7 years for selected conditions, precautions do apply.  What side effects may I notice from receiving this medicine?  Side effects that you should report to your doctor or health care professional as soon as possible:  · allergic reactions like skin rash, itching or hives, swelling of the face, lips, or tongue  · black or bloody stools, blood in the urine or vomit  · fast, irregular heartbeat  · feeling faint or lightheaded, falls  · hallucination, loss of contact with reality  · seizures  · suicidal thoughts or other mood changes  · unusual bleeding or bruising  · unusually weak or tired  · vomiting  Side effects that usually do not require medical attention (report to your doctor or health care professional if they continue or are bothersome):  · change in appetite  · change in sex drive or performance  · diarrhea  · increased sweating  · indigestion, nausea  · tremors  What may interact with this medicine?  Do not take this medicine with any of the  following medications:  · certain medicines for fungal infections like fluconazole, itraconazole, ketoconazole, posaconazole, voriconazole  · cisapride  · disulfiram  · dofetilide  · linezolid  · MAOIs like Carbex, Eldepryl, Marplan, Nardil, and Parnate  · metronidazole  · methylene blue (injected into a vein)  · pimozide  · thioridazine  · ziprasidone  This medicine may also interact with the following medications:  · alcohol  · aspirin and aspirin-like medicines  · certain medicines for depression, anxiety, or psychotic disturbances  · certain medicines for irregular heart beat like flecainide, propafenone  · certain medicines for migraine headaches like almotriptan, eletriptan, frovatriptan, naratriptan, rizatriptan, sumatriptan, zolmitriptan  · certain medicines for sleep  · certain medicines for seizures like carbamazepine, valproic acid, phenytoin  · certain medicines that treat or prevent blood clots like warfarin, enoxaparin, dalteparin  · cimetidine  · digoxin  · diuretics  · fentanyl  · furazolidone  · isoniazid  · lithium  · NSAIDs, medicines for pain and inflammation, like ibuprofen or naproxen  · other medicines that prolong the QT interval (cause an abnormal heart rhythm)  · procarbazine  · rasagiline  · supplements like [place]'s wort, kava kava, valerian  · tolbutamide  · tramadol  · tryptophan  What if I miss a dose?  If you miss a dose, take it as soon as you can. If it is almost time for your next dose, take only that dose. Do not take double or extra doses.  Where should I keep my medicine?  Keep out of the reach of children.  Store at room temperature between 15 and 30 degrees C (59 and 86 degrees F). Throw away any unused medicine after the expiration date.  What should I tell my health care provider before I take this medicine?  They need to know if you have any of these conditions:  · bipolar disorder or a family history of bipolar disorder  · diabetes  · glaucoma  · heart disease  · high  blood pressure  · history of irregular heartbeat  · history of low levels of calcium, magnesium, or potassium in the blood  · if you often drink alcohol  · liver disease  · receiving electroconvulsive therapy  · seizures  · suicidal thoughts, plans, or attempt; a previous suicide attempt by you or a family member  · thyroid disease  · an unusual or allergic reaction to sertraline, other medicines, foods, dyes, or preservatives  · pregnant or trying to get pregnant  · breast-feeding  What should I watch for while using this medicine?  Tell your doctor if your symptoms do not get better or if they get worse. Visit your doctor or health care professional for regular checks on your progress. Because it may take several weeks to see the full effects of this medicine, it is important to continue your treatment as prescribed by your doctor.  Patients and their families should watch out for new or worsening thoughts of suicide or depression. Also watch out for sudden changes in feelings such as feeling anxious, agitated, panicky, irritable, hostile, aggressive, impulsive, severely restless, overly excited and hyperactive, or not being able to sleep. If this happens, especially at the beginning of treatment or after a change in dose, call your health care professional.  You may get drowsy or dizzy. Do not drive, use machinery, or do anything that needs mental alertness until you know how this medicine affects you. Do not stand or sit up quickly, especially if you are an older patient. This reduces the risk of dizzy or fainting spells. Alcohol may interfere with the effect of this medicine. Avoid alcoholic drinks.  Your mouth may get dry. Chewing sugarless gum or sucking hard candy, and drinking plenty of water may help. Contact your doctor if the problem does not go away or is severe.  NOTE:This sheet is a summary. It may not cover all possible information. If you have questions about this medicine, talk to your doctor,  pharmacist, or health care provider. Copyright© 2017 [employer]

## 2020-11-11 ENCOUNTER — LAB VISIT (OUTPATIENT)
Dept: LAB | Facility: HOSPITAL | Age: 45
End: 2020-11-11
Attending: INTERNAL MEDICINE
Payer: COMMERCIAL

## 2020-11-11 ENCOUNTER — OFFICE VISIT (OUTPATIENT)
Dept: INTERNAL MEDICINE | Facility: CLINIC | Age: 45
End: 2020-11-11
Payer: COMMERCIAL

## 2020-11-11 ENCOUNTER — IMMUNIZATION (OUTPATIENT)
Dept: INTERNAL MEDICINE | Facility: CLINIC | Age: 45
End: 2020-11-11
Payer: COMMERCIAL

## 2020-11-11 VITALS
SYSTOLIC BLOOD PRESSURE: 118 MMHG | BODY MASS INDEX: 42.66 KG/M2 | DIASTOLIC BLOOD PRESSURE: 74 MMHG | WEIGHT: 315 LBS | HEIGHT: 72 IN

## 2020-11-11 DIAGNOSIS — Z98.84 HISTORY OF BARIATRIC SURGERY: ICD-10-CM

## 2020-11-11 DIAGNOSIS — K21.9 GASTROESOPHAGEAL REFLUX DISEASE, UNSPECIFIED WHETHER ESOPHAGITIS PRESENT: ICD-10-CM

## 2020-11-11 DIAGNOSIS — F41.9 ANXIETY: ICD-10-CM

## 2020-11-11 DIAGNOSIS — E55.9 VITAMIN D DEFICIENCY: ICD-10-CM

## 2020-11-11 DIAGNOSIS — G25.81 RESTLESS LEG: ICD-10-CM

## 2020-11-11 DIAGNOSIS — Z00.00 HEALTH CARE MAINTENANCE: ICD-10-CM

## 2020-11-11 DIAGNOSIS — F33.1 MODERATE EPISODE OF RECURRENT MAJOR DEPRESSIVE DISORDER: ICD-10-CM

## 2020-11-11 DIAGNOSIS — Z00.00 HEALTH CARE MAINTENANCE: Primary | ICD-10-CM

## 2020-11-11 DIAGNOSIS — L65.9 ALOPECIA: ICD-10-CM

## 2020-11-11 LAB
25(OH)D3+25(OH)D2 SERPL-MCNC: 27 NG/ML (ref 30–96)
ALBUMIN SERPL BCP-MCNC: 3.5 G/DL (ref 3.5–5.2)
ALP SERPL-CCNC: 80 U/L (ref 55–135)
ALT SERPL W/O P-5'-P-CCNC: 17 U/L (ref 10–44)
ANION GAP SERPL CALC-SCNC: 6 MMOL/L (ref 8–16)
AST SERPL-CCNC: 21 U/L (ref 10–40)
BASOPHILS # BLD AUTO: 0.1 K/UL (ref 0–0.2)
BASOPHILS NFR BLD: 1.4 % (ref 0–1.9)
BILIRUB SERPL-MCNC: 0.8 MG/DL (ref 0.1–1)
BUN SERPL-MCNC: 12 MG/DL (ref 6–20)
CALCIUM SERPL-MCNC: 9.4 MG/DL (ref 8.7–10.5)
CHLORIDE SERPL-SCNC: 106 MMOL/L (ref 95–110)
CHOLEST SERPL-MCNC: 187 MG/DL (ref 120–199)
CHOLEST/HDLC SERPL: 4.5 {RATIO} (ref 2–5)
CO2 SERPL-SCNC: 28 MMOL/L (ref 23–29)
CREAT SERPL-MCNC: 1 MG/DL (ref 0.5–1.4)
DIFFERENTIAL METHOD: ABNORMAL
EOSINOPHIL # BLD AUTO: 0.3 K/UL (ref 0–0.5)
EOSINOPHIL NFR BLD: 4.6 % (ref 0–8)
ERYTHROCYTE [DISTWIDTH] IN BLOOD BY AUTOMATED COUNT: 13.2 % (ref 11.5–14.5)
EST. GFR  (AFRICAN AMERICAN): >60 ML/MIN/1.73 M^2
EST. GFR  (NON AFRICAN AMERICAN): >60 ML/MIN/1.73 M^2
ESTIMATED AVG GLUCOSE: 97 MG/DL (ref 68–131)
FERRITIN SERPL-MCNC: 69 NG/ML (ref 20–300)
GLUCOSE SERPL-MCNC: 93 MG/DL (ref 70–110)
HBA1C MFR BLD HPLC: 5 % (ref 4–5.6)
HCT VFR BLD AUTO: 42.3 % (ref 40–54)
HDLC SERPL-MCNC: 42 MG/DL (ref 40–75)
HDLC SERPL: 22.5 % (ref 20–50)
HGB BLD-MCNC: 13.1 G/DL (ref 14–18)
IMM GRANULOCYTES # BLD AUTO: 0.01 K/UL (ref 0–0.04)
IMM GRANULOCYTES NFR BLD AUTO: 0.1 % (ref 0–0.5)
IRON SERPL-MCNC: 119 UG/DL (ref 45–160)
LDLC SERPL CALC-MCNC: 123.4 MG/DL (ref 63–159)
LYMPHOCYTES # BLD AUTO: 2.2 K/UL (ref 1–4.8)
LYMPHOCYTES NFR BLD: 30 % (ref 18–48)
MAGNESIUM SERPL-MCNC: 1.9 MG/DL (ref 1.6–2.6)
MCH RBC QN AUTO: 29.7 PG (ref 27–31)
MCHC RBC AUTO-ENTMCNC: 31 G/DL (ref 32–36)
MCV RBC AUTO: 96 FL (ref 82–98)
MONOCYTES # BLD AUTO: 0.7 K/UL (ref 0.3–1)
MONOCYTES NFR BLD: 9.3 % (ref 4–15)
NEUTROPHILS # BLD AUTO: 3.9 K/UL (ref 1.8–7.7)
NEUTROPHILS NFR BLD: 54.6 % (ref 38–73)
NONHDLC SERPL-MCNC: 145 MG/DL
NRBC BLD-RTO: 0 /100 WBC
PLATELET # BLD AUTO: 308 K/UL (ref 150–350)
PMV BLD AUTO: 9.4 FL (ref 9.2–12.9)
POTASSIUM SERPL-SCNC: 4.3 MMOL/L (ref 3.5–5.1)
PROT SERPL-MCNC: 7 G/DL (ref 6–8.4)
RBC # BLD AUTO: 4.41 M/UL (ref 4.6–6.2)
SATURATED IRON: 30 % (ref 20–50)
SODIUM SERPL-SCNC: 140 MMOL/L (ref 136–145)
TOTAL IRON BINDING CAPACITY: 392 UG/DL (ref 250–450)
TRANSFERRIN SERPL-MCNC: 265 MG/DL (ref 200–375)
TRIGL SERPL-MCNC: 108 MG/DL (ref 30–150)
TSH SERPL DL<=0.005 MIU/L-ACNC: 2.73 UIU/ML (ref 0.4–4)
VIT B12 SERPL-MCNC: 301 PG/ML (ref 210–950)
WBC # BLD AUTO: 7.21 K/UL (ref 3.9–12.7)

## 2020-11-11 PROCEDURE — 99999 PR PBB SHADOW E&M-EST. PATIENT-LVL III: CPT | Mod: PBBFAC,,, | Performed by: INTERNAL MEDICINE

## 2020-11-11 PROCEDURE — 83735 ASSAY OF MAGNESIUM: CPT

## 2020-11-11 PROCEDURE — 3008F PR BODY MASS INDEX (BMI) DOCUMENTED: ICD-10-PCS | Mod: CPTII,S$GLB,, | Performed by: INTERNAL MEDICINE

## 2020-11-11 PROCEDURE — 90471 IMMUNIZATION ADMIN: CPT | Mod: S$GLB,,, | Performed by: INTERNAL MEDICINE

## 2020-11-11 PROCEDURE — 80061 LIPID PANEL: CPT

## 2020-11-11 PROCEDURE — 83540 ASSAY OF IRON: CPT

## 2020-11-11 PROCEDURE — 3008F BODY MASS INDEX DOCD: CPT | Mod: CPTII,S$GLB,, | Performed by: INTERNAL MEDICINE

## 2020-11-11 PROCEDURE — 82728 ASSAY OF FERRITIN: CPT

## 2020-11-11 PROCEDURE — 82306 VITAMIN D 25 HYDROXY: CPT

## 2020-11-11 PROCEDURE — 90686 IIV4 VACC NO PRSV 0.5 ML IM: CPT | Mod: S$GLB,,, | Performed by: INTERNAL MEDICINE

## 2020-11-11 PROCEDURE — 84443 ASSAY THYROID STIM HORMONE: CPT

## 2020-11-11 PROCEDURE — 80053 COMPREHEN METABOLIC PANEL: CPT

## 2020-11-11 PROCEDURE — 85025 COMPLETE CBC W/AUTO DIFF WBC: CPT

## 2020-11-11 PROCEDURE — 99396 PR PREVENTIVE VISIT,EST,40-64: ICD-10-PCS | Mod: S$GLB,25,, | Performed by: INTERNAL MEDICINE

## 2020-11-11 PROCEDURE — 82607 VITAMIN B-12: CPT

## 2020-11-11 PROCEDURE — 99999 PR PBB SHADOW E&M-EST. PATIENT-LVL III: ICD-10-PCS | Mod: PBBFAC,,, | Performed by: INTERNAL MEDICINE

## 2020-11-11 PROCEDURE — 90471 FLU VACCINE (QUAD) GREATER THAN OR EQUAL TO 3YO PRESERVATIVE FREE IM: ICD-10-PCS | Mod: S$GLB,,, | Performed by: INTERNAL MEDICINE

## 2020-11-11 PROCEDURE — 90686 FLU VACCINE (QUAD) GREATER THAN OR EQUAL TO 3YO PRESERVATIVE FREE IM: ICD-10-PCS | Mod: S$GLB,,, | Performed by: INTERNAL MEDICINE

## 2020-11-11 PROCEDURE — 36415 COLL VENOUS BLD VENIPUNCTURE: CPT

## 2020-11-11 PROCEDURE — 99396 PREV VISIT EST AGE 40-64: CPT | Mod: S$GLB,25,, | Performed by: INTERNAL MEDICINE

## 2020-11-11 PROCEDURE — 83036 HEMOGLOBIN GLYCOSYLATED A1C: CPT

## 2020-11-11 RX ORDER — PANTOPRAZOLE SODIUM 40 MG/1
TABLET, DELAYED RELEASE ORAL
Qty: 30 TABLET | Refills: 11 | Status: SHIPPED | OUTPATIENT
Start: 2020-11-11 | End: 2021-10-27 | Stop reason: SDUPTHER

## 2020-11-11 RX ORDER — ROPINIROLE 0.5 MG/1
1 TABLET, FILM COATED ORAL NIGHTLY
Qty: 30 TABLET | Refills: 6 | COMMUNITY
Start: 2020-11-11 | End: 2021-10-27 | Stop reason: SDUPTHER

## 2020-11-11 NOTE — PROGRESS NOTES
Subjective:       Patient ID: Jered Contreras is a 45 y.o. male.    Chief Complaint: Annual Exam (general physical. ) and Alopecia (reports of possible hair loss, mainly in beard and also in head. feels its triggered by stressors from work, pandemic etc.)    HPI   Jered Contreras is a 45 y.o. male here for a yearly preventative healthcare visit.     Concerned about splotchy hair loss in beard and on head. No itchy or rash.     Intermittent hand cramping mostly left. Ambidextrous. Seems worse after cutting grass or cleaning or drawing (but the latter he does right handed). Has to massage hand. No numbness.     Seeing psychiatry for depression. On sertraline 150mg.     No asthma exacerbations since his 20s. Avoids smoke and triggers.     Weight loss surgery sept 2019. Done at [facility] by Dr. Martin.   Highest weight pre surgery was 480lbs. He lost to 270 lbs with diet/exercise then gained back to 430 lbs pre-surgery. Working on adding exercise and cutting out soda. He does drink a lot of coffee (splenda and a little half and half.)    Taking bariatric MVI and vit c. Unsure how much vit d in this. He did weekly ergo at beginning of year.    Ropinirole 0.5mg for RLS - still having lots of symptoms at night.   Review of Systems   Constitutional: Negative for fever.   HENT: Negative.    Eyes: Negative.    Respiratory: Negative for shortness of breath.    Cardiovascular: Negative for chest pain and leg swelling.   Gastrointestinal: Negative for abdominal pain, diarrhea, nausea and vomiting.   Genitourinary: Negative.    Musculoskeletal: Negative for arthralgias.   Skin: Negative for rash.   Psychiatric/Behavioral: Negative.        Objective:   /74 (BP Location: Left arm, Patient Position: Sitting, BP Method: Large (Manual))   Ht 6' (1.829 m)   Wt (!) 163.2 kg (359 lb 12.7 oz)   BMI 48.80 kg/m²      Physical Exam  Vitals signs reviewed.   Constitutional:       Appearance: He is  well-developed.   HENT:      Head: Normocephalic and atraumatic.   Eyes:      Conjunctiva/sclera: Conjunctivae normal.      Pupils: Pupils are equal, round, and reactive to light.   Neck:      Musculoskeletal: Neck supple.      Thyroid: No thyromegaly.   Cardiovascular:      Rate and Rhythm: Normal rate and regular rhythm.      Heart sounds: Normal heart sounds. No murmur.   Pulmonary:      Effort: Pulmonary effort is normal. No respiratory distress.      Breath sounds: Normal breath sounds. No wheezing.   Abdominal:      General: Bowel sounds are normal. There is no distension.      Palpations: Abdomen is soft.      Tenderness: There is no abdominal tenderness.   Musculoskeletal: Normal range of motion.   Skin:     General: Skin is warm and dry.      Findings: No rash.   Neurological:      Mental Status: He is alert and oriented to person, place, and time.   Psychiatric:         Thought Content: Thought content normal.         Judgment: Judgment normal.         Assessment:       1. Health care maintenance    2. Moderate episode of recurrent major depressive disorder    3. BMI 45.0-49.9, adult    4. History of bariatric surgery    5. Gastroesophageal reflux disease, unspecified whether esophagitis present    6. Vitamin D deficiency    7. Anxiety    8. Alopecia    9. Restless leg        Plan:       Jered was seen today for annual exam and alopecia.    Diagnoses and all orders for this visit:    Health care maintenance  -     CBC Auto Differential; Future  -     Hemoglobin A1C; Future  -     Comprehensive Metabolic Panel; Future  -     Lipid Panel; Future  -     TSH; Future  -     Vitamin D; Future    Moderate episode of recurrent major depressive disorder  Managed by psychiatry    BMI 45.0-49.9, adult  Continues to work on weight loss    History of bariatric surgery  -     pantoprazole (PROTONIX) 40 MG tablet; TK 1 T PO QD  -     Iron and TIBC; Future  -     Ferritin; Future  -     Vitamin B12; Future   Vit  d    Gastroesophageal reflux disease, unspecified whether esophagitis present  -     pantoprazole (PROTONIX) 40 MG tablet; TK 1 T PO QD  -     Magnesium; Future    Vitamin D deficiency  -     Vitamin D; Future    Alopecia  -     Ambulatory referral/consult to Dermatology; Future    Restless leg  May increase ropinirole to 1mg qhs        Will try weaning off PPI

## 2020-11-17 ENCOUNTER — OFFICE VISIT (OUTPATIENT)
Dept: DERMATOLOGY | Facility: CLINIC | Age: 45
End: 2020-11-17
Payer: COMMERCIAL

## 2020-11-17 DIAGNOSIS — L63.9 ALOPECIA AREATA: ICD-10-CM

## 2020-11-17 PROCEDURE — 1126F AMNT PAIN NOTED NONE PRSNT: CPT | Mod: S$GLB,,, | Performed by: PHYSICIAN ASSISTANT

## 2020-11-17 PROCEDURE — 11900 PR INJECTION INTO SKIN LESIONS, UP TO 7: ICD-10-PCS | Mod: S$GLB,,, | Performed by: PHYSICIAN ASSISTANT

## 2020-11-17 PROCEDURE — 11900 INJECT SKIN LESIONS </W 7: CPT | Mod: S$GLB,,, | Performed by: PHYSICIAN ASSISTANT

## 2020-11-17 PROCEDURE — 1126F PR PAIN SEVERITY QUANTIFIED, NO PAIN PRESENT: ICD-10-PCS | Mod: S$GLB,,, | Performed by: PHYSICIAN ASSISTANT

## 2020-11-17 PROCEDURE — 99999 PR PBB SHADOW E&M-EST. PATIENT-LVL III: CPT | Mod: PBBFAC,,, | Performed by: PHYSICIAN ASSISTANT

## 2020-11-17 PROCEDURE — 99499 UNLISTED E&M SERVICE: CPT | Mod: S$GLB,,, | Performed by: PHYSICIAN ASSISTANT

## 2020-11-17 PROCEDURE — 99999 PR PBB SHADOW E&M-EST. PATIENT-LVL III: ICD-10-PCS | Mod: PBBFAC,,, | Performed by: PHYSICIAN ASSISTANT

## 2020-11-17 PROCEDURE — 99499 NO LOS: ICD-10-PCS | Mod: S$GLB,,, | Performed by: PHYSICIAN ASSISTANT

## 2020-11-17 NOTE — LETTER
November 17, 2020      Angela Dacosta MD  [address]  [facility] [address]           [facility] Dermatology [address]  [address]  [facility] [address]  Phone: [phone]  Fax: [phone]          Patient: Jered Contreras   MR Number: [id number]   YOB: 1975   Date of Visit: 11/17/2020       Dear Dr. Angela Dacosta:    Thank you for referring Jered Contreras to me for evaluation. Attached you will find relevant portions of my assessment and plan of care.    If you have questions, please do not hesitate to call me. I look forward to following Jered Contreras along with you.    Sincerely,    NOEMI Bhatti  CC:  No Recipients    If you would like to receive this communication electronically, please contact [email or web address] or [phone] to request more information on [facility] [facility] access.    For providers and/or their staff who would like to refer a patient to [facility], please contact us through our one-stop-shop provider referral line, [facility], at [phone].    If you feel you have received this communication in error or would no longer like to receive these types of communications, please e-mail [email or web address]

## 2020-11-17 NOTE — PROGRESS NOTES
Subjective:       Patient ID:  Jered Contreras is a 45 y.o. male who presents for   Chief Complaint   Patient presents with    Hair Loss     Scalp, chin     History of Present Illness: The patient presents with chief complaint of hair loss.  Location: beard area and scalp  Duration: 2 yrs (beard), few months (scalp)  Signs/Symptoms: none  Prior treatments: none    Recent TSH wnl.      Review of Systems   Constitutional: Negative for fatigue.   Skin: Negative for itching and rash.   Hematologic/Lymphatic: Does not bruise/bleed easily.        Objective:    Physical Exam   Constitutional: He appears well-developed and well-nourished. No distress.   Neurological: He is alert and oriented to person, place, and time. He is not disoriented.   Psychiatric: He has a normal mood and affect.   Skin:   Areas Examined (abnormalities noted in diagram):   Scalp / Hair Palpated and Inspected  Head / Face Inspection Performed  Neck Inspection Performed                  Diagram Legend     Erythematous scaling macule/papule c/w actinic keratosis       Vascular papule c/w angioma      Pigmented verrucoid papule/plaque c/w seborrheic keratosis      Yellow umbilicated papule c/w sebaceous hyperplasia      Irregularly shaped tan macule c/w lentigo     1-2 mm smooth white papules consistent with Milia      Movable subcutaneous cyst with punctum c/w epidermal inclusion cyst      Subcutaneous movable cyst c/w pilar cyst      Firm pink to brown papule c/w dermatofibroma      Pedunculated fleshy papule(s) c/w skin tag(s)      Evenly pigmented macule c/w junctional nevus     Mildly variegated pigmented, slightly irregular-bordered macule c/w mildly atypical nevus      Flesh colored to evenly pigmented papule c/w intradermal nevus       Pink pearly papule/plaque c/w basal cell carcinoma      Erythematous hyperkeratotic cursted plaque c/w SCC      Surgical scar with no sign of skin cancer recurrence      Open and closed comedones       Inflammatory papules and pustules      Verrucoid papule consistent consistent with wart     Erythematous eczematous patches and plaques     Dystrophic onycholytic nail with subungual debris c/w onychomycosis     Umbilicated papule    Erythematous-base heme-crusted tan verrucoid plaque consistent with inflamed seborrheic keratosis     Erythematous Silvery Scaling Plaque c/w Psoriasis     See annotation    Lab Results   Component Value Date    TSH 2.733 11/11/2020     Assessment / Plan:      Alopecia areata  -     triamcinolone acetonide injection 10 mg    Intralesional Kenalog 5mg/cc (0.2 cc total) injected into 1 lesion on the scalp today after obtaining verbal consent including risk of surrounding hypopigmentation. Patient tolerated procedure well.    Units: 1  NDC for Kenalog 10mg/cc:  7311-9639-52    Discussed pathogenesis of dz and provided informational brochure. Pt defers tx to AA on neck at this time.         Follow up in about 1 month (around 12/17/2020).

## 2020-11-24 ENCOUNTER — OFFICE VISIT (OUTPATIENT)
Dept: PSYCHIATRY | Facility: CLINIC | Age: 45
End: 2020-11-24
Payer: COMMERCIAL

## 2020-11-24 DIAGNOSIS — Z86.59 HISTORY OF PANIC ATTACKS: ICD-10-CM

## 2020-11-24 DIAGNOSIS — F41.1 GAD (GENERALIZED ANXIETY DISORDER): Primary | ICD-10-CM

## 2020-11-24 DIAGNOSIS — F41.9 ANXIETY: ICD-10-CM

## 2020-11-24 PROCEDURE — 99214 PR OFFICE/OUTPT VISIT, EST, LEVL IV, 30-39 MIN: ICD-10-PCS | Mod: 95,,, | Performed by: NURSE PRACTITIONER

## 2020-11-24 PROCEDURE — 99214 OFFICE O/P EST MOD 30 MIN: CPT | Mod: 95,,, | Performed by: NURSE PRACTITIONER

## 2020-11-24 RX ORDER — SERTRALINE HYDROCHLORIDE 100 MG/1
TABLET, FILM COATED ORAL
Qty: 45 TABLET | Refills: 2 | Status: SHIPPED | OUTPATIENT
Start: 2020-11-24 | End: 2021-01-28 | Stop reason: SDUPTHER

## 2020-11-24 NOTE — PROGRESS NOTES
"Outpatient Psychiatry Follow-Up Visit (MD/NP)    11/24/2020   The patient location is: Home in [place], [place]  The chief complaint leading to consultation is:  Depression, anxiety and history of panic attacks    Visit type: audiovisual    Face to Face time with patient: 25 minutes  30 minutes of total time spent on the encounter, which includes face to face time and non-face to face time preparing to see the patient (eg, review of tests), Obtaining and/or reviewing separately obtained history, Documenting clinical information in the electronic or other health record, Independently interpreting results (not separately reported) and communicating results to the patient/family/caregiver, or Care coordination (not separately reported).         Each patient to whom he or she provides medical services by telemedicine is:  (1) informed of the relationship between the physician and patient and the respective role of any other health care provider with respect to management of the patient; and (2) notified that he or she may decline to receive medical services by telemedicine and may withdraw from such care at any time.    Notes:       Clinical Status of Patient:  Outpatient (Ambulatory)    Chief Complaint:  Jered Contreras is a 45 y.o. male who presents today for follow-up of depression, anxiety and and history of panic attacks.  Met with patient.      Interval History and Content of Current Session:  Interim Events/Subjective Report/Content of Current Session:   Patient reported doing well since his last visit. He denied any significant events since his last visit. He described mood as "good" and affect was mood congruent. He reported improved anxiety and depression since his last visit. He rated his depression at 2-3/10 and rated his anxiety at 4/10. He reported medication compliance and denied any side effects to her current medication regimen of Zoloft 150mg po daily. He stated, "I get in a bad mood once in a " "while when I am dealing with parents. It can be a challenging experience. Talking to my dad on the phone is stressful." Provided supportive therapy, CBT skills to maintain inner peace while dealing with his parents and "holding the two truths techniques." Patient requested to keep the same dosage of Zoloft 150 mg po daily due to its effectiveness at this time. He denied feeling depressed. He denied feelings of guilt, helplessness or fatigue. He reported okay sleep but reported staying up late by choice working on extra art work projects with his wife. 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 Patient denied SI/HI/AH/VH and no delusion noted or reported. Patient denied symptoms of stuart and denied alcohol abuse or any type of illicit drug use. He sees Dr. Conklin therapist at [facility]. We discussed the importance to utilizing effective sleep hygiene skills and melatonin if needed.       Psychotherapy:  · Target symptoms: depression, anxiety   · Why chosen therapy is appropriate versus another modality: relevant to diagnosis, patient responds to this modality, evidence based practice  · Outcome monitoring methods: self-report, observation  · Therapeutic intervention type: insight oriented psychotherapy, behavior modifying psychotherapy, supportive psychotherapy  · Topics discussed/themes: building skills sets for symptom management, symptom recognition  · The patient's response to the intervention is motivated. The patient's progress toward treatment goals is fair.   · Duration of intervention: 16 minutes.    Review of Systems   · PSYCHIATRIC: Pertinant items are noted in the narrative.  · CONSTITUTIONAL: No weight gain or loss.   · MUSCULOSKELETAL: No pain or stiffness of the joints.  · NEUROLOGIC: No weakness, sensory changes, seizures, confusion, memory loss, tremor or other abnormal movements.  · ENDOCRINE: No polydipsia or polyuria.  · INTEGUMENTARY: No rashes or lacerations.  · EYES: No exophthalmos, " "jaundice or blindness.  · ENT: No dizziness, tinnitus or hearing loss.  · RESPIRATORY: No shortness of breath.  · CARDIOVASCULAR: No tachycardia or chest pain.  · GASTROINTESTINAL: No nausea, vomiting, pain, constipation or diarrhea.  · GENITOURINARY: No frequency, dysuria or sexual dysfunction.  · HEMATOLOGIC/LYMPHATIC: No excessive bleeding, prolonged or excessive bleeding after dental extraction/injury.  · ALLERGIC/IMMUNOLOGIC: No allergic response to materials, foods or animals at this time.    Past Medical, Family and Social History: The patient's past medical, family and social history have been reviewed and updated as appropriate within the electronic medical record - see encounter notes.    Compliance: yes    Side effects: None    Risk Parameters:  Patient reports no suicidal ideation  Patient reports no homicidal ideation  Patient reports no self-injurious behavior  Patient reports no violent behavior    Exam (detailed: at least 9 elements; comprehensive: all 15 elements)   Constitutional  Vitals:  Most recent vital signs, dated greater than 90 days prior to this appointment, were reviewed.   There were no vitals filed for this visit.     General:  unremarkable, age appropriate     Musculoskeletal  Muscle Strength/Tone:  no dystonia, no tremor, no tic   Gait & Station:  non-ataxic     Psychiatric  Speech:  no latency; no press   Mood & Affect:  "good"  congruent and appropriate   Thought Process:  normal and logical   Associations:  intact   Thought Content:  normal, no suicidality, no homicidality, delusions, or paranoia   Insight:  intact, has awareness of illness   Judgement: behavior is adequate to circumstances   Orientation:  grossly intact   Memory: intact for content of interview, grossly intact   Language: grossly intact, able to name, able to repeat   Attention Span & Concentration:  able to focus, completed tasks   Fund of Knowledge:  intact and appropriate to age and level of education, familiar " with aspects of current personal life     Assessment and Diagnosis   Status/Progress: Based on the examination today, the patient's problem(s) is/are improved.  New problems have not been presented today.   Co-morbidities, Diagnostic uncertainty and Lack of compliance are not complicating management of the primary condition.  There are no active rule-out diagnoses for this patient at this time.     General Impression: Doing well.       ICD-10-CM ICD-9-CM   1. Moderate episode of recurrent major depressive disorder  F33.1 296.32   2. WAYNE (generalized anxiety disorder)  F41.1 300.02   3. History of panic attacks  Z86.59 V11.8       Intervention/Counseling/Treatment Plan   · Medication Management: Continue current medications. The risks and benefits of medication were discussed with the patient.  · Medication Management:   · -Continue Zoloft to 150 mg po daily for MDD and WAYNE  · Continue psychotherapy to learn effective coping skills with Dr. Conklin  · Continue utilization of effective CBT skills, positive self-talk, cognitive reframing, meditation, mindfulness, deep breathing, and relaxations skills.  · Continue with:  · The Mindful Way Through Depression: Freeing Yourself from Chronic Unhappiness, by Rafael Romero, Eleazar Galindo , Kallie Guzman , Tutu Sauceda    ·             -Make up your mind that you want to be happy, that you want to be happy, encouraged to act like a happy person. -Sleep -Be kind to self -Stay active -Get out in the daytime -Enjoy the little things send self positive message (focus on positive statements about self and things around you) -Journeling, especially the positives, the good things that you do every day -Spend time with family and friends -Sense of humor, keep it up -Helping out others and trying to making other people happy is one of the surest ways to find happiness           · Labs: reviewed most recent labs  · The treatment plan and follow up plan were reviewed with the  patient.  · Discussed with patient informed consent, risks vs. benefits, alternative treatments, side effect profile and the inherent unpredictability of individual responses to these treatments. The patient expresses understanding of the above and displays the capacity to agree with this current plan and had no other questions.  · Encouraged Patient to keep future appointments.   · Take medications as prescribed and abstain from substance abuse.   · In the event of an emergency patient was advised to go to the emergency room.  · Referral for further treatment to social work team for psychotherapy    Return to Clinic: 2 months or earlier as needed    SOURAV [email or web address]

## 2020-11-25 NOTE — PATIENT INSTRUCTIONS
Sertraline tablets  What is this medicine?  SERTRALINE (SER tra channing) is used to treat depression. It may also be used to treat obsessive compulsive disorder, panic disorder, post-trauma stress, premenstrual dysphoric disorder (PMDD) or social anxiety.  How should I use this medicine?  Take this medicine by mouth with a glass of water. Follow the directions on the prescription label. You can take it with or without food. Take your medicine at regular intervals. Do not take your medicine more often than directed. Do not stop taking this medicine suddenly except upon the advice of your doctor. Stopping this medicine too quickly may cause serious side effects or your condition may worsen.  A special MedGuide will be given to you by the pharmacist with each prescription and refill. Be sure to read this information carefully each time.  Talk to your pediatrician regarding the use of this medicine in children. While this drug may be prescribed for children as young as 7 years for selected conditions, precautions do apply.  What side effects may I notice from receiving this medicine?  Side effects that you should report to your doctor or health care professional as soon as possible:  · allergic reactions like skin rash, itching or hives, swelling of the face, lips, or tongue  · black or bloody stools, blood in the urine or vomit  · fast, irregular heartbeat  · feeling faint or lightheaded, falls  · hallucination, loss of contact with reality  · seizures  · suicidal thoughts or other mood changes  · unusual bleeding or bruising  · unusually weak or tired  · vomiting  Side effects that usually do not require medical attention (report to your doctor or health care professional if they continue or are bothersome):  · change in appetite  · change in sex drive or performance  · diarrhea  · increased sweating  · indigestion, nausea  · tremors  What may interact with this medicine?  Do not take this medicine with any of the  following medications:  · certain medicines for fungal infections like fluconazole, itraconazole, ketoconazole, posaconazole, voriconazole  · cisapride  · disulfiram  · dofetilide  · linezolid  · MAOIs like Carbex, Eldepryl, Marplan, Nardil, and Parnate  · metronidazole  · methylene blue (injected into a vein)  · pimozide  · thioridazine  · ziprasidone  This medicine may also interact with the following medications:  · alcohol  · aspirin and aspirin-like medicines  · certain medicines for depression, anxiety, or psychotic disturbances  · certain medicines for irregular heart beat like flecainide, propafenone  · certain medicines for migraine headaches like almotriptan, eletriptan, frovatriptan, naratriptan, rizatriptan, sumatriptan, zolmitriptan  · certain medicines for sleep  · certain medicines for seizures like carbamazepine, valproic acid, phenytoin  · certain medicines that treat or prevent blood clots like warfarin, enoxaparin, dalteparin  · cimetidine  · digoxin  · diuretics  · fentanyl  · furazolidone  · isoniazid  · lithium  · NSAIDs, medicines for pain and inflammation, like ibuprofen or naproxen  · other medicines that prolong the QT interval (cause an abnormal heart rhythm)  · procarbazine  · rasagiline  · supplements like Altha's wort, kava kava, valerian  · tolbutamide  · tramadol  · tryptophan  What if I miss a dose?  If you miss a dose, take it as soon as you can. If it is almost time for your next dose, take only that dose. Do not take double or extra doses.  Where should I keep my medicine?  Keep out of the reach of children.  Store at room temperature between 15 and 30 degrees C (59 and 86 degrees F). Throw away any unused medicine after the expiration date.  What should I tell my health care provider before I take this medicine?  They need to know if you have any of these conditions:  · bipolar disorder or a family history of bipolar disorder  · diabetes  · glaucoma  · heart disease  · high  blood pressure  · history of irregular heartbeat  · history of low levels of calcium, magnesium, or potassium in the blood  · if you often drink alcohol  · liver disease  · receiving electroconvulsive therapy  · seizures  · suicidal thoughts, plans, or attempt; a previous suicide attempt by you or a family member  · thyroid disease  · an unusual or allergic reaction to sertraline, other medicines, foods, dyes, or preservatives  · pregnant or trying to get pregnant  · breast-feeding  What should I watch for while using this medicine?  Tell your doctor if your symptoms do not get better or if they get worse. Visit your doctor or health care professional for regular checks on your progress. Because it may take several weeks to see the full effects of this medicine, it is important to continue your treatment as prescribed by your doctor.  Patients and their families should watch out for new or worsening thoughts of suicide or depression. Also watch out for sudden changes in feelings such as feeling anxious, agitated, panicky, irritable, hostile, aggressive, impulsive, severely restless, overly excited and hyperactive, or not being able to sleep. If this happens, especially at the beginning of treatment or after a change in dose, call your health care professional.  You may get drowsy or dizzy. Do not drive, use machinery, or do anything that needs mental alertness until you know how this medicine affects you. Do not stand or sit up quickly, especially if you are an older patient. This reduces the risk of dizzy or fainting spells. Alcohol may interfere with the effect of this medicine. Avoid alcoholic drinks.  Your mouth may get dry. Chewing sugarless gum or sucking hard candy, and drinking plenty of water may help. Contact your doctor if the problem does not go away or is severe.  NOTE:This sheet is a summary. It may not cover all possible information. If you have questions about this medicine, talk to your doctor,  pharmacist, or health care provider. Copyright© 2017 [employer]

## 2020-11-28 ENCOUNTER — LAB VISIT (OUTPATIENT)
Dept: INTERNAL MEDICINE | Facility: CLINIC | Age: 45
End: 2020-11-28
Payer: COMMERCIAL

## 2020-11-28 ENCOUNTER — TELEPHONE (OUTPATIENT)
Dept: INTERNAL MEDICINE | Facility: CLINIC | Age: 45
End: 2020-11-28

## 2020-11-28 DIAGNOSIS — J06.9 UPPER RESPIRATORY TRACT INFECTION, UNSPECIFIED TYPE: ICD-10-CM

## 2020-11-28 DIAGNOSIS — R09.81 SINUS CONGESTION: Primary | ICD-10-CM

## 2020-11-28 DIAGNOSIS — R09.81 SINUS CONGESTION: ICD-10-CM

## 2020-11-28 PROCEDURE — U0003 INFECTIOUS AGENT DETECTION BY NUCLEIC ACID (DNA OR RNA); SEVERE ACUTE RESPIRATORY SYNDROME CORONAVIRUS 2 (SARS-COV-2) (CORONAVIRUS DISEASE [COVID-19]), AMPLIFIED PROBE TECHNIQUE, MAKING USE OF HIGH THROUGHPUT TECHNOLOGIES AS DESCRIBED BY CMS-2020-01-R: HCPCS

## 2020-11-28 NOTE — TELEPHONE ENCOUNTER
Spoke with patient, does have hx of sinus trouble, at this time, sneezing, some coughing , not much, non productive, sinus pressure, headache and congestion, all usually happens when the weather changes, has been working outside lately, works at night, would like to be checked out, wife having surgery soon. Informed of covid order, will go through the drive through then come in for exam.

## 2020-11-28 NOTE — TELEPHONE ENCOUNTER
Patient has appointment with me this afternoon, per report looks to be malaise and URI symptoms. Likely needs COVID test. Please call patient to see if any other symptoms are going on; if not patient can go to drive through for COVID test.

## 2020-11-29 LAB — SARS-COV-2 RNA RESP QL NAA+PROBE: NOT DETECTED

## 2021-01-13 ENCOUNTER — PATIENT MESSAGE (OUTPATIENT)
Dept: DERMATOLOGY | Facility: CLINIC | Age: 46
End: 2021-01-13

## 2021-01-20 ENCOUNTER — PATIENT MESSAGE (OUTPATIENT)
Dept: INTERNAL MEDICINE | Facility: CLINIC | Age: 46
End: 2021-01-20

## 2021-01-20 ENCOUNTER — TELEPHONE (OUTPATIENT)
Dept: INTERNAL MEDICINE | Facility: CLINIC | Age: 46
End: 2021-01-20

## 2021-01-28 ENCOUNTER — OFFICE VISIT (OUTPATIENT)
Dept: PSYCHIATRY | Facility: CLINIC | Age: 46
End: 2021-01-28
Payer: COMMERCIAL

## 2021-01-28 DIAGNOSIS — F41.9 ANXIETY: ICD-10-CM

## 2021-01-28 PROCEDURE — 99214 PR OFFICE/OUTPT VISIT, EST, LEVL IV, 30-39 MIN: ICD-10-PCS | Mod: 95,,, | Performed by: NURSE PRACTITIONER

## 2021-01-28 PROCEDURE — 99214 OFFICE O/P EST MOD 30 MIN: CPT | Mod: 95,,, | Performed by: NURSE PRACTITIONER

## 2021-01-28 RX ORDER — SERTRALINE HYDROCHLORIDE 100 MG/1
TABLET, FILM COATED ORAL
Qty: 60 TABLET | Refills: 3 | Status: SHIPPED | OUTPATIENT
Start: 2021-01-28 | End: 2021-04-23 | Stop reason: SDUPTHER

## 2021-02-26 ENCOUNTER — OFFICE VISIT (OUTPATIENT)
Dept: INTERNAL MEDICINE | Facility: CLINIC | Age: 46
End: 2021-02-26
Payer: COMMERCIAL

## 2021-02-26 ENCOUNTER — HOSPITAL ENCOUNTER (OUTPATIENT)
Dept: RADIOLOGY | Facility: HOSPITAL | Age: 46
Discharge: HOME | End: 2021-02-26
Attending: INTERNAL MEDICINE
Payer: COMMERCIAL

## 2021-02-26 VITALS
HEART RATE: 77 BPM | SYSTOLIC BLOOD PRESSURE: 134 MMHG | WEIGHT: 315 LBS | HEIGHT: 72 IN | BODY MASS INDEX: 42.66 KG/M2 | DIASTOLIC BLOOD PRESSURE: 76 MMHG | OXYGEN SATURATION: 96 %

## 2021-02-26 DIAGNOSIS — M79.672 PAIN OF LEFT HEEL: ICD-10-CM

## 2021-02-26 DIAGNOSIS — G57.91 NEURITIS OF RIGHT LOWER EXTREMITY: Primary | ICD-10-CM

## 2021-02-26 PROCEDURE — 3008F BODY MASS INDEX DOCD: CPT | Mod: CPTII,S$GLB,, | Performed by: INTERNAL MEDICINE

## 2021-02-26 PROCEDURE — 73650 X-RAY EXAM OF HEEL: CPT | Mod: 26,LT,, | Performed by: RADIOLOGY

## 2021-02-26 PROCEDURE — 73650 X-RAY EXAM OF HEEL: CPT | Mod: TC,LT

## 2021-02-26 PROCEDURE — 73650 XR CALCANEUS 2 VIEW LEFT: ICD-10-PCS | Mod: 26,LT,, | Performed by: RADIOLOGY

## 2021-02-26 PROCEDURE — 99214 OFFICE O/P EST MOD 30 MIN: CPT | Mod: S$GLB,,, | Performed by: INTERNAL MEDICINE

## 2021-02-26 PROCEDURE — 3008F PR BODY MASS INDEX (BMI) DOCUMENTED: ICD-10-PCS | Mod: CPTII,S$GLB,, | Performed by: INTERNAL MEDICINE

## 2021-02-26 PROCEDURE — 1126F PR PAIN SEVERITY QUANTIFIED, NO PAIN PRESENT: ICD-10-PCS | Mod: S$GLB,,, | Performed by: INTERNAL MEDICINE

## 2021-02-26 PROCEDURE — 99999 PR PBB SHADOW E&M-EST. PATIENT-LVL V: CPT | Mod: PBBFAC,,, | Performed by: INTERNAL MEDICINE

## 2021-02-26 PROCEDURE — 99214 PR OFFICE/OUTPT VISIT, EST, LEVL IV, 30-39 MIN: ICD-10-PCS | Mod: S$GLB,,, | Performed by: INTERNAL MEDICINE

## 2021-02-26 PROCEDURE — 1126F AMNT PAIN NOTED NONE PRSNT: CPT | Mod: S$GLB,,, | Performed by: INTERNAL MEDICINE

## 2021-02-26 PROCEDURE — 99999 PR PBB SHADOW E&M-EST. PATIENT-LVL V: ICD-10-PCS | Mod: PBBFAC,,, | Performed by: INTERNAL MEDICINE

## 2021-03-08 ENCOUNTER — OFFICE VISIT (OUTPATIENT)
Dept: PSYCHIATRY | Facility: CLINIC | Age: 46
End: 2021-03-08
Payer: COMMERCIAL

## 2021-03-08 DIAGNOSIS — F41.1 GAD (GENERALIZED ANXIETY DISORDER): Primary | ICD-10-CM

## 2021-03-08 DIAGNOSIS — F33.1 MDD (MAJOR DEPRESSIVE DISORDER), RECURRENT EPISODE, MODERATE: ICD-10-CM

## 2021-03-08 PROCEDURE — 90834 PSYTX W PT 45 MINUTES: CPT | Mod: 95,,, | Performed by: SOCIAL WORKER

## 2021-03-08 PROCEDURE — 90834 PR PSYCHOTHERAPY W/PATIENT, 45 MIN: ICD-10-PCS | Mod: 95,,, | Performed by: SOCIAL WORKER

## 2021-03-09 ENCOUNTER — PATIENT MESSAGE (OUTPATIENT)
Dept: PSYCHIATRY | Facility: CLINIC | Age: 46
End: 2021-03-09

## 2021-03-10 ENCOUNTER — TELEPHONE (OUTPATIENT)
Dept: PSYCHIATRY | Facility: CLINIC | Age: 46
End: 2021-03-10

## 2021-03-11 ENCOUNTER — OFFICE VISIT (OUTPATIENT)
Dept: PODIATRY | Facility: CLINIC | Age: 46
End: 2021-03-11
Payer: COMMERCIAL

## 2021-03-11 VITALS
BODY MASS INDEX: 42.66 KG/M2 | HEART RATE: 67 BPM | SYSTOLIC BLOOD PRESSURE: 127 MMHG | WEIGHT: 315 LBS | HEIGHT: 72 IN | DIASTOLIC BLOOD PRESSURE: 65 MMHG

## 2021-03-11 DIAGNOSIS — M79.672 PAIN OF LEFT HEEL: ICD-10-CM

## 2021-03-11 DIAGNOSIS — M72.2 PLANTAR FASCIITIS: Primary | ICD-10-CM

## 2021-03-11 PROCEDURE — 99213 OFFICE O/P EST LOW 20 MIN: CPT | Mod: S$GLB,,, | Performed by: PODIATRIST

## 2021-03-11 PROCEDURE — 99999 PR PBB SHADOW E&M-EST. PATIENT-LVL III: ICD-10-PCS | Mod: PBBFAC,,, | Performed by: PODIATRIST

## 2021-03-11 PROCEDURE — 99999 PR PBB SHADOW E&M-EST. PATIENT-LVL III: CPT | Mod: PBBFAC,,, | Performed by: PODIATRIST

## 2021-03-11 PROCEDURE — 1125F AMNT PAIN NOTED PAIN PRSNT: CPT | Mod: S$GLB,,, | Performed by: PODIATRIST

## 2021-03-11 PROCEDURE — 3008F PR BODY MASS INDEX (BMI) DOCUMENTED: ICD-10-PCS | Mod: CPTII,S$GLB,, | Performed by: PODIATRIST

## 2021-03-11 PROCEDURE — 99213 PR OFFICE/OUTPT VISIT, EST, LEVL III, 20-29 MIN: ICD-10-PCS | Mod: S$GLB,,, | Performed by: PODIATRIST

## 2021-03-11 PROCEDURE — 3008F BODY MASS INDEX DOCD: CPT | Mod: CPTII,S$GLB,, | Performed by: PODIATRIST

## 2021-03-11 PROCEDURE — 1125F PR PAIN SEVERITY QUANTIFIED, PAIN PRESENT: ICD-10-PCS | Mod: S$GLB,,, | Performed by: PODIATRIST

## 2021-03-12 ENCOUNTER — IMMUNIZATION (OUTPATIENT)
Dept: PRIMARY CARE CLINIC | Facility: CLINIC | Age: 46
End: 2021-03-12
Payer: COMMERCIAL

## 2021-03-12 DIAGNOSIS — Z23 NEED FOR VACCINATION: Primary | ICD-10-CM

## 2021-03-12 PROCEDURE — 0001A PR IMMUNIZ ADMIN, SARS-COV-2 COVID-19 VACC, 30MCG/0.3ML, 1ST DOSE: CPT | Mod: CV19,S$GLB,, | Performed by: INTERNAL MEDICINE

## 2021-03-12 PROCEDURE — 91300 PR SARS-COV- 2 COVID-19 VACCINE, NO PRSV, 30MCG/0.3ML, IM: ICD-10-PCS | Mod: S$GLB,,, | Performed by: INTERNAL MEDICINE

## 2021-03-12 PROCEDURE — 0001A PR IMMUNIZ ADMIN, SARS-COV-2 COVID-19 VACC, 30MCG/0.3ML, 1ST DOSE: ICD-10-PCS | Mod: CV19,S$GLB,, | Performed by: INTERNAL MEDICINE

## 2021-03-12 PROCEDURE — 91300 PR SARS-COV- 2 COVID-19 VACCINE, NO PRSV, 30MCG/0.3ML, IM: CPT | Mod: S$GLB,,, | Performed by: INTERNAL MEDICINE

## 2021-03-12 RX ADMIN — Medication 0.3 ML: at 03:03

## 2021-03-14 ENCOUNTER — PATIENT MESSAGE (OUTPATIENT)
Dept: INTERNAL MEDICINE | Facility: CLINIC | Age: 46
End: 2021-03-14

## 2021-03-14 DIAGNOSIS — Z00.00 HEALTH CARE MAINTENANCE: ICD-10-CM

## 2021-03-14 DIAGNOSIS — Z98.84 BARIATRIC SURGERY STATUS: Primary | ICD-10-CM

## 2021-03-14 DIAGNOSIS — R32 ENURESIS: ICD-10-CM

## 2021-03-15 ENCOUNTER — OFFICE VISIT (OUTPATIENT)
Dept: PSYCHIATRY | Facility: CLINIC | Age: 46
End: 2021-03-15
Payer: COMMERCIAL

## 2021-03-15 DIAGNOSIS — F33.1 MDD (MAJOR DEPRESSIVE DISORDER), RECURRENT EPISODE, MODERATE: Primary | ICD-10-CM

## 2021-03-15 DIAGNOSIS — F41.1 GAD (GENERALIZED ANXIETY DISORDER): ICD-10-CM

## 2021-03-15 PROCEDURE — 99214 PR OFFICE/OUTPT VISIT, EST, LEVL IV, 30-39 MIN: ICD-10-PCS | Mod: 95,,, | Performed by: NURSE PRACTITIONER

## 2021-03-15 PROCEDURE — 99214 OFFICE O/P EST MOD 30 MIN: CPT | Mod: 95,,, | Performed by: NURSE PRACTITIONER

## 2021-03-15 RX ORDER — GABAPENTIN 100 MG/1
100 CAPSULE ORAL 2 TIMES DAILY
Qty: 60 CAPSULE | Refills: 0 | Status: SHIPPED | OUTPATIENT
Start: 2021-03-15 | End: 2021-06-16

## 2021-03-16 ENCOUNTER — LAB VISIT (OUTPATIENT)
Dept: LAB | Facility: HOSPITAL | Age: 46
End: 2021-03-16
Attending: INTERNAL MEDICINE
Payer: COMMERCIAL

## 2021-03-16 ENCOUNTER — PATIENT MESSAGE (OUTPATIENT)
Dept: PSYCHIATRY | Facility: CLINIC | Age: 46
End: 2021-03-16

## 2021-03-16 DIAGNOSIS — Z98.84 BARIATRIC SURGERY STATUS: ICD-10-CM

## 2021-03-16 DIAGNOSIS — Z00.00 HEALTH CARE MAINTENANCE: ICD-10-CM

## 2021-03-16 LAB
25(OH)D3+25(OH)D2 SERPL-MCNC: 46 NG/ML (ref 30–96)
ALBUMIN SERPL BCP-MCNC: 3.9 G/DL (ref 3.5–5.2)
ALP SERPL-CCNC: 70 U/L (ref 55–135)
ALT SERPL W/O P-5'-P-CCNC: 15 U/L (ref 10–44)
ANION GAP SERPL CALC-SCNC: 8 MMOL/L (ref 8–16)
AST SERPL-CCNC: 19 U/L (ref 10–40)
BASOPHILS # BLD AUTO: 0.07 K/UL (ref 0–0.2)
BASOPHILS NFR BLD: 1.1 % (ref 0–1.9)
BILIRUB SERPL-MCNC: 0.6 MG/DL (ref 0.1–1)
BUN SERPL-MCNC: 12 MG/DL (ref 6–20)
CALCIUM SERPL-MCNC: 9.5 MG/DL (ref 8.7–10.5)
CHLORIDE SERPL-SCNC: 106 MMOL/L (ref 95–110)
CHOLEST SERPL-MCNC: 159 MG/DL (ref 120–199)
CHOLEST/HDLC SERPL: 4.8 {RATIO} (ref 2–5)
CO2 SERPL-SCNC: 28 MMOL/L (ref 23–29)
CREAT SERPL-MCNC: 0.8 MG/DL (ref 0.5–1.4)
DIFFERENTIAL METHOD: ABNORMAL
EOSINOPHIL # BLD AUTO: 0.2 K/UL (ref 0–0.5)
EOSINOPHIL NFR BLD: 3.4 % (ref 0–8)
ERYTHROCYTE [DISTWIDTH] IN BLOOD BY AUTOMATED COUNT: 13.3 % (ref 11.5–14.5)
EST. GFR  (AFRICAN AMERICAN): >60 ML/MIN/1.73 M^2
EST. GFR  (NON AFRICAN AMERICAN): >60 ML/MIN/1.73 M^2
FERRITIN SERPL-MCNC: 60 NG/ML (ref 20–300)
FOLATE SERPL-MCNC: 12.1 NG/ML (ref 4–24)
GLUCOSE SERPL-MCNC: 82 MG/DL (ref 70–110)
HCT VFR BLD AUTO: 42.1 % (ref 40–54)
HDLC SERPL-MCNC: 33 MG/DL (ref 40–75)
HDLC SERPL: 20.8 % (ref 20–50)
HGB BLD-MCNC: 13.6 G/DL (ref 14–18)
IMM GRANULOCYTES # BLD AUTO: 0.01 K/UL (ref 0–0.04)
IMM GRANULOCYTES NFR BLD AUTO: 0.2 % (ref 0–0.5)
IRON SERPL-MCNC: 73 UG/DL (ref 45–160)
LDLC SERPL CALC-MCNC: 110.8 MG/DL (ref 63–159)
LYMPHOCYTES # BLD AUTO: 1.7 K/UL (ref 1–4.8)
LYMPHOCYTES NFR BLD: 28 % (ref 18–48)
MCH RBC QN AUTO: 29.6 PG (ref 27–31)
MCHC RBC AUTO-ENTMCNC: 32.3 G/DL (ref 32–36)
MCV RBC AUTO: 92 FL (ref 82–98)
MONOCYTES # BLD AUTO: 0.6 K/UL (ref 0.3–1)
MONOCYTES NFR BLD: 10.2 % (ref 4–15)
NEUTROPHILS # BLD AUTO: 3.5 K/UL (ref 1.8–7.7)
NEUTROPHILS NFR BLD: 57.1 % (ref 38–73)
NONHDLC SERPL-MCNC: 126 MG/DL
NRBC BLD-RTO: 0 /100 WBC
PLATELET # BLD AUTO: 313 K/UL (ref 150–350)
PMV BLD AUTO: 10.3 FL (ref 9.2–12.9)
POTASSIUM SERPL-SCNC: 4.1 MMOL/L (ref 3.5–5.1)
PROT SERPL-MCNC: 7.2 G/DL (ref 6–8.4)
RBC # BLD AUTO: 4.6 M/UL (ref 4.6–6.2)
SATURATED IRON: 20 % (ref 20–50)
SODIUM SERPL-SCNC: 142 MMOL/L (ref 136–145)
TOTAL IRON BINDING CAPACITY: 364 UG/DL (ref 250–450)
TRANSFERRIN SERPL-MCNC: 246 MG/DL (ref 200–375)
TRIGL SERPL-MCNC: 76 MG/DL (ref 30–150)
TSH SERPL DL<=0.005 MIU/L-ACNC: 3 UIU/ML (ref 0.4–4)
VIT B12 SERPL-MCNC: 1435 PG/ML (ref 210–950)
WBC # BLD AUTO: 6.1 K/UL (ref 3.9–12.7)

## 2021-03-16 PROCEDURE — 80053 COMPREHEN METABOLIC PANEL: CPT | Performed by: INTERNAL MEDICINE

## 2021-03-16 PROCEDURE — 36415 COLL VENOUS BLD VENIPUNCTURE: CPT | Performed by: INTERNAL MEDICINE

## 2021-03-16 PROCEDURE — 83540 ASSAY OF IRON: CPT | Performed by: INTERNAL MEDICINE

## 2021-03-16 PROCEDURE — 82607 VITAMIN B-12: CPT | Performed by: INTERNAL MEDICINE

## 2021-03-16 PROCEDURE — 82746 ASSAY OF FOLIC ACID SERUM: CPT | Performed by: INTERNAL MEDICINE

## 2021-03-16 PROCEDURE — 84443 ASSAY THYROID STIM HORMONE: CPT | Performed by: INTERNAL MEDICINE

## 2021-03-16 PROCEDURE — 85025 COMPLETE CBC W/AUTO DIFF WBC: CPT | Performed by: INTERNAL MEDICINE

## 2021-03-16 PROCEDURE — 80061 LIPID PANEL: CPT | Performed by: INTERNAL MEDICINE

## 2021-03-16 PROCEDURE — 82728 ASSAY OF FERRITIN: CPT | Performed by: INTERNAL MEDICINE

## 2021-03-16 PROCEDURE — 82306 VITAMIN D 25 HYDROXY: CPT | Performed by: INTERNAL MEDICINE

## 2021-03-16 PROCEDURE — 83036 HEMOGLOBIN GLYCOSYLATED A1C: CPT | Performed by: INTERNAL MEDICINE

## 2021-03-16 RX ORDER — VORTIOXETINE 5 MG/1
5 TABLET, FILM COATED ORAL DAILY
Qty: 30 TABLET | Refills: 1 | Status: SHIPPED | OUTPATIENT
Start: 2021-03-16 | End: 2021-04-15

## 2021-03-17 LAB
ESTIMATED AVG GLUCOSE: 91 MG/DL (ref 68–131)
HBA1C MFR BLD: 4.8 % (ref 4–5.6)

## 2021-03-24 ENCOUNTER — TELEPHONE (OUTPATIENT)
Dept: PSYCHIATRY | Facility: CLINIC | Age: 46
End: 2021-03-24

## 2021-03-24 RX ORDER — BUPROPION HYDROCHLORIDE 150 MG/1
150 TABLET ORAL DAILY
Qty: 30 TABLET | Refills: 1 | Status: SHIPPED | OUTPATIENT
Start: 2021-03-24 | End: 2021-05-08 | Stop reason: SDUPTHER

## 2021-03-29 ENCOUNTER — PATIENT MESSAGE (OUTPATIENT)
Dept: INTERNAL MEDICINE | Facility: CLINIC | Age: 46
End: 2021-03-29

## 2021-03-29 DIAGNOSIS — E53.8 B12 DEFICIENCY: Primary | ICD-10-CM

## 2021-04-02 ENCOUNTER — IMMUNIZATION (OUTPATIENT)
Dept: PRIMARY CARE CLINIC | Facility: CLINIC | Age: 46
End: 2021-04-02
Payer: COMMERCIAL

## 2021-04-02 DIAGNOSIS — Z23 NEED FOR VACCINATION: Primary | ICD-10-CM

## 2021-04-02 PROCEDURE — 0002A PR IMMUNIZ ADMIN, SARS-COV-2 COVID-19 VACC, 30MCG/0.3ML, 2ND DOSE: CPT | Mod: CV19,S$GLB,, | Performed by: INTERNAL MEDICINE

## 2021-04-02 PROCEDURE — 91300 PR SARS-COV- 2 COVID-19 VACCINE, NO PRSV, 30MCG/0.3ML, IM: ICD-10-PCS | Mod: S$GLB,,, | Performed by: INTERNAL MEDICINE

## 2021-04-02 PROCEDURE — 0002A PR IMMUNIZ ADMIN, SARS-COV-2 COVID-19 VACC, 30MCG/0.3ML, 2ND DOSE: ICD-10-PCS | Mod: CV19,S$GLB,, | Performed by: INTERNAL MEDICINE

## 2021-04-02 PROCEDURE — 91300 PR SARS-COV- 2 COVID-19 VACCINE, NO PRSV, 30MCG/0.3ML, IM: CPT | Mod: S$GLB,,, | Performed by: INTERNAL MEDICINE

## 2021-04-02 RX ADMIN — Medication 0.3 ML: at 04:04

## 2021-04-05 RX ORDER — MULTIVIT WITH MINERALS/HERBS
1 TABLET ORAL DAILY
COMMUNITY
Start: 2021-04-05

## 2021-04-09 ENCOUNTER — PATIENT MESSAGE (OUTPATIENT)
Dept: PSYCHIATRY | Facility: CLINIC | Age: 46
End: 2021-04-09

## 2021-04-14 ENCOUNTER — OFFICE VISIT (OUTPATIENT)
Dept: PSYCHIATRY | Facility: CLINIC | Age: 46
End: 2021-04-14
Payer: COMMERCIAL

## 2021-04-14 DIAGNOSIS — Z86.59 HISTORY OF PANIC ATTACKS: ICD-10-CM

## 2021-04-14 DIAGNOSIS — F33.1 MDD (MAJOR DEPRESSIVE DISORDER), RECURRENT EPISODE, MODERATE: Primary | ICD-10-CM

## 2021-04-14 DIAGNOSIS — F41.1 GAD (GENERALIZED ANXIETY DISORDER): ICD-10-CM

## 2021-04-14 PROCEDURE — 99214 PR OFFICE/OUTPT VISIT, EST, LEVL IV, 30-39 MIN: ICD-10-PCS | Mod: 95,,, | Performed by: NURSE PRACTITIONER

## 2021-04-14 PROCEDURE — 99214 OFFICE O/P EST MOD 30 MIN: CPT | Mod: 95,,, | Performed by: NURSE PRACTITIONER

## 2021-04-21 DIAGNOSIS — F41.9 ANXIETY: ICD-10-CM

## 2021-04-23 RX ORDER — SERTRALINE HYDROCHLORIDE 100 MG/1
TABLET, FILM COATED ORAL
Qty: 90 TABLET | Refills: 3 | Status: SHIPPED | OUTPATIENT
Start: 2021-04-23 | End: 2021-07-08 | Stop reason: SDUPTHER

## 2021-05-04 ENCOUNTER — TELEPHONE (OUTPATIENT)
Dept: NEUROLOGY | Facility: CLINIC | Age: 46
End: 2021-05-04

## 2021-05-07 ENCOUNTER — PROCEDURE VISIT (OUTPATIENT)
Dept: NEUROLOGY | Facility: CLINIC | Age: 46
End: 2021-05-07
Payer: COMMERCIAL

## 2021-05-07 DIAGNOSIS — G57.91 NEURITIS OF RIGHT LOWER EXTREMITY: ICD-10-CM

## 2021-05-07 PROCEDURE — 95911 NRV CNDJ TEST 9-10 STUDIES: CPT | Mod: S$GLB,,, | Performed by: PSYCHIATRY & NEUROLOGY

## 2021-05-07 PROCEDURE — 95886 MUSC TEST DONE W/N TEST COMP: CPT | Mod: S$GLB,,, | Performed by: PSYCHIATRY & NEUROLOGY

## 2021-05-07 PROCEDURE — 95886 PR EMG COMPLETE, W/ NERVE CONDUCTION STUDIES, 5+ MUSCLES: ICD-10-PCS | Mod: S$GLB,,, | Performed by: PSYCHIATRY & NEUROLOGY

## 2021-05-07 PROCEDURE — 95911 PR NERVE CONDUCTION STUDY; 9-10 STUDIES: ICD-10-PCS | Mod: S$GLB,,, | Performed by: PSYCHIATRY & NEUROLOGY

## 2021-05-08 ENCOUNTER — PATIENT MESSAGE (OUTPATIENT)
Dept: PSYCHIATRY | Facility: CLINIC | Age: 46
End: 2021-05-08

## 2021-05-10 RX ORDER — BUPROPION HYDROCHLORIDE 300 MG/1
300 TABLET ORAL DAILY
Qty: 30 TABLET | Refills: 1 | Status: SHIPPED | OUTPATIENT
Start: 2021-05-10 | End: 2021-07-08 | Stop reason: SDUPTHER

## 2021-05-11 ENCOUNTER — PATIENT MESSAGE (OUTPATIENT)
Dept: INTERNAL MEDICINE | Facility: CLINIC | Age: 46
End: 2021-05-11

## 2021-05-11 DIAGNOSIS — M54.9 DORSALGIA, UNSPECIFIED: ICD-10-CM

## 2021-05-11 DIAGNOSIS — M54.16 LUMBAR RADICULOPATHY: Primary | ICD-10-CM

## 2021-05-11 DIAGNOSIS — M54.16 LUMBAR NEURITIS: Primary | ICD-10-CM

## 2021-05-12 ENCOUNTER — HOSPITAL ENCOUNTER (OUTPATIENT)
Dept: RADIOLOGY | Facility: HOSPITAL | Age: 46
Discharge: HOME | End: 2021-05-12
Attending: INTERNAL MEDICINE
Payer: COMMERCIAL

## 2021-05-12 DIAGNOSIS — M54.9 DORSALGIA, UNSPECIFIED: ICD-10-CM

## 2021-05-12 DIAGNOSIS — M54.16 LUMBAR RADICULOPATHY: ICD-10-CM

## 2021-05-12 PROCEDURE — 72110 X-RAY EXAM L-2 SPINE 4/>VWS: CPT | Mod: TC

## 2021-05-12 PROCEDURE — 72110 XR LUMBAR SPINE COMPLETE 5 VIEW: ICD-10-PCS | Mod: 26,,, | Performed by: RADIOLOGY

## 2021-05-12 PROCEDURE — 72110 X-RAY EXAM L-2 SPINE 4/>VWS: CPT | Mod: 26,,, | Performed by: RADIOLOGY

## 2021-05-13 ENCOUNTER — PATIENT MESSAGE (OUTPATIENT)
Dept: INTERNAL MEDICINE | Facility: CLINIC | Age: 46
End: 2021-05-13

## 2021-06-16 ENCOUNTER — OFFICE VISIT (OUTPATIENT)
Dept: SPINE | Facility: CLINIC | Age: 46
End: 2021-06-16
Attending: PHYSICAL MEDICINE & REHABILITATION
Payer: COMMERCIAL

## 2021-06-16 ENCOUNTER — HOSPITAL ENCOUNTER (OUTPATIENT)
Dept: RADIOLOGY | Facility: OTHER | Age: 46
Discharge: HOME | End: 2021-06-16
Attending: NEUROLOGICAL SURGERY
Payer: COMMERCIAL

## 2021-06-16 ENCOUNTER — OFFICE VISIT (OUTPATIENT)
Dept: NEUROLOGY | Facility: CLINIC | Age: 46
End: 2021-06-16
Payer: COMMERCIAL

## 2021-06-16 VITALS
HEART RATE: 63 BPM | DIASTOLIC BLOOD PRESSURE: 75 MMHG | WEIGHT: 296.75 LBS | HEIGHT: 72 IN | SYSTOLIC BLOOD PRESSURE: 123 MMHG | BODY MASS INDEX: 40.19 KG/M2

## 2021-06-16 VITALS
DIASTOLIC BLOOD PRESSURE: 64 MMHG | SYSTOLIC BLOOD PRESSURE: 137 MMHG | HEART RATE: 63 BPM | BODY MASS INDEX: 40.19 KG/M2 | WEIGHT: 296.75 LBS | HEIGHT: 72 IN

## 2021-06-16 DIAGNOSIS — G57.11 MERALGIA PARESTHETICA OF RIGHT SIDE: Primary | ICD-10-CM

## 2021-06-16 DIAGNOSIS — M54.16 LUMBAR NEURITIS: ICD-10-CM

## 2021-06-16 DIAGNOSIS — M54.9 DORSALGIA, UNSPECIFIED: ICD-10-CM

## 2021-06-16 DIAGNOSIS — G57.91 NEURITIS OF RIGHT LOWER EXTREMITY: ICD-10-CM

## 2021-06-16 PROCEDURE — 99213 OFFICE O/P EST LOW 20 MIN: CPT | Mod: S$GLB,,, | Performed by: NEUROLOGICAL SURGERY

## 2021-06-16 PROCEDURE — 1126F AMNT PAIN NOTED NONE PRSNT: CPT | Mod: S$GLB,,, | Performed by: PHYSICAL MEDICINE & REHABILITATION

## 2021-06-16 PROCEDURE — 72110 XR LUMBAR SPINE 5 VIEW WITH FLEX AND EXT: ICD-10-PCS | Mod: 26,,, | Performed by: RADIOLOGY

## 2021-06-16 PROCEDURE — 3008F BODY MASS INDEX DOCD: CPT | Mod: CPTII,S$GLB,, | Performed by: PHYSICAL MEDICINE & REHABILITATION

## 2021-06-16 PROCEDURE — 3008F PR BODY MASS INDEX (BMI) DOCUMENTED: ICD-10-PCS | Mod: CPTII,S$GLB,, | Performed by: NEUROLOGICAL SURGERY

## 2021-06-16 PROCEDURE — 1126F PR PAIN SEVERITY QUANTIFIED, NO PAIN PRESENT: ICD-10-PCS | Mod: S$GLB,,, | Performed by: PHYSICAL MEDICINE & REHABILITATION

## 2021-06-16 PROCEDURE — 1126F AMNT PAIN NOTED NONE PRSNT: CPT | Mod: S$GLB,,, | Performed by: NEUROLOGICAL SURGERY

## 2021-06-16 PROCEDURE — 99204 PR OFFICE/OUTPT VISIT, NEW, LEVL IV, 45-59 MIN: ICD-10-PCS | Mod: S$GLB,,, | Performed by: PHYSICAL MEDICINE & REHABILITATION

## 2021-06-16 PROCEDURE — 99999 PR PBB SHADOW E&M-EST. PATIENT-LVL IV: CPT | Mod: PBBFAC,,, | Performed by: NEUROLOGICAL SURGERY

## 2021-06-16 PROCEDURE — 3008F PR BODY MASS INDEX (BMI) DOCUMENTED: ICD-10-PCS | Mod: CPTII,S$GLB,, | Performed by: PHYSICAL MEDICINE & REHABILITATION

## 2021-06-16 PROCEDURE — 1126F PR PAIN SEVERITY QUANTIFIED, NO PAIN PRESENT: ICD-10-PCS | Mod: S$GLB,,, | Performed by: NEUROLOGICAL SURGERY

## 2021-06-16 PROCEDURE — 99204 OFFICE O/P NEW MOD 45 MIN: CPT | Mod: S$GLB,,, | Performed by: PHYSICAL MEDICINE & REHABILITATION

## 2021-06-16 PROCEDURE — 72110 X-RAY EXAM L-2 SPINE 4/>VWS: CPT | Mod: TC,FY

## 2021-06-16 PROCEDURE — 99213 PR OFFICE/OUTPT VISIT, EST, LEVL III, 20-29 MIN: ICD-10-PCS | Mod: S$GLB,,, | Performed by: NEUROLOGICAL SURGERY

## 2021-06-16 PROCEDURE — 99999 PR PBB SHADOW E&M-EST. PATIENT-LVL IV: ICD-10-PCS | Mod: PBBFAC,,, | Performed by: PHYSICAL MEDICINE & REHABILITATION

## 2021-06-16 PROCEDURE — 99999 PR PBB SHADOW E&M-EST. PATIENT-LVL IV: ICD-10-PCS | Mod: PBBFAC,,, | Performed by: NEUROLOGICAL SURGERY

## 2021-06-16 PROCEDURE — 72110 X-RAY EXAM L-2 SPINE 4/>VWS: CPT | Mod: 26,,, | Performed by: RADIOLOGY

## 2021-06-16 PROCEDURE — 99999 PR PBB SHADOW E&M-EST. PATIENT-LVL IV: CPT | Mod: PBBFAC,,, | Performed by: PHYSICAL MEDICINE & REHABILITATION

## 2021-06-16 PROCEDURE — 3008F BODY MASS INDEX DOCD: CPT | Mod: CPTII,S$GLB,, | Performed by: NEUROLOGICAL SURGERY

## 2021-06-29 ENCOUNTER — PATIENT MESSAGE (OUTPATIENT)
Dept: INTERNAL MEDICINE | Facility: CLINIC | Age: 46
End: 2021-06-29

## 2021-06-29 ENCOUNTER — PATIENT MESSAGE (OUTPATIENT)
Dept: OTOLARYNGOLOGY | Facility: CLINIC | Age: 46
End: 2021-06-29

## 2021-06-29 RX ORDER — FLUTICASONE PROPIONATE 50 MCG
2 SPRAY, SUSPENSION (ML) NASAL DAILY
Qty: 18.2 ML | Refills: 11 | Status: CANCELLED | OUTPATIENT
Start: 2021-06-29

## 2021-06-29 RX ORDER — FLUTICASONE PROPIONATE 50 MCG
2 SPRAY, SUSPENSION (ML) NASAL DAILY
Qty: 18.2 ML | Refills: 0 | Status: SHIPPED | OUTPATIENT
Start: 2021-06-29 | End: 2021-07-23

## 2021-07-08 ENCOUNTER — PATIENT MESSAGE (OUTPATIENT)
Dept: PSYCHIATRY | Facility: CLINIC | Age: 46
End: 2021-07-08

## 2021-07-08 DIAGNOSIS — F41.9 ANXIETY: ICD-10-CM

## 2021-07-08 RX ORDER — BUPROPION HYDROCHLORIDE 300 MG/1
300 TABLET ORAL DAILY
Qty: 30 TABLET | Refills: 0 | Status: SHIPPED | OUTPATIENT
Start: 2021-07-08 | End: 2021-08-06 | Stop reason: SDUPTHER

## 2021-07-08 RX ORDER — SERTRALINE HYDROCHLORIDE 100 MG/1
TABLET, FILM COATED ORAL
Qty: 30 TABLET | Refills: 0 | Status: SHIPPED | OUTPATIENT
Start: 2021-07-08 | End: 2021-08-22

## 2021-07-22 ENCOUNTER — OFFICE VISIT (OUTPATIENT)
Dept: PSYCHIATRY | Facility: CLINIC | Age: 46
End: 2021-07-22
Payer: COMMERCIAL

## 2021-07-22 DIAGNOSIS — F41.1 GAD (GENERALIZED ANXIETY DISORDER): Primary | ICD-10-CM

## 2021-07-22 DIAGNOSIS — F33.1 MDD (MAJOR DEPRESSIVE DISORDER), RECURRENT EPISODE, MODERATE: ICD-10-CM

## 2021-07-22 PROCEDURE — 99214 OFFICE O/P EST MOD 30 MIN: CPT | Mod: 95,,, | Performed by: NURSE PRACTITIONER

## 2021-07-22 PROCEDURE — 99214 PR OFFICE/OUTPT VISIT, EST, LEVL IV, 30-39 MIN: ICD-10-PCS | Mod: 95,,, | Performed by: NURSE PRACTITIONER

## 2021-07-22 RX ORDER — DULOXETIN HYDROCHLORIDE 20 MG/1
20 CAPSULE, DELAYED RELEASE ORAL DAILY
Qty: 30 CAPSULE | Refills: 0 | Status: SHIPPED | OUTPATIENT
Start: 2021-07-22 | End: 2021-08-24

## 2021-07-23 ENCOUNTER — TELEPHONE (OUTPATIENT)
Dept: OTOLARYNGOLOGY | Facility: CLINIC | Age: 46
End: 2021-07-23

## 2021-07-23 ENCOUNTER — OFFICE VISIT (OUTPATIENT)
Dept: OTOLARYNGOLOGY | Facility: CLINIC | Age: 46
End: 2021-07-23
Payer: COMMERCIAL

## 2021-07-23 VITALS
BODY MASS INDEX: 39.95 KG/M2 | SYSTOLIC BLOOD PRESSURE: 126 MMHG | DIASTOLIC BLOOD PRESSURE: 64 MMHG | HEART RATE: 58 BPM | WEIGHT: 294.56 LBS | TEMPERATURE: 98 F

## 2021-07-23 DIAGNOSIS — J34.2 NASAL SEPTAL DEVIATION: ICD-10-CM

## 2021-07-23 DIAGNOSIS — Z98.84 HISTORY OF BARIATRIC SURGERY: ICD-10-CM

## 2021-07-23 DIAGNOSIS — M95.0 NASAL VALVE COLLAPSE: ICD-10-CM

## 2021-07-23 DIAGNOSIS — J34.89 NASAL OBSTRUCTION WITHOUT CHOANAL ATRESIA: ICD-10-CM

## 2021-07-23 DIAGNOSIS — R43.9 DISORDERS OF SMELL: ICD-10-CM

## 2021-07-23 DIAGNOSIS — J30.9 ALLERGIC RHINITIS, UNSPECIFIED SEASONALITY, UNSPECIFIED TRIGGER: Primary | ICD-10-CM

## 2021-07-23 DIAGNOSIS — G47.33 OBSTRUCTIVE SLEEP APNEA TREATED WITH CONTINUOUS POSITIVE AIRWAY PRESSURE (CPAP): ICD-10-CM

## 2021-07-23 DIAGNOSIS — J34.3 HYPERTROPHY OF BOTH INFERIOR NASAL TURBINATES: ICD-10-CM

## 2021-07-23 PROBLEM — J34.829 NASAL VALVE COLLAPSE: Status: ACTIVE | Noted: 2021-07-23

## 2021-07-23 PROCEDURE — 31231 NASAL ENDOSCOPY DX: CPT | Mod: S$GLB,,, | Performed by: SPECIALIST

## 2021-07-23 PROCEDURE — 31231 NASAL/SINUS ENDOSCOPY: ICD-10-PCS | Mod: S$GLB,,, | Performed by: SPECIALIST

## 2021-07-23 PROCEDURE — 99999 PR PBB SHADOW E&M-EST. PATIENT-LVL IV: CPT | Mod: PBBFAC,,, | Performed by: SPECIALIST

## 2021-07-23 PROCEDURE — 99214 PR OFFICE/OUTPT VISIT, EST, LEVL IV, 30-39 MIN: ICD-10-PCS | Mod: 25,S$GLB,, | Performed by: SPECIALIST

## 2021-07-23 PROCEDURE — 1126F AMNT PAIN NOTED NONE PRSNT: CPT | Mod: CPTII,S$GLB,, | Performed by: SPECIALIST

## 2021-07-23 PROCEDURE — 99999 PR PBB SHADOW E&M-EST. PATIENT-LVL IV: ICD-10-PCS | Mod: PBBFAC,,, | Performed by: SPECIALIST

## 2021-07-23 PROCEDURE — 99214 OFFICE O/P EST MOD 30 MIN: CPT | Mod: 25,S$GLB,, | Performed by: SPECIALIST

## 2021-07-23 PROCEDURE — 3008F PR BODY MASS INDEX (BMI) DOCUMENTED: ICD-10-PCS | Mod: CPTII,S$GLB,, | Performed by: SPECIALIST

## 2021-07-23 PROCEDURE — 3008F BODY MASS INDEX DOCD: CPT | Mod: CPTII,S$GLB,, | Performed by: SPECIALIST

## 2021-07-23 PROCEDURE — 1126F PR PAIN SEVERITY QUANTIFIED, NO PAIN PRESENT: ICD-10-PCS | Mod: CPTII,S$GLB,, | Performed by: SPECIALIST

## 2021-07-23 RX ORDER — FLUTICASONE PROPIONATE 50 MCG
2 SPRAY, SUSPENSION (ML) NASAL DAILY
Qty: 18.2 ML | Refills: 11 | Status: ON HOLD | OUTPATIENT
Start: 2021-07-23 | End: 2021-12-07 | Stop reason: HOSPADM

## 2021-07-23 RX ORDER — AZELASTINE 1 MG/ML
SPRAY, METERED NASAL
Qty: 30 ML | Refills: 11 | Status: ON HOLD | OUTPATIENT
Start: 2021-07-23 | End: 2021-12-07 | Stop reason: HOSPADM

## 2021-07-26 ENCOUNTER — HOSPITAL ENCOUNTER (OUTPATIENT)
Dept: RADIOLOGY | Facility: HOSPITAL | Age: 46
Discharge: HOME | End: 2021-07-26
Attending: SPECIALIST
Payer: COMMERCIAL

## 2021-07-26 DIAGNOSIS — R43.9 DISORDERS OF SMELL: ICD-10-CM

## 2021-07-26 PROCEDURE — 70486 CT MAXILLOFACIAL W/O DYE: CPT | Mod: 26,,, | Performed by: RADIOLOGY

## 2021-07-26 PROCEDURE — 70486 CT MEDTRONIC SINUSES WITHOUT: ICD-10-PCS | Mod: 26,,, | Performed by: RADIOLOGY

## 2021-07-26 PROCEDURE — 70486 CT MAXILLOFACIAL W/O DYE: CPT | Mod: TC

## 2021-07-27 RX ORDER — DOXYCYCLINE HYCLATE 100 MG
100 TABLET ORAL 2 TIMES DAILY
Qty: 28 TABLET | Refills: 0 | Status: SHIPPED | OUTPATIENT
Start: 2021-07-27 | End: 2021-10-27

## 2021-07-28 ENCOUNTER — TELEPHONE (OUTPATIENT)
Dept: OTOLARYNGOLOGY | Facility: CLINIC | Age: 46
End: 2021-07-28

## 2021-08-11 ENCOUNTER — PATIENT MESSAGE (OUTPATIENT)
Dept: PSYCHIATRY | Facility: CLINIC | Age: 46
End: 2021-08-11

## 2021-08-20 ENCOUNTER — OFFICE VISIT (OUTPATIENT)
Dept: OTOLARYNGOLOGY | Facility: CLINIC | Age: 46
End: 2021-08-20
Payer: COMMERCIAL

## 2021-08-20 VITALS
HEART RATE: 58 BPM | WEIGHT: 301.56 LBS | BODY MASS INDEX: 40.85 KG/M2 | DIASTOLIC BLOOD PRESSURE: 68 MMHG | HEIGHT: 72 IN | SYSTOLIC BLOOD PRESSURE: 126 MMHG | TEMPERATURE: 99 F

## 2021-08-20 DIAGNOSIS — G47.33 OBSTRUCTIVE SLEEP APNEA TREATED WITH CONTINUOUS POSITIVE AIRWAY PRESSURE (CPAP): ICD-10-CM

## 2021-08-20 DIAGNOSIS — J34.3 HYPERTROPHY OF BOTH INFERIOR NASAL TURBINATES: ICD-10-CM

## 2021-08-20 DIAGNOSIS — J34.2 NASAL SEPTAL DEVIATION: ICD-10-CM

## 2021-08-20 DIAGNOSIS — J34.89 NASAL OBSTRUCTION WITHOUT CHOANAL ATRESIA: ICD-10-CM

## 2021-08-20 DIAGNOSIS — J30.9 ALLERGIC RHINITIS, UNSPECIFIED SEASONALITY, UNSPECIFIED TRIGGER: Primary | ICD-10-CM

## 2021-08-20 DIAGNOSIS — M95.0 NASAL VALVE COLLAPSE: ICD-10-CM

## 2021-08-20 PROCEDURE — 3044F HG A1C LEVEL LT 7.0%: CPT | Mod: CPTII,S$GLB,, | Performed by: SPECIALIST

## 2021-08-20 PROCEDURE — 3078F PR MOST RECENT DIASTOLIC BLOOD PRESSURE < 80 MM HG: ICD-10-PCS | Mod: CPTII,S$GLB,, | Performed by: SPECIALIST

## 2021-08-20 PROCEDURE — 1160F PR REVIEW ALL MEDS BY PRESCRIBER/CLIN PHARMACIST DOCUMENTED: ICD-10-PCS | Mod: CPTII,S$GLB,, | Performed by: SPECIALIST

## 2021-08-20 PROCEDURE — 3008F BODY MASS INDEX DOCD: CPT | Mod: CPTII,S$GLB,, | Performed by: SPECIALIST

## 2021-08-20 PROCEDURE — 3008F PR BODY MASS INDEX (BMI) DOCUMENTED: ICD-10-PCS | Mod: CPTII,S$GLB,, | Performed by: SPECIALIST

## 2021-08-20 PROCEDURE — 99999 PR PBB SHADOW E&M-EST. PATIENT-LVL III: CPT | Mod: PBBFAC,,, | Performed by: SPECIALIST

## 2021-08-20 PROCEDURE — 3044F PR MOST RECENT HEMOGLOBIN A1C LEVEL <7.0%: ICD-10-PCS | Mod: CPTII,S$GLB,, | Performed by: SPECIALIST

## 2021-08-20 PROCEDURE — 1159F PR MEDICATION LIST DOCUMENTED IN MEDICAL RECORD: ICD-10-PCS | Mod: CPTII,S$GLB,, | Performed by: SPECIALIST

## 2021-08-20 PROCEDURE — 99214 OFFICE O/P EST MOD 30 MIN: CPT | Mod: S$GLB,,, | Performed by: SPECIALIST

## 2021-08-20 PROCEDURE — 3078F DIAST BP <80 MM HG: CPT | Mod: CPTII,S$GLB,, | Performed by: SPECIALIST

## 2021-08-20 PROCEDURE — 1126F AMNT PAIN NOTED NONE PRSNT: CPT | Mod: CPTII,S$GLB,, | Performed by: SPECIALIST

## 2021-08-20 PROCEDURE — 3074F PR MOST RECENT SYSTOLIC BLOOD PRESSURE < 130 MM HG: ICD-10-PCS | Mod: CPTII,S$GLB,, | Performed by: SPECIALIST

## 2021-08-20 PROCEDURE — 1126F PR PAIN SEVERITY QUANTIFIED, NO PAIN PRESENT: ICD-10-PCS | Mod: CPTII,S$GLB,, | Performed by: SPECIALIST

## 2021-08-20 PROCEDURE — 99999 PR PBB SHADOW E&M-EST. PATIENT-LVL III: ICD-10-PCS | Mod: PBBFAC,,, | Performed by: SPECIALIST

## 2021-08-20 PROCEDURE — 1159F MED LIST DOCD IN RCRD: CPT | Mod: CPTII,S$GLB,, | Performed by: SPECIALIST

## 2021-08-20 PROCEDURE — 99214 PR OFFICE/OUTPT VISIT, EST, LEVL IV, 30-39 MIN: ICD-10-PCS | Mod: S$GLB,,, | Performed by: SPECIALIST

## 2021-08-20 PROCEDURE — 3074F SYST BP LT 130 MM HG: CPT | Mod: CPTII,S$GLB,, | Performed by: SPECIALIST

## 2021-08-20 PROCEDURE — 1160F RVW MEDS BY RX/DR IN RCRD: CPT | Mod: CPTII,S$GLB,, | Performed by: SPECIALIST

## 2021-08-20 RX ORDER — MONTELUKAST SODIUM 10 MG/1
TABLET ORAL
Qty: 30 TABLET | Refills: 11 | Status: SHIPPED | OUTPATIENT
Start: 2021-08-20 | End: 2022-06-30 | Stop reason: SDUPTHER

## 2021-08-24 ENCOUNTER — OFFICE VISIT (OUTPATIENT)
Dept: PSYCHIATRY | Facility: CLINIC | Age: 46
End: 2021-08-24
Payer: COMMERCIAL

## 2021-08-24 DIAGNOSIS — F33.1 MDD (MAJOR DEPRESSIVE DISORDER), RECURRENT EPISODE, MODERATE: ICD-10-CM

## 2021-08-24 DIAGNOSIS — F41.1 GAD (GENERALIZED ANXIETY DISORDER): Primary | ICD-10-CM

## 2021-08-24 DIAGNOSIS — G47.00 INSOMNIA, UNSPECIFIED TYPE: ICD-10-CM

## 2021-08-24 PROCEDURE — 99214 OFFICE O/P EST MOD 30 MIN: CPT | Mod: 95,,, | Performed by: NURSE PRACTITIONER

## 2021-08-24 PROCEDURE — 3044F PR MOST RECENT HEMOGLOBIN A1C LEVEL <7.0%: ICD-10-PCS | Mod: CPTII,95,, | Performed by: NURSE PRACTITIONER

## 2021-08-24 PROCEDURE — 99214 PR OFFICE/OUTPT VISIT, EST, LEVL IV, 30-39 MIN: ICD-10-PCS | Mod: 95,,, | Performed by: NURSE PRACTITIONER

## 2021-08-24 PROCEDURE — 3044F HG A1C LEVEL LT 7.0%: CPT | Mod: CPTII,95,, | Performed by: NURSE PRACTITIONER

## 2021-08-24 RX ORDER — DULOXETIN HYDROCHLORIDE 60 MG/1
60 CAPSULE, DELAYED RELEASE ORAL DAILY
Qty: 30 CAPSULE | Refills: 1 | Status: SHIPPED | OUTPATIENT
Start: 2021-08-24 | End: 2021-10-14 | Stop reason: SDUPTHER

## 2021-08-25 ENCOUNTER — PATIENT MESSAGE (OUTPATIENT)
Dept: PSYCHIATRY | Facility: CLINIC | Age: 46
End: 2021-08-25

## 2021-08-25 RX ORDER — LAMOTRIGINE 25 MG/1
25 TABLET ORAL DAILY
Qty: 30 TABLET | Refills: 0 | Status: SHIPPED | OUTPATIENT
Start: 2021-08-25 | End: 2021-09-20

## 2021-08-30 ENCOUNTER — PATIENT MESSAGE (OUTPATIENT)
Dept: INTERNAL MEDICINE | Facility: CLINIC | Age: 46
End: 2021-08-30

## 2021-08-30 ENCOUNTER — PATIENT MESSAGE (OUTPATIENT)
Dept: SLEEP MEDICINE | Facility: CLINIC | Age: 46
End: 2021-08-30

## 2021-08-30 ENCOUNTER — PATIENT MESSAGE (OUTPATIENT)
Dept: OTOLARYNGOLOGY | Facility: CLINIC | Age: 46
End: 2021-08-30

## 2021-09-02 DIAGNOSIS — G47.33 OBSTRUCTIVE SLEEP APNEA: Primary | ICD-10-CM

## 2021-09-07 ENCOUNTER — PATIENT MESSAGE (OUTPATIENT)
Dept: SLEEP MEDICINE | Facility: CLINIC | Age: 46
End: 2021-09-07

## 2021-10-14 ENCOUNTER — PATIENT MESSAGE (OUTPATIENT)
Dept: PSYCHIATRY | Facility: CLINIC | Age: 46
End: 2021-10-14
Payer: COMMERCIAL

## 2021-10-15 ENCOUNTER — OFFICE VISIT (OUTPATIENT)
Dept: OTOLARYNGOLOGY | Facility: CLINIC | Age: 46
End: 2021-10-15
Payer: COMMERCIAL

## 2021-10-15 VITALS
HEART RATE: 63 BPM | TEMPERATURE: 97 F | SYSTOLIC BLOOD PRESSURE: 136 MMHG | BODY MASS INDEX: 42.95 KG/M2 | DIASTOLIC BLOOD PRESSURE: 72 MMHG | WEIGHT: 315 LBS

## 2021-10-15 DIAGNOSIS — J34.89 NASAL OBSTRUCTION WITHOUT CHOANAL ATRESIA: ICD-10-CM

## 2021-10-15 DIAGNOSIS — J30.9 ALLERGIC RHINITIS, UNSPECIFIED SEASONALITY, UNSPECIFIED TRIGGER: ICD-10-CM

## 2021-10-15 DIAGNOSIS — G47.33 OBSTRUCTIVE SLEEP APNEA TREATED WITH CONTINUOUS POSITIVE AIRWAY PRESSURE (CPAP): ICD-10-CM

## 2021-10-15 DIAGNOSIS — J34.2 NASAL SEPTAL DEVIATION: ICD-10-CM

## 2021-10-15 DIAGNOSIS — M95.0 NASAL VALVE COLLAPSE: Primary | ICD-10-CM

## 2021-10-15 DIAGNOSIS — J34.3 HYPERTROPHY OF BOTH INFERIOR NASAL TURBINATES: ICD-10-CM

## 2021-10-15 PROCEDURE — 3078F PR MOST RECENT DIASTOLIC BLOOD PRESSURE < 80 MM HG: ICD-10-PCS | Mod: CPTII,S$GLB,, | Performed by: SPECIALIST

## 2021-10-15 PROCEDURE — 99214 OFFICE O/P EST MOD 30 MIN: CPT | Mod: S$GLB,,, | Performed by: SPECIALIST

## 2021-10-15 PROCEDURE — 3008F PR BODY MASS INDEX (BMI) DOCUMENTED: ICD-10-PCS | Mod: CPTII,S$GLB,, | Performed by: SPECIALIST

## 2021-10-15 PROCEDURE — 3075F PR MOST RECENT SYSTOLIC BLOOD PRESS GE 130-139MM HG: ICD-10-PCS | Mod: CPTII,S$GLB,, | Performed by: SPECIALIST

## 2021-10-15 PROCEDURE — 3008F BODY MASS INDEX DOCD: CPT | Mod: CPTII,S$GLB,, | Performed by: SPECIALIST

## 2021-10-15 PROCEDURE — 3075F SYST BP GE 130 - 139MM HG: CPT | Mod: CPTII,S$GLB,, | Performed by: SPECIALIST

## 2021-10-15 PROCEDURE — 99999 PR PBB SHADOW E&M-EST. PATIENT-LVL IV: CPT | Mod: PBBFAC,,, | Performed by: SPECIALIST

## 2021-10-15 PROCEDURE — 1160F RVW MEDS BY RX/DR IN RCRD: CPT | Mod: CPTII,S$GLB,, | Performed by: SPECIALIST

## 2021-10-15 PROCEDURE — 99999 PR PBB SHADOW E&M-EST. PATIENT-LVL IV: ICD-10-PCS | Mod: PBBFAC,,, | Performed by: SPECIALIST

## 2021-10-15 PROCEDURE — 3044F PR MOST RECENT HEMOGLOBIN A1C LEVEL <7.0%: ICD-10-PCS | Mod: CPTII,S$GLB,, | Performed by: SPECIALIST

## 2021-10-15 PROCEDURE — 99214 PR OFFICE/OUTPT VISIT, EST, LEVL IV, 30-39 MIN: ICD-10-PCS | Mod: S$GLB,,, | Performed by: SPECIALIST

## 2021-10-15 PROCEDURE — 1159F MED LIST DOCD IN RCRD: CPT | Mod: CPTII,S$GLB,, | Performed by: SPECIALIST

## 2021-10-15 PROCEDURE — 3044F HG A1C LEVEL LT 7.0%: CPT | Mod: CPTII,S$GLB,, | Performed by: SPECIALIST

## 2021-10-15 PROCEDURE — 3078F DIAST BP <80 MM HG: CPT | Mod: CPTII,S$GLB,, | Performed by: SPECIALIST

## 2021-10-15 PROCEDURE — 1160F PR REVIEW ALL MEDS BY PRESCRIBER/CLIN PHARMACIST DOCUMENTED: ICD-10-PCS | Mod: CPTII,S$GLB,, | Performed by: SPECIALIST

## 2021-10-15 PROCEDURE — 1159F PR MEDICATION LIST DOCUMENTED IN MEDICAL RECORD: ICD-10-PCS | Mod: CPTII,S$GLB,, | Performed by: SPECIALIST

## 2021-10-23 ENCOUNTER — PATIENT MESSAGE (OUTPATIENT)
Dept: OTOLARYNGOLOGY | Facility: CLINIC | Age: 46
End: 2021-10-23
Payer: COMMERCIAL

## 2021-10-25 DIAGNOSIS — J34.3 HYPERTROPHY OF BOTH INFERIOR NASAL TURBINATES: ICD-10-CM

## 2021-10-25 DIAGNOSIS — J34.89 NASAL VALVE STENOSIS: Primary | ICD-10-CM

## 2021-10-25 DIAGNOSIS — J34.2 NASAL SEPTAL DEVIATION: ICD-10-CM

## 2021-10-25 DIAGNOSIS — J34.89 REFRACTORY OBSTRUCTION OF NASAL AIRWAY: ICD-10-CM

## 2021-10-27 ENCOUNTER — OFFICE VISIT (OUTPATIENT)
Dept: INTERNAL MEDICINE | Facility: CLINIC | Age: 46
End: 2021-10-27
Payer: COMMERCIAL

## 2021-10-27 ENCOUNTER — IMMUNIZATION (OUTPATIENT)
Dept: INTERNAL MEDICINE | Facility: CLINIC | Age: 46
End: 2021-10-27
Payer: COMMERCIAL

## 2021-10-27 VITALS
WEIGHT: 315 LBS | DIASTOLIC BLOOD PRESSURE: 72 MMHG | HEART RATE: 80 BPM | OXYGEN SATURATION: 99 % | BODY MASS INDEX: 41.75 KG/M2 | HEIGHT: 73 IN | SYSTOLIC BLOOD PRESSURE: 118 MMHG

## 2021-10-27 DIAGNOSIS — Z00.00 HEALTH CARE MAINTENANCE: Primary | ICD-10-CM

## 2021-10-27 DIAGNOSIS — G25.81 RESTLESS LEG: ICD-10-CM

## 2021-10-27 DIAGNOSIS — F41.9 ANXIETY: ICD-10-CM

## 2021-10-27 DIAGNOSIS — Z98.84 HISTORY OF BARIATRIC SURGERY: ICD-10-CM

## 2021-10-27 DIAGNOSIS — K21.9 GASTROESOPHAGEAL REFLUX DISEASE, UNSPECIFIED WHETHER ESOPHAGITIS PRESENT: ICD-10-CM

## 2021-10-27 PROCEDURE — 3078F PR MOST RECENT DIASTOLIC BLOOD PRESSURE < 80 MM HG: ICD-10-PCS | Mod: CPTII,S$GLB,, | Performed by: INTERNAL MEDICINE

## 2021-10-27 PROCEDURE — 3044F HG A1C LEVEL LT 7.0%: CPT | Mod: CPTII,S$GLB,, | Performed by: INTERNAL MEDICINE

## 2021-10-27 PROCEDURE — 3074F SYST BP LT 130 MM HG: CPT | Mod: CPTII,S$GLB,, | Performed by: INTERNAL MEDICINE

## 2021-10-27 PROCEDURE — 1160F RVW MEDS BY RX/DR IN RCRD: CPT | Mod: CPTII,S$GLB,, | Performed by: INTERNAL MEDICINE

## 2021-10-27 PROCEDURE — 3074F PR MOST RECENT SYSTOLIC BLOOD PRESSURE < 130 MM HG: ICD-10-PCS | Mod: CPTII,S$GLB,, | Performed by: INTERNAL MEDICINE

## 2021-10-27 PROCEDURE — 99999 PR PBB SHADOW E&M-EST. PATIENT-LVL IV: ICD-10-PCS | Mod: PBBFAC,,, | Performed by: INTERNAL MEDICINE

## 2021-10-27 PROCEDURE — 3008F PR BODY MASS INDEX (BMI) DOCUMENTED: ICD-10-PCS | Mod: CPTII,S$GLB,, | Performed by: INTERNAL MEDICINE

## 2021-10-27 PROCEDURE — 99999 PR PBB SHADOW E&M-EST. PATIENT-LVL IV: CPT | Mod: PBBFAC,,, | Performed by: INTERNAL MEDICINE

## 2021-10-27 PROCEDURE — 3008F BODY MASS INDEX DOCD: CPT | Mod: CPTII,S$GLB,, | Performed by: INTERNAL MEDICINE

## 2021-10-27 PROCEDURE — 99396 PR PREVENTIVE VISIT,EST,40-64: ICD-10-PCS | Mod: 25,S$GLB,, | Performed by: INTERNAL MEDICINE

## 2021-10-27 PROCEDURE — 1160F PR REVIEW ALL MEDS BY PRESCRIBER/CLIN PHARMACIST DOCUMENTED: ICD-10-PCS | Mod: CPTII,S$GLB,, | Performed by: INTERNAL MEDICINE

## 2021-10-27 PROCEDURE — 3078F DIAST BP <80 MM HG: CPT | Mod: CPTII,S$GLB,, | Performed by: INTERNAL MEDICINE

## 2021-10-27 PROCEDURE — 1159F MED LIST DOCD IN RCRD: CPT | Mod: CPTII,S$GLB,, | Performed by: INTERNAL MEDICINE

## 2021-10-27 PROCEDURE — 90686 IIV4 VACC NO PRSV 0.5 ML IM: CPT | Mod: S$GLB,,, | Performed by: INTERNAL MEDICINE

## 2021-10-27 PROCEDURE — 1159F PR MEDICATION LIST DOCUMENTED IN MEDICAL RECORD: ICD-10-PCS | Mod: CPTII,S$GLB,, | Performed by: INTERNAL MEDICINE

## 2021-10-27 PROCEDURE — 3044F PR MOST RECENT HEMOGLOBIN A1C LEVEL <7.0%: ICD-10-PCS | Mod: CPTII,S$GLB,, | Performed by: INTERNAL MEDICINE

## 2021-10-27 PROCEDURE — 99396 PREV VISIT EST AGE 40-64: CPT | Mod: 25,S$GLB,, | Performed by: INTERNAL MEDICINE

## 2021-10-27 PROCEDURE — 90471 IMMUNIZATION ADMIN: CPT | Mod: S$GLB,,, | Performed by: INTERNAL MEDICINE

## 2021-10-27 PROCEDURE — 90686 FLU VACCINE (QUAD) GREATER THAN OR EQUAL TO 3YO PRESERVATIVE FREE IM: ICD-10-PCS | Mod: S$GLB,,, | Performed by: INTERNAL MEDICINE

## 2021-10-27 PROCEDURE — 90471 FLU VACCINE (QUAD) GREATER THAN OR EQUAL TO 3YO PRESERVATIVE FREE IM: ICD-10-PCS | Mod: S$GLB,,, | Performed by: INTERNAL MEDICINE

## 2021-10-27 RX ORDER — HYDROXYZINE HYDROCHLORIDE 25 MG/1
TABLET, FILM COATED ORAL
Qty: 30 TABLET | Refills: 6 | Status: SHIPPED | OUTPATIENT
Start: 2021-10-27

## 2021-10-27 RX ORDER — PANTOPRAZOLE SODIUM 40 MG/1
TABLET, DELAYED RELEASE ORAL
Qty: 90 TABLET | Refills: 3 | Status: SHIPPED | OUTPATIENT
Start: 2021-10-27 | End: 2022-10-09 | Stop reason: SDUPTHER

## 2021-10-27 RX ORDER — ROPINIROLE 0.5 MG/1
1 TABLET, FILM COATED ORAL NIGHTLY
Qty: 30 TABLET | Refills: 6 | Status: SHIPPED | OUTPATIENT
Start: 2021-10-27

## 2021-10-29 ENCOUNTER — LAB VISIT (OUTPATIENT)
Dept: LAB | Facility: HOSPITAL | Age: 46
End: 2021-10-29
Attending: INTERNAL MEDICINE
Payer: COMMERCIAL

## 2021-10-29 DIAGNOSIS — Z00.00 HEALTH CARE MAINTENANCE: ICD-10-CM

## 2021-10-29 DIAGNOSIS — Z98.84 HISTORY OF BARIATRIC SURGERY: ICD-10-CM

## 2021-10-29 LAB
25(OH)D3+25(OH)D2 SERPL-MCNC: 32 NG/ML (ref 30–96)
ALBUMIN SERPL BCP-MCNC: 3.6 G/DL (ref 3.5–5.2)
ALP SERPL-CCNC: 66 U/L (ref 55–135)
ALT SERPL W/O P-5'-P-CCNC: 16 U/L (ref 10–44)
ANION GAP SERPL CALC-SCNC: 5 MMOL/L (ref 8–16)
AST SERPL-CCNC: 20 U/L (ref 10–40)
BASOPHILS # BLD AUTO: 0.09 K/UL (ref 0–0.2)
BASOPHILS NFR BLD: 1.5 % (ref 0–1.9)
BILIRUB SERPL-MCNC: 0.5 MG/DL (ref 0.1–1)
BUN SERPL-MCNC: 8 MG/DL (ref 6–20)
CALCIUM SERPL-MCNC: 9.4 MG/DL (ref 8.7–10.5)
CHLORIDE SERPL-SCNC: 105 MMOL/L (ref 95–110)
CHOLEST SERPL-MCNC: 185 MG/DL (ref 120–199)
CHOLEST/HDLC SERPL: 3.9 {RATIO} (ref 2–5)
CO2 SERPL-SCNC: 31 MMOL/L (ref 23–29)
CREAT SERPL-MCNC: 0.9 MG/DL (ref 0.5–1.4)
DIFFERENTIAL METHOD: ABNORMAL
EOSINOPHIL # BLD AUTO: 0.4 K/UL (ref 0–0.5)
EOSINOPHIL NFR BLD: 5.9 % (ref 0–8)
ERYTHROCYTE [DISTWIDTH] IN BLOOD BY AUTOMATED COUNT: 13.3 % (ref 11.5–14.5)
EST. GFR  (AFRICAN AMERICAN): >60 ML/MIN/1.73 M^2
EST. GFR  (NON AFRICAN AMERICAN): >60 ML/MIN/1.73 M^2
ESTIMATED AVG GLUCOSE: 94 MG/DL (ref 68–131)
FERRITIN SERPL-MCNC: 35 NG/ML (ref 20–300)
FOLATE SERPL-MCNC: 10.9 NG/ML (ref 4–24)
GLUCOSE SERPL-MCNC: 93 MG/DL (ref 70–110)
HBA1C MFR BLD: 4.9 % (ref 4–5.6)
HCT VFR BLD AUTO: 40.5 % (ref 40–54)
HDLC SERPL-MCNC: 48 MG/DL (ref 40–75)
HDLC SERPL: 25.9 % (ref 20–50)
HGB BLD-MCNC: 13 G/DL (ref 14–18)
IMM GRANULOCYTES # BLD AUTO: 0.02 K/UL (ref 0–0.04)
IMM GRANULOCYTES NFR BLD AUTO: 0.3 % (ref 0–0.5)
IRON SERPL-MCNC: 93 UG/DL (ref 45–160)
LDLC SERPL CALC-MCNC: 123.8 MG/DL (ref 63–159)
LYMPHOCYTES # BLD AUTO: 1.7 K/UL (ref 1–4.8)
LYMPHOCYTES NFR BLD: 28.3 % (ref 18–48)
MCH RBC QN AUTO: 30.1 PG (ref 27–31)
MCHC RBC AUTO-ENTMCNC: 32.1 G/DL (ref 32–36)
MCV RBC AUTO: 94 FL (ref 82–98)
MONOCYTES # BLD AUTO: 0.6 K/UL (ref 0.3–1)
MONOCYTES NFR BLD: 9.4 % (ref 4–15)
NEUTROPHILS # BLD AUTO: 3.4 K/UL (ref 1.8–7.7)
NEUTROPHILS NFR BLD: 54.6 % (ref 38–73)
NONHDLC SERPL-MCNC: 137 MG/DL
NRBC BLD-RTO: 0 /100 WBC
PLATELET # BLD AUTO: 324 K/UL (ref 150–450)
PMV BLD AUTO: 9.1 FL (ref 9.2–12.9)
POTASSIUM SERPL-SCNC: 4.4 MMOL/L (ref 3.5–5.1)
PROT SERPL-MCNC: 6.6 G/DL (ref 6–8.4)
RBC # BLD AUTO: 4.32 M/UL (ref 4.6–6.2)
SATURATED IRON: 24 % (ref 20–50)
SODIUM SERPL-SCNC: 141 MMOL/L (ref 136–145)
TOTAL IRON BINDING CAPACITY: 385 UG/DL (ref 250–450)
TRANSFERRIN SERPL-MCNC: 260 MG/DL (ref 200–375)
TRIGL SERPL-MCNC: 66 MG/DL (ref 30–150)
TSH SERPL DL<=0.005 MIU/L-ACNC: 3.4 UIU/ML (ref 0.4–4)
VIT B12 SERPL-MCNC: 1010 PG/ML (ref 210–950)
WBC # BLD AUTO: 6.15 K/UL (ref 3.9–12.7)

## 2021-10-29 PROCEDURE — 80053 COMPREHEN METABOLIC PANEL: CPT | Performed by: INTERNAL MEDICINE

## 2021-10-29 PROCEDURE — 82728 ASSAY OF FERRITIN: CPT | Performed by: INTERNAL MEDICINE

## 2021-10-29 PROCEDURE — 85025 COMPLETE CBC W/AUTO DIFF WBC: CPT | Performed by: INTERNAL MEDICINE

## 2021-10-29 PROCEDURE — 84466 ASSAY OF TRANSFERRIN: CPT | Performed by: INTERNAL MEDICINE

## 2021-10-29 PROCEDURE — 36415 COLL VENOUS BLD VENIPUNCTURE: CPT | Performed by: INTERNAL MEDICINE

## 2021-10-29 PROCEDURE — 80061 LIPID PANEL: CPT | Performed by: INTERNAL MEDICINE

## 2021-10-29 PROCEDURE — 83036 HEMOGLOBIN GLYCOSYLATED A1C: CPT | Performed by: INTERNAL MEDICINE

## 2021-10-29 PROCEDURE — 84443 ASSAY THYROID STIM HORMONE: CPT | Performed by: INTERNAL MEDICINE

## 2021-10-29 PROCEDURE — 82306 VITAMIN D 25 HYDROXY: CPT | Performed by: INTERNAL MEDICINE

## 2021-10-29 PROCEDURE — 82746 ASSAY OF FOLIC ACID SERUM: CPT | Performed by: INTERNAL MEDICINE

## 2021-10-29 PROCEDURE — 82607 VITAMIN B-12: CPT | Performed by: INTERNAL MEDICINE

## 2021-11-04 ENCOUNTER — OFFICE VISIT (OUTPATIENT)
Dept: PSYCHIATRY | Facility: CLINIC | Age: 46
End: 2021-11-04
Payer: COMMERCIAL

## 2021-11-04 DIAGNOSIS — F41.1 GAD (GENERALIZED ANXIETY DISORDER): Primary | ICD-10-CM

## 2021-11-04 DIAGNOSIS — F33.1 MDD (MAJOR DEPRESSIVE DISORDER), RECURRENT EPISODE, MODERATE: ICD-10-CM

## 2021-11-04 DIAGNOSIS — Z86.59 HISTORY OF PANIC ATTACKS: ICD-10-CM

## 2021-11-04 PROCEDURE — 3044F HG A1C LEVEL LT 7.0%: CPT | Mod: CPTII,95,, | Performed by: NURSE PRACTITIONER

## 2021-11-04 PROCEDURE — 99214 PR OFFICE/OUTPT VISIT, EST, LEVL IV, 30-39 MIN: ICD-10-PCS | Mod: 95,,, | Performed by: NURSE PRACTITIONER

## 2021-11-04 PROCEDURE — 3044F PR MOST RECENT HEMOGLOBIN A1C LEVEL <7.0%: ICD-10-PCS | Mod: CPTII,95,, | Performed by: NURSE PRACTITIONER

## 2021-11-04 PROCEDURE — 99214 OFFICE O/P EST MOD 30 MIN: CPT | Mod: 95,,, | Performed by: NURSE PRACTITIONER

## 2021-11-04 RX ORDER — BUPROPION HYDROCHLORIDE 300 MG/1
300 TABLET ORAL DAILY
Qty: 30 TABLET | Refills: 0 | Status: SHIPPED | OUTPATIENT
Start: 2021-11-04 | End: 2021-12-04

## 2021-11-04 RX ORDER — LAMOTRIGINE 25 MG/1
TABLET ORAL
Qty: 60 TABLET | Refills: 0 | Status: SHIPPED | OUTPATIENT
Start: 2021-11-04 | End: 2021-12-04

## 2021-11-04 RX ORDER — DULOXETIN HYDROCHLORIDE 60 MG/1
60 CAPSULE, DELAYED RELEASE ORAL DAILY
Qty: 30 CAPSULE | Refills: 0 | Status: SHIPPED | OUTPATIENT
Start: 2021-11-04 | End: 2021-12-04

## 2021-11-06 ENCOUNTER — IMMUNIZATION (OUTPATIENT)
Dept: INTERNAL MEDICINE | Facility: CLINIC | Age: 46
End: 2021-11-06
Payer: COMMERCIAL

## 2021-11-06 DIAGNOSIS — Z23 NEED FOR VACCINATION: Primary | ICD-10-CM

## 2021-11-06 PROCEDURE — 91300 COVID-19, MRNA, LNP-S, PF, 30 MCG/0.3 ML DOSE VACCINE: CPT | Mod: PBBFAC | Performed by: INTERNAL MEDICINE

## 2021-12-01 ENCOUNTER — HOSPITAL ENCOUNTER (OUTPATIENT)
Dept: PREADMISSION TESTING | Facility: OTHER | Age: 46
Discharge: HOME | End: 2021-12-01
Attending: SPECIALIST
Payer: COMMERCIAL

## 2021-12-01 ENCOUNTER — ANESTHESIA EVENT (OUTPATIENT)
Dept: SURGERY | Facility: OTHER | Age: 46
End: 2021-12-01
Payer: COMMERCIAL

## 2021-12-01 VITALS
TEMPERATURE: 97 F | OXYGEN SATURATION: 97 % | BODY MASS INDEX: 40.63 KG/M2 | HEIGHT: 72 IN | RESPIRATION RATE: 16 BRPM | HEART RATE: 69 BPM | SYSTOLIC BLOOD PRESSURE: 145 MMHG | WEIGHT: 300 LBS | DIASTOLIC BLOOD PRESSURE: 85 MMHG

## 2021-12-01 RX ORDER — LIDOCAINE HYDROCHLORIDE 10 MG/ML
0.5 INJECTION, SOLUTION EPIDURAL; INFILTRATION; INTRACAUDAL; PERINEURAL ONCE
Status: CANCELLED | OUTPATIENT
Start: 2021-12-01 | End: 2021-12-01

## 2021-12-01 RX ORDER — ACETAMINOPHEN 500 MG
1000 TABLET ORAL
Status: CANCELLED | OUTPATIENT
Start: 2021-12-01 | End: 2021-12-01

## 2021-12-01 RX ORDER — SODIUM CHLORIDE, SODIUM LACTATE, POTASSIUM CHLORIDE, CALCIUM CHLORIDE 600; 310; 30; 20 MG/100ML; MG/100ML; MG/100ML; MG/100ML
INJECTION, SOLUTION INTRAVENOUS CONTINUOUS
Status: CANCELLED | OUTPATIENT
Start: 2021-12-01

## 2021-12-05 RX ORDER — LIDOCAINE HYDROCHLORIDE 10 MG/ML
1 INJECTION, SOLUTION EPIDURAL; INFILTRATION; INTRACAUDAL; PERINEURAL ONCE
Status: CANCELLED | OUTPATIENT
Start: 2021-12-05 | End: 2021-12-05

## 2021-12-05 RX ORDER — CLINDAMYCIN PHOSPHATE 900 MG/50ML
900 INJECTION, SOLUTION INTRAVENOUS
Status: CANCELLED | OUTPATIENT
Start: 2021-12-05

## 2021-12-05 RX ORDER — DEXAMETHASONE SODIUM PHOSPHATE 4 MG/ML
12 INJECTION, SOLUTION INTRA-ARTICULAR; INTRALESIONAL; INTRAMUSCULAR; INTRAVENOUS; SOFT TISSUE
Status: CANCELLED | OUTPATIENT
Start: 2021-12-05

## 2021-12-06 ENCOUNTER — ANESTHESIA (OUTPATIENT)
Dept: SURGERY | Facility: OTHER | Age: 46
End: 2021-12-06
Payer: COMMERCIAL

## 2021-12-06 ENCOUNTER — HOSPITAL ENCOUNTER (OUTPATIENT)
Facility: OTHER | Age: 46
Discharge: HOME | End: 2021-12-07
Attending: SPECIALIST | Admitting: SPECIALIST
Payer: COMMERCIAL

## 2021-12-06 DIAGNOSIS — J34.2 NASAL SEPTAL DEVIATION: ICD-10-CM

## 2021-12-06 DIAGNOSIS — J34.3 HYPERTROPHY OF BOTH INFERIOR NASAL TURBINATES: ICD-10-CM

## 2021-12-06 DIAGNOSIS — M95.0 NASAL VALVE COLLAPSE: ICD-10-CM

## 2021-12-06 PROBLEM — Z98.84 HISTORY OF BARIATRIC SURGERY: Chronic | Status: ACTIVE | Noted: 2021-07-23

## 2021-12-06 PROBLEM — G47.33 OBSTRUCTIVE SLEEP APNEA: Status: RESOLVED | Noted: 2018-07-10 | Resolved: 2021-12-06

## 2021-12-06 PROBLEM — F41.1 GAD (GENERALIZED ANXIETY DISORDER): Chronic | Status: ACTIVE | Noted: 2020-10-13

## 2021-12-06 PROBLEM — K21.9 GERD (GASTROESOPHAGEAL REFLUX DISEASE): Chronic | Status: ACTIVE | Noted: 2021-12-06

## 2021-12-06 PROBLEM — F32.A DEPRESSION: Status: RESOLVED | Noted: 2017-05-09 | Resolved: 2021-12-06

## 2021-12-06 PROBLEM — M25.561 ACUTE PAIN OF RIGHT KNEE: Status: RESOLVED | Noted: 2017-06-13 | Resolved: 2021-12-06

## 2021-12-06 PROBLEM — F33.1 MDD (MAJOR DEPRESSIVE DISORDER), RECURRENT EPISODE, MODERATE: Chronic | Status: ACTIVE | Noted: 2021-03-08

## 2021-12-06 PROCEDURE — 37000008 HC ANESTHESIA 1ST 15 MINUTES: Performed by: SPECIALIST

## 2021-12-06 PROCEDURE — 30520 PR REPAIR, NASAL SEPTUM: ICD-10-PCS | Mod: 51,,, | Performed by: SPECIALIST

## 2021-12-06 PROCEDURE — 30465 PR REPAIR NASAL CAVITY STENOSIS: ICD-10-PCS | Mod: ,,, | Performed by: SPECIALIST

## 2021-12-06 PROCEDURE — 30465 REPAIR NASAL STENOSIS: CPT | Mod: ,,, | Performed by: SPECIALIST

## 2021-12-06 PROCEDURE — 25000003 PHARM REV CODE 250: Performed by: ANESTHESIOLOGY

## 2021-12-06 PROCEDURE — 25000003 PHARM REV CODE 250: Performed by: STUDENT IN AN ORGANIZED HEALTH CARE EDUCATION/TRAINING PROGRAM

## 2021-12-06 PROCEDURE — 71000039 HC RECOVERY, EACH ADD'L HOUR: Performed by: SPECIALIST

## 2021-12-06 PROCEDURE — 71000033 HC RECOVERY, INTIAL HOUR: Performed by: SPECIALIST

## 2021-12-06 PROCEDURE — 30140 RESECT INFERIOR TURBINATE: CPT | Mod: 50,51,, | Performed by: SPECIALIST

## 2021-12-06 PROCEDURE — 30520 REPAIR OF NASAL SEPTUM: CPT | Mod: 51,,, | Performed by: SPECIALIST

## 2021-12-06 PROCEDURE — 63600175 PHARM REV CODE 636 W HCPCS: Performed by: SPECIALIST

## 2021-12-06 PROCEDURE — 21235 EAR CARTILAGE GRAFT: CPT | Mod: 51,,, | Performed by: SPECIALIST

## 2021-12-06 PROCEDURE — 63600175 PHARM REV CODE 636 W HCPCS: Performed by: ANESTHESIOLOGY

## 2021-12-06 PROCEDURE — 99205 OFFICE O/P NEW HI 60 MIN: CPT | Mod: ,,, | Performed by: INTERNAL MEDICINE

## 2021-12-06 PROCEDURE — 25000003 PHARM REV CODE 250: Performed by: SPECIALIST

## 2021-12-06 PROCEDURE — 63600175 PHARM REV CODE 636 W HCPCS: Mod: JG | Performed by: STUDENT IN AN ORGANIZED HEALTH CARE EDUCATION/TRAINING PROGRAM

## 2021-12-06 PROCEDURE — 21235 PR GRAFT-EAR CARTILAGE TO NOSE OR EAR: ICD-10-PCS | Mod: 51,,, | Performed by: SPECIALIST

## 2021-12-06 PROCEDURE — 30140 PR EXCISION TURBINATE,SUBMUCOUS: ICD-10-PCS | Mod: 50,51,, | Performed by: SPECIALIST

## 2021-12-06 PROCEDURE — P9045 ALBUMIN (HUMAN), 5%, 250 ML: HCPCS | Mod: JG | Performed by: STUDENT IN AN ORGANIZED HEALTH CARE EDUCATION/TRAINING PROGRAM

## 2021-12-06 PROCEDURE — 36000706: Performed by: SPECIALIST

## 2021-12-06 PROCEDURE — 36000707: Performed by: SPECIALIST

## 2021-12-06 PROCEDURE — 25000003 PHARM REV CODE 250: Performed by: NURSE ANESTHETIST, CERTIFIED REGISTERED

## 2021-12-06 PROCEDURE — 94761 N-INVAS EAR/PLS OXIMETRY MLT: CPT

## 2021-12-06 PROCEDURE — 37000009 HC ANESTHESIA EA ADD 15 MINS: Performed by: SPECIALIST

## 2021-12-06 PROCEDURE — 99205 PR OFFICE/OUTPT VISIT, NEW, LEVL V, 60-74 MIN: ICD-10-PCS | Mod: ,,, | Performed by: INTERNAL MEDICINE

## 2021-12-06 PROCEDURE — 27000221 HC OXYGEN, UP TO 24 HOURS

## 2021-12-06 RX ORDER — LIDOCAINE HYDROCHLORIDE 20 MG/ML
INJECTION INTRAVENOUS
Status: DISCONTINUED | OUTPATIENT
Start: 2021-12-06 | End: 2021-12-06

## 2021-12-06 RX ORDER — ONDANSETRON 8 MG/1
8 TABLET, ORALLY DISINTEGRATING ORAL EVERY 6 HOURS PRN
Status: DISCONTINUED | OUTPATIENT
Start: 2021-12-06 | End: 2021-12-07 | Stop reason: HOSPADM

## 2021-12-06 RX ORDER — LIDOCAINE HYDROCHLORIDE AND EPINEPHRINE 10; 10 MG/ML; UG/ML
INJECTION, SOLUTION INFILTRATION; PERINEURAL
Status: DISCONTINUED | OUTPATIENT
Start: 2021-12-06 | End: 2021-12-06 | Stop reason: HOSPADM

## 2021-12-06 RX ORDER — LIDOCAINE HYDROCHLORIDE 10 MG/ML
1 INJECTION, SOLUTION EPIDURAL; INFILTRATION; INTRACAUDAL; PERINEURAL ONCE
Status: DISCONTINUED | OUTPATIENT
Start: 2021-12-06 | End: 2021-12-06 | Stop reason: HOSPADM

## 2021-12-06 RX ORDER — DEXMEDETOMIDINE HYDROCHLORIDE 100 UG/ML
INJECTION, SOLUTION INTRAVENOUS
Status: DISCONTINUED | OUTPATIENT
Start: 2021-12-06 | End: 2021-12-06

## 2021-12-06 RX ORDER — OXYCODONE HYDROCHLORIDE 5 MG/1
5 TABLET ORAL
Status: DISCONTINUED | OUTPATIENT
Start: 2021-12-06 | End: 2021-12-06 | Stop reason: HOSPADM

## 2021-12-06 RX ORDER — BACITRACIN ZINC 500 UNIT/G
OINTMENT (GRAM) TOPICAL
Status: DISCONTINUED | OUTPATIENT
Start: 2021-12-06 | End: 2021-12-06 | Stop reason: HOSPADM

## 2021-12-06 RX ORDER — ACETAMINOPHEN 500 MG
1000 TABLET ORAL
Status: COMPLETED | OUTPATIENT
Start: 2021-12-06 | End: 2021-12-06

## 2021-12-06 RX ORDER — ONDANSETRON 2 MG/ML
INJECTION INTRAMUSCULAR; INTRAVENOUS
Status: DISCONTINUED | OUTPATIENT
Start: 2021-12-06 | End: 2021-12-06

## 2021-12-06 RX ORDER — FENTANYL CITRATE 50 UG/ML
INJECTION, SOLUTION INTRAMUSCULAR; INTRAVENOUS
Status: DISCONTINUED | OUTPATIENT
Start: 2021-12-06 | End: 2021-12-06

## 2021-12-06 RX ORDER — KETAMINE HCL IN 0.9 % NACL 50 MG/5 ML
SYRINGE (ML) INTRAVENOUS
Status: DISCONTINUED | OUTPATIENT
Start: 2021-12-06 | End: 2021-12-06

## 2021-12-06 RX ORDER — ALBUMIN HUMAN 50 G/1000ML
SOLUTION INTRAVENOUS CONTINUOUS PRN
Status: DISCONTINUED | OUTPATIENT
Start: 2021-12-06 | End: 2021-12-06

## 2021-12-06 RX ORDER — EPINEPHRINE 1 MG/ML
INJECTION, SOLUTION INTRACARDIAC; INTRAMUSCULAR; INTRAVENOUS; SUBCUTANEOUS
Status: DISCONTINUED | OUTPATIENT
Start: 2021-12-06 | End: 2021-12-06 | Stop reason: HOSPADM

## 2021-12-06 RX ORDER — ROCURONIUM BROMIDE 10 MG/ML
INJECTION, SOLUTION INTRAVENOUS
Status: DISCONTINUED | OUTPATIENT
Start: 2021-12-06 | End: 2021-12-06

## 2021-12-06 RX ORDER — HYDROCODONE BITARTRATE AND ACETAMINOPHEN 5; 325 MG/1; MG/1
1 TABLET ORAL EVERY 4 HOURS PRN
Status: DISCONTINUED | OUTPATIENT
Start: 2021-12-06 | End: 2021-12-07 | Stop reason: HOSPADM

## 2021-12-06 RX ORDER — MEPERIDINE HYDROCHLORIDE 25 MG/ML
12.5 INJECTION INTRAMUSCULAR; INTRAVENOUS; SUBCUTANEOUS ONCE AS NEEDED
Status: DISCONTINUED | OUTPATIENT
Start: 2021-12-06 | End: 2021-12-06 | Stop reason: HOSPADM

## 2021-12-06 RX ORDER — CLINDAMYCIN PHOSPHATE 900 MG/50ML
900 INJECTION, SOLUTION INTRAVENOUS
Status: COMPLETED | OUTPATIENT
Start: 2021-12-06 | End: 2021-12-06

## 2021-12-06 RX ORDER — PROCHLORPERAZINE EDISYLATE 5 MG/ML
5 INJECTION INTRAMUSCULAR; INTRAVENOUS EVERY 30 MIN PRN
Status: DISCONTINUED | OUTPATIENT
Start: 2021-12-06 | End: 2021-12-06 | Stop reason: HOSPADM

## 2021-12-06 RX ORDER — SODIUM CHLORIDE, SODIUM LACTATE, POTASSIUM CHLORIDE, CALCIUM CHLORIDE 600; 310; 30; 20 MG/100ML; MG/100ML; MG/100ML; MG/100ML
INJECTION, SOLUTION INTRAVENOUS CONTINUOUS
Status: DISCONTINUED | OUTPATIENT
Start: 2021-12-06 | End: 2021-12-07

## 2021-12-06 RX ORDER — OXYCODONE HYDROCHLORIDE 5 MG/1
10 TABLET ORAL EVERY 4 HOURS PRN
Status: DISCONTINUED | OUTPATIENT
Start: 2021-12-06 | End: 2021-12-07 | Stop reason: HOSPADM

## 2021-12-06 RX ORDER — SODIUM CHLORIDE 0.9 % (FLUSH) 0.9 %
3 SYRINGE (ML) INJECTION
Status: DISCONTINUED | OUTPATIENT
Start: 2021-12-06 | End: 2021-12-07 | Stop reason: HOSPADM

## 2021-12-06 RX ORDER — LIDOCAINE HYDROCHLORIDE 10 MG/ML
0.5 INJECTION, SOLUTION EPIDURAL; INFILTRATION; INTRACAUDAL; PERINEURAL ONCE
Status: DISCONTINUED | OUTPATIENT
Start: 2021-12-06 | End: 2021-12-06 | Stop reason: HOSPADM

## 2021-12-06 RX ORDER — MIDAZOLAM HYDROCHLORIDE 1 MG/ML
INJECTION INTRAMUSCULAR; INTRAVENOUS
Status: DISCONTINUED | OUTPATIENT
Start: 2021-12-06 | End: 2021-12-06

## 2021-12-06 RX ORDER — SODIUM CHLORIDE 0.9 % (FLUSH) 0.9 %
10 SYRINGE (ML) INJECTION
Status: DISCONTINUED | OUTPATIENT
Start: 2021-12-06 | End: 2021-12-07 | Stop reason: HOSPADM

## 2021-12-06 RX ORDER — HYDROMORPHONE HYDROCHLORIDE 2 MG/ML
0.4 INJECTION, SOLUTION INTRAMUSCULAR; INTRAVENOUS; SUBCUTANEOUS EVERY 5 MIN PRN
Status: DISCONTINUED | OUTPATIENT
Start: 2021-12-06 | End: 2021-12-06 | Stop reason: HOSPADM

## 2021-12-06 RX ORDER — DEXAMETHASONE SODIUM PHOSPHATE 4 MG/ML
INJECTION, SOLUTION INTRA-ARTICULAR; INTRALESIONAL; INTRAMUSCULAR; INTRAVENOUS; SOFT TISSUE
Status: DISCONTINUED | OUTPATIENT
Start: 2021-12-06 | End: 2021-12-06

## 2021-12-06 RX ORDER — SODIUM CHLORIDE, SODIUM LACTATE, POTASSIUM CHLORIDE, CALCIUM CHLORIDE 600; 310; 30; 20 MG/100ML; MG/100ML; MG/100ML; MG/100ML
INJECTION, SOLUTION INTRAVENOUS CONTINUOUS
Status: DISCONTINUED | OUTPATIENT
Start: 2021-12-06 | End: 2021-12-07 | Stop reason: HOSPADM

## 2021-12-06 RX ORDER — PROPOFOL 10 MG/ML
VIAL (ML) INTRAVENOUS
Status: DISCONTINUED | OUTPATIENT
Start: 2021-12-06 | End: 2021-12-06

## 2021-12-06 RX ADMIN — PROPOFOL 250 MG: 10 INJECTION, EMULSION INTRAVENOUS at 07:12

## 2021-12-06 RX ADMIN — FENTANYL CITRATE 100 MCG: 50 INJECTION, SOLUTION INTRAMUSCULAR; INTRAVENOUS at 07:12

## 2021-12-06 RX ADMIN — ROCURONIUM BROMIDE 10 MG: 10 INJECTION, SOLUTION INTRAVENOUS at 09:12

## 2021-12-06 RX ADMIN — GLYCOPYRROLATE 0.2 MG: 0.2 INJECTION, SOLUTION INTRAMUSCULAR; INTRAVITREAL at 06:12

## 2021-12-06 RX ADMIN — LIDOCAINE HYDROCHLORIDE 100 MG: 20 INJECTION, SOLUTION INTRAVENOUS at 07:12

## 2021-12-06 RX ADMIN — ROCURONIUM BROMIDE 50 MG: 10 INJECTION, SOLUTION INTRAVENOUS at 07:12

## 2021-12-06 RX ADMIN — ESMOLOL HYDROCHLORIDE 20 MG: 10 INJECTION INTRAVENOUS at 11:12

## 2021-12-06 RX ADMIN — ALBUMIN (HUMAN): 12.5 SOLUTION INTRAVENOUS at 10:12

## 2021-12-06 RX ADMIN — ROCURONIUM BROMIDE 20 MG: 10 INJECTION, SOLUTION INTRAVENOUS at 08:12

## 2021-12-06 RX ADMIN — DEXMEDETOMIDINE HYDROCHLORIDE 20 MCG: 100 INJECTION, SOLUTION, CONCENTRATE INTRAVENOUS at 06:12

## 2021-12-06 RX ADMIN — ROCURONIUM BROMIDE 20 MG: 10 INJECTION, SOLUTION INTRAVENOUS at 10:12

## 2021-12-06 RX ADMIN — ONDANSETRON HYDROCHLORIDE 4 MG: 2 INJECTION INTRAMUSCULAR; INTRAVENOUS at 06:12

## 2021-12-06 RX ADMIN — HYDROMORPHONE HYDROCHLORIDE 0.4 MG: 2 INJECTION INTRAMUSCULAR; INTRAVENOUS; SUBCUTANEOUS at 01:12

## 2021-12-06 RX ADMIN — ACETAMINOPHEN 1000 MG: 500 TABLET, FILM COATED ORAL at 06:12

## 2021-12-06 RX ADMIN — ESMOLOL HYDROCHLORIDE 20 MG: 10 INJECTION INTRAVENOUS at 10:12

## 2021-12-06 RX ADMIN — SODIUM CHLORIDE, SODIUM LACTATE, POTASSIUM CHLORIDE, AND CALCIUM CHLORIDE: 600; 310; 30; 20 INJECTION, SOLUTION INTRAVENOUS at 06:12

## 2021-12-06 RX ADMIN — ROCURONIUM BROMIDE 20 MG: 10 INJECTION, SOLUTION INTRAVENOUS at 11:12

## 2021-12-06 RX ADMIN — FENTANYL CITRATE 100 MCG: 50 INJECTION, SOLUTION INTRAMUSCULAR; INTRAVENOUS at 09:12

## 2021-12-06 RX ADMIN — ONDANSETRON 8 MG: 8 TABLET, ORALLY DISINTEGRATING ORAL at 08:12

## 2021-12-06 RX ADMIN — FENTANYL CITRATE 50 MCG: 50 INJECTION, SOLUTION INTRAMUSCULAR; INTRAVENOUS at 08:12

## 2021-12-06 RX ADMIN — DEXMEDETOMIDINE HYDROCHLORIDE 20 MCG: 100 INJECTION, SOLUTION, CONCENTRATE INTRAVENOUS at 08:12

## 2021-12-06 RX ADMIN — FENTANYL CITRATE 50 MCG: 50 INJECTION, SOLUTION INTRAMUSCULAR; INTRAVENOUS at 12:12

## 2021-12-06 RX ADMIN — FENTANYL CITRATE 100 MCG: 50 INJECTION, SOLUTION INTRAMUSCULAR; INTRAVENOUS at 10:12

## 2021-12-06 RX ADMIN — Medication 50 MG: at 07:12

## 2021-12-06 RX ADMIN — SODIUM CHLORIDE, SODIUM LACTATE, POTASSIUM CHLORIDE, AND CALCIUM CHLORIDE: .6; .31; .03; .02 INJECTION, SOLUTION INTRAVENOUS at 10:12

## 2021-12-06 RX ADMIN — SODIUM CHLORIDE, SODIUM LACTATE, POTASSIUM CHLORIDE, AND CALCIUM CHLORIDE: .6; .31; .03; .02 INJECTION, SOLUTION INTRAVENOUS at 06:12

## 2021-12-06 RX ADMIN — FENTANYL CITRATE 100 MCG: 50 INJECTION, SOLUTION INTRAMUSCULAR; INTRAVENOUS at 08:12

## 2021-12-06 RX ADMIN — DEXAMETHASONE SODIUM PHOSPHATE 8 MG: 4 INJECTION, SOLUTION INTRAMUSCULAR; INTRAVENOUS at 07:12

## 2021-12-06 RX ADMIN — CLINDAMYCIN PHOSPHATE 900 MG: 18 INJECTION, SOLUTION INTRAVENOUS at 07:12

## 2021-12-06 RX ADMIN — ALBUMIN (HUMAN): 12.5 SOLUTION INTRAVENOUS at 07:12

## 2021-12-06 RX ADMIN — DEXMEDETOMIDINE HYDROCHLORIDE 10 MCG: 100 INJECTION, SOLUTION, CONCENTRATE INTRAVENOUS at 10:12

## 2021-12-06 RX ADMIN — OXYCODONE 10 MG: 5 TABLET ORAL at 06:12

## 2021-12-06 RX ADMIN — SUGAMMADEX 200 MG: 100 INJECTION, SOLUTION INTRAVENOUS at 12:12

## 2021-12-06 RX ADMIN — FENTANYL CITRATE 50 MCG: 50 INJECTION, SOLUTION INTRAMUSCULAR; INTRAVENOUS at 09:12

## 2021-12-06 RX ADMIN — MIDAZOLAM HYDROCHLORIDE 2 MG: 1 INJECTION, SOLUTION INTRAMUSCULAR; INTRAVENOUS at 06:12

## 2021-12-07 VITALS
TEMPERATURE: 98 F | HEIGHT: 72 IN | WEIGHT: 315 LBS | SYSTOLIC BLOOD PRESSURE: 126 MMHG | DIASTOLIC BLOOD PRESSURE: 66 MMHG | RESPIRATION RATE: 18 BRPM | BODY MASS INDEX: 42.66 KG/M2 | HEART RATE: 78 BPM | OXYGEN SATURATION: 98 %

## 2021-12-07 PROCEDURE — 63600175 PHARM REV CODE 636 W HCPCS: Performed by: SPECIALIST

## 2021-12-07 PROCEDURE — 25000003 PHARM REV CODE 250: Performed by: INTERNAL MEDICINE

## 2021-12-07 PROCEDURE — 94761 N-INVAS EAR/PLS OXIMETRY MLT: CPT

## 2021-12-07 PROCEDURE — 25000003 PHARM REV CODE 250: Performed by: SPECIALIST

## 2021-12-07 RX ORDER — ROPINIROLE 0.25 MG/1
1 TABLET, FILM COATED ORAL NIGHTLY PRN
Status: DISCONTINUED | OUTPATIENT
Start: 2021-12-07 | End: 2021-12-07 | Stop reason: HOSPADM

## 2021-12-07 RX ORDER — LAMOTRIGINE 25 MG/1
50 TABLET ORAL DAILY
Status: DISCONTINUED | OUTPATIENT
Start: 2021-12-07 | End: 2021-12-07 | Stop reason: HOSPADM

## 2021-12-07 RX ORDER — DULOXETIN HYDROCHLORIDE 30 MG/1
60 CAPSULE, DELAYED RELEASE ORAL DAILY
Status: DISCONTINUED | OUTPATIENT
Start: 2021-12-07 | End: 2021-12-07 | Stop reason: HOSPADM

## 2021-12-07 RX ORDER — ONDANSETRON 8 MG/1
8 TABLET, ORALLY DISINTEGRATING ORAL EVERY 6 HOURS PRN
Qty: 10 TABLET | Refills: 2 | Status: SHIPPED | OUTPATIENT
Start: 2021-12-07 | End: 2022-08-01

## 2021-12-07 RX ORDER — HYDROCODONE BITARTRATE AND ACETAMINOPHEN 5; 325 MG/1; MG/1
TABLET ORAL
Qty: 30 TABLET | Refills: 0 | Status: SHIPPED | OUTPATIENT
Start: 2021-12-07 | End: 2022-01-03

## 2021-12-07 RX ORDER — HYDROXYZINE HYDROCHLORIDE 25 MG/1
25 TABLET, FILM COATED ORAL 3 TIMES DAILY PRN
Status: DISCONTINUED | OUTPATIENT
Start: 2021-12-07 | End: 2021-12-07 | Stop reason: HOSPADM

## 2021-12-07 RX ORDER — BUPROPION HYDROCHLORIDE 150 MG/1
300 TABLET ORAL DAILY
Status: DISCONTINUED | OUTPATIENT
Start: 2021-12-07 | End: 2021-12-07 | Stop reason: HOSPADM

## 2021-12-07 RX ORDER — PANTOPRAZOLE SODIUM 40 MG/1
40 TABLET, DELAYED RELEASE ORAL DAILY
Status: DISCONTINUED | OUTPATIENT
Start: 2021-12-07 | End: 2021-12-07 | Stop reason: HOSPADM

## 2021-12-07 RX ORDER — CLARITHROMYCIN 500 MG/1
500 TABLET, FILM COATED ORAL EVERY 12 HOURS
Qty: 20 TABLET | Refills: 0 | Status: SHIPPED | OUTPATIENT
Start: 2021-12-07 | End: 2022-01-03 | Stop reason: ALTCHOICE

## 2021-12-07 RX ADMIN — BUPROPION HYDROCHLORIDE 300 MG: 150 TABLET, FILM COATED, EXTENDED RELEASE ORAL at 09:12

## 2021-12-07 RX ADMIN — LAMOTRIGINE 50 MG: 25 TABLET ORAL at 09:12

## 2021-12-07 RX ADMIN — DULOXETINE 60 MG: 30 CAPSULE, DELAYED RELEASE ORAL at 09:12

## 2021-12-07 RX ADMIN — HYDROCODONE BITARTRATE AND ACETAMINOPHEN 1 TABLET: 5; 325 TABLET ORAL at 09:12

## 2021-12-07 RX ADMIN — PANTOPRAZOLE SODIUM 40 MG: 40 TABLET, DELAYED RELEASE ORAL at 09:12

## 2021-12-07 RX ADMIN — SODIUM CHLORIDE, SODIUM LACTATE, POTASSIUM CHLORIDE, AND CALCIUM CHLORIDE: .6; .31; .03; .02 INJECTION, SOLUTION INTRAVENOUS at 03:12

## 2021-12-13 ENCOUNTER — OFFICE VISIT (OUTPATIENT)
Dept: OTOLARYNGOLOGY | Facility: CLINIC | Age: 46
End: 2021-12-13
Payer: COMMERCIAL

## 2021-12-13 VITALS
TEMPERATURE: 97 F | DIASTOLIC BLOOD PRESSURE: 77 MMHG | BODY MASS INDEX: 44.97 KG/M2 | HEART RATE: 87 BPM | WEIGHT: 315 LBS | SYSTOLIC BLOOD PRESSURE: 144 MMHG

## 2021-12-13 DIAGNOSIS — J34.2 NASAL SEPTAL DEVIATION: ICD-10-CM

## 2021-12-13 DIAGNOSIS — M95.0 NASAL VALVE COLLAPSE: ICD-10-CM

## 2021-12-13 DIAGNOSIS — J34.3 HYPERTROPHY OF BOTH INFERIOR NASAL TURBINATES: ICD-10-CM

## 2021-12-13 DIAGNOSIS — J34.89 NASAL VALVE STENOSIS: Primary | ICD-10-CM

## 2021-12-13 PROCEDURE — 99024 POSTOP FOLLOW-UP VISIT: CPT | Mod: S$GLB,,, | Performed by: SPECIALIST

## 2021-12-13 PROCEDURE — 99999 PR PBB SHADOW E&M-EST. PATIENT-LVL III: ICD-10-PCS | Mod: PBBFAC,,, | Performed by: SPECIALIST

## 2021-12-13 PROCEDURE — 99024 PR POST-OP FOLLOW-UP VISIT: ICD-10-PCS | Mod: S$GLB,,, | Performed by: SPECIALIST

## 2021-12-13 PROCEDURE — 99999 PR PBB SHADOW E&M-EST. PATIENT-LVL III: CPT | Mod: PBBFAC,,, | Performed by: SPECIALIST

## 2021-12-20 ENCOUNTER — OFFICE VISIT (OUTPATIENT)
Dept: OTOLARYNGOLOGY | Facility: CLINIC | Age: 46
End: 2021-12-20
Payer: COMMERCIAL

## 2021-12-20 VITALS
BODY MASS INDEX: 44.76 KG/M2 | HEART RATE: 94 BPM | DIASTOLIC BLOOD PRESSURE: 77 MMHG | SYSTOLIC BLOOD PRESSURE: 142 MMHG | TEMPERATURE: 98 F | WEIGHT: 315 LBS

## 2021-12-20 DIAGNOSIS — J34.89 REFRACTORY OBSTRUCTION OF NASAL AIRWAY: ICD-10-CM

## 2021-12-20 DIAGNOSIS — J34.3 HYPERTROPHY OF BOTH INFERIOR NASAL TURBINATES: ICD-10-CM

## 2021-12-20 DIAGNOSIS — J34.89 NASAL VALVE STENOSIS: Primary | ICD-10-CM

## 2021-12-20 DIAGNOSIS — J34.2 NASAL SEPTAL DEVIATION: ICD-10-CM

## 2021-12-20 DIAGNOSIS — M95.0 NASAL VALVE COLLAPSE: ICD-10-CM

## 2021-12-20 PROCEDURE — 99024 POSTOP FOLLOW-UP VISIT: CPT | Mod: S$GLB,,, | Performed by: SPECIALIST

## 2021-12-20 PROCEDURE — 99024 PR POST-OP FOLLOW-UP VISIT: ICD-10-PCS | Mod: S$GLB,,, | Performed by: SPECIALIST

## 2021-12-20 PROCEDURE — 99999 PR PBB SHADOW E&M-EST. PATIENT-LVL III: ICD-10-PCS | Mod: PBBFAC,,, | Performed by: SPECIALIST

## 2021-12-20 PROCEDURE — 99999 PR PBB SHADOW E&M-EST. PATIENT-LVL III: CPT | Mod: PBBFAC,,, | Performed by: SPECIALIST

## 2021-12-30 ENCOUNTER — PATIENT MESSAGE (OUTPATIENT)
Dept: PSYCHIATRY | Facility: CLINIC | Age: 46
End: 2021-12-30
Payer: COMMERCIAL

## 2022-01-03 ENCOUNTER — OFFICE VISIT (OUTPATIENT)
Dept: OTOLARYNGOLOGY | Facility: CLINIC | Age: 47
End: 2022-01-03
Payer: COMMERCIAL

## 2022-01-03 VITALS
TEMPERATURE: 98 F | BODY MASS INDEX: 46.49 KG/M2 | HEART RATE: 68 BPM | WEIGHT: 315 LBS | SYSTOLIC BLOOD PRESSURE: 132 MMHG | DIASTOLIC BLOOD PRESSURE: 76 MMHG

## 2022-01-03 DIAGNOSIS — J34.3 HYPERTROPHY OF BOTH INFERIOR NASAL TURBINATES: ICD-10-CM

## 2022-01-03 DIAGNOSIS — J34.2 NASAL SEPTAL DEVIATION: ICD-10-CM

## 2022-01-03 DIAGNOSIS — R43.9 DISORDERS OF SMELL: ICD-10-CM

## 2022-01-03 DIAGNOSIS — J34.89 NASAL VALVE STENOSIS: Primary | ICD-10-CM

## 2022-01-03 DIAGNOSIS — J30.9 ALLERGIC RHINITIS, UNSPECIFIED SEASONALITY, UNSPECIFIED TRIGGER: ICD-10-CM

## 2022-01-03 DIAGNOSIS — M95.0 NASAL VALVE COLLAPSE: ICD-10-CM

## 2022-01-03 PROCEDURE — 1159F MED LIST DOCD IN RCRD: CPT | Mod: CPTII,S$GLB,, | Performed by: SPECIALIST

## 2022-01-03 PROCEDURE — 3075F PR MOST RECENT SYSTOLIC BLOOD PRESS GE 130-139MM HG: ICD-10-PCS | Mod: CPTII,S$GLB,, | Performed by: SPECIALIST

## 2022-01-03 PROCEDURE — 1160F PR REVIEW ALL MEDS BY PRESCRIBER/CLIN PHARMACIST DOCUMENTED: ICD-10-PCS | Mod: CPTII,S$GLB,, | Performed by: SPECIALIST

## 2022-01-03 PROCEDURE — 1159F PR MEDICATION LIST DOCUMENTED IN MEDICAL RECORD: ICD-10-PCS | Mod: CPTII,S$GLB,, | Performed by: SPECIALIST

## 2022-01-03 PROCEDURE — 99024 PR POST-OP FOLLOW-UP VISIT: ICD-10-PCS | Mod: S$GLB,,, | Performed by: SPECIALIST

## 2022-01-03 PROCEDURE — 99999 PR PBB SHADOW E&M-EST. PATIENT-LVL III: ICD-10-PCS | Mod: PBBFAC,,, | Performed by: SPECIALIST

## 2022-01-03 PROCEDURE — 99999 PR PBB SHADOW E&M-EST. PATIENT-LVL III: CPT | Mod: PBBFAC,,, | Performed by: SPECIALIST

## 2022-01-03 PROCEDURE — 3008F BODY MASS INDEX DOCD: CPT | Mod: CPTII,S$GLB,, | Performed by: SPECIALIST

## 2022-01-03 PROCEDURE — 3078F PR MOST RECENT DIASTOLIC BLOOD PRESSURE < 80 MM HG: ICD-10-PCS | Mod: CPTII,S$GLB,, | Performed by: SPECIALIST

## 2022-01-03 PROCEDURE — 3078F DIAST BP <80 MM HG: CPT | Mod: CPTII,S$GLB,, | Performed by: SPECIALIST

## 2022-01-03 PROCEDURE — 1160F RVW MEDS BY RX/DR IN RCRD: CPT | Mod: CPTII,S$GLB,, | Performed by: SPECIALIST

## 2022-01-03 PROCEDURE — 3008F PR BODY MASS INDEX (BMI) DOCUMENTED: ICD-10-PCS | Mod: CPTII,S$GLB,, | Performed by: SPECIALIST

## 2022-01-03 PROCEDURE — 99024 POSTOP FOLLOW-UP VISIT: CPT | Mod: S$GLB,,, | Performed by: SPECIALIST

## 2022-01-03 PROCEDURE — 3075F SYST BP GE 130 - 139MM HG: CPT | Mod: CPTII,S$GLB,, | Performed by: SPECIALIST

## 2022-01-03 RX ORDER — FLUTICASONE PROPIONATE 50 MCG
SPRAY, SUSPENSION (ML) NASAL
Qty: 18.2 ML | Refills: 11
Start: 2022-01-03 | End: 2022-04-28 | Stop reason: SDUPTHER

## 2022-01-03 RX ORDER — AZELASTINE 1 MG/ML
SPRAY, METERED NASAL
Qty: 30 ML | Refills: 11
Start: 2022-01-03 | End: 2022-06-03 | Stop reason: SDUPTHER

## 2022-01-03 NOTE — PROGRESS NOTES
Subjective:       Patient ID: Jered Contreras is a 46 y.o. male.    Chief Complaint: Post-op Evaluation    Four weeks postop:    The patient is breathing well through both sides of his nose.  He has restarted using CPAP and finds it to be much more effective.  He has been noticed staying a steady decline in his sense of smell for several months prior to the surgery.  It is now beginning to return and improved.  He has been using Singulair at night and Flonase spray once daily.  He has azelastine nasal spray but has not felt like he needed to use it.    Review of Systems   Constitutional: Negative for fatigue and fever.   HENT: Positive for congestion, postnasal drip and rhinorrhea. Negative for ear pain, sinus pressure and sinus pain.    Respiratory: Negative for cough.    Allergic/Immunologic: Positive for environmental allergies.   Neurological: Negative for headaches.       Objective:      Physical Exam  Vitals and nursing note reviewed. Exam conducted with a chaperone present.   Constitutional:       General: He is not in acute distress.     Appearance: Normal appearance. He is obese.   HENT:      Head: Normocephalic.      Right Ear: Tympanic membrane, ear canal and external ear normal.      Left Ear: Tympanic membrane, ear canal and external ear normal.      Ears:      Comments: Ear graft cartilage site-incision well healed     Nose:      Comments: Nose-columellar incision well healed, midline septum, no collapse of internal or external nasal valves with deep inspiration, nasal tip firm and indurated but nontender, good airway bilaterally     Mouth/Throat:      Mouth: Mucous membranes are moist.      Pharynx: Oropharynx is clear. No oropharyngeal exudate.   Eyes:      Extraocular Movements: Extraocular movements intact.      Conjunctiva/sclera: Conjunctivae normal.      Pupils: Pupils are equal, round, and reactive to light.   Musculoskeletal:      Cervical back: Normal range of motion and neck  supple.   Lymphadenopathy:      Cervical: No cervical adenopathy.   Neurological:      Mental Status: He is alert.         Assessment:       1. Nasal valve stenosis    2. Nasal valve collapse    3. Nasal septal deviation    4. Hypertrophy of both inferior nasal turbinates    5. Allergic rhinitis, unspecified seasonality, unspecified trigger    6. Disorders of smell        Plan:       I will allow the patient to increase his activity to include light aerobics for the next 2 weeks and full activities thereafter with the exception of contact sports.  He may add azelastine nasal spray to the full fluticasone if he is having nasal congestion.  I will recheck him in 4 weeks, or sooner on an as-needed basis.

## 2022-01-19 ENCOUNTER — PATIENT MESSAGE (OUTPATIENT)
Dept: INTERNAL MEDICINE | Facility: CLINIC | Age: 47
End: 2022-01-19
Payer: COMMERCIAL

## 2022-01-19 DIAGNOSIS — G47.33 OBSTRUCTIVE SLEEP APNEA TREATED WITH CONTINUOUS POSITIVE AIRWAY PRESSURE (CPAP): Primary | ICD-10-CM

## 2022-02-01 ENCOUNTER — OFFICE VISIT (OUTPATIENT)
Dept: OTOLARYNGOLOGY | Facility: CLINIC | Age: 47
End: 2022-02-01
Payer: COMMERCIAL

## 2022-02-01 VITALS
SYSTOLIC BLOOD PRESSURE: 138 MMHG | DIASTOLIC BLOOD PRESSURE: 76 MMHG | WEIGHT: 315 LBS | HEART RATE: 72 BPM | TEMPERATURE: 98 F | BODY MASS INDEX: 48.23 KG/M2

## 2022-02-01 DIAGNOSIS — J34.2 NASAL SEPTAL DEVIATION: ICD-10-CM

## 2022-02-01 DIAGNOSIS — J30.9 ALLERGIC RHINITIS, UNSPECIFIED SEASONALITY, UNSPECIFIED TRIGGER: ICD-10-CM

## 2022-02-01 DIAGNOSIS — M95.0 NASAL VALVE COLLAPSE: ICD-10-CM

## 2022-02-01 DIAGNOSIS — J34.89 NASAL VALVE STENOSIS: Primary | ICD-10-CM

## 2022-02-01 DIAGNOSIS — G47.33 OBSTRUCTIVE SLEEP APNEA TREATED WITH CONTINUOUS POSITIVE AIRWAY PRESSURE (CPAP): ICD-10-CM

## 2022-02-01 PROCEDURE — 3075F PR MOST RECENT SYSTOLIC BLOOD PRESS GE 130-139MM HG: ICD-10-PCS | Mod: CPTII,S$GLB,, | Performed by: SPECIALIST

## 2022-02-01 PROCEDURE — 99999 PR PBB SHADOW E&M-EST. PATIENT-LVL IV: ICD-10-PCS | Mod: PBBFAC,,, | Performed by: SPECIALIST

## 2022-02-01 PROCEDURE — 3008F PR BODY MASS INDEX (BMI) DOCUMENTED: ICD-10-PCS | Mod: CPTII,S$GLB,, | Performed by: SPECIALIST

## 2022-02-01 PROCEDURE — 3008F BODY MASS INDEX DOCD: CPT | Mod: CPTII,S$GLB,, | Performed by: SPECIALIST

## 2022-02-01 PROCEDURE — 3075F SYST BP GE 130 - 139MM HG: CPT | Mod: CPTII,S$GLB,, | Performed by: SPECIALIST

## 2022-02-01 PROCEDURE — 3078F DIAST BP <80 MM HG: CPT | Mod: CPTII,S$GLB,, | Performed by: SPECIALIST

## 2022-02-01 PROCEDURE — 3078F PR MOST RECENT DIASTOLIC BLOOD PRESSURE < 80 MM HG: ICD-10-PCS | Mod: CPTII,S$GLB,, | Performed by: SPECIALIST

## 2022-02-01 PROCEDURE — 99999 PR PBB SHADOW E&M-EST. PATIENT-LVL IV: CPT | Mod: PBBFAC,,, | Performed by: SPECIALIST

## 2022-02-01 PROCEDURE — 99024 PR POST-OP FOLLOW-UP VISIT: ICD-10-PCS | Mod: S$GLB,,, | Performed by: SPECIALIST

## 2022-02-01 PROCEDURE — 1160F RVW MEDS BY RX/DR IN RCRD: CPT | Mod: CPTII,S$GLB,, | Performed by: SPECIALIST

## 2022-02-01 PROCEDURE — 99024 POSTOP FOLLOW-UP VISIT: CPT | Mod: S$GLB,,, | Performed by: SPECIALIST

## 2022-02-01 PROCEDURE — 1160F PR REVIEW ALL MEDS BY PRESCRIBER/CLIN PHARMACIST DOCUMENTED: ICD-10-PCS | Mod: CPTII,S$GLB,, | Performed by: SPECIALIST

## 2022-02-01 PROCEDURE — 1159F PR MEDICATION LIST DOCUMENTED IN MEDICAL RECORD: ICD-10-PCS | Mod: CPTII,S$GLB,, | Performed by: SPECIALIST

## 2022-02-01 PROCEDURE — 1159F MED LIST DOCD IN RCRD: CPT | Mod: CPTII,S$GLB,, | Performed by: SPECIALIST

## 2022-02-01 RX ORDER — IPRATROPIUM BROMIDE 21 UG/1
2 SPRAY, METERED NASAL 2 TIMES DAILY
Qty: 30 ML | Refills: 11 | Status: SHIPPED | OUTPATIENT
Start: 2022-02-01 | End: 2022-06-30 | Stop reason: SDUPTHER

## 2022-02-01 NOTE — PROGRESS NOTES
"Subjective:       Patient ID: Jered Contreras is a 46 y.o. male.    Chief Complaint: Post-op Evaluation    3 months post-op:    The patient finds his nasal breathing to be significantly better.  He is using azelastine and fluticasone sprays twice daily and montelukast at night to control allergy symptoms.  He also uses saline rinses when needed.  He is having episodes of rhinorrhea and postnasal drip that are perfuse.  His sense of smell has returned to "almost normal".    He is using CPAP for his obstructive sleep apnea.  His machine is quite old.  He finds that he is having significant problems with the CPAP since his nasal surgery.    Review of Systems   Constitutional: Positive for fatigue.   HENT: Positive for congestion, ear pain, postnasal drip, rhinorrhea and sinus pain.    Eyes: Negative.    Respiratory: Positive for apnea.         Snoring  Sleep apnea   Cardiovascular: Negative.    Gastrointestinal: Negative.    Endocrine: Negative.    Genitourinary: Negative.    Musculoskeletal: Negative.    Skin: Negative.    Allergic/Immunologic: Positive for environmental allergies (Seasonal allergies).   Neurological: Negative.    Hematological: Negative.    Psychiatric/Behavioral: Negative.        Objective:      Physical Exam  Constitutional:       Appearance: Normal appearance. He is obese.   HENT:      Head: Normocephalic.      Right Ear: Tympanic membrane and ear canal normal.      Left Ear: Tympanic membrane, ear canal and external ear normal.      Ears:      Comments: Right external ear-incision healed well from cartilage graft no cosmetic deformity     Nose:      Comments: Nose-columellar incision healed well, midline septum, no collapse of the internal nasal valves with deep inspiration     Mouth/Throat:      Mouth: Mucous membranes are moist.   Eyes:      Extraocular Movements: Extraocular movements intact.      Conjunctiva/sclera: Conjunctivae normal.      Pupils: Pupils are equal, round, and " reactive to light.   Musculoskeletal:      Cervical back: Normal range of motion and neck supple.   Neurological:      Mental Status: He is alert.         Assessment:       1. Nasal valve stenosis    2. Nasal valve collapse    3. Nasal septal deviation    4. Allergic rhinitis, unspecified seasonality, unspecified trigger    5. Obstructive sleep apnea treated with continuous positive airway pressure (CPAP)        Plan:       I will start the patient on ipratropium bromide nasal spray to be used on an as-needed basis to control rhinorrhea and postnasal drip when they occur.  [facility] evaluation since he has had is nasal surgery.  I will recheck him in 4 weeks.    Answers for HPI/ROS submitted by the patient on 1/29/2022  Snoring?: Yes  Sleep Apnea?: Yes  Seasonal Allergies?: Yes

## 2022-02-05 NOTE — PATIENT INSTRUCTIONS
Dentists for dental appliance  [place]    - Dr. Doni Mitchell     [phone], [address]  [place], [place] [address]    - David Cole Jr      [phone]  [address]   [place] [address]    - Dr. Sean Mcneal       [phone]  Sean Mcneal   [address] [place] [id number]    -Dr. Fredo Grande   [phone]  [facility]    -Dr. Isaac Churchill   [phone]      [facility]    -[facility]   [address]  [place] [address]    - Danielle John [phone]     1.  NeilMed Sinus Rinse Regular Kit    Recipe 1/4 teaspoon of salt and 1/4 teaspoon of baking soda in distilled water             No

## 2022-02-09 ENCOUNTER — TELEPHONE (OUTPATIENT)
Dept: PSYCHIATRY | Facility: CLINIC | Age: 47
End: 2022-02-09
Payer: COMMERCIAL

## 2022-02-21 ENCOUNTER — PATIENT MESSAGE (OUTPATIENT)
Dept: PSYCHIATRY | Facility: CLINIC | Age: 47
End: 2022-02-21
Payer: COMMERCIAL

## 2022-02-23 ENCOUNTER — OFFICE VISIT (OUTPATIENT)
Dept: PSYCHIATRY | Facility: CLINIC | Age: 47
End: 2022-02-23
Payer: COMMERCIAL

## 2022-02-23 DIAGNOSIS — F41.1 GAD (GENERALIZED ANXIETY DISORDER): Primary | ICD-10-CM

## 2022-02-23 DIAGNOSIS — F33.1 MDD (MAJOR DEPRESSIVE DISORDER), RECURRENT EPISODE, MODERATE: ICD-10-CM

## 2022-02-23 PROCEDURE — 99214 OFFICE O/P EST MOD 30 MIN: CPT | Mod: 95,,, | Performed by: NURSE PRACTITIONER

## 2022-02-23 PROCEDURE — 99214 PR OFFICE/OUTPT VISIT, EST, LEVL IV, 30-39 MIN: ICD-10-PCS | Mod: 95,,, | Performed by: NURSE PRACTITIONER

## 2022-02-23 RX ORDER — DULOXETIN HYDROCHLORIDE 60 MG/1
CAPSULE, DELAYED RELEASE ORAL
Qty: 30 CAPSULE | Refills: 2 | Status: SHIPPED | OUTPATIENT
Start: 2022-02-23 | End: 2022-06-03 | Stop reason: SDUPTHER

## 2022-02-23 RX ORDER — BUPROPION HYDROCHLORIDE 300 MG/1
300 TABLET ORAL DAILY
Qty: 30 TABLET | Refills: 2 | Status: SHIPPED | OUTPATIENT
Start: 2022-02-23 | End: 2022-06-03 | Stop reason: SDUPTHER

## 2022-02-23 RX ORDER — LAMOTRIGINE 25 MG/1
TABLET ORAL
Qty: 75 TABLET | Refills: 2 | Status: SHIPPED | OUTPATIENT
Start: 2022-02-23 | End: 2022-03-25

## 2022-02-23 NOTE — PROGRESS NOTES
"Outpatient Psychiatry Follow-Up Visit (MD/NP)    2/23/2022   The patient location is: Home in [place], [place]  The chief complaint leading to consultation is:  Depression, anxiety and history of panic attacks    Visit type: audiovisual    Face to Face time with patient: 30 minutes  40 minutes of total time spent on the encounter, which includes face to face time and non-face to face time preparing to see the patient (eg, review of tests), Obtaining and/or reviewing separately obtained history, Documenting clinical information in the electronic or other health record, Independently interpreting results (not separately reported) and communicating results to the patient/family/caregiver, or Care coordination (not separately reported).         Each patient to whom he or she provides medical services by telemedicine is:  (1) informed of the relationship between the physician and patient and the respective role of any other health care provider with respect to management of the patient; and (2) notified that he or she may decline to receive medical services by telemedicine and may withdraw from such care at any time.    Notes:       Clinical Status of Patient:  Outpatient (Ambulatory)    Chief Complaint:  Jered Contreras is a 46 y.o. male who presents today for follow-up of depression, anxiety and and history of panic attacks.  Met with patient.      Interval History and Content of Current Session:  Interim Events/Subjective Report/Content of Current Session:   Reviewed messages from communication with patient since his last visit    Patient described his mood as "frustrated and irritable" and affect was euthymic. He stated he has been fighting with wife a lot and has been unable to respond in conflict in a calm and relaxed manner. He isn't in therapy at this time but interested in starting individual and couple's therapy.    He stated he is taking Lamictal 50 mg po daily and finds it somewhat effective and " interested in increasing Lamictal to 75 mg po daily. He stated he is still taking Cymbalta 60 mg po daily and finds it effective in improving his depression, anxiety and panic attacks. He stated is still taking Wellbutrin xl 300 mg po daily and finds it effective as well. He stated his temper is causing him a lot of problems in his relationship with his spouse. We discussed effective skills to manage anger. Provided supportive therapy and encouraged utilization of CBT skills to manage anger and irritability.     He stated he is working on improving his sleep by adhering to good sleep hygiene (has sleep apnea, uses CPAP and due for another sleep study)/. He is not taking vistaril. We discussed the importance of utilizing effective sleep hygiene skills and melatonin as needed. .No changes in appetite and few lbs since his last visit. Patient denied SI/HI/AH/VH and no delusion noted or reported.     He stated he has problems with focus, attention, and finishing tasks that is creating problems in his personal and professional life but more with his personal life. We discussed ADHD evaluation in the future.      He still works from home. 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 Patient denied symptoms of stuart and denied alcohol abuse or any type of illicit drug use.    Psychiatric Review Of Systems - Is patient experiencing or having changes in:  sleep: no change. But working on improving sleep habits  appetite: no  weight: no  energy/anergy: no  interest/pleasure/anhedonia: no  somatic symptoms: no  anxiety/panic: yes   guilty/hopelessness: no  concentration: no  S.I.B.s/risky behavior: no  Irritability: yes  Racing thoughts: yes  Impulsive behaviors: no  Paranoia:no  AVH:no  Suicidal thoughts/plan/intent: no      Psychotropic medication review  Previous Trials-  -lexapro (worked for a while but stopped working and PCP switched him to Zoloft)  -Zoloft 200 mg po daily  -vistaril 25 mg po prn  daily    Current meds-  -Wellbutrin xl 300mg po daily  -Cymbalta 60 mg po daily  -Lamictal 50 mg po daily       Psychotherapy:  · Target symptoms: depression, anxiety   · Why chosen therapy is appropriate versus another modality: relevant to diagnosis, patient responds to this modality, evidence based practice  · Outcome monitoring methods: self-report, observation  · Therapeutic intervention type: insight oriented psychotherapy, behavior modifying psychotherapy, supportive psychotherapy  · Topics discussed/themes: building skills sets for symptom management, symptom recognition  · The patient's response to the intervention is motivated. The patient's progress toward treatment goals is fair.   · Duration of intervention: 10 minutes.    Review of Systems   · PSYCHIATRIC: Pertinant items are noted in the narrative.  · CONSTITUTIONAL: No weight gain or loss.   · MUSCULOSKELETAL: No pain or stiffness of the joints.  · NEUROLOGIC: No weakness, sensory changes, seizures, confusion, memory loss, tremor or other abnormal movements.  · ENDOCRINE: No polydipsia or polyuria.  · INTEGUMENTARY: No rashes or lacerations.  · EYES: No exophthalmos, jaundice or blindness.  · ENT: No dizziness, tinnitus or hearing loss.  · RESPIRATORY: No shortness of breath.  · CARDIOVASCULAR: No tachycardia or chest pain.  · GASTROINTESTINAL: No nausea, vomiting, pain, constipation or diarrhea.  · GENITOURINARY: No frequency, dysuria or sexual dysfunction.  · HEMATOLOGIC/LYMPHATIC: No excessive bleeding, prolonged or excessive bleeding after dental extraction/injury.  · ALLERGIC/IMMUNOLOGIC: No allergic response to materials, foods or animals at this time.    Past Medical, Family and Social History: The patient's past medical, family and social history have been reviewed and updated as appropriate within the electronic medical record - see encounter notes.    Compliance: yes    Side effects: None    Risk Parameters:  Patient reports no suicidal  "ideation  Patient reports no homicidal ideation  Patient reports no self-injurious behavior  Patient reports no violent behavior    Exam (detailed: at least 9 elements; comprehensive: all 15 elements)   Constitutional  Vitals:  Most recent vital signs, dated greater than 90 days prior to this appointment, were reviewed.   There were no vitals filed for this visit.     General:  unremarkable, age appropriate     Musculoskeletal  Muscle Strength/Tone:  no dystonia, no tremor, no tic   Gait & Station:  non-ataxic     Psychiatric  Speech:  no latency; no press   Mood & Affect:  "frustrated"  euthymic   Thought Process:  normal and logical   Associations:  intact   Thought Content:  normal, no suicidality, no homicidality, delusions, or paranoia   Insight:  intact, has awareness of illness   Judgement: behavior is adequate to circumstances   Orientation:  grossly intact   Memory: intact for content of interview, grossly intact   Language: grossly intact, able to name, able to repeat   Attention Span & Concentration:  able to focus, completed tasks   Fund of Knowledge:  intact and appropriate to age and level of education, familiar with aspects of current personal life     Assessment and Diagnosis   Status/Progress: Based on the examination today, the patient's problem(s) is/are improved.  New problems have not been presented today.   Co-morbidities, Diagnostic uncertainty and Lack of compliance are not complicating management of the primary condition.  There are no active rule-out diagnoses for this patient at this time.     General Impression: Doing okay and stable.       ICD-10-CM ICD-9-CM   1. Moderate episode of recurrent major depressive disorder  F33.1 296.32   2. WAYNE (generalized anxiety disorder)  F41.1 300.02   3. History of panic attacks  Z86.59 V11.8       Intervention/Counseling/Treatment Plan   · Medication Management: Continue current medications. The risks and benefits of medication were discussed with the " patient.  · Medication Management:   · Continue Wellbutrin xl 300 mg po daily for MDD and WAYNE  · Continue Cymbalta 60 mg po daily or WAYNE and MDD  · Increase Lamictal to 75 mg po daily for mood stabilization. Warned against SJS and all the associated side effects and he agreed to take it.   · Restart psychotherapy to learn effective coping skills   · Continue utilization of effective CBT skills, positive self-talk, cognitive reframing, meditation, mindfulness, deep breathing, and relaxations skills.  · Encouraged effective sleep hygiene and continue melatonin 3-5 mg po qhs prn for insomnia      · Labs: reviewed most recent labs  · The treatment plan and follow up plan were reviewed with the patient.  · Discussed with patient informed consent, risks vs. benefits, alternative treatments, side effect profile and the inherent unpredictability of individual responses to these treatments. The patient expresses understanding of the above and displays the capacity to agree with this current plan and had no other questions.  · Encouraged Patient to keep future appointments.   · Take medications as prescribed   · In the event of an emergency patient was advised to go to the emergency room.      Return to Clinic: 1 month or earlier as needed    YI Kwon-BC

## 2022-02-23 NOTE — PATIENT INSTRUCTIONS
I have a list of recommended books for anxiety  The Anxiety and Worry Workbook: The Cognitive Behavioral Solution by Oni Rojas and Wiliam Spangler  How To Control Your Anxiety Before It Controls You. Jon Garibay  The Gifts of Imperfection: Let Go of Who You Think Youre Supposed to Be and Embrace Who You Are by Kenyon Mckay  How to Stop Worrying and Start Living by Austin Ha   The anxiety and Phobia Workbook by Clinton Gabriel  I have listed a name off APPs for meditations such headspace, calm, and virtual hope box.

## 2022-02-25 ENCOUNTER — OFFICE VISIT (OUTPATIENT)
Dept: PSYCHIATRY | Facility: CLINIC | Age: 47
End: 2022-02-25
Payer: COMMERCIAL

## 2022-02-25 DIAGNOSIS — F41.9 ANXIETY: Primary | ICD-10-CM

## 2022-02-25 PROCEDURE — 90832 PSYTX W PT 30 MINUTES: CPT | Mod: 95,,, | Performed by: SOCIAL WORKER

## 2022-02-25 PROCEDURE — 90832 PR PSYCHOTHERAPY W/PATIENT, 30 MIN: ICD-10-PCS | Mod: 95,,, | Performed by: SOCIAL WORKER

## 2022-02-25 NOTE — PROGRESS NOTES
"The patient location is: [place], [place]  The chief complaint leading to consultation is: Anxiety    Visit type: audiovisual    Face to Face time with patient: 20  30 minutes of total time spent on the encounter, which includes face to face time and non-face to face time preparing to see the patient (eg, review of tests), Obtaining and/or reviewing separately obtained history, Documenting clinical information in the electronic or other health record, Independently interpreting results (not separately reported) and communicating results to the patient/family/caregiver, or Care coordination (not separately reported).   Each patient to whom he or she provides medical services by telemedicine is:  (1) informed of the relationship between the physician and patient and the respective role of any other health care provider with respect to management of the patient; and (2) notified that he or she may decline to receive medical services by telemedicine and may withdraw from such care at any time.    Notes: The patient is 30 minutes late for the appointment.     Psychiatry Initial Visit (PhD/LCSW)  Diagnostic Interview - CPT 97443    Date: 2/25/2022    Site: [facility]    Clinical status of patient: Outpatient    Jered Contreras, a 46 y.o. male, for initial evaluation visit.  Met with patient.    Chief complaint/reason for encounter: anger    History of present illness: Seeing a NP for medication, states that he is having a lot of anger issues and being able to cope with anger and a lot of issues of his past. States that he gets into fights a lot with his wife. He has seen Dr. Conklin in the past and states, "I didn't feel like I was getting much from our talks."     Because the patient was 30 minutes we could not do a full assessment, he is asked to schedule a follow up appointment.           "

## 2022-02-26 ENCOUNTER — PATIENT MESSAGE (OUTPATIENT)
Dept: PSYCHIATRY | Facility: CLINIC | Age: 47
End: 2022-02-26
Payer: COMMERCIAL

## 2022-02-28 ENCOUNTER — TELEPHONE (OUTPATIENT)
Dept: PSYCHIATRY | Facility: CLINIC | Age: 47
End: 2022-02-28
Payer: COMMERCIAL

## 2022-02-28 NOTE — TELEPHONE ENCOUNTER
"Spoke with patient and stated he does not think it is anything has to do with Lamictal. He stated, "my rash has been there for a week. I always get these bumps on my back and I think it may be related to the detergent that I am using. I don't have any other symptoms. I feel fine". He stated he has changed his haas and soap. He plans to see his dermatologist this Wednesday on 3/2/2020. Patient stated if his rash worsen, any mouth or any open sores, or flu-like symptoms he will immediately go to ED. Informed patient to provide me with an update tomorrow and once her sees his dermatologist.  "

## 2022-03-03 ENCOUNTER — OFFICE VISIT (OUTPATIENT)
Dept: OTOLARYNGOLOGY | Facility: CLINIC | Age: 47
End: 2022-03-03
Payer: COMMERCIAL

## 2022-03-03 VITALS
SYSTOLIC BLOOD PRESSURE: 143 MMHG | WEIGHT: 315 LBS | HEART RATE: 99 BPM | TEMPERATURE: 98 F | DIASTOLIC BLOOD PRESSURE: 78 MMHG | BODY MASS INDEX: 50.53 KG/M2

## 2022-03-03 DIAGNOSIS — J30.89 NON-SEASONAL ALLERGIC RHINITIS, UNSPECIFIED TRIGGER: Primary | ICD-10-CM

## 2022-03-03 DIAGNOSIS — J34.89 NASAL VESTIBULITIS: ICD-10-CM

## 2022-03-03 DIAGNOSIS — G47.33 OBSTRUCTIVE SLEEP APNEA TREATED WITH CONTINUOUS POSITIVE AIRWAY PRESSURE (CPAP): ICD-10-CM

## 2022-03-03 PROCEDURE — 99999 PR PBB SHADOW E&M-EST. PATIENT-LVL IV: CPT | Mod: PBBFAC,,, | Performed by: SPECIALIST

## 2022-03-03 PROCEDURE — 1159F PR MEDICATION LIST DOCUMENTED IN MEDICAL RECORD: ICD-10-PCS | Mod: CPTII,S$GLB,, | Performed by: SPECIALIST

## 2022-03-03 PROCEDURE — 99213 OFFICE O/P EST LOW 20 MIN: CPT | Mod: 24,S$GLB,, | Performed by: SPECIALIST

## 2022-03-03 PROCEDURE — 99213 PR OFFICE/OUTPT VISIT, EST, LEVL III, 20-29 MIN: ICD-10-PCS | Mod: 24,S$GLB,, | Performed by: SPECIALIST

## 2022-03-03 PROCEDURE — 1159F MED LIST DOCD IN RCRD: CPT | Mod: CPTII,S$GLB,, | Performed by: SPECIALIST

## 2022-03-03 PROCEDURE — 3078F PR MOST RECENT DIASTOLIC BLOOD PRESSURE < 80 MM HG: ICD-10-PCS | Mod: CPTII,S$GLB,, | Performed by: SPECIALIST

## 2022-03-03 PROCEDURE — 1160F RVW MEDS BY RX/DR IN RCRD: CPT | Mod: CPTII,S$GLB,, | Performed by: SPECIALIST

## 2022-03-03 PROCEDURE — 3008F BODY MASS INDEX DOCD: CPT | Mod: CPTII,S$GLB,, | Performed by: SPECIALIST

## 2022-03-03 PROCEDURE — 3008F PR BODY MASS INDEX (BMI) DOCUMENTED: ICD-10-PCS | Mod: CPTII,S$GLB,, | Performed by: SPECIALIST

## 2022-03-03 PROCEDURE — 99999 PR PBB SHADOW E&M-EST. PATIENT-LVL IV: ICD-10-PCS | Mod: PBBFAC,,, | Performed by: SPECIALIST

## 2022-03-03 PROCEDURE — 3077F PR MOST RECENT SYSTOLIC BLOOD PRESSURE >= 140 MM HG: ICD-10-PCS | Mod: CPTII,S$GLB,, | Performed by: SPECIALIST

## 2022-03-03 PROCEDURE — 1160F PR REVIEW ALL MEDS BY PRESCRIBER/CLIN PHARMACIST DOCUMENTED: ICD-10-PCS | Mod: CPTII,S$GLB,, | Performed by: SPECIALIST

## 2022-03-03 PROCEDURE — 3078F DIAST BP <80 MM HG: CPT | Mod: CPTII,S$GLB,, | Performed by: SPECIALIST

## 2022-03-03 PROCEDURE — 3077F SYST BP >= 140 MM HG: CPT | Mod: CPTII,S$GLB,, | Performed by: SPECIALIST

## 2022-03-03 RX ORDER — MUPIROCIN 20 MG/G
OINTMENT TOPICAL
Qty: 22 G | Refills: 3 | Status: SHIPPED | OUTPATIENT
Start: 2022-03-03 | End: 2022-08-01

## 2022-03-03 NOTE — PROGRESS NOTES
Subjective:       Patient ID: Jered Contreras is a 46 y.o. male.    Chief Complaint: Post-op Evaluation    The patient is returning to discuss multiple problems:  1. Four months postop nasal reconstruction:  The patient is definitely breathing better through his nose.  He does have a sore spot in the right dome area where this seems to be some scabbing and crusting.  The skin is not red.  He does not have fever.  2. Allergic rhinitis:  He is using azelastine and fluticasone twice daily Zyrtec in the morning.  He takes montelukast at night.  He is using ipratropium bromide spray when needed He continues to have clear nasal discharge and some nasal blockage.  3. Diminished smell and taste:  No change with regard to the sense of smell and taste  4. Obstructive sleep apnea:  He uses his CPAP every night.  He has an appointment with the sleep doctor in April.  He does not feel that the pressure in the CPAP is correct; however, he does sleep better with it than without it.        Review of Systems   Constitutional: Negative.  Negative for activity change, appetite change, chills, fever and unexpected weight change.   HENT: Positive for congestion, postnasal drip and sinus pressure. Negative for ear discharge, ear pain, facial swelling, hearing loss, mouth sores, rhinorrhea, sinus pain, sneezing, sore throat, tinnitus, trouble swallowing and voice change.         Snoring  Obstructive sleep apnea   Eyes: Negative.  Negative for photophobia, pain, discharge, redness, itching and visual disturbance.   Respiratory: Negative for apnea, cough, choking, shortness of breath and wheezing.    Cardiovascular: Negative.  Negative for chest pain and palpitations.   Gastrointestinal: Negative.  Negative for abdominal distention, abdominal pain, nausea and vomiting.   Endocrine: Negative.    Genitourinary: Negative.    Musculoskeletal: Negative.  Negative for arthralgias, myalgias, neck pain and neck stiffness.   Skin:  Negative.  Negative for color change, pallor and rash.   Allergic/Immunologic: Positive for environmental allergies (Seasonal allergies). Negative for food allergies and immunocompromised state.   Neurological: Negative.  Negative for dizziness, facial asymmetry, speech difficulty, weakness, light-headedness and numbness.   Hematological: Negative.  Negative for adenopathy. Does not bruise/bleed easily.   Psychiatric/Behavioral: Positive for sleep disturbance. Negative for agitation, confusion and decreased concentration.       Objective:      Physical Exam  Vitals and nursing note reviewed.   Constitutional:       General: He is awake.      Appearance: Normal appearance. He is well-developed, well-groomed and normal weight.   HENT:      Head: Normocephalic.      Jaw: There is normal jaw occlusion.      Salivary Glands: Right salivary gland is not diffusely enlarged. Left salivary gland is not diffusely enlarged.      Right Ear: Hearing, ear canal and external ear normal. Tympanic membrane is retracted.      Left Ear: Hearing, ear canal and external ear normal. Tympanic membrane is retracted.      Nose: Nasal deformity ( internal nasal valve collapse with deep inspiration bilaterally, more marked on the left, both sides reverse with Shayne maneuver), septal deviation (High septal deviation to the right, midline and floor of nose), mucosal edema (cyanotic, boggy inferior turbinates bilaterally) and rhinorrhea (clear mucus bilaterally) present. Rhinorrhea is clear.      Right Turbinates: Enlarged and pale.      Left Turbinates: Enlarged and pale.      Mouth/Throat:      Lips: No lesions.      Mouth: No oral lesions.      Dentition: No gum lesions.      Tongue: No lesions.      Palate: No mass and lesions.      Pharynx: Oropharynx is clear. Uvula midline.      Tonsils: No tonsillar exudate. 3+ on the right. 3+ on the left.      Comments: Oral cavity-Costello class 2, tonsils 3+/4+ enlarged, long thin soft palate,  normal uvula, base of tongue enlarged with narrowing of the oropharyngeal inlet  Eyes:      General: Lids are normal.         Right eye: No discharge.         Left eye: No discharge.      Conjunctiva/sclera:      Right eye: Right conjunctiva is injected. No exudate.     Left eye: Left conjunctiva is injected. No exudate.     Pupils: Pupils are equal, round, and reactive to light.   Neck:      Thyroid: No thyroid mass or thyromegaly.      Trachea: Trachea normal. No tracheal deviation.   Cardiovascular:      Rate and Rhythm: Normal rate and regular rhythm.      Pulses: Normal pulses.      Heart sounds: Normal heart sounds.   Pulmonary:      Effort: Pulmonary effort is normal.      Breath sounds: Normal breath sounds. No stridor. No decreased breath sounds, wheezing, rhonchi or rales.   Abdominal:      General: Bowel sounds are normal.      Palpations: Abdomen is soft.      Tenderness: There is no abdominal tenderness.   Musculoskeletal:         General: Normal range of motion.      Cervical back: Normal range of motion. No muscular tenderness.   Lymphadenopathy:      Head:      Right side of head: No submental, submandibular, preauricular, posterior auricular or occipital adenopathy.      Left side of head: No submental, submandibular, preauricular, posterior auricular or occipital adenopathy.      Cervical: No cervical adenopathy.   Skin:     General: Skin is warm and dry.      Findings: No petechiae or rash.      Nails: There is no clubbing.   Neurological:      Mental Status: He is alert and oriented to person, place, and time.      Cranial Nerves: No cranial nerve deficit.      Sensory: No sensory deficit.      Gait: Gait normal.   Psychiatric:         Speech: Speech normal.         Behavior: Behavior normal. Behavior is cooperative.         Thought Content: Thought content normal.         Judgment: Judgment normal.             Assessment:       1. Non-seasonal allergic rhinitis, unspecified trigger    2.  Obstructive sleep apnea treated with continuous positive airway pressure (CPAP)    3. Nasal vestibulitis        Plan:       I will have the patient use mupirocin ointment in the nostrils when there is any cracking of the mucosa, scabbing, crusting or tenderness.  I will send a secure chat message to Dr. Berkowitz.  Since he has had surgery to correct his nasal airway the CPAP almost always requires a pressure change because of the lowered airway resistance in the nasal cavity.  This could be accomplished with auto CPAP or determine by a CPAP titration study.  I will recheck him in 1 month.

## 2022-03-04 ENCOUNTER — TELEPHONE (OUTPATIENT)
Dept: DERMATOLOGY | Facility: CLINIC | Age: 47
End: 2022-03-04
Payer: COMMERCIAL

## 2022-03-07 ENCOUNTER — TELEPHONE (OUTPATIENT)
Dept: PULMONOLOGY | Facility: CLINIC | Age: 47
End: 2022-03-07
Payer: COMMERCIAL

## 2022-03-07 NOTE — TELEPHONE ENCOUNTER
Patient scheduled an appointment to see provider.    LUZMARIA Márquez                ----- Message from Willi Doan MA sent at 3/4/2022  7:19 AM CST -----  MD HUY Orr Staff  Caller: Unspecified (Yesterday,  4:57 PM)  Please schedule clinic follow up with me or marcia for cpap follow up after sinus surgery.   angela

## 2022-03-23 ENCOUNTER — PATIENT MESSAGE (OUTPATIENT)
Dept: PSYCHIATRY | Facility: CLINIC | Age: 47
End: 2022-03-23
Payer: COMMERCIAL

## 2022-03-25 RX ORDER — LAMOTRIGINE 100 MG/1
100 TABLET ORAL DAILY
Qty: 30 TABLET | Refills: 1 | Status: SHIPPED | OUTPATIENT
Start: 2022-03-25 | End: 2022-06-03 | Stop reason: SDUPTHER

## 2022-03-27 ENCOUNTER — PATIENT MESSAGE (OUTPATIENT)
Dept: INTERNAL MEDICINE | Facility: CLINIC | Age: 47
End: 2022-03-27
Payer: COMMERCIAL

## 2022-03-28 ENCOUNTER — OFFICE VISIT (OUTPATIENT)
Dept: SLEEP MEDICINE | Facility: CLINIC | Age: 47
End: 2022-03-28
Payer: COMMERCIAL

## 2022-03-28 ENCOUNTER — OFFICE VISIT (OUTPATIENT)
Dept: INTERNAL MEDICINE | Facility: CLINIC | Age: 47
End: 2022-03-28
Payer: COMMERCIAL

## 2022-03-28 VITALS
WEIGHT: 315 LBS | HEIGHT: 73 IN | BODY MASS INDEX: 41.75 KG/M2 | DIASTOLIC BLOOD PRESSURE: 70 MMHG | SYSTOLIC BLOOD PRESSURE: 142 MMHG | HEART RATE: 94 BPM

## 2022-03-28 DIAGNOSIS — F41.1 GAD (GENERALIZED ANXIETY DISORDER): ICD-10-CM

## 2022-03-28 DIAGNOSIS — J34.3 HYPERTROPHY OF BOTH INFERIOR NASAL TURBINATES: ICD-10-CM

## 2022-03-28 DIAGNOSIS — J34.89 NASAL VALVE STENOSIS: Primary | ICD-10-CM

## 2022-03-28 DIAGNOSIS — J34.2 NASAL SEPTAL DEVIATION: ICD-10-CM

## 2022-03-28 DIAGNOSIS — G47.33 OBSTRUCTIVE SLEEP APNEA TREATED WITH CONTINUOUS POSITIVE AIRWAY PRESSURE (CPAP): ICD-10-CM

## 2022-03-28 DIAGNOSIS — M95.0 NASAL VALVE COLLAPSE: ICD-10-CM

## 2022-03-28 DIAGNOSIS — M25.562 ACUTE PAIN OF LEFT KNEE: Primary | ICD-10-CM

## 2022-03-28 DIAGNOSIS — F33.1 MDD (MAJOR DEPRESSIVE DISORDER), RECURRENT EPISODE, MODERATE: ICD-10-CM

## 2022-03-28 DIAGNOSIS — E66.01 MORBIDLY OBESE: ICD-10-CM

## 2022-03-28 DIAGNOSIS — K21.9 GASTROESOPHAGEAL REFLUX DISEASE, UNSPECIFIED WHETHER ESOPHAGITIS PRESENT: ICD-10-CM

## 2022-03-28 PROCEDURE — 1159F PR MEDICATION LIST DOCUMENTED IN MEDICAL RECORD: ICD-10-PCS | Mod: CPTII,S$GLB,, | Performed by: NURSE PRACTITIONER

## 2022-03-28 PROCEDURE — 3008F BODY MASS INDEX DOCD: CPT | Mod: CPTII,S$GLB,, | Performed by: NURSE PRACTITIONER

## 2022-03-28 PROCEDURE — 1160F PR REVIEW ALL MEDS BY PRESCRIBER/CLIN PHARMACIST DOCUMENTED: ICD-10-PCS | Mod: CPTII,95,, | Performed by: INTERNAL MEDICINE

## 2022-03-28 PROCEDURE — 1160F PR REVIEW ALL MEDS BY PRESCRIBER/CLIN PHARMACIST DOCUMENTED: ICD-10-PCS | Mod: CPTII,S$GLB,, | Performed by: NURSE PRACTITIONER

## 2022-03-28 PROCEDURE — 1159F MED LIST DOCD IN RCRD: CPT | Mod: CPTII,95,, | Performed by: INTERNAL MEDICINE

## 2022-03-28 PROCEDURE — 1160F RVW MEDS BY RX/DR IN RCRD: CPT | Mod: CPTII,S$GLB,, | Performed by: NURSE PRACTITIONER

## 2022-03-28 PROCEDURE — 99214 PR OFFICE/OUTPT VISIT, EST, LEVL IV, 30-39 MIN: ICD-10-PCS | Mod: S$GLB,,, | Performed by: NURSE PRACTITIONER

## 2022-03-28 PROCEDURE — 1159F MED LIST DOCD IN RCRD: CPT | Mod: CPTII,S$GLB,, | Performed by: NURSE PRACTITIONER

## 2022-03-28 PROCEDURE — 3077F PR MOST RECENT SYSTOLIC BLOOD PRESSURE >= 140 MM HG: ICD-10-PCS | Mod: CPTII,S$GLB,, | Performed by: NURSE PRACTITIONER

## 2022-03-28 PROCEDURE — 99203 OFFICE O/P NEW LOW 30 MIN: CPT | Mod: 95,,, | Performed by: INTERNAL MEDICINE

## 2022-03-28 PROCEDURE — 3077F SYST BP >= 140 MM HG: CPT | Mod: CPTII,S$GLB,, | Performed by: NURSE PRACTITIONER

## 2022-03-28 PROCEDURE — 3078F DIAST BP <80 MM HG: CPT | Mod: CPTII,S$GLB,, | Performed by: NURSE PRACTITIONER

## 2022-03-28 PROCEDURE — 99999 PR PBB SHADOW E&M-EST. PATIENT-LVL V: CPT | Mod: PBBFAC,,, | Performed by: NURSE PRACTITIONER

## 2022-03-28 PROCEDURE — 1159F PR MEDICATION LIST DOCUMENTED IN MEDICAL RECORD: ICD-10-PCS | Mod: CPTII,95,, | Performed by: INTERNAL MEDICINE

## 2022-03-28 PROCEDURE — 1160F RVW MEDS BY RX/DR IN RCRD: CPT | Mod: CPTII,95,, | Performed by: INTERNAL MEDICINE

## 2022-03-28 PROCEDURE — 99999 PR PBB SHADOW E&M-EST. PATIENT-LVL V: ICD-10-PCS | Mod: PBBFAC,,, | Performed by: NURSE PRACTITIONER

## 2022-03-28 PROCEDURE — 3008F PR BODY MASS INDEX (BMI) DOCUMENTED: ICD-10-PCS | Mod: CPTII,S$GLB,, | Performed by: NURSE PRACTITIONER

## 2022-03-28 PROCEDURE — 99214 OFFICE O/P EST MOD 30 MIN: CPT | Mod: S$GLB,,, | Performed by: NURSE PRACTITIONER

## 2022-03-28 PROCEDURE — 3078F PR MOST RECENT DIASTOLIC BLOOD PRESSURE < 80 MM HG: ICD-10-PCS | Mod: CPTII,S$GLB,, | Performed by: NURSE PRACTITIONER

## 2022-03-28 PROCEDURE — 99203 PR OFFICE/OUTPT VISIT, NEW, LEVL III, 30-44 MIN: ICD-10-PCS | Mod: 95,,, | Performed by: INTERNAL MEDICINE

## 2022-03-28 RX ORDER — ACETAMINOPHEN AND CODEINE PHOSPHATE 300; 30 MG/1; MG/1
1 TABLET ORAL EVERY 8 HOURS PRN
Qty: 10 TABLET | Refills: 0 | Status: SHIPPED | OUTPATIENT
Start: 2022-03-28 | End: 2022-03-31

## 2022-03-28 RX ORDER — ACETAMINOPHEN AND CODEINE PHOSPHATE 300; 30 MG/1; MG/1
1 TABLET ORAL EVERY 8 HOURS PRN
Qty: 10 TABLET | Refills: 0 | Status: SHIPPED | OUTPATIENT
Start: 2022-03-28 | End: 2022-03-28 | Stop reason: SDUPTHER

## 2022-03-28 NOTE — PROGRESS NOTES
"  Subjective:       Patient ID: Jered Contreras is a 46 y.o. male.    Chief Complaint: Knee Pain (L pain=7 x2weeks)    Pt of Dr. Dacosta, here for knee pain    Messaged Dr. Dacosta today stating, "The last few days I've been having some nasty knee pain. I don't know if I've tweaked it or not but it's hurting more than it has in a long time. Do you have any suggestions? I have been icing it and taking advil along with keeping it elevated. My knee is slightly swollen I think. Its been going on for about a week and a half now. My knee gives me problems anyway and normally after a day or so it goes away, but this time it's lingered."    Has a hx of pulled ligaments in HS in the same knee. Usually has pain in knees with weather changes but this has lingered longer than usual    Cannot take NSAIDs due to hx of Gastric sleeve surgery    Knee Pain   The incident occurred more than 1 week ago (2 weeks). There was no injury mechanism. The pain is present in the left knee. The pain is at a severity of 7/10. The pain is moderate. The pain has been worsening since onset. Pertinent negatives include no inability to bear weight, loss of motion, loss of sensation, muscle weakness, numbness or tingling. He reports no foreign bodies present. The symptoms are aggravated by movement. He has tried NSAIDs (Advil and otc Topical pain spray) for the symptoms. The treatment provided mild relief.     Review of Systems   Constitutional: Negative for activity change, appetite change, chills, diaphoresis, fatigue, fever and unexpected weight change.   Respiratory: Negative for chest tightness and shortness of breath.    Cardiovascular: Negative for chest pain.   Musculoskeletal: Positive for arthralgias. Negative for joint swelling and leg pain.        Left knee as documented in HPI   Allergic/Immunologic: Negative for environmental allergies, food allergies and frequent infections.   Neurological: Negative for dizziness, tingling, " weakness, numbness, disturbances in coordination and coordination difficulties.   Hematological: Negative for adenopathy. Does not bruise/bleed easily.   Psychiatric/Behavioral: Negative for suicidal ideas.        Review of patient's allergies indicates:   Allergen Reactions    Ceclor [cefaclor] Anaphylaxis    Penicillins Hives     Elevated blood pressure, temp, hives       Current Outpatient Medications:     ASCORBATE CALCIUM (VITAMIN C ORAL), Take by mouth., Disp: , Rfl:     azelastine (ASTELIN) 137 mcg (0.1 %) nasal spray, Two sprays in each nostril, sniff until absorbed, then follow with 1 spray of fluticasone.  Use both sprays twice daily., Disp: 30 mL, Rfl: 11    b complex vitamins (B COMPLEX-VITAMIN B12) tablet, Take 1 tablet by mouth once daily., Disp: , Rfl:     buPROPion (WELLBUTRIN XL) 300 MG 24 hr tablet, Take 1 tablet (300 mg total) by mouth once daily., Disp: 30 tablet, Rfl: 2    DULoxetine (CYMBALTA) 60 MG capsule, TAKE 1 CAPSULE(60 MG) BY MOUTH EVERY DAY, Disp: 30 capsule, Rfl: 2    fluticasone propionate (FLONASE) 50 mcg/actuation nasal spray, One spray in each nostril twice daily after 1st using azelastine nasal spray, Disp: 18.2 mL, Rfl: 11    hydrOXYzine HCL (ATARAX) 25 MG tablet, TK 1 T PO  TID PRN ANXIETY, Disp: 30 tablet, Rfl: 6    ipratropium (ATROVENT) 21 mcg (0.03 %) nasal spray, 2 sprays by Nasal route 2 (two) times daily. May be use more often if needed, Disp: 30 mL, Rfl: 11    lamoTRIgine (LAMICTAL) 100 MG tablet, Take 1 tablet (100 mg total) by mouth once daily., Disp: 30 tablet, Rfl: 1    montelukast (SINGULAIR) 10 mg tablet, One tablet by mouth every evening, Disp: 30 tablet, Rfl: 11    multivitamin capsule, Take 1 capsule by mouth once daily., Disp: , Rfl:     mupirocin (BACTROBAN) 2 % ointment, Apply to nose 3 times daily on a Q-tip for crusting or sores inside the nose, Disp: 22 g, Rfl: 3    ondansetron (ZOFRAN-ODT) 8 MG TbDL, Take 1 tablet (8 mg total) by mouth  every 6 (six) hours as needed (Nausea or vomiting)., Disp: 10 tablet, Rfl: 2    pantoprazole (PROTONIX) 40 MG tablet, TK 1 T PO QD, Disp: 90 tablet, Rfl: 3    rOPINIRole (REQUIP) 0.5 MG tablet, Take 2 tablets (1 mg total) by mouth every evening., Disp: 30 tablet, Rfl: 6    Patient Active Problem List   Diagnosis    AR (allergic rhinitis)    BMI 40.0-44.9, adult    Heart palpitations    WAYNE (generalized anxiety disorder)    History of panic attacks    MDD (major depressive disorder), recurrent episode, moderate    Meralgia paresthetica of right side    Nasal valve collapse    Nasal septal deviation    Hypertrophy of both inferior nasal turbinates    Obstructive sleep apnea treated with continuous positive airway pressure (CPAP)    History of bariatric surgery    Disorders of smell    GERD (gastroesophageal reflux disease)    Nasal valve stenosis     Past Medical History:   Diagnosis Date    Allergic rhinitis     Asthma     Depression     Hx of psychiatric care     Morbid obesity     Obstructive sleep apnea treated with continuous positive airway pressure (CPAP) 7/10/2018    Psychiatric problem     Therapy      Past Surgical History:   Procedure Laterality Date    APPENDECTOMY      APPLICATION OF CARTILAGE GRAFT Bilateral 12/6/2021    Procedure: APPLICATION, CARTILAGE GRAFT;  Surgeon: JAN Arredondo MD;  Location: [facility];  Service: ENT;  Laterality: Bilateral;  from right ear    gastric sleeve  09/2019    [facility]    NASAL SEPTOPLASTY Bilateral 12/6/2021    Procedure: SEPTOPLASTY, NOSE;  Surgeon: JAN Arredondo MD;  Location: [facility] [place], operated by [facility];  Service: ENT;  Laterality: Bilateral;    NASAL STENOSIS REPAIR Bilateral 12/6/2021    Procedure: REPAIR, STENOSIS, NOSE, VESTIBULE;  Surgeon: JAN Arredondo MD;  Location: [facility] [place], operated by [facility] OR;  Service: ENT;  Laterality: Bilateral;    SINUS SURGERY      VASECTOMY  2017     Social History     Socioeconomic History    Marital  "status:    Occupational History    Occupation: Information Tech     Employer: [employer]   Tobacco Use    Smoking status: Never Smoker    Smokeless tobacco: Never Used   Substance and Sexual Activity    Alcohol use: Yes     Alcohol/week: 1.0 standard drink     Types: 1 Cans of beer per week     Comment: occ    Drug use: No    Sexual activity: Yes     Partners: Female     Family History   Problem Relation Age of Onset    Diabetes Father     Hypertension Father     Rheum arthritis Mother     Lung cancer Mother         post lobectomy    Dementia Mother     Rheum arthritis Maternal Grandfather     Diabetes Paternal Grandmother     Alzheimer's disease Paternal Grandmother     Hypertension Paternal Grandfather     Obesity Paternal Grandfather     Cerebral aneurysm Paternal Grandfather     Hypertension Maternal Grandmother     Dementia Maternal Grandmother     Brain cancer Maternal Grandmother         in remission before she     Melanoma Neg Hx            Objective:       Vitals:    22 1556   BP: (!) 142/70   Pulse: 94   Weight: (!) 172 kg (379 lb 3.1 oz)   Height: 6' 1" (1.854 m)   PainSc:   7   PainLoc: Knee     Body mass index is 50.03 kg/m².    Physical Exam  Vitals and nursing note reviewed.   Constitutional:       Appearance: He is obese.   HENT:      Head: Normocephalic.      Nose: Nose normal.      Mouth/Throat:      Mouth: Mucous membranes are moist.   Eyes:      Conjunctiva/sclera: Conjunctivae normal.   Cardiovascular:      Rate and Rhythm: Normal rate.   Pulmonary:      Effort: Pulmonary effort is normal.   Musculoskeletal:         General: Normal range of motion.      Left knee: Bony tenderness present. No swelling, deformity or erythema. Normal range of motion. Tenderness present over the lateral joint line.   Skin:     General: Skin is warm and dry.      Capillary Refill: Capillary refill takes less than 2 seconds.   Neurological:      General: No focal deficit " present.      Mental Status: He is alert and oriented to person, place, and time.   Psychiatric:         Mood and Affect: Mood normal.         Behavior: Behavior normal.         Thought Content: Thought content normal.         Judgment: Judgment normal.         Assessment:       Problem List Items Addressed This Visit    None     Visit Diagnoses     Acute pain of left knee    -  Primary    Relevant Medications    acetaminophen-codeine 300-30mg (TYLENOL #3) 300-30 mg Tab    Other Relevant Orders    X-Ray Knee 3 View Left    BMI 50.0-59.9, adult        Morbidly obese              Plan:     Jered was seen today for knee pain.    Diagnoses and all orders for this visit:    Acute pain of left knee  -     X-Ray Knee 3 View Left; Future  -     acetaminophen-codeine 300-30mg (TYLENOL #3) 300-30 mg Tab; Take 1 tablet by mouth every 8 (eight) hours as needed (for severe knee pain).     reviewed    Check left knee Xray today, will message with results    Rest, Ice alternate with heat, Elevation    Written script for Tylenol #3 take every 8hrs as needed for severe pain only, use regular tylenol otc otherwise as needed    BMI 50.0-59.9, adult  BMI reviewed    Morbidly obese  BMI reviewed.    Diet and exercise to lose weight.    Self care instructions provided in AVS    Follow up if symptoms worsen or fail to improve.

## 2022-03-28 NOTE — PROGRESS NOTES
Subjective:       Patient ID: Jered Contreras is a 46 y.o. male.    Chief Complaint: Sleeping Problem    I had the pleasure of seeing Jered Contreras today, who is a 46 y.o. male that presents with TAYO.    Jered Contreras does not have a CDL.    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    Jered Contreras presents with TAYO that has been going on for 7 years   Split night study 2015 showed AHI 25.9 and CPAP 9 cm H20 was recommended.   After significant weight loss, repeat study showed AHI 14.5.   Has CPAP range 11-15, but machine is not holding pressure.    Using FFM.       Bedtime when working ranges from 1300 to 3466-0411.   When not working, bedtime ranges from 2300 to 2400.   Sleep latency ranges from 10 to 15 minutes.     Average number of awakenings is 1-2 and return to sleep is quick.   Wake up time when working is [id number] to 0248-4963.   When not working, wake up time is 0800 to 0900.   Patient does not feel rested upon awakening.    Jered Contreras consumes approximately 2 beverages with caffeine daily.   An average of 2 beverages with alcohol are consumed monthly   Medications taken for sleep currently: none  Previous medications taken: none     Jered Contreras does not experience daytime sleepiness.   Naps are taken about 0 times weekly, usually lasting NA to NA minutes.  Jered currently does operate an automobile.  Jered Contreras does not experience drowsiness when driving.   Patient does doze off when sedentary.   Jered Contreras does not have auxiliary symptoms of narcolepsy including sleep onset paralysis, hypnagogic hallucinations, sleep attacks and cataplexy    EPWORTH SLEEPINESS SCALE 3/28/2022   Sitting and reading 1   Watching TV 1   Sitting, inactive in a public place (e.g. a theatre or a meeting) 1   As a passenger in a car for an hour without a break 2   Lying down to rest in the afternoon when circumstances  permit 2   Sitting and talking to someone 1   Sitting quietly after a lunch without alcohol 1   In a car, while stopped for a few minutes in traffic 0   Total score 9       Jered Contreras has a history of snoring.   Snoring is described as moderate and intermittent.   Apneic episodes have been noticed during sleep.   A witness to sleep is present.   The patient awakens with mouth dryness.      Jered Contreras does not have symptoms of Restless Legs Syndrome. Nocturnal leg movements have not been noticed.   The patient does not experience sleep related leg cramps.   There is not a history of parasomnia.      Current Outpatient Medications:     ASCORBATE CALCIUM (VITAMIN C ORAL), Take by mouth., Disp: , Rfl:     azelastine (ASTELIN) 137 mcg (0.1 %) nasal spray, Two sprays in each nostril, sniff until absorbed, then follow with 1 spray of fluticasone.  Use both sprays twice daily., Disp: 30 mL, Rfl: 11    b complex vitamins (B COMPLEX-VITAMIN B12) tablet, Take 1 tablet by mouth once daily., Disp: , Rfl:     buPROPion (WELLBUTRIN XL) 300 MG 24 hr tablet, Take 1 tablet (300 mg total) by mouth once daily., Disp: 30 tablet, Rfl: 2    DULoxetine (CYMBALTA) 60 MG capsule, TAKE 1 CAPSULE(60 MG) BY MOUTH EVERY DAY, Disp: 30 capsule, Rfl: 2    fluticasone propionate (FLONASE) 50 mcg/actuation nasal spray, One spray in each nostril twice daily after 1st using azelastine nasal spray, Disp: 18.2 mL, Rfl: 11    hydrOXYzine HCL (ATARAX) 25 MG tablet, TK 1 T PO  TID PRN ANXIETY, Disp: 30 tablet, Rfl: 6    ipratropium (ATROVENT) 21 mcg (0.03 %) nasal spray, 2 sprays by Nasal route 2 (two) times daily. May be use more often if needed, Disp: 30 mL, Rfl: 11    lamoTRIgine (LAMICTAL) 100 MG tablet, Take 1 tablet (100 mg total) by mouth once daily., Disp: 30 tablet, Rfl: 1    montelukast (SINGULAIR) 10 mg tablet, One tablet by mouth every evening, Disp: 30 tablet, Rfl: 11    multivitamin capsule, Take 1  capsule by mouth once daily., Disp: , Rfl:     mupirocin (BACTROBAN) 2 % ointment, Apply to nose 3 times daily on a Q-tip for crusting or sores inside the nose, Disp: 22 g, Rfl: 3    ondansetron (ZOFRAN-ODT) 8 MG TbDL, Take 1 tablet (8 mg total) by mouth every 6 (six) hours as needed (Nausea or vomiting)., Disp: 10 tablet, Rfl: 2    pantoprazole (PROTONIX) 40 MG tablet, TK 1 T PO QD, Disp: 90 tablet, Rfl: 3    rOPINIRole (REQUIP) 0.5 MG tablet, Take 2 tablets (1 mg total) by mouth every evening., Disp: 30 tablet, Rfl: 6     Review of patient's allergies indicates:   Allergen Reactions    Ceclor [cefaclor] Anaphylaxis    Penicillins Hives     Elevated blood pressure, temp, hives         Past Medical History:   Diagnosis Date    Allergic rhinitis     Asthma     Depression     Hx of psychiatric care     Morbid obesity     Obstructive sleep apnea treated with continuous positive airway pressure (CPAP) 7/10/2018    Psychiatric problem     Therapy        Past Surgical History:   Procedure Laterality Date    APPENDECTOMY      APPLICATION OF CARTILAGE GRAFT Bilateral 12/6/2021    Procedure: APPLICATION, CARTILAGE GRAFT;  Surgeon: JAN Arredondo MD;  Location: [facility] OR;  Service: ENT;  Laterality: Bilateral;  from right ear    gastric sleeve  09/2019    [facility]    NASAL SEPTOPLASTY Bilateral 12/6/2021    Procedure: SEPTOPLASTY, NOSE;  Surgeon: JAN Arredondo MD;  Location: [facility] OR;  Service: ENT;  Laterality: Bilateral;    NASAL STENOSIS REPAIR Bilateral 12/6/2021    Procedure: REPAIR, STENOSIS, NOSE, VESTIBULE;  Surgeon: JAN Arredondo MD;  Location: [facility] OR;  Service: ENT;  Laterality: Bilateral;    SINUS SURGERY      VASECTOMY  2017       Family History   Problem Relation Age of Onset    Diabetes Father     Hypertension Father     Rheum arthritis Mother     Lung cancer Mother         post lobectomy    Dementia Mother     Rheum arthritis Maternal Grandfather      Diabetes Paternal Grandmother     Alzheimer's disease Paternal Grandmother     Hypertension Paternal Grandfather     Obesity Paternal Grandfather     Cerebral aneurysm Paternal Grandfather     Hypertension Maternal Grandmother     Dementia Maternal Grandmother     Brain cancer Maternal Grandmother         in remission before she     Melanoma Neg Hx        Social History     Socioeconomic History    Marital status:    Occupational History    Occupation: Information Tech     Employer: [employer]   Tobacco Use    Smoking status: Never Smoker    Smokeless tobacco: Never Used   Substance and Sexual Activity    Alcohol use: Yes     Alcohol/week: 1.0 standard drink     Types: 1 Cans of beer per week     Comment: occ    Drug use: No    Sexual activity: Yes     Partners: Female           Old medical records.    There were no vitals filed for this visit.           The patient was given open opportunity to ask questions and/or express concerns about treatment plan.   All questions/concerns were discussed.   Driving precautions were provided.     Two patient identifiers used prior to evaluation.    Thank you for referring Jered Alvarenga Diane for evaluation.           Past Medical History:   Diagnosis Date    Allergic rhinitis     Asthma     Depression     Hx of psychiatric care     Morbid obesity     Obstructive sleep apnea treated with continuous positive airway pressure (CPAP) 7/10/2018    Psychiatric problem     Therapy      Past Surgical History:   Procedure Laterality Date    APPENDECTOMY      APPLICATION OF CARTILAGE GRAFT Bilateral 2021    Procedure: APPLICATION, CARTILAGE GRAFT;  Surgeon: JAN Arredondo MD;  Location: [facility];  Service: ENT;  Laterality: Bilateral;  from right ear    gastric sleeve  2019    [facility]    NASAL SEPTOPLASTY Bilateral 2021    Procedure: SEPTOPLASTY, NOSE;  Surgeon: JAN Arredondo MD;   [place] [facility] [place] [place];  Service: ENT;  Laterality: Bilateral;    NASAL STENOSIS REPAIR Bilateral 2021    Procedure: REPAIR, STENOSIS, NOSE, VESTIBULE;  Surgeon: JAN Arredondo MD;  [place] [facility] [place] [place];  Service: ENT;  Laterality: Bilateral;    SINUS SURGERY      VASECTOMY  2017     Family History   Problem Relation Age of Onset    Diabetes Father     Hypertension Father     Rheum arthritis Mother     Lung cancer Mother         post lobectomy    Dementia Mother     Rheum arthritis Maternal Grandfather     Diabetes Paternal Grandmother     Alzheimer's disease Paternal Grandmother     Hypertension Paternal Grandfather     Obesity Paternal Grandfather     Cerebral aneurysm Paternal Grandfather     Hypertension Maternal Grandmother     Dementia Maternal Grandmother     Brain cancer Maternal Grandmother         in remission before she     Melanoma Neg Hx      Social History     Socioeconomic History    Marital status:    Occupational History    Occupation: Information Tech     Employer: [employer]   Tobacco Use    Smoking status: Never Smoker    Smokeless tobacco: Never Used   Substance and Sexual Activity    Alcohol use: Yes     Alcohol/week: 1.0 standard drink     Types: 1 Cans of beer per week     Comment: occ    Drug use: No    Sexual activity: Yes     Partners: Female       Current Outpatient Medications   Medication Sig Dispense Refill    ASCORBATE CALCIUM (VITAMIN C ORAL) Take by mouth.      azelastine (ASTELIN) 137 mcg (0.1 %) nasal spray Two sprays in each nostril, sniff until absorbed, then follow with 1 spray of fluticasone.  Use both sprays twice daily. 30 mL 11    b complex vitamins (B COMPLEX-VITAMIN B12) tablet Take 1 tablet by mouth once daily.      buPROPion (WELLBUTRIN XL) 300 MG 24 hr tablet Take 1 tablet (300 mg total) by mouth once daily. 30 tablet 2    DULoxetine (CYMBALTA) 60 MG capsule TAKE 1 CAPSULE(60 MG) BY MOUTH EVERY DAY 30 capsule 2     fluticasone propionate (FLONASE) 50 mcg/actuation nasal spray One spray in each nostril twice daily after 1st using azelastine nasal spray 18.2 mL 11    hydrOXYzine HCL (ATARAX) 25 MG tablet TK 1 T PO  TID PRN ANXIETY 30 tablet 6    ipratropium (ATROVENT) 21 mcg (0.03 %) nasal spray 2 sprays by Nasal route 2 (two) times daily. May be use more often if needed 30 mL 11    lamoTRIgine (LAMICTAL) 100 MG tablet Take 1 tablet (100 mg total) by mouth once daily. 30 tablet 1    montelukast (SINGULAIR) 10 mg tablet One tablet by mouth every evening 30 tablet 11    multivitamin capsule Take 1 capsule by mouth once daily.      mupirocin (BACTROBAN) 2 % ointment Apply to nose 3 times daily on a Q-tip for crusting or sores inside the nose 22 g 3    ondansetron (ZOFRAN-ODT) 8 MG TbDL Take 1 tablet (8 mg total) by mouth every 6 (six) hours as needed (Nausea or vomiting). 10 tablet 2    pantoprazole (PROTONIX) 40 MG tablet TK 1 T PO QD 90 tablet 3    rOPINIRole (REQUIP) 0.5 MG tablet Take 2 tablets (1 mg total) by mouth every evening. 30 tablet 6     No current facility-administered medications for this visit.     Review of patient's allergies indicates:   Allergen Reactions    Ceclor [cefaclor] Anaphylaxis    Penicillins Hives     Elevated blood pressure, temp, hives       Review of Systems    Objective:      There were no vitals filed for this visit.  Physical Exam    Lab Review:   BMP:   Lab Results   Component Value Date    GLU 93 10/29/2021     10/29/2021    K 4.4 10/29/2021     10/29/2021    CO2 31 (H) 10/29/2021    BUN 8 10/29/2021    CREATININE 0.9 10/29/2021    CALCIUM 9.4 10/29/2021     Diagnostics Review: Echo: Reviewed     Assessment:       1. Nasal valve stenosis    2. Gastroesophageal reflux disease, unspecified whether esophagitis present    3. Nasal valve collapse    4. Hypertrophy of both inferior nasal turbinates    5. Nasal septal deviation    6. Obstructive sleep apnea treated with  continuous positive airway pressure (CPAP)    7. MDD (major depressive disorder), recurrent episode, moderate    8. WAYNE (generalized anxiety disorder)        Plan:         The pathophysiology of TAYO was discussed.   The effects of TAYO on patient's co-morbid conditions and the increased morbidity and/or mortality associated with this condition were reviewed.   The patient was given open opportunity to ask questions and/or express concerns about treatment plan.   All questions/concerns were discussed.   Driving precautions were provided.   Patient is compliant and benefits from use.   Machine is broken and beyond repair.   Replacement machine ordered.   Compliance follow up      Thank you for referring Jered Alvarenga Diane for evaluation.      The patient location is: home  The chief complaint leading to consultation is: TAYO    Visit type: audiovisual    Face to Face time with patient: 13  32 minutes of total time spent on the encounter, which includes face to face time and non-face to face time preparing to see the patient (eg, review of tests), Obtaining and/or reviewing separately obtained history, Documenting clinical information in the electronic or other health record, Independently interpreting results (not separately reported) and communicating results to the patient/family/caregiver, or Care coordination (not separately reported).         Each patient to whom he or she provides medical services by telemedicine is:  (1) informed of the relationship between the physician and patient and the respective role of any other health care provider with respect to management of the patient; and (2) notified that he or she may decline to receive medical services by telemedicine and may withdraw from such care at any time.    Notes:     Confirmation number [id number]

## 2022-03-28 NOTE — PATIENT INSTRUCTIONS
Check left knee Xray today, will message with results    Rest, Ice alternate with heat, Elevation    Written script for Tylenol #3 take every 8hrs as needed for severe pain only, use regular tylenol otc otherwise as needed

## 2022-03-29 ENCOUNTER — HOSPITAL ENCOUNTER (OUTPATIENT)
Dept: RADIOLOGY | Facility: HOSPITAL | Age: 47
Discharge: HOME | End: 2022-03-29
Attending: NURSE PRACTITIONER
Payer: COMMERCIAL

## 2022-03-29 DIAGNOSIS — M25.562 ACUTE PAIN OF LEFT KNEE: ICD-10-CM

## 2022-03-29 PROCEDURE — 73562 X-RAY EXAM OF KNEE 3: CPT | Mod: 26,LT,, | Performed by: RADIOLOGY

## 2022-03-29 PROCEDURE — 73562 X-RAY EXAM OF KNEE 3: CPT | Mod: TC,LT

## 2022-03-29 PROCEDURE — 73562 XR KNEE 3 VIEW LEFT: ICD-10-PCS | Mod: 26,LT,, | Performed by: RADIOLOGY

## 2022-04-10 ENCOUNTER — PATIENT MESSAGE (OUTPATIENT)
Dept: PSYCHIATRY | Facility: CLINIC | Age: 47
End: 2022-04-10
Payer: COMMERCIAL

## 2022-04-16 ENCOUNTER — IMMUNIZATION (OUTPATIENT)
Dept: INTERNAL MEDICINE | Facility: CLINIC | Age: 47
End: 2022-04-16
Payer: COMMERCIAL

## 2022-04-16 DIAGNOSIS — Z23 NEED FOR VACCINATION: Primary | ICD-10-CM

## 2022-04-16 PROCEDURE — 0054A COVID-19, MRNA, LNP-S, PF, 30 MCG/0.3 ML DOSE VACCINE (PFIZER): CPT | Mod: CV19,PBBFAC | Performed by: INTERNAL MEDICINE

## 2022-04-28 ENCOUNTER — OFFICE VISIT (OUTPATIENT)
Dept: OTOLARYNGOLOGY | Facility: CLINIC | Age: 47
End: 2022-04-28
Payer: COMMERCIAL

## 2022-04-28 VITALS
SYSTOLIC BLOOD PRESSURE: 114 MMHG | HEART RATE: 88 BPM | WEIGHT: 315 LBS | TEMPERATURE: 98 F | DIASTOLIC BLOOD PRESSURE: 59 MMHG | BODY MASS INDEX: 50.99 KG/M2

## 2022-04-28 DIAGNOSIS — J34.2 NASAL SEPTAL DEVIATION: ICD-10-CM

## 2022-04-28 DIAGNOSIS — J30.89 NON-SEASONAL ALLERGIC RHINITIS, UNSPECIFIED TRIGGER: ICD-10-CM

## 2022-04-28 DIAGNOSIS — J34.89 NASAL VALVE STENOSIS: Primary | ICD-10-CM

## 2022-04-28 DIAGNOSIS — G47.33 OBSTRUCTIVE SLEEP APNEA TREATED WITH CONTINUOUS POSITIVE AIRWAY PRESSURE (CPAP): ICD-10-CM

## 2022-04-28 DIAGNOSIS — M95.0 NASAL VALVE COLLAPSE: ICD-10-CM

## 2022-04-28 PROCEDURE — 3008F BODY MASS INDEX DOCD: CPT | Mod: CPTII,S$GLB,, | Performed by: SPECIALIST

## 2022-04-28 PROCEDURE — 3078F PR MOST RECENT DIASTOLIC BLOOD PRESSURE < 80 MM HG: ICD-10-PCS | Mod: CPTII,S$GLB,, | Performed by: SPECIALIST

## 2022-04-28 PROCEDURE — 1160F RVW MEDS BY RX/DR IN RCRD: CPT | Mod: CPTII,S$GLB,, | Performed by: SPECIALIST

## 2022-04-28 PROCEDURE — 3078F DIAST BP <80 MM HG: CPT | Mod: CPTII,S$GLB,, | Performed by: SPECIALIST

## 2022-04-28 PROCEDURE — 1159F PR MEDICATION LIST DOCUMENTED IN MEDICAL RECORD: ICD-10-PCS | Mod: CPTII,S$GLB,, | Performed by: SPECIALIST

## 2022-04-28 PROCEDURE — 99213 OFFICE O/P EST LOW 20 MIN: CPT | Mod: S$GLB,,, | Performed by: SPECIALIST

## 2022-04-28 PROCEDURE — 1159F MED LIST DOCD IN RCRD: CPT | Mod: CPTII,S$GLB,, | Performed by: SPECIALIST

## 2022-04-28 PROCEDURE — 3074F SYST BP LT 130 MM HG: CPT | Mod: CPTII,S$GLB,, | Performed by: SPECIALIST

## 2022-04-28 PROCEDURE — 99999 PR PBB SHADOW E&M-EST. PATIENT-LVL III: CPT | Mod: PBBFAC,,, | Performed by: SPECIALIST

## 2022-04-28 PROCEDURE — 3074F PR MOST RECENT SYSTOLIC BLOOD PRESSURE < 130 MM HG: ICD-10-PCS | Mod: CPTII,S$GLB,, | Performed by: SPECIALIST

## 2022-04-28 PROCEDURE — 3008F PR BODY MASS INDEX (BMI) DOCUMENTED: ICD-10-PCS | Mod: CPTII,S$GLB,, | Performed by: SPECIALIST

## 2022-04-28 PROCEDURE — 99213 PR OFFICE/OUTPT VISIT, EST, LEVL III, 20-29 MIN: ICD-10-PCS | Mod: S$GLB,,, | Performed by: SPECIALIST

## 2022-04-28 PROCEDURE — 1160F PR REVIEW ALL MEDS BY PRESCRIBER/CLIN PHARMACIST DOCUMENTED: ICD-10-PCS | Mod: CPTII,S$GLB,, | Performed by: SPECIALIST

## 2022-04-28 PROCEDURE — 99999 PR PBB SHADOW E&M-EST. PATIENT-LVL III: ICD-10-PCS | Mod: PBBFAC,,, | Performed by: SPECIALIST

## 2022-04-28 RX ORDER — FLUTICASONE PROPIONATE 50 MCG
SPRAY, SUSPENSION (ML) NASAL
Qty: 18.2 ML | Refills: 11
Start: 2022-04-28 | End: 2022-06-03 | Stop reason: SDUPTHER

## 2022-04-28 NOTE — PROGRESS NOTES
Subjective:       Patient ID: Jered Contreras is a 46 y.o. male.    Chief Complaint: Follow-up    The patient is returning to discuss multiple problems:  1.   5 months postop nasal reconstruction:     He is breathing well through his nose on both sides.  There is no obstruction when he takes in a deep breath through his nose.    2. Allergic rhinitis:   The patient has had a bed spring with regard to his allergies.  He has nasal congestion to some degree with either rhinorrhea or postnasal drip on an everyday basis.  He is taking all of his medications.  He states that when the time comes to be re-dosed he is noticing a significant increase in symptoms before the time interval arrives. He is pleased with the results of his surgery.He is using azelastine and fluticasone twice daily Zyrtec in the morning.  He takes montelukast at night.  He is using ipratropium bromide spray when needed   3. Diminished smell and taste:   His sense of smell and taste have returned to normal.  4. Obstructive sleep apnea:   He did visit the sleep doctor after his last visit.  His been placed on an auto titrating CPAP.  His new machine should be arriving within a week. He  Now uses his  orCPAP every night.  It has been recalled, but when he tried to sleep without at he had horrible sleep and stop breathing all night long.        Review of Systems   Constitutional: Negative.  Negative for activity change, appetite change, chills, fever and unexpected weight change.   HENT: Positive for congestion, postnasal drip and sinus pressure. Negative for ear discharge, ear pain, facial swelling, hearing loss, mouth sores, rhinorrhea, sinus pain, sneezing, sore throat, tinnitus, trouble swallowing and voice change.         Snoring  Obstructive sleep apnea   Eyes: Negative.  Negative for photophobia, pain, discharge, redness, itching and visual disturbance.   Respiratory: Negative for apnea, cough, choking, shortness of breath and wheezing.     Cardiovascular: Negative.  Negative for chest pain and palpitations.   Gastrointestinal: Negative.  Negative for abdominal distention, abdominal pain, nausea and vomiting.   Endocrine: Negative.    Genitourinary: Negative.    Musculoskeletal: Negative.  Negative for arthralgias, myalgias, neck pain and neck stiffness.   Skin: Negative.  Negative for color change, pallor and rash.   Allergic/Immunologic: Positive for environmental allergies (Seasonal allergies). Negative for food allergies and immunocompromised state.   Neurological: Negative.  Negative for dizziness, facial asymmetry, speech difficulty, weakness, light-headedness and numbness.   Hematological: Negative.  Negative for adenopathy. Does not bruise/bleed easily.   Psychiatric/Behavioral: Positive for sleep disturbance. Negative for agitation, confusion and decreased concentration.       Objective:      Physical Exam  Vitals and nursing note reviewed.   Constitutional:       General: He is awake.      Appearance: Normal appearance. He is well-developed, well-groomed and normal weight.   HENT:      Head: Normocephalic.      Jaw: There is normal jaw occlusion.      Salivary Glands: Right salivary gland is not diffusely enlarged. Left salivary gland is not diffusely enlarged.      Right Ear: Hearing, ear canal and external ear normal. Tympanic membrane is retracted.      Left Ear: Hearing, ear canal and external ear normal. Tympanic membrane is retracted.      Nose: Nasal deformity ( internal nasal valve collapse with deep inspiration bilaterally, more marked on the left, both sides reverse with Shayne maneuver), septal deviation (High septal deviation to the right, midline and floor of nose), mucosal edema (cyanotic, boggy inferior turbinates bilaterally) and rhinorrhea (clear mucus bilaterally) present. Rhinorrhea is clear.      Right Turbinates: Enlarged and pale.      Left Turbinates: Enlarged and pale.      Mouth/Throat:      Lips: No lesions.       Mouth: No oral lesions.      Dentition: No gum lesions.      Tongue: No lesions.      Palate: No mass and lesions.      Pharynx: Oropharynx is clear. Uvula midline.      Tonsils: No tonsillar exudate. 3+ on the right. 3+ on the left.      Comments: Oral cavity-Costello class 2, tonsils 3+/4+ enlarged, long thin soft palate, normal uvula, base of tongue enlarged with narrowing of the oropharyngeal inlet  Eyes:      General: Lids are normal.         Right eye: No discharge.         Left eye: No discharge.      Conjunctiva/sclera:      Right eye: Right conjunctiva is injected. No exudate.     Left eye: Left conjunctiva is injected. No exudate.     Pupils: Pupils are equal, round, and reactive to light.   Neck:      Thyroid: No thyroid mass or thyromegaly.      Trachea: Trachea normal. No tracheal deviation.   Cardiovascular:      Rate and Rhythm: Normal rate and regular rhythm.      Pulses: Normal pulses.      Heart sounds: Normal heart sounds.   Pulmonary:      Effort: Pulmonary effort is normal.      Breath sounds: Normal breath sounds. No stridor. No decreased breath sounds, wheezing, rhonchi or rales.   Abdominal:      General: Bowel sounds are normal.      Palpations: Abdomen is soft.      Tenderness: There is no abdominal tenderness.   Musculoskeletal:         General: Normal range of motion.      Cervical back: Normal range of motion. No muscular tenderness.   Lymphadenopathy:      Head:      Right side of head: No submental, submandibular, preauricular, posterior auricular or occipital adenopathy.      Left side of head: No submental, submandibular, preauricular, posterior auricular or occipital adenopathy.      Cervical: No cervical adenopathy.   Skin:     General: Skin is warm and dry.      Findings: No petechiae or rash.      Nails: There is no clubbing.   Neurological:      Mental Status: He is alert and oriented to person, place, and time.      Cranial Nerves: No cranial nerve deficit.      Sensory: No  sensory deficit.      Gait: Gait normal.   Psychiatric:         Speech: Speech normal.         Behavior: Behavior normal. Behavior is cooperative.         Thought Content: Thought content normal.         Judgment: Judgment normal.             Assessment:       1. Nasal valve stenosis    2. Nasal valve collapse    3. Nasal septal deviation    4. Non-seasonal allergic rhinitis, unspecified trigger    5. Obstructive sleep apnea treated with continuous positive airway pressure (CPAP)        Plan:       I  Will have the patient continue with his current medications.  I will schedule him for allergy testing.  For 4 days before he needs to refrain from using the azelastine come Zyrtec and Singulair.

## 2022-05-09 ENCOUNTER — PATIENT MESSAGE (OUTPATIENT)
Dept: SLEEP MEDICINE | Facility: CLINIC | Age: 47
End: 2022-05-09
Payer: COMMERCIAL

## 2022-06-03 ENCOUNTER — PATIENT MESSAGE (OUTPATIENT)
Dept: SLEEP MEDICINE | Facility: CLINIC | Age: 47
End: 2022-06-03
Payer: COMMERCIAL

## 2022-06-03 ENCOUNTER — PATIENT MESSAGE (OUTPATIENT)
Dept: PSYCHIATRY | Facility: CLINIC | Age: 47
End: 2022-06-03
Payer: COMMERCIAL

## 2022-06-03 ENCOUNTER — OFFICE VISIT (OUTPATIENT)
Dept: INTERNAL MEDICINE | Facility: CLINIC | Age: 47
End: 2022-06-03
Payer: COMMERCIAL

## 2022-06-03 VITALS
BODY MASS INDEX: 41.75 KG/M2 | HEART RATE: 81 BPM | SYSTOLIC BLOOD PRESSURE: 126 MMHG | DIASTOLIC BLOOD PRESSURE: 78 MMHG | WEIGHT: 315 LBS | HEIGHT: 73 IN

## 2022-06-03 DIAGNOSIS — R05.9 COUGH IN ADULT: ICD-10-CM

## 2022-06-03 DIAGNOSIS — R09.82 POSTNASAL DRIP: ICD-10-CM

## 2022-06-03 DIAGNOSIS — J01.40 ACUTE NON-RECURRENT PANSINUSITIS: Primary | ICD-10-CM

## 2022-06-03 DIAGNOSIS — E66.01 MORBIDLY OBESE: ICD-10-CM

## 2022-06-03 PROCEDURE — 1159F MED LIST DOCD IN RCRD: CPT | Mod: CPTII,S$GLB,, | Performed by: NURSE PRACTITIONER

## 2022-06-03 PROCEDURE — 3074F SYST BP LT 130 MM HG: CPT | Mod: CPTII,S$GLB,, | Performed by: NURSE PRACTITIONER

## 2022-06-03 PROCEDURE — 3008F BODY MASS INDEX DOCD: CPT | Mod: CPTII,S$GLB,, | Performed by: NURSE PRACTITIONER

## 2022-06-03 PROCEDURE — 1160F RVW MEDS BY RX/DR IN RCRD: CPT | Mod: CPTII,S$GLB,, | Performed by: NURSE PRACTITIONER

## 2022-06-03 PROCEDURE — 3074F PR MOST RECENT SYSTOLIC BLOOD PRESSURE < 130 MM HG: ICD-10-PCS | Mod: CPTII,S$GLB,, | Performed by: NURSE PRACTITIONER

## 2022-06-03 PROCEDURE — 1159F PR MEDICATION LIST DOCUMENTED IN MEDICAL RECORD: ICD-10-PCS | Mod: CPTII,S$GLB,, | Performed by: NURSE PRACTITIONER

## 2022-06-03 PROCEDURE — 99214 OFFICE O/P EST MOD 30 MIN: CPT | Mod: S$GLB,,, | Performed by: NURSE PRACTITIONER

## 2022-06-03 PROCEDURE — 3078F DIAST BP <80 MM HG: CPT | Mod: CPTII,S$GLB,, | Performed by: NURSE PRACTITIONER

## 2022-06-03 PROCEDURE — 3008F PR BODY MASS INDEX (BMI) DOCUMENTED: ICD-10-PCS | Mod: CPTII,S$GLB,, | Performed by: NURSE PRACTITIONER

## 2022-06-03 PROCEDURE — 99999 PR PBB SHADOW E&M-EST. PATIENT-LVL IV: ICD-10-PCS | Mod: PBBFAC,,, | Performed by: NURSE PRACTITIONER

## 2022-06-03 PROCEDURE — 3078F PR MOST RECENT DIASTOLIC BLOOD PRESSURE < 80 MM HG: ICD-10-PCS | Mod: CPTII,S$GLB,, | Performed by: NURSE PRACTITIONER

## 2022-06-03 PROCEDURE — 99214 PR OFFICE/OUTPT VISIT, EST, LEVL IV, 30-39 MIN: ICD-10-PCS | Mod: S$GLB,,, | Performed by: NURSE PRACTITIONER

## 2022-06-03 PROCEDURE — 99999 PR PBB SHADOW E&M-EST. PATIENT-LVL IV: CPT | Mod: PBBFAC,,, | Performed by: NURSE PRACTITIONER

## 2022-06-03 PROCEDURE — 1160F PR REVIEW ALL MEDS BY PRESCRIBER/CLIN PHARMACIST DOCUMENTED: ICD-10-PCS | Mod: CPTII,S$GLB,, | Performed by: NURSE PRACTITIONER

## 2022-06-03 RX ORDER — BUPROPION HYDROCHLORIDE 300 MG/1
300 TABLET ORAL DAILY
Qty: 30 TABLET | Refills: 0 | Status: SHIPPED | OUTPATIENT
Start: 2022-06-03 | End: 2022-06-20 | Stop reason: SDUPTHER

## 2022-06-03 RX ORDER — LAMOTRIGINE 100 MG/1
100 TABLET ORAL DAILY
Qty: 30 TABLET | Refills: 1 | Status: SHIPPED | OUTPATIENT
Start: 2022-06-03 | End: 2022-06-20 | Stop reason: SDUPTHER

## 2022-06-03 RX ORDER — DULOXETIN HYDROCHLORIDE 60 MG/1
CAPSULE, DELAYED RELEASE ORAL
Qty: 30 CAPSULE | Refills: 0 | Status: SHIPPED | OUTPATIENT
Start: 2022-06-03 | End: 2022-06-20 | Stop reason: SDUPTHER

## 2022-06-03 RX ORDER — AZELASTINE 1 MG/ML
SPRAY, METERED NASAL
Qty: 30 ML | Refills: 11
Start: 2022-06-03 | End: 2022-06-06 | Stop reason: SDUPTHER

## 2022-06-03 RX ORDER — METHYLPREDNISOLONE 4 MG/1
TABLET ORAL
Qty: 21 EACH | Refills: 0 | Status: SHIPPED | OUTPATIENT
Start: 2022-06-03 | End: 2022-06-24

## 2022-06-03 RX ORDER — BENZONATATE 100 MG/1
100 CAPSULE ORAL 3 TIMES DAILY PRN
Qty: 30 CAPSULE | Refills: 0 | Status: SHIPPED | OUTPATIENT
Start: 2022-06-03 | End: 2022-06-30 | Stop reason: SDUPTHER

## 2022-06-03 RX ORDER — FLUTICASONE PROPIONATE 50 MCG
SPRAY, SUSPENSION (ML) NASAL
Qty: 18.2 ML | Refills: 11
Start: 2022-06-03 | End: 2022-06-06 | Stop reason: SDUPTHER

## 2022-06-03 NOTE — PATIENT INSTRUCTIONS
Meds as prescribed    Rest and Fluids, Tylenol or Motrin otc as needed for pain and or fever    Warm liquids to thin mucus    Allergen avoidance discussed, humidified air recommended    Warm salt water gargles as needed for throat pain    Nasal spray, taught how to correctly use

## 2022-06-03 NOTE — PROGRESS NOTES
"Subjective:       Patient ID: Jered Contreras is a 46 y.o. male.    Chief Complaint: Sinus Problem, Cough, Nasal Congestion (x1day), and Pressure Behind the Eyes    Pt of Dr. Dacosta, here for, "Sinuses have become really inflamed and now I'm coughing."    Also needs refills on Flonase    Sinus Problem  This is a new problem. The current episode started yesterday. The problem has been gradually worsening since onset. There has been no fever. His pain is at a severity of 0/10. He is experiencing no pain. Associated symptoms include congestion, coughing, sinus pressure and a sore throat. Pertinent negatives include no chills, diaphoresis, ear pain or shortness of breath.     Review of Systems   Constitutional: Negative for activity change, appetite change, chills, diaphoresis, fatigue, fever and unexpected weight change.   HENT: Positive for nasal congestion, postnasal drip, rhinorrhea, sinus pressure/congestion and sore throat. Negative for ear pain, facial swelling and trouble swallowing.    Eyes: Positive for pain. Negative for itching and visual disturbance.   Respiratory: Positive for cough. Negative for chest tightness and shortness of breath.    Cardiovascular: Negative for chest pain.   Gastrointestinal: Negative for abdominal pain, constipation, diarrhea, nausea and vomiting.   Endocrine: Negative for cold intolerance, heat intolerance, polydipsia, polyphagia and polyuria.   Genitourinary: Negative for dysuria.   Musculoskeletal: Negative for arthralgias and myalgias.   Allergic/Immunologic: Positive for environmental allergies. Negative for food allergies and frequent infections.        Occasionally sleeps with ceiling fan  Sleeps with CPAP  Shedding dog in home  Dog lays in bed with pt     Neurological: Negative for dizziness, weakness, light-headedness and numbness.   Hematological: Negative for adenopathy. Does not bruise/bleed easily.   Psychiatric/Behavioral: Negative for suicidal ideas. "     Review of patient's allergies indicates:   Allergen Reactions    Ceclor [cefaclor] Anaphylaxis    Penicillins Hives     Elevated blood pressure, temp, hives       Current Outpatient Medications:     ASCORBATE CALCIUM (VITAMIN C ORAL), Take by mouth., Disp: , Rfl:     azelastine (ASTELIN) 137 mcg (0.1 %) nasal spray, Two sprays in each nostril, sniff until absorbed, then follow with 1 spray of fluticasone.  Use both sprays twice daily., Disp: 30 mL, Rfl: 11    b complex vitamins (B COMPLEX-VITAMIN B12) tablet, Take 1 tablet by mouth once daily., Disp: , Rfl:     buPROPion (WELLBUTRIN XL) 300 MG 24 hr tablet, Take 1 tablet (300 mg total) by mouth once daily., Disp: 30 tablet, Rfl: 2    DULoxetine (CYMBALTA) 60 MG capsule, TAKE 1 CAPSULE(60 MG) BY MOUTH EVERY DAY, Disp: 30 capsule, Rfl: 2    fluticasone propionate (FLONASE) 50 mcg/actuation nasal spray, One spray in each nostril twice daily after 1st using azelastine nasal spray, Disp: 18.2 mL, Rfl: 11    hydrOXYzine HCL (ATARAX) 25 MG tablet, TK 1 T PO  TID PRN ANXIETY, Disp: 30 tablet, Rfl: 6    ipratropium (ATROVENT) 21 mcg (0.03 %) nasal spray, 2 sprays by Nasal route 2 (two) times daily. May be use more often if needed, Disp: 30 mL, Rfl: 11    montelukast (SINGULAIR) 10 mg tablet, One tablet by mouth every evening, Disp: 30 tablet, Rfl: 11    multivitamin capsule, Take 1 capsule by mouth once daily., Disp: , Rfl:     mupirocin (BACTROBAN) 2 % ointment, Apply to nose 3 times daily on a Q-tip for crusting or sores inside the nose, Disp: 22 g, Rfl: 3    ondansetron (ZOFRAN-ODT) 8 MG TbDL, Take 1 tablet (8 mg total) by mouth every 6 (six) hours as needed (Nausea or vomiting)., Disp: 10 tablet, Rfl: 2    pantoprazole (PROTONIX) 40 MG tablet, TK 1 T PO QD, Disp: 90 tablet, Rfl: 3    rOPINIRole (REQUIP) 0.5 MG tablet, Take 2 tablets (1 mg total) by mouth every evening., Disp: 30 tablet, Rfl: 6    lamoTRIgine (LAMICTAL) 100 MG tablet, Take 1 tablet  (100 mg total) by mouth once daily., Disp: 30 tablet, Rfl: 1    Patient Active Problem List   Diagnosis    AR (allergic rhinitis)    BMI 40.0-44.9, adult    Heart palpitations    WAYNE (generalized anxiety disorder)    History of panic attacks    MDD (major depressive disorder), recurrent episode, moderate    Meralgia paresthetica of right side    Nasal valve collapse    Nasal septal deviation    Hypertrophy of both inferior nasal turbinates    Obstructive sleep apnea treated with continuous positive airway pressure (CPAP)    History of bariatric surgery    Disorders of smell    GERD (gastroesophageal reflux disease)    Nasal valve stenosis     Past Medical History:   Diagnosis Date    Allergic rhinitis     Asthma     Depression     Hx of psychiatric care     Morbid obesity     Obstructive sleep apnea treated with continuous positive airway pressure (CPAP) 7/10/2018    Psychiatric problem     Therapy      Past Surgical History:   Procedure Laterality Date    APPENDECTOMY      APPLICATION OF CARTILAGE GRAFT Bilateral 12/6/2021    Procedure: APPLICATION, CARTILAGE GRAFT;  Surgeon: JAN Arredondo MD;  Location: [facility];  Service: ENT;  Laterality: Bilateral;  from right ear    gastric sleeve  09/2019    [facility] [place] [facility]    NASAL SEPTOPLASTY Bilateral 12/6/2021    Procedure: SEPTOPLASTY, NOSE;  Surgeon: JAN Arredondo MD;  Location: [facility];  Service: ENT;  Laterality: Bilateral;    NASAL STENOSIS REPAIR Bilateral 12/6/2021    Procedure: REPAIR, STENOSIS, NOSE, VESTIBULE;  Surgeon: JAN Arredondo MD;  Location: [facility];  Service: ENT;  Laterality: Bilateral;    SINUS SURGERY      VASECTOMY  2017     Social History     Socioeconomic History    Marital status:    Occupational History    Occupation: Information Tech     Employer: [employer]   Tobacco Use    Smoking status: Never Smoker    Smokeless tobacco: Never Used   Substance and Sexual Activity  "   Alcohol use: Yes     Alcohol/week: 1.0 standard drink     Types: 1 Cans of beer per week     Comment: occ    Drug use: No    Sexual activity: Yes     Partners: Female     Family History   Problem Relation Age of Onset    Diabetes Father     Hypertension Father     Rheum arthritis Mother     Lung cancer Mother         post lobectomy    Dementia Mother     Rheum arthritis Maternal Grandfather     Diabetes Paternal Grandmother     Alzheimer's disease Paternal Grandmother     Hypertension Paternal Grandfather     Obesity Paternal Grandfather     Cerebral aneurysm Paternal Grandfather     Hypertension Maternal Grandmother     Dementia Maternal Grandmother     Brain cancer Maternal Grandmother         in remission before she     Melanoma Neg Hx            Objective:       Vitals:    22 0907   BP: 126/78   Pulse: 81   Weight: (!) 175.8 kg (387 lb 9.1 oz)   Height: 6' 1" (1.854 m)   PainSc: 0-No pain     Body mass index is 51.13 kg/m².    Physical Exam  Vitals and nursing note reviewed.   Constitutional:       General: He is not in acute distress.     Appearance: He is obese. He is not ill-appearing, toxic-appearing or diaphoretic.   HENT:      Head: Normocephalic.      Right Ear: Tympanic membrane, ear canal and external ear normal. There is no impacted cerumen.      Left Ear: Tympanic membrane, ear canal and external ear normal. There is no impacted cerumen.      Nose: Congestion and rhinorrhea present.      Right Sinus: Maxillary sinus tenderness and frontal sinus tenderness present.      Left Sinus: Maxillary sinus tenderness and frontal sinus tenderness present.      Mouth/Throat:      Mouth: Mucous membranes are moist.      Pharynx: Oropharynx is clear. No oropharyngeal exudate or posterior oropharyngeal erythema.      Comments: Clear PND noted on exam    Eyes:      Extraocular Movements: Extraocular movements intact.      Conjunctiva/sclera: Conjunctivae normal.      Pupils: Pupils are " equal, round, and reactive to light.   Cardiovascular:      Rate and Rhythm: Normal rate and regular rhythm.      Pulses: Normal pulses.      Heart sounds: Normal heart sounds.   Pulmonary:      Effort: Pulmonary effort is normal.      Breath sounds: Normal breath sounds.   Musculoskeletal:         General: Normal range of motion.      Cervical back: Normal range of motion and neck supple. No tenderness.   Lymphadenopathy:      Cervical: No cervical adenopathy.   Skin:     General: Skin is warm and dry.   Neurological:      General: No focal deficit present.      Mental Status: He is alert and oriented to person, place, and time.   Psychiatric:         Mood and Affect: Mood normal.         Behavior: Behavior normal.         Thought Content: Thought content normal.         Judgment: Judgment normal.         Assessment:       Problem List Items Addressed This Visit    None     Visit Diagnoses     Acute non-recurrent pansinusitis    -  Primary    Relevant Medications    fluticasone propionate (FLONASE) 50 mcg/actuation nasal spray    azelastine (ASTELIN) 137 mcg (0.1 %) nasal spray    methylPREDNISolone (MEDROL DOSEPACK) 4 mg tablet    Postnasal drip        Relevant Medications    fluticasone propionate (FLONASE) 50 mcg/actuation nasal spray    azelastine (ASTELIN) 137 mcg (0.1 %) nasal spray    methylPREDNISolone (MEDROL DOSEPACK) 4 mg tablet    Cough in adult        Relevant Medications    benzonatate (TESSALON) 100 MG capsule    BMI 50.0-59.9, adult        Morbidly obese              Plan:       Jered was seen today for sinus problem, cough, nasal congestion and pressure behind the eyes.    Diagnoses and all orders for this visit:    Acute non-recurrent pansinusitis  -     fluticasone propionate (FLONASE) 50 mcg/actuation nasal spray; One spray in each nostril twice daily after 1st using azelastine nasal spray  -     azelastine (ASTELIN) 137 mcg (0.1 %) nasal spray; Two sprays in each nostril, sniff until  absorbed, then follow with 1 spray of fluticasone.  Use both sprays twice daily.  -     methylPREDNISolone (MEDROL DOSEPACK) 4 mg tablet; use as directed    Postnasal drip  -     fluticasone propionate (FLONASE) 50 mcg/actuation nasal spray; One spray in each nostril twice daily after 1st using azelastine nasal spray  -     azelastine (ASTELIN) 137 mcg (0.1 %) nasal spray; Two sprays in each nostril, sniff until absorbed, then follow with 1 spray of fluticasone.  Use both sprays twice daily.  -     methylPREDNISolone (MEDROL DOSEPACK) 4 mg tablet; use as directed    Cough in adult  -     benzonatate (TESSALON) 100 MG capsule; Take 1 capsule (100 mg total) by mouth 3 (three) times daily as needed for Cough.    BMI 50.0-59.9, adult  BMI reviewed    Morbidly obese  BMI reviewed.    Diet and exercise to lose weight.    Meds as prescribed    Rest and Fluids, Tylenol or Motrin otc as needed for pain and or fever    Warm liquids to thin mucus    Allergen avoidance discussed, humidified air recommended    Warm salt water gargles as needed for throat pain    Nasal spray, taught how to correctly use    Self care instructions provided in AVS    Follow up if symptoms worsen or fail to improve.

## 2022-06-04 ENCOUNTER — PATIENT MESSAGE (OUTPATIENT)
Dept: INTERNAL MEDICINE | Facility: CLINIC | Age: 47
End: 2022-06-04
Payer: COMMERCIAL

## 2022-06-04 DIAGNOSIS — R09.82 POSTNASAL DRIP: ICD-10-CM

## 2022-06-04 DIAGNOSIS — R05.9 COUGH IN ADULT: Primary | ICD-10-CM

## 2022-06-04 DIAGNOSIS — J01.40 ACUTE NON-RECURRENT PANSINUSITIS: ICD-10-CM

## 2022-06-06 RX ORDER — FLUTICASONE PROPIONATE 50 MCG
SPRAY, SUSPENSION (ML) NASAL
Qty: 18.2 ML | Refills: 11 | Status: SHIPPED | OUTPATIENT
Start: 2022-06-06

## 2022-06-06 RX ORDER — PROMETHAZINE HYDROCHLORIDE AND DEXTROMETHORPHAN HYDROBROMIDE 6.25; 15 MG/5ML; MG/5ML
5 SYRUP ORAL EVERY 6 HOURS PRN
Qty: 240 ML | Refills: 0 | Status: SHIPPED | OUTPATIENT
Start: 2022-06-06 | End: 2022-08-01

## 2022-06-06 RX ORDER — AZELASTINE 1 MG/ML
SPRAY, METERED NASAL
Qty: 30 ML | Refills: 11 | Status: SHIPPED | OUTPATIENT
Start: 2022-06-06 | End: 2022-06-30 | Stop reason: SDUPTHER

## 2022-06-07 ENCOUNTER — PATIENT MESSAGE (OUTPATIENT)
Dept: INTERNAL MEDICINE | Facility: CLINIC | Age: 47
End: 2022-06-07
Payer: COMMERCIAL

## 2022-06-07 DIAGNOSIS — J01.40 ACUTE NON-RECURRENT PANSINUSITIS: Primary | ICD-10-CM

## 2022-06-07 RX ORDER — AZITHROMYCIN 250 MG/1
TABLET, FILM COATED ORAL
Qty: 6 TABLET | Refills: 0 | Status: SHIPPED | OUTPATIENT
Start: 2022-06-07 | End: 2022-06-12

## 2022-06-18 ENCOUNTER — PATIENT MESSAGE (OUTPATIENT)
Dept: PSYCHIATRY | Facility: CLINIC | Age: 47
End: 2022-06-18
Payer: COMMERCIAL

## 2022-06-20 ENCOUNTER — OFFICE VISIT (OUTPATIENT)
Dept: PSYCHIATRY | Facility: CLINIC | Age: 47
End: 2022-06-20
Payer: COMMERCIAL

## 2022-06-20 DIAGNOSIS — F33.1 MDD (MAJOR DEPRESSIVE DISORDER), RECURRENT EPISODE, MODERATE: ICD-10-CM

## 2022-06-20 DIAGNOSIS — G47.00 INSOMNIA, UNSPECIFIED TYPE: ICD-10-CM

## 2022-06-20 DIAGNOSIS — F41.1 GAD (GENERALIZED ANXIETY DISORDER): Primary | ICD-10-CM

## 2022-06-20 PROCEDURE — 99214 PR OFFICE/OUTPT VISIT, EST, LEVL IV, 30-39 MIN: ICD-10-PCS | Mod: 95,,, | Performed by: NURSE PRACTITIONER

## 2022-06-20 PROCEDURE — 99214 OFFICE O/P EST MOD 30 MIN: CPT | Mod: 95,,, | Performed by: NURSE PRACTITIONER

## 2022-06-20 RX ORDER — BUPROPION HYDROCHLORIDE 300 MG/1
300 TABLET ORAL DAILY
Qty: 30 TABLET | Refills: 2 | Status: SHIPPED | OUTPATIENT
Start: 2022-06-20 | End: 2022-10-11 | Stop reason: SDUPTHER

## 2022-06-20 RX ORDER — LAMOTRIGINE 100 MG/1
100 TABLET ORAL DAILY
Qty: 30 TABLET | Refills: 2 | Status: SHIPPED | OUTPATIENT
Start: 2022-06-20 | End: 2022-10-11 | Stop reason: SDUPTHER

## 2022-06-20 RX ORDER — DULOXETIN HYDROCHLORIDE 60 MG/1
CAPSULE, DELAYED RELEASE ORAL
Qty: 30 CAPSULE | Refills: 2 | Status: SHIPPED | OUTPATIENT
Start: 2022-06-20 | End: 2022-10-11 | Stop reason: SDUPTHER

## 2022-06-20 NOTE — PROGRESS NOTES
"Outpatient Psychiatry Follow-Up Visit (MD/NP)    6/20/2022   The patient location is: Home in [place], [place]  The chief complaint leading to consultation is:  Depression, anxiety and history of panic attacks    Visit type: audiovisual    Face to Face time with patient: 25 minutes  35 minutes of total time spent on the encounter, which includes face to face time and non-face to face time preparing to see the patient (eg, review of tests), Obtaining and/or reviewing separately obtained history, Documenting clinical information in the electronic or other health record, Independently interpreting results (not separately reported) and communicating results to the patient/family/caregiver, or Care coordination (not separately reported).         Each patient to whom he or she provides medical services by telemedicine is:  (1) informed of the relationship between the physician and patient and the respective role of any other health care provider with respect to management of the patient; and (2) notified that he or she may decline to receive medical services by telemedicine and may withdraw from such care at any time.    Notes:       Clinical Status of Patient:  Outpatient (Ambulatory)    Chief Complaint:  Jered Contreras is a 46 y.o. male who presents today for follow-up of depression, anxiety and and history of panic attacks.  Met with patient.      Interval History and Content of Current Session:  Interim Events/Subjective Report/Content of Current Session:   Reviewed messages from communication with patient since his last visit    Patient described his mood as "better" and affect was euthymic. He stated his wife has been assisting him in expressing his anger and frustration in healthy ways. He isn't in therapy at this time but still interested in starting individual and couple's therapy. He reported managed anxiety, mood, and depression at this time. He rated his depression at 5/10 and anxiety 3/10 with 10/10 " being the most severe.     He stated he is now taking Lamictal 100 mg po daily, Cymbalta 60 mg po daily. and Wellbutrin xl 300 mg po daily and finds them effective in managing his anxiety, mood, and depression. He reported poor sleep at times due to work stress, buying/selling his house, and due to having a sinus infection. He stated he is working on improving his sleep by adhering to good sleep hygiene (has sleep apnea, uses CPAP and due for another sleep study). He takes melatonin as needed. He is not taking vistaril. We discussed the importance of utilizing effective sleep hygiene skills and melatonin as needed. Provided supportive therapy and encouraged utilization of CBT skills.     No changes in appetite and reported a slight increase in weight since his last visit due to decreased activity in the heat. He stated he is planning to get a revision of his weigh loss surgery.  Patient denied SI/HI/AH/VH and no delusion noted or reported.     He stated he has problems with focus, attention, and finishing tasks that is creating problems in his personal and professional life but more with his personal life. We discussed ADHD evaluation in the future.     He still works from home. 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 Patient denied symptoms of stuart and denied alcohol abuse or any type of illicit drug use.    Psychiatric Review Of Systems - Is patient experiencing or having changes in:  sleep: no change.  appetite: no  Weight:yes slight gain due to decreased activity due to heat  energy/anergy: no  interest/pleasure/anhedonia: no  somatic symptoms: no  anxiety/panic: yes but managed  guilty/hopelessness: no  concentration: no  S.I.B.s/risky behavior: no  Irritability: improved and expressed in healthy ways  Racing thoughts: no  Impulsive behaviors: no  Paranoia:no  AVH:no  Suicidal thoughts/plan/intent: no      Psychotropic medication review  Previous Trials-  -lexapro (worked for a while  but stopped working and PCP switched him to Zoloft)  -Zoloft 200 mg po daily  -vistaril 25 mg po prn daily    Current meds-  -Wellbutrin xl 300mg po daily  -Cymbalta 60 mg po daily  -Lamictal 100 mg po daily       Psychotherapy:  · Target symptoms: depression, anxiety   · Why chosen therapy is appropriate versus another modality: relevant to diagnosis, patient responds to this modality, evidence based practice  · Outcome monitoring methods: self-report, observation  · Therapeutic intervention type: insight oriented psychotherapy, behavior modifying psychotherapy, supportive psychotherapy  · Topics discussed/themes: building skills sets for symptom management, symptom recognition  · The patient's response to the intervention is motivated. The patient's progress toward treatment goals is fair.   · Duration of intervention: 10 minutes.    Review of Systems   · PSYCHIATRIC: Pertinant items are noted in the narrative.  · CONSTITUTIONAL: No weight gain or loss.   · MUSCULOSKELETAL: No pain or stiffness of the joints.  · NEUROLOGIC: No weakness, sensory changes, seizures, confusion, memory loss, tremor or other abnormal movements.  · ENDOCRINE: No polydipsia or polyuria.  · INTEGUMENTARY: No rashes or lacerations.  · EYES: No exophthalmos, jaundice or blindness.  · ENT: No dizziness, tinnitus or hearing loss.  · RESPIRATORY: No shortness of breath.  · CARDIOVASCULAR: No tachycardia or chest pain.  · GASTROINTESTINAL: No nausea, vomiting, pain, constipation or diarrhea.  · GENITOURINARY: No frequency, dysuria or sexual dysfunction.  · HEMATOLOGIC/LYMPHATIC: No excessive bleeding, prolonged or excessive bleeding after dental extraction/injury.  · ALLERGIC/IMMUNOLOGIC: No allergic response to materials, foods or animals at this time.    Past Medical, Family and Social History: The patient's past medical, family and social history have been reviewed and updated as appropriate within the electronic medical record - see  "encounter notes.    Compliance: yes    Side effects: None    Risk Parameters:  Patient reports no suicidal ideation  Patient reports no homicidal ideation  Patient reports no self-injurious behavior  Patient reports no violent behavior    Exam (detailed: at least 9 elements; comprehensive: all 15 elements)   Constitutional  Vitals:  Most recent vital signs, dated greater than 90 days prior to this appointment, were reviewed.   There were no vitals filed for this visit.     General:  unremarkable, age appropriate     Musculoskeletal  Muscle Strength/Tone:  no dystonia, no tremor, no tic   Gait & Station:  non-ataxic     Psychiatric  Speech:  no latency; no press   Mood & Affect:  "better"  euthymic   Thought Process:  normal and logical   Associations:  intact   Thought Content:  normal, no suicidality, no homicidality, delusions, or paranoia   Insight:  intact, has awareness of illness   Judgement: behavior is adequate to circumstances   Orientation:  grossly intact   Memory: intact for content of interview, grossly intact   Language: grossly intact, able to name, able to repeat   Attention Span & Concentration:  able to focus, completed tasks   Fund of Knowledge:  intact and appropriate to age and level of education, familiar with aspects of current personal life     Assessment and Diagnosis   Status/Progress: Based on the examination today, the patient's problem(s) is/are improved.  New problems have not been presented today.   Co-morbidities, Diagnostic uncertainty and Lack of compliance are not complicating management of the primary condition.  There are no active rule-out diagnoses for this patient at this time.     General Impression: Doing okay and stable.       ICD-10-CM ICD-9-CM   1. Moderate episode of recurrent major depressive disorder  F33.1 296.32   2. WAYNE (generalized anxiety disorder)  F41.1 300.02   3. History of panic attacks  Z86.59 V11.8       Intervention/Counseling/Treatment Plan   · Medication " Management: Continue current medications. The risks and benefits of medication were discussed with the patient.  · Medication Management:   · Continue Wellbutrin xl 300 mg po daily for MDD and WAYNE  · Continue Cymbalta 60 mg po daily or WAYNE and MDD  · Continue Lamictal 100 mg po daily for mood stabilization. Warned against SJS and all the associated side effects and he agreed to take it.   · Restart psychotherapy to learn effective coping skills   · Continue utilization of effective CBT skills, positive self-talk, cognitive reframing, meditation, mindfulness, deep breathing, and relaxations skills.  · Encouraged effective sleep hygiene and continue melatonin 3-5 mg po qhs prn for insomnia      · Labs: reviewed most recent labs  · The treatment plan and follow up plan were reviewed with the patient.  · Discussed with patient informed consent, risks vs. benefits, alternative treatments, side effect profile and the inherent unpredictability of individual responses to these treatments. The patient expresses understanding of the above and displays the capacity to agree with this current plan and had no other questions.  · Encouraged Patient to keep future appointments.   · Take medications as prescribed   · In the event of an emergency patient was advised to go to the emergency room.      Return to Clinic: 1 month or earlier as needed    YI Kwon-BC

## 2022-06-21 ENCOUNTER — OFFICE VISIT (OUTPATIENT)
Dept: INTERNAL MEDICINE | Facility: CLINIC | Age: 47
End: 2022-06-21
Payer: COMMERCIAL

## 2022-06-21 ENCOUNTER — LAB VISIT (OUTPATIENT)
Dept: LAB | Facility: HOSPITAL | Age: 47
End: 2022-06-21
Attending: INTERNAL MEDICINE
Payer: COMMERCIAL

## 2022-06-21 ENCOUNTER — PATIENT MESSAGE (OUTPATIENT)
Dept: OTOLARYNGOLOGY | Facility: CLINIC | Age: 47
End: 2022-06-21
Payer: COMMERCIAL

## 2022-06-21 VITALS
HEIGHT: 73 IN | OXYGEN SATURATION: 97 % | SYSTOLIC BLOOD PRESSURE: 134 MMHG | HEART RATE: 77 BPM | DIASTOLIC BLOOD PRESSURE: 80 MMHG | WEIGHT: 315 LBS | BODY MASS INDEX: 41.75 KG/M2

## 2022-06-21 DIAGNOSIS — R60.0 BILATERAL LEG EDEMA: ICD-10-CM

## 2022-06-21 DIAGNOSIS — R35.0 URINARY FREQUENCY: Primary | ICD-10-CM

## 2022-06-21 LAB
ALBUMIN SERPL BCP-MCNC: 3.8 G/DL (ref 3.5–5.2)
ALP SERPL-CCNC: 77 U/L (ref 55–135)
ALT SERPL W/O P-5'-P-CCNC: 20 U/L (ref 10–44)
ANION GAP SERPL CALC-SCNC: 10 MMOL/L (ref 8–16)
AST SERPL-CCNC: 22 U/L (ref 10–40)
BILIRUB SERPL-MCNC: 0.6 MG/DL (ref 0.1–1)
BILIRUB SERPL-MCNC: NORMAL MG/DL
BLOOD, POC UA: NEGATIVE
BNP SERPL-MCNC: <10 PG/ML (ref 0–99)
BUN SERPL-MCNC: 8 MG/DL (ref 6–20)
CALCIUM SERPL-MCNC: 9.7 MG/DL (ref 8.7–10.5)
CHLORIDE SERPL-SCNC: 104 MMOL/L (ref 95–110)
CO2 SERPL-SCNC: 27 MMOL/L (ref 23–29)
CREAT SERPL-MCNC: 0.9 MG/DL (ref 0.5–1.4)
EST. GFR  (AFRICAN AMERICAN): >60 ML/MIN/1.73 M^2
EST. GFR  (NON AFRICAN AMERICAN): >60 ML/MIN/1.73 M^2
GLUCOSE SERPL-MCNC: 79 MG/DL (ref 70–110)
GLUCOSE UR QL STRIP: NORMAL
KETONES UR QL STRIP: NEGATIVE
LEUKOCYTE ESTERASE URINE, POC: NEGATIVE
NITRITE, POC UA: NEGATIVE
PH, POC UA: 8
POTASSIUM SERPL-SCNC: 4.2 MMOL/L (ref 3.5–5.1)
PROT SERPL-MCNC: 7 G/DL (ref 6–8.4)
PROTEIN, POC: NORMAL
SODIUM SERPL-SCNC: 141 MMOL/L (ref 136–145)
SPECIFIC GRAVITY, POC UA: 1
UROBILINOGEN, POC UA: NORMAL

## 2022-06-21 PROCEDURE — 99999 PR PBB SHADOW E&M-EST. PATIENT-LVL V: CPT | Mod: PBBFAC,,, | Performed by: INTERNAL MEDICINE

## 2022-06-21 PROCEDURE — 3075F PR MOST RECENT SYSTOLIC BLOOD PRESS GE 130-139MM HG: ICD-10-PCS | Mod: CPTII,S$GLB,, | Performed by: INTERNAL MEDICINE

## 2022-06-21 PROCEDURE — 1160F PR REVIEW ALL MEDS BY PRESCRIBER/CLIN PHARMACIST DOCUMENTED: ICD-10-PCS | Mod: CPTII,S$GLB,, | Performed by: INTERNAL MEDICINE

## 2022-06-21 PROCEDURE — 99214 OFFICE O/P EST MOD 30 MIN: CPT | Mod: S$GLB,,, | Performed by: INTERNAL MEDICINE

## 2022-06-21 PROCEDURE — 1159F MED LIST DOCD IN RCRD: CPT | Mod: CPTII,S$GLB,, | Performed by: INTERNAL MEDICINE

## 2022-06-21 PROCEDURE — 99214 PR OFFICE/OUTPT VISIT, EST, LEVL IV, 30-39 MIN: ICD-10-PCS | Mod: S$GLB,,, | Performed by: INTERNAL MEDICINE

## 2022-06-21 PROCEDURE — 81003 URINALYSIS AUTO W/O SCOPE: CPT | Mod: QW,S$GLB,, | Performed by: INTERNAL MEDICINE

## 2022-06-21 PROCEDURE — 3008F PR BODY MASS INDEX (BMI) DOCUMENTED: ICD-10-PCS | Mod: CPTII,S$GLB,, | Performed by: INTERNAL MEDICINE

## 2022-06-21 PROCEDURE — 1160F RVW MEDS BY RX/DR IN RCRD: CPT | Mod: CPTII,S$GLB,, | Performed by: INTERNAL MEDICINE

## 2022-06-21 PROCEDURE — 3079F DIAST BP 80-89 MM HG: CPT | Mod: CPTII,S$GLB,, | Performed by: INTERNAL MEDICINE

## 2022-06-21 PROCEDURE — 36415 COLL VENOUS BLD VENIPUNCTURE: CPT | Performed by: INTERNAL MEDICINE

## 2022-06-21 PROCEDURE — 81003 URINALYSIS AUTO W/O SCOPE: CPT | Performed by: INTERNAL MEDICINE

## 2022-06-21 PROCEDURE — 83880 ASSAY OF NATRIURETIC PEPTIDE: CPT | Performed by: INTERNAL MEDICINE

## 2022-06-21 PROCEDURE — 81003 POCT URINALYSIS: ICD-10-PCS | Mod: QW,S$GLB,, | Performed by: INTERNAL MEDICINE

## 2022-06-21 PROCEDURE — 3079F PR MOST RECENT DIASTOLIC BLOOD PRESSURE 80-89 MM HG: ICD-10-PCS | Mod: CPTII,S$GLB,, | Performed by: INTERNAL MEDICINE

## 2022-06-21 PROCEDURE — 3075F SYST BP GE 130 - 139MM HG: CPT | Mod: CPTII,S$GLB,, | Performed by: INTERNAL MEDICINE

## 2022-06-21 PROCEDURE — 1159F PR MEDICATION LIST DOCUMENTED IN MEDICAL RECORD: ICD-10-PCS | Mod: CPTII,S$GLB,, | Performed by: INTERNAL MEDICINE

## 2022-06-21 PROCEDURE — 99999 PR PBB SHADOW E&M-EST. PATIENT-LVL V: ICD-10-PCS | Mod: PBBFAC,,, | Performed by: INTERNAL MEDICINE

## 2022-06-21 PROCEDURE — 80053 COMPREHEN METABOLIC PANEL: CPT | Performed by: INTERNAL MEDICINE

## 2022-06-21 PROCEDURE — 3008F BODY MASS INDEX DOCD: CPT | Mod: CPTII,S$GLB,, | Performed by: INTERNAL MEDICINE

## 2022-06-21 NOTE — PROGRESS NOTES
"Subjective:       Patient ID: Jered Contreras is a 46 y.o. male.    Chief Complaint: Leg Swelling (Mostly in the left) and Follow-up    HPI   46 y.o. male here for evaluation of leg swelling, urinary frequency.    He underwent weight loss surgery in 2019 and lost 105 lbs but has unfortunately gained some of this back. Highest weight was 424 lbs and 400 lbs today.     U/A: neg leuk, nitrities, trace protein    He is getting a bariatric surgery revision due to bile reflux.    Waking up with swelling in legs. Worse over last week or two. No clear inciting factor.    Having to urinate frequently. Has had to go frequenty since he was a child but worse than usual. He was drinking more soda but not as much now.    Review of Systems   Constitutional: Negative for fever.   Respiratory: Negative for shortness of breath.    Cardiovascular: Positive for leg swelling. Negative for chest pain.   Musculoskeletal: Negative.    Skin: Negative.        Objective:   /80 (BP Location: Right arm, Patient Position: Sitting, BP Method: Large (Manual))   Pulse 77   Ht 6' 1" (1.854 m)   Wt (!) 181.4 kg (400 lb 0.4 oz)   SpO2 97%   BMI 52.78 kg/m²      Physical Exam  Constitutional:       General: He is not in acute distress.     Appearance: He is well-developed. He is not diaphoretic.   HENT:      Head: Normocephalic and atraumatic.   Cardiovascular:      Rate and Rhythm: Normal rate and regular rhythm.      Heart sounds: No murmur heard.     Comments: 2+edema with slight warmth to bilateral lower extremities.   Pulmonary:      Effort: Pulmonary effort is normal. No respiratory distress.      Breath sounds: No wheezing or rales.   Skin:     General: Skin is warm and dry.   Neurological:      Mental Status: He is alert and oriented to person, place, and time.   Psychiatric:         Behavior: Behavior normal.         Assessment:       1. Urinary frequency    2. Bilateral leg edema        Plan:       Jered was seen today " for leg swelling and follow-up.    Diagnoses and all orders for this visit:    Urinary frequency  -     Urinalysis, Reflex to Urine Culture Urine, Clean Catch; Future  -     POCT Urinalysis  -     Urinalysis, Reflex to Urine Culture Urine, Clean Catch    Bilateral leg edema  -     Comprehensive Metabolic Panel; Future  -     BNP; Future   Elevate, low salt, compression socks

## 2022-06-22 LAB
BILIRUB UR QL STRIP: NEGATIVE
CLARITY UR REFRACT.AUTO: CLEAR
COLOR UR AUTO: YELLOW
GLUCOSE UR QL STRIP: NEGATIVE
HGB UR QL STRIP: NEGATIVE
KETONES UR QL STRIP: NEGATIVE
LEUKOCYTE ESTERASE UR QL STRIP: NEGATIVE
NITRITE UR QL STRIP: NEGATIVE
PH UR STRIP: 7 [PH] (ref 5–8)
PROT UR QL STRIP: NEGATIVE
SP GR UR STRIP: 1.01 (ref 1–1.03)
URN SPEC COLLECT METH UR: NORMAL

## 2022-06-30 ENCOUNTER — OFFICE VISIT (OUTPATIENT)
Dept: INTERNAL MEDICINE | Facility: CLINIC | Age: 47
End: 2022-06-30
Payer: COMMERCIAL

## 2022-06-30 VITALS
HEART RATE: 85 BPM | WEIGHT: 315 LBS | DIASTOLIC BLOOD PRESSURE: 82 MMHG | HEIGHT: 73 IN | SYSTOLIC BLOOD PRESSURE: 139 MMHG | BODY MASS INDEX: 41.75 KG/M2 | TEMPERATURE: 98 F | OXYGEN SATURATION: 97 %

## 2022-06-30 DIAGNOSIS — J30.9 CHRONIC ALLERGIC RHINITIS: ICD-10-CM

## 2022-06-30 DIAGNOSIS — J30.9 ALLERGIC SINUSITIS: Primary | ICD-10-CM

## 2022-06-30 PROCEDURE — 99214 PR OFFICE/OUTPT VISIT, EST, LEVL IV, 30-39 MIN: ICD-10-PCS | Mod: S$GLB,,, | Performed by: INTERNAL MEDICINE

## 2022-06-30 PROCEDURE — 3075F PR MOST RECENT SYSTOLIC BLOOD PRESS GE 130-139MM HG: ICD-10-PCS | Mod: CPTII,S$GLB,, | Performed by: INTERNAL MEDICINE

## 2022-06-30 PROCEDURE — 3079F DIAST BP 80-89 MM HG: CPT | Mod: CPTII,S$GLB,, | Performed by: INTERNAL MEDICINE

## 2022-06-30 PROCEDURE — 99999 PR PBB SHADOW E&M-EST. PATIENT-LVL IV: CPT | Mod: PBBFAC,,, | Performed by: INTERNAL MEDICINE

## 2022-06-30 PROCEDURE — 3075F SYST BP GE 130 - 139MM HG: CPT | Mod: CPTII,S$GLB,, | Performed by: INTERNAL MEDICINE

## 2022-06-30 PROCEDURE — 3079F PR MOST RECENT DIASTOLIC BLOOD PRESSURE 80-89 MM HG: ICD-10-PCS | Mod: CPTII,S$GLB,, | Performed by: INTERNAL MEDICINE

## 2022-06-30 PROCEDURE — 1159F MED LIST DOCD IN RCRD: CPT | Mod: CPTII,S$GLB,, | Performed by: INTERNAL MEDICINE

## 2022-06-30 PROCEDURE — 1159F PR MEDICATION LIST DOCUMENTED IN MEDICAL RECORD: ICD-10-PCS | Mod: CPTII,S$GLB,, | Performed by: INTERNAL MEDICINE

## 2022-06-30 PROCEDURE — 3008F PR BODY MASS INDEX (BMI) DOCUMENTED: ICD-10-PCS | Mod: CPTII,S$GLB,, | Performed by: INTERNAL MEDICINE

## 2022-06-30 PROCEDURE — 99214 OFFICE O/P EST MOD 30 MIN: CPT | Mod: S$GLB,,, | Performed by: INTERNAL MEDICINE

## 2022-06-30 PROCEDURE — 3008F BODY MASS INDEX DOCD: CPT | Mod: CPTII,S$GLB,, | Performed by: INTERNAL MEDICINE

## 2022-06-30 PROCEDURE — 99999 PR PBB SHADOW E&M-EST. PATIENT-LVL IV: ICD-10-PCS | Mod: PBBFAC,,, | Performed by: INTERNAL MEDICINE

## 2022-06-30 RX ORDER — IPRATROPIUM BROMIDE 21 UG/1
2 SPRAY, METERED NASAL 3 TIMES DAILY PRN
Qty: 30 ML | Refills: 5 | Status: SHIPPED | OUTPATIENT
Start: 2022-06-30 | End: 2022-06-30 | Stop reason: SDUPTHER

## 2022-06-30 RX ORDER — AZELASTINE 1 MG/ML
2 SPRAY, METERED NASAL 2 TIMES DAILY PRN
Qty: 30 ML | Refills: 5 | Status: SHIPPED | OUTPATIENT
Start: 2022-06-30

## 2022-06-30 RX ORDER — MONTELUKAST SODIUM 10 MG/1
10 TABLET ORAL NIGHTLY
Qty: 90 TABLET | Refills: 3 | Status: SHIPPED | OUTPATIENT
Start: 2022-06-30

## 2022-06-30 RX ORDER — IPRATROPIUM BROMIDE 21 UG/1
2 SPRAY, METERED NASAL 3 TIMES DAILY PRN
Qty: 30 ML | Refills: 5 | Status: SHIPPED | OUTPATIENT
Start: 2022-06-30 | End: 2024-03-27

## 2022-06-30 RX ORDER — DEXAMETHASONE 4 MG/1
4 TABLET ORAL EVERY 8 HOURS
Qty: 21 TABLET | Refills: 0 | Status: SHIPPED | OUTPATIENT
Start: 2022-06-30 | End: 2022-07-07

## 2022-06-30 RX ORDER — AZELASTINE 1 MG/ML
2 SPRAY, METERED NASAL 2 TIMES DAILY PRN
Qty: 30 ML | Refills: 5 | Status: SHIPPED | OUTPATIENT
Start: 2022-06-30 | End: 2022-06-30 | Stop reason: SDUPTHER

## 2022-06-30 RX ORDER — BENZONATATE 100 MG/1
100 CAPSULE ORAL 3 TIMES DAILY PRN
Qty: 60 CAPSULE | Refills: 3 | Status: SHIPPED | OUTPATIENT
Start: 2022-06-30 | End: 2023-02-14

## 2022-06-30 NOTE — PROGRESS NOTES
INTERNAL MEDICINE CLINIC - SAME DAY APPOINTMENT  Progress Note    PRESENTING HISTORY     PCP: Angela Dacosta MD    Chief Complaint/Reason for Visit:  Cold symptoms    History of Present Illness & ROS : Mr. Jered Contreras is a 46 y.o. male.      Started with nasal drip on Monday.   Sinus congestion. Brown yellow nasal discharge.    Coughing worse at night.  (He is working night shift now in IT).  No chills.  No change in taste or smell.    No ear clogging.    No chest pain or SOB.  Sore chest from coughing.    Took Claritin-D 24, Tylenol Sinus and Theraflu at night.    Home  COVID test negative.    Uses Flonase and Astelin.    PAST HISTORY:     Past Medical History:   Diagnosis Date    Allergic rhinitis     Asthma     Depression     Hx of psychiatric care     Morbid obesity     Obstructive sleep apnea treated with continuous positive airway pressure (CPAP) 7/10/2018    Psychiatric problem     Therapy        Past Surgical History:   Procedure Laterality Date    APPENDECTOMY      APPLICATION OF CARTILAGE GRAFT Bilateral 12/6/2021    Procedure: APPLICATION, CARTILAGE GRAFT;  Surgeon: JAN Arredondo MD;  Location: [facility];  Service: ENT;  Laterality: Bilateral;  from right ear    gastric sleeve  09/2019    [facility] Hardik Martin    NASAL SEPTOPLASTY Bilateral 12/6/2021    Procedure: SEPTOPLASTY, NOSE;  Surgeon: JAN Arredondo MD;  Location: [facility];  Service: ENT;  Laterality: Bilateral;    NASAL STENOSIS REPAIR Bilateral 12/6/2021    Procedure: REPAIR, STENOSIS, NOSE, VESTIBULE;  Surgeon: JAN Arredondo MD;  Location: [facility];  Service: ENT;  Laterality: Bilateral;    SINUS SURGERY      VASECTOMY  2017       Family History   Problem Relation Age of Onset    Diabetes Father     Hypertension Father     Rheum arthritis Mother     Lung cancer Mother         post lobectomy    Dementia Mother     Rheum arthritis Maternal Grandfather     Diabetes Paternal Grandmother      Alzheimer's disease Paternal Grandmother     Hypertension Paternal Grandfather     Obesity Paternal Grandfather     Cerebral aneurysm Paternal Grandfather     Hypertension Maternal Grandmother     Dementia Maternal Grandmother     Brain cancer Maternal Grandmother         in remission before she     Melanoma Neg Hx        Social History     Socioeconomic History    Marital status:    Occupational History    Occupation: Information Tech     Employer: [employer]   Tobacco Use    Smoking status: Never Smoker    Smokeless tobacco: Never Used   Substance and Sexual Activity    Alcohol use: Yes     Alcohol/week: 1.0 standard drink     Types: 1 Cans of beer per week     Comment: occ    Drug use: No    Sexual activity: Yes     Partners: Female       MEDICATIONS & ALLERGIES:     Current Outpatient Medications on File Prior to Visit   Medication Sig Dispense Refill    ASCORBATE CALCIUM (VITAMIN C ORAL) Take by mouth.      azelastine (ASTELIN) 137 mcg (0.1 %) nasal spray Two sprays in each nostril, sniff until absorbed, then follow with 1 spray of fluticasone.  Use both sprays twice daily. 30 mL 11    b complex vitamins (B COMPLEX-VITAMIN B12) tablet Take 1 tablet by mouth once daily.      benzonatate (TESSALON) 100 MG capsule Take 1 capsule (100 mg total) by mouth 3 (three) times daily as needed for Cough. 30 capsule 0    buPROPion (WELLBUTRIN XL) 300 MG 24 hr tablet Take 1 tablet (300 mg total) by mouth once daily. 30 tablet 2    DULoxetine (CYMBALTA) 60 MG capsule TAKE 1 CAPSULE(60 MG) BY MOUTH EVERY DAY 30 capsule 2    fluticasone propionate (FLONASE) 50 mcg/actuation nasal spray One spray in each nostril twice daily after 1st using azelastine nasal spray 18.2 mL 11    hydrOXYzine HCL (ATARAX) 25 MG tablet TK 1 T PO  TID PRN ANXIETY 30 tablet 6    ipratropium (ATROVENT) 21 mcg (0.03 %) nasal spray 2 sprays by Nasal route 2 (two) times daily. May be use more often if needed 30  mL 11    lamoTRIgine (LAMICTAL) 100 MG tablet Take 1 tablet (100 mg total) by mouth once daily. 30 tablet 2    montelukast (SINGULAIR) 10 mg tablet One tablet by mouth every evening 30 tablet 11    multivitamin capsule Take 1 capsule by mouth once daily.      mupirocin (BACTROBAN) 2 % ointment Apply to nose 3 times daily on a Q-tip for crusting or sores inside the nose 22 g 3    ondansetron (ZOFRAN-ODT) 8 MG TbDL Take 1 tablet (8 mg total) by mouth every 6 (six) hours as needed (Nausea or vomiting). 10 tablet 2    pantoprazole (PROTONIX) 40 MG tablet TK 1 T PO QD 90 tablet 3    promethazine-dextromethorphan (PROMETHAZINE-DM) 6.25-15 mg/5 mL Syrp Take 5 mLs by mouth every 6 (six) hours as needed (cough and congestion). 240 mL 0    rOPINIRole (REQUIP) 0.5 MG tablet Take 2 tablets (1 mg total) by mouth every evening. 30 tablet 6     No current facility-administered medications on file prior to visit.        Review of patient's allergies indicates:   Allergen Reactions    Ceclor [cefaclor] Anaphylaxis    Penicillins Hives     Elevated blood pressure, temp, hives       Medications Reconciliation:   I have reconciled the patient's home medications with the patient/family. I have updated all changes.  Refer to After-Visit Medication List.    OBJECTIVE:     Vital Signs:  Vitals:    06/30/22 1634   BP: 139/82   Pulse: 85   Temp: 98.3 °F (36.8 °C)     Wt Readings from Last 3 Encounters:   06/30/22 1634 (!) 182.9 kg (403 lb 3.5 oz)   06/21/22 1444 (!) 181.4 kg (400 lb 0.4 oz)   06/03/22 0907 (!) 175.8 kg (387 lb 9.1 oz)     Body mass index is 53.2 kg/m².     Physical Exam:  General: Well developed, well nourished. No distress.  HEENT: Head is normocephalic, atraumatic; ears are normal.  TM clear bilaterally.  Pharynx is normal. Moderate nasal erythema and congestion.  Eyes: Clear conjunctiva.  Neck: Supple, symmetrical neck; trachea midline.  Lungs: Clear to auscultation bilaterally and normal respiratory  effort.  Cardiovascular: Heart with regular rate and rhythm.    Extremities: No LE edema. Pulses 2+ and symmetric.   Abdomen: Abdomen is soft  Musculoskeletal: Normal gait.   Lymph Nodes: No cervical or supraclavicular adenopathy.  Psychiatric: Normal affect. Alert.      Laboratory  Lab Results   Component Value Date    WBC 6.15 10/29/2021    HGB 13.0 (L) 10/29/2021    HCT 40.5 10/29/2021     10/29/2021    CHOL 185 10/29/2021    TRIG 66 10/29/2021    HDL 48 10/29/2021    ALT 20 06/21/2022    AST 22 06/21/2022     06/21/2022    K 4.2 06/21/2022     06/21/2022    CREATININE 0.9 06/21/2022    BUN 8 06/21/2022    CO2 27 06/21/2022    TSH 3.405 10/29/2021    HGBA1C 4.9 10/29/2021       ASSESSMENT & PLAN:     Allergic sinusitis  -     dexAMETHasone (DECADRON) 4 MG Tab; Take 1 tablet (4 mg total) by mouth every 8 (eight) hours. for 7 days  Dispense: 21 tablet; Refill: 0  -     benzonatate (TESSALON) 100 MG capsule; Take 1 capsule (100 mg total) by mouth 3 (three) times daily as needed for Cough.  Dispense: 60 capsule; Refill: 3    Chronic allergic rhinitis  -     montelukast (SINGULAIR) 10 mg tablet; Take 1 tablet (10 mg total) by mouth every evening. Take during allergy season.  Dispense: 90 tablet; Refill: 3  -     azelastine (ASTELIN) 137 mcg (0.1 %) nasal spray; 2 sprays (274 mcg total) by Nasal route 2 (two) times daily as needed (Nasal congestion).  Dispense: 30 mL; Refill: 5  -     ipratropium (ATROVENT) 21 mcg (0.03 %) nasal spray; 2 sprays by Nasal route 3 (three) times daily as needed for Rhinitis (runing nose).  Dispense: 30 mL; Refill: 5    Scheduled Follow-up :  Future Appointments   Date Time Provider Department Center          8/1/2022  9:30 AM Angeles Hay MD [facility] [place]   8/19/2022  8:20 AM [facility]   10/17/2022 10:00 AM Angela Dacosta MD [facility]       After Visit Medication List :     Medication List          Accurate as of  June 30, 2022  4:54 PM. If you have any questions, ask your nurse or doctor.            START taking these medications    dexAMETHasone 4 MG Tab  Commonly known as: DECADRON  Take 1 tablet (4 mg total) by mouth every 8 (eight) hours. for 7 days  Started by: Oni Crowell MD        CHANGE how you take these medications    azelastine 137 mcg (0.1 %) nasal spray  Commonly known as: ASTELIN  2 sprays (274 mcg total) by Nasal route 2 (two) times daily as needed (Nasal congestion).  What changed:   · how much to take  · how to take this  · when to take this  · reasons to take this  · additional instructions  Changed by: Oni Crowell MD     ipratropium 21 mcg (0.03 %) nasal spray  Commonly known as: ATROVENT  2 sprays by Nasal route 3 (three) times daily as needed for Rhinitis (runing nose).  What changed:   · when to take this  · reasons to take this  · additional instructions  Changed by: Oni Crowell MD     montelukast 10 mg tablet  Commonly known as: SINGULAIR  Take 1 tablet (10 mg total) by mouth every evening. Take during allergy season.  What changed:   · how much to take  · how to take this  · when to take this  · additional instructions  Changed by: Oni Crowell MD        CONTINUE taking these medications    b complex vitamins tablet  Commonly known as: B COMPLEX-VITAMIN B12  Take 1 tablet by mouth once daily.     benzonatate 100 MG capsule  Commonly known as: TESSALON  Take 1 capsule (100 mg total) by mouth 3 (three) times daily as needed for Cough.     buPROPion 300 MG 24 hr tablet  Commonly known as: WELLBUTRIN XL  Take 1 tablet (300 mg total) by mouth once daily.     DULoxetine 60 MG capsule  Commonly known as: CYMBALTA  TAKE 1 CAPSULE(60 MG) BY MOUTH EVERY DAY     fluticasone propionate 50 mcg/actuation nasal spray  Commonly known as: FLONASE  One spray in each nostril twice daily after 1st using azelastine nasal spray     hydrOXYzine HCL 25 MG tablet  Commonly known as: ATARAX  TK 1 T PO  TID PRN  ANXIETY     lamoTRIgine 100 MG tablet  Commonly known as: LAMICTAL  Take 1 tablet (100 mg total) by mouth once daily.     multivitamin capsule     mupirocin 2 % ointment  Commonly known as: BACTROBAN  Apply to nose 3 times daily on a Q-tip for crusting or sores inside the nose     ondansetron 8 MG Tbdl  Commonly known as: ZOFRAN-ODT  Take 1 tablet (8 mg total) by mouth every 6 (six) hours as needed (Nausea or vomiting).     pantoprazole 40 MG tablet  Commonly known as: PROTONIX  TK 1 T PO QD     promethazine-dextromethorphan 6.25-15 mg/5 mL Syrp  Commonly known as: PROMETHAZINE-DM  Take 5 mLs by mouth every 6 (six) hours as needed (cough and congestion).     rOPINIRole 0.5 MG tablet  Commonly known as: REQUIP  Take 2 tablets (1 mg total) by mouth every evening.     VITAMIN C ORAL           Where to Get Your Medications      These medications were sent to [facility] #[id number] [place] [address] EXP AT [address] [address]    Phone: [phone]   · azelastine 137 mcg (0.1 %) nasal spray  · benzonatate 100 MG capsule  · dexAMETHasone 4 MG Tab  · ipratropium 21 mcg (0.03 %) nasal spray  · montelukast 10 mg tablet         Signing Physician:  Oni Crowell MD

## 2022-07-17 NOTE — PROGRESS NOTES
46-year-old  For the past 4 days he started noticing erythema involving the distal aspect of his left lower extremity associated with some degree of swelling.  Initially started off as a little clear blister and has noted other clear blisters with clear drainage along with surrounding erythema.  It is a little bit uncomfortable.  Feels warm.  There has been no fever.  He does not recall any trauma to his legs other than he was cutting grass right before this occurred.  He has been seen for swelling involving his lower extremity but has not been given any diagnosis.      Medical history  Depression anxiety  Obstructive sleep apnea uses CPAP  Allergic rhinitis    Medication list per med card    Examination  Weight 414 lb  Pulse 88  Blood pressure 130/72  2+ bilateral pedal pretibial edema  Left lower extremity multiple vesicles noted with surrounding erythema and warmth around the leg.    Impression  Cellulitis  Lower extremity edema probably that of venous insufficiency    Plan  Cefdinir 300 mg twice a day for 10 days  Lasix 20 mg once a day for week with Micro-K 10 mEq once a day

## 2022-07-18 ENCOUNTER — OFFICE VISIT (OUTPATIENT)
Dept: INTERNAL MEDICINE | Facility: CLINIC | Age: 47
End: 2022-07-18
Payer: COMMERCIAL

## 2022-07-18 VITALS
HEART RATE: 88 BPM | WEIGHT: 315 LBS | SYSTOLIC BLOOD PRESSURE: 136 MMHG | HEIGHT: 73 IN | BODY MASS INDEX: 41.75 KG/M2 | OXYGEN SATURATION: 98 % | DIASTOLIC BLOOD PRESSURE: 80 MMHG

## 2022-07-18 DIAGNOSIS — R60.0 BILATERAL LEG EDEMA: ICD-10-CM

## 2022-07-18 DIAGNOSIS — L03.116 CELLULITIS OF LEFT LOWER EXTREMITY: Primary | ICD-10-CM

## 2022-07-18 PROCEDURE — 99999 PR PBB SHADOW E&M-EST. PATIENT-LVL IV: CPT | Mod: PBBFAC,,, | Performed by: INTERNAL MEDICINE

## 2022-07-18 PROCEDURE — 3075F PR MOST RECENT SYSTOLIC BLOOD PRESS GE 130-139MM HG: ICD-10-PCS | Mod: CPTII,S$GLB,, | Performed by: INTERNAL MEDICINE

## 2022-07-18 PROCEDURE — 3008F BODY MASS INDEX DOCD: CPT | Mod: CPTII,S$GLB,, | Performed by: INTERNAL MEDICINE

## 2022-07-18 PROCEDURE — 99999 PR PBB SHADOW E&M-EST. PATIENT-LVL IV: ICD-10-PCS | Mod: PBBFAC,,, | Performed by: INTERNAL MEDICINE

## 2022-07-18 PROCEDURE — 99214 OFFICE O/P EST MOD 30 MIN: CPT | Mod: S$GLB,,, | Performed by: INTERNAL MEDICINE

## 2022-07-18 PROCEDURE — 3075F SYST BP GE 130 - 139MM HG: CPT | Mod: CPTII,S$GLB,, | Performed by: INTERNAL MEDICINE

## 2022-07-18 PROCEDURE — 3079F DIAST BP 80-89 MM HG: CPT | Mod: CPTII,S$GLB,, | Performed by: INTERNAL MEDICINE

## 2022-07-18 PROCEDURE — 3079F PR MOST RECENT DIASTOLIC BLOOD PRESSURE 80-89 MM HG: ICD-10-PCS | Mod: CPTII,S$GLB,, | Performed by: INTERNAL MEDICINE

## 2022-07-18 PROCEDURE — 3008F PR BODY MASS INDEX (BMI) DOCUMENTED: ICD-10-PCS | Mod: CPTII,S$GLB,, | Performed by: INTERNAL MEDICINE

## 2022-07-18 PROCEDURE — 1160F PR REVIEW ALL MEDS BY PRESCRIBER/CLIN PHARMACIST DOCUMENTED: ICD-10-PCS | Mod: CPTII,S$GLB,, | Performed by: INTERNAL MEDICINE

## 2022-07-18 PROCEDURE — 99214 PR OFFICE/OUTPT VISIT, EST, LEVL IV, 30-39 MIN: ICD-10-PCS | Mod: S$GLB,,, | Performed by: INTERNAL MEDICINE

## 2022-07-18 PROCEDURE — 1160F RVW MEDS BY RX/DR IN RCRD: CPT | Mod: CPTII,S$GLB,, | Performed by: INTERNAL MEDICINE

## 2022-07-18 PROCEDURE — 1159F MED LIST DOCD IN RCRD: CPT | Mod: CPTII,S$GLB,, | Performed by: INTERNAL MEDICINE

## 2022-07-18 PROCEDURE — 1159F PR MEDICATION LIST DOCUMENTED IN MEDICAL RECORD: ICD-10-PCS | Mod: CPTII,S$GLB,, | Performed by: INTERNAL MEDICINE

## 2022-07-18 RX ORDER — DOXYCYCLINE HYCLATE 100 MG
100 TABLET ORAL EVERY 12 HOURS
Qty: 20 TABLET | Refills: 0 | Status: SHIPPED | OUTPATIENT
Start: 2022-07-18 | End: 2022-07-28

## 2022-07-18 RX ORDER — FUROSEMIDE 20 MG/1
20 TABLET ORAL DAILY
Qty: 7 TABLET | Refills: 0 | Status: SHIPPED | OUTPATIENT
Start: 2022-07-18 | End: 2022-08-01 | Stop reason: SDUPTHER

## 2022-07-18 RX ORDER — POTASSIUM CHLORIDE 750 MG/1
10 CAPSULE, EXTENDED RELEASE ORAL DAILY
Qty: 7 CAPSULE | Refills: 0 | Status: SHIPPED | OUTPATIENT
Start: 2022-07-18 | End: 2022-07-25

## 2022-07-20 ENCOUNTER — PATIENT MESSAGE (OUTPATIENT)
Dept: PSYCHIATRY | Facility: CLINIC | Age: 47
End: 2022-07-20
Payer: COMMERCIAL

## 2022-07-21 ENCOUNTER — PATIENT MESSAGE (OUTPATIENT)
Dept: INTERNAL MEDICINE | Facility: CLINIC | Age: 47
End: 2022-07-21
Payer: COMMERCIAL

## 2022-07-31 ENCOUNTER — PATIENT MESSAGE (OUTPATIENT)
Dept: INTERNAL MEDICINE | Facility: CLINIC | Age: 47
End: 2022-07-31
Payer: COMMERCIAL

## 2022-08-01 ENCOUNTER — OFFICE VISIT (OUTPATIENT)
Dept: INTERNAL MEDICINE | Facility: CLINIC | Age: 47
End: 2022-08-01
Payer: COMMERCIAL

## 2022-08-01 VITALS
HEART RATE: 89 BPM | OXYGEN SATURATION: 97 % | HEIGHT: 72 IN | SYSTOLIC BLOOD PRESSURE: 138 MMHG | BODY MASS INDEX: 42.66 KG/M2 | WEIGHT: 315 LBS | DIASTOLIC BLOOD PRESSURE: 88 MMHG

## 2022-08-01 DIAGNOSIS — R60.0 BILATERAL LOWER EXTREMITY EDEMA: ICD-10-CM

## 2022-08-01 DIAGNOSIS — R25.1 TREMOR OF BOTH HANDS: ICD-10-CM

## 2022-08-01 DIAGNOSIS — I89.0 LYMPHEDEMA: Primary | ICD-10-CM

## 2022-08-01 PROCEDURE — 99214 OFFICE O/P EST MOD 30 MIN: CPT | Mod: S$GLB,,, | Performed by: INTERNAL MEDICINE

## 2022-08-01 PROCEDURE — 99999 PR PBB SHADOW E&M-EST. PATIENT-LVL V: ICD-10-PCS | Mod: PBBFAC,,, | Performed by: INTERNAL MEDICINE

## 2022-08-01 PROCEDURE — 1159F MED LIST DOCD IN RCRD: CPT | Mod: CPTII,S$GLB,, | Performed by: INTERNAL MEDICINE

## 2022-08-01 PROCEDURE — 99214 PR OFFICE/OUTPT VISIT, EST, LEVL IV, 30-39 MIN: ICD-10-PCS | Mod: S$GLB,,, | Performed by: INTERNAL MEDICINE

## 2022-08-01 PROCEDURE — 3075F SYST BP GE 130 - 139MM HG: CPT | Mod: CPTII,S$GLB,, | Performed by: INTERNAL MEDICINE

## 2022-08-01 PROCEDURE — 3079F DIAST BP 80-89 MM HG: CPT | Mod: CPTII,S$GLB,, | Performed by: INTERNAL MEDICINE

## 2022-08-01 PROCEDURE — 1159F PR MEDICATION LIST DOCUMENTED IN MEDICAL RECORD: ICD-10-PCS | Mod: CPTII,S$GLB,, | Performed by: INTERNAL MEDICINE

## 2022-08-01 PROCEDURE — 99999 PR PBB SHADOW E&M-EST. PATIENT-LVL V: CPT | Mod: PBBFAC,,, | Performed by: INTERNAL MEDICINE

## 2022-08-01 PROCEDURE — 3008F BODY MASS INDEX DOCD: CPT | Mod: CPTII,S$GLB,, | Performed by: INTERNAL MEDICINE

## 2022-08-01 PROCEDURE — 1160F RVW MEDS BY RX/DR IN RCRD: CPT | Mod: CPTII,S$GLB,, | Performed by: INTERNAL MEDICINE

## 2022-08-01 PROCEDURE — 3079F PR MOST RECENT DIASTOLIC BLOOD PRESSURE 80-89 MM HG: ICD-10-PCS | Mod: CPTII,S$GLB,, | Performed by: INTERNAL MEDICINE

## 2022-08-01 PROCEDURE — 1160F PR REVIEW ALL MEDS BY PRESCRIBER/CLIN PHARMACIST DOCUMENTED: ICD-10-PCS | Mod: CPTII,S$GLB,, | Performed by: INTERNAL MEDICINE

## 2022-08-01 PROCEDURE — 3075F PR MOST RECENT SYSTOLIC BLOOD PRESS GE 130-139MM HG: ICD-10-PCS | Mod: CPTII,S$GLB,, | Performed by: INTERNAL MEDICINE

## 2022-08-01 PROCEDURE — 3008F PR BODY MASS INDEX (BMI) DOCUMENTED: ICD-10-PCS | Mod: CPTII,S$GLB,, | Performed by: INTERNAL MEDICINE

## 2022-08-01 RX ORDER — FUROSEMIDE 40 MG/1
40 TABLET ORAL DAILY
Qty: 7 TABLET | Refills: 0 | Status: SHIPPED | OUTPATIENT
Start: 2022-08-01 | End: 2022-08-05 | Stop reason: ALTCHOICE

## 2022-08-01 RX ORDER — POTASSIUM CHLORIDE 750 MG/1
10 TABLET, EXTENDED RELEASE ORAL DAILY
Qty: 7 TABLET | Refills: 0 | Status: SHIPPED | OUTPATIENT
Start: 2022-08-01 | End: 2022-08-05 | Stop reason: SDUPTHER

## 2022-08-01 NOTE — PROGRESS NOTES
Subjective:       Patient ID: Jered Contreras is a 47 y.o. male.    Chief Complaint: Rash and Leg Swelling    HPI   Jered Contreras is a 47 y.o. male here for an urgent evaluation of rasha nd leg sweling.   Rash recently thought to be lamcital side effect and stopped this.  Seen by Dr. Glez on 7/18 and dx cellulitis rx doxycycline, lasix and potassium for a week.   BNP nl in June - he was seen for bilateral leg edema at that time.   Review of Systems   Constitutional: Negative for fever.   Respiratory: Negative for shortness of breath.    Cardiovascular: Negative for chest pain.   Musculoskeletal: Negative.    Skin: Positive for rash.       Objective:   /88 (BP Location: Left arm, Patient Position: Sitting, BP Method: Large (Manual))   Pulse 89   Ht 6' (1.829 m)   Wt (!) 190.7 kg (420 lb 6.7 oz)   SpO2 97%   BMI 57.02 kg/m²      Physical Exam  Constitutional:       General: He is not in acute distress.     Appearance: He is well-developed. He is not diaphoretic.      Comments: obese   HENT:      Head: Normocephalic and atraumatic.   Cardiovascular:      Rate and Rhythm: Normal rate.      Comments: 2+tight bilateral lower extremity edema with venous stasis like erythema changes, no heat or warmth over skin. Right leg with superficial skin tears/venous stasis blistering, left leg with healed small wounds likely friction related  Pulmonary:      Effort: Pulmonary effort is normal. No respiratory distress.   Skin:     General: Skin is warm and dry.   Neurological:      Mental Status: He is alert and oriented to person, place, and time.   Psychiatric:         Behavior: Behavior normal.           2+ edema with erythema and tears with clear fluid  Assessment:       1. Lymphedema    2. Bilateral lower extremity edema    3. Tremor of both hands        Plan:       Jered was seen today for rash and leg swelling.    Diagnoses and all orders for this visit:    Bilateral lower extremity edema;  suspected Lymphedema  -     Basic Metabolic Panel; Future  -      Urinalysis, Reflex to Urine Culture Urine, Clean Catch; Future  -     BNP; Future  -     furosemide (LASIX) 40 MG tablet; Take 1 tablet (40 mg total) by mouth once daily. for 7 days  -     potassium chloride SA (K-DUR,KLOR-CON M) 10 MEQ tablet; Take 1 tablet (10 mEq total) by mouth once daily. X 7 days  -     TSH; Future  -      Lower Extremity Veins Bilateral Insufficiency; Future  -     Ambulatory referral/consult to Physical/Occupational Therapy; Future - lymphedema therapy  Low salt diet, elevated legs when possible, compression stockings once skin is healed, weight loss    Tremor of both hands  -     TSH; Future

## 2022-08-02 ENCOUNTER — HOSPITAL ENCOUNTER (OUTPATIENT)
Dept: RADIOLOGY | Facility: HOSPITAL | Age: 47
Discharge: HOME | End: 2022-08-02
Attending: INTERNAL MEDICINE
Payer: COMMERCIAL

## 2022-08-02 DIAGNOSIS — I87.2 VENOUS INSUFFICIENCY: Primary | ICD-10-CM

## 2022-08-02 DIAGNOSIS — R60.0 BILATERAL LOWER EXTREMITY EDEMA: ICD-10-CM

## 2022-08-02 DIAGNOSIS — I89.0 LYMPHEDEMA: ICD-10-CM

## 2022-08-02 PROCEDURE — 93970 EXTREMITY STUDY: CPT | Mod: 26,,, | Performed by: RADIOLOGY

## 2022-08-02 PROCEDURE — 93970 EXTREMITY STUDY: CPT | Mod: TC

## 2022-08-02 PROCEDURE — 93970 US LOWER EXTREMITY VEINS BILATERAL INSUFFICIENCY: ICD-10-PCS | Mod: 26,,, | Performed by: RADIOLOGY

## 2022-08-03 ENCOUNTER — TELEPHONE (OUTPATIENT)
Dept: OTOLARYNGOLOGY | Facility: CLINIC | Age: 47
End: 2022-08-03
Payer: COMMERCIAL

## 2022-08-03 NOTE — TELEPHONE ENCOUNTER
Called and spoke with patient today about rescheduling his appointment with Dr. Arredondo. Patient wants to know can he recommend him to another allergy specialist while he's out.

## 2022-08-04 ENCOUNTER — TELEPHONE (OUTPATIENT)
Dept: OTOLARYNGOLOGY | Facility: CLINIC | Age: 47
End: 2022-08-04
Payer: COMMERCIAL

## 2022-08-04 DIAGNOSIS — J30.89 NON-SEASONAL ALLERGIC RHINITIS, UNSPECIFIED TRIGGER: Primary | ICD-10-CM

## 2022-08-04 NOTE — TELEPHONE ENCOUNTER
Called and spoke with pt today per Dr. Arredondo letting him know Dr. Arredondo sent a consult to Allergy/Immunology for him. Patient states thanks.

## 2022-08-05 ENCOUNTER — OFFICE VISIT (OUTPATIENT)
Dept: CARDIOLOGY | Facility: CLINIC | Age: 47
End: 2022-08-05
Payer: COMMERCIAL

## 2022-08-05 VITALS
DIASTOLIC BLOOD PRESSURE: 80 MMHG | HEIGHT: 72 IN | SYSTOLIC BLOOD PRESSURE: 161 MMHG | BODY MASS INDEX: 42.66 KG/M2 | HEART RATE: 84 BPM | WEIGHT: 315 LBS

## 2022-08-05 DIAGNOSIS — R60.0 BILATERAL LOWER EXTREMITY EDEMA: ICD-10-CM

## 2022-08-05 DIAGNOSIS — R60.0 EDEMA OF BOTH LOWER EXTREMITIES: ICD-10-CM

## 2022-08-05 DIAGNOSIS — I89.0 LYMPHEDEMA OF BOTH LOWER EXTREMITIES: ICD-10-CM

## 2022-08-05 DIAGNOSIS — I89.0 LYMPHEDEMA: ICD-10-CM

## 2022-08-05 DIAGNOSIS — I83.019 VENOUS STASIS ULCERS OF BOTH LOWER EXTREMITIES: ICD-10-CM

## 2022-08-05 DIAGNOSIS — I87.2 VENOUS INSUFFICIENCY OF BOTH LOWER EXTREMITIES: Primary | ICD-10-CM

## 2022-08-05 DIAGNOSIS — I83.029 VENOUS STASIS ULCERS OF BOTH LOWER EXTREMITIES: ICD-10-CM

## 2022-08-05 DIAGNOSIS — E66.01 MORBID OBESITY: ICD-10-CM

## 2022-08-05 DIAGNOSIS — I83.11 VARICOSE VEINS OF BOTH LOWER EXTREMITIES WITH INFLAMMATION: ICD-10-CM

## 2022-08-05 DIAGNOSIS — I83.12 VARICOSE VEINS OF BOTH LOWER EXTREMITIES WITH INFLAMMATION: ICD-10-CM

## 2022-08-05 DIAGNOSIS — I87.2 VENOUS INSUFFICIENCY: ICD-10-CM

## 2022-08-05 DIAGNOSIS — L97.929 VENOUS STASIS ULCERS OF BOTH LOWER EXTREMITIES: ICD-10-CM

## 2022-08-05 DIAGNOSIS — G47.33 OBSTRUCTIVE SLEEP APNEA TREATED WITH CONTINUOUS POSITIVE AIRWAY PRESSURE (CPAP): ICD-10-CM

## 2022-08-05 DIAGNOSIS — L97.919 VENOUS STASIS ULCERS OF BOTH LOWER EXTREMITIES: ICD-10-CM

## 2022-08-05 PROCEDURE — 3077F PR MOST RECENT SYSTOLIC BLOOD PRESSURE >= 140 MM HG: ICD-10-PCS | Mod: CPTII,S$GLB,, | Performed by: INTERNAL MEDICINE

## 2022-08-05 PROCEDURE — 1159F MED LIST DOCD IN RCRD: CPT | Mod: CPTII,S$GLB,, | Performed by: INTERNAL MEDICINE

## 2022-08-05 PROCEDURE — 3079F PR MOST RECENT DIASTOLIC BLOOD PRESSURE 80-89 MM HG: ICD-10-PCS | Mod: CPTII,S$GLB,, | Performed by: INTERNAL MEDICINE

## 2022-08-05 PROCEDURE — 99205 OFFICE O/P NEW HI 60 MIN: CPT | Mod: S$GLB,,, | Performed by: INTERNAL MEDICINE

## 2022-08-05 PROCEDURE — 99999 PR PBB SHADOW E&M-EST. PATIENT-LVL V: ICD-10-PCS | Mod: PBBFAC,,, | Performed by: INTERNAL MEDICINE

## 2022-08-05 PROCEDURE — 3008F PR BODY MASS INDEX (BMI) DOCUMENTED: ICD-10-PCS | Mod: CPTII,S$GLB,, | Performed by: INTERNAL MEDICINE

## 2022-08-05 PROCEDURE — 3008F BODY MASS INDEX DOCD: CPT | Mod: CPTII,S$GLB,, | Performed by: INTERNAL MEDICINE

## 2022-08-05 PROCEDURE — 1159F PR MEDICATION LIST DOCUMENTED IN MEDICAL RECORD: ICD-10-PCS | Mod: CPTII,S$GLB,, | Performed by: INTERNAL MEDICINE

## 2022-08-05 PROCEDURE — 3077F SYST BP >= 140 MM HG: CPT | Mod: CPTII,S$GLB,, | Performed by: INTERNAL MEDICINE

## 2022-08-05 PROCEDURE — 99999 PR PBB SHADOW E&M-EST. PATIENT-LVL V: CPT | Mod: PBBFAC,,, | Performed by: INTERNAL MEDICINE

## 2022-08-05 PROCEDURE — 3079F DIAST BP 80-89 MM HG: CPT | Mod: CPTII,S$GLB,, | Performed by: INTERNAL MEDICINE

## 2022-08-05 PROCEDURE — 99205 PR OFFICE/OUTPT VISIT, NEW, LEVL V, 60-74 MIN: ICD-10-PCS | Mod: S$GLB,,, | Performed by: INTERNAL MEDICINE

## 2022-08-05 RX ORDER — POTASSIUM CHLORIDE 750 MG/1
10 TABLET, EXTENDED RELEASE ORAL DAILY
Qty: 30 TABLET | Refills: 11 | Status: SHIPPED | OUTPATIENT
Start: 2022-08-05 | End: 2023-03-27

## 2022-08-05 RX ORDER — BUMETANIDE 0.5 MG/1
0.5 TABLET ORAL 2 TIMES DAILY
Qty: 180 TABLET | Refills: 3 | Status: SHIPPED | OUTPATIENT
Start: 2022-08-05 | End: 2023-02-14

## 2022-08-05 NOTE — PROGRESS NOTES
[facility]      Chief Complaint   Patient presents with    Venous Reflux       Patient ID: Jered Contreras is a 47 y.o. male with venous insufficiency, morbid obesity s/p gastric sleeve, TAYO (on CPAP), who presents for an initial appointment.  Pertinent history/events are as follows:     -Pt kindly referred by Dr. Dacosta for evaluation of venous insufficiency.     HPI:  Mr. Contreras reports leg swelling starting in 6/2022.  States he subsequently developed ulcer at the bilateral lower legs, which are in different stages of healing.  He has no claudication or rest pain.  BLE Venous Reflux Study on 8/2/2022 revealed hemodynamically significant venous reflux (reflux lasting greater than 500ms) in the bilateral greater saphenous veins.    Past Medical History:   Diagnosis Date    Allergic rhinitis     Asthma     Depression     Hx of psychiatric care     Morbid obesity     Obstructive sleep apnea treated with continuous positive airway pressure (CPAP) 7/10/2018    Psychiatric problem     Therapy      Past Surgical History:   Procedure Laterality Date    APPENDECTOMY      APPLICATION OF CARTILAGE GRAFT Bilateral 12/6/2021    Procedure: APPLICATION, CARTILAGE GRAFT;  Surgeon: JAN Arredondo MD;  Location: [facility] OR;  Service: ENT;  Laterality: Bilateral;  from right ear    gastric sleeve  09/2019    [facility] - Dr. Martin    NASAL SEPTOPLASTY Bilateral 12/6/2021    Procedure: SEPTOPLASTY, NOSE;  Surgeon: JAN Arredondo MD;  Location: [facility] OR;  Service: ENT;  Laterality: Bilateral;    NASAL STENOSIS REPAIR Bilateral 12/6/2021    Procedure: REPAIR, STENOSIS, NOSE, VESTIBULE;  Surgeon: JAN Arredondo MD;  Location: [facility] OR;  Service: ENT;  Laterality: Bilateral;    SINUS SURGERY      VASECTOMY  2017     Social History     Socioeconomic History    Marital status:    Occupational History    Occupation: Information Tech     Employer: [employer]    Tobacco Use    Smoking status: Never Smoker    Smokeless tobacco: Never Used   Substance and Sexual Activity    Alcohol use: Yes     Alcohol/week: 1.0 standard drink     Types: 1 Cans of beer per week     Comment: occ    Drug use: No    Sexual activity: Yes     Partners: Female     Family History   Problem Relation Age of Onset    Diabetes Father     Hypertension Father     Rheum arthritis Mother     Lung cancer Mother         post lobectomy    Dementia Mother     Rheum arthritis Maternal Grandfather     Diabetes Paternal Grandmother     Alzheimer's disease Paternal Grandmother     Hypertension Paternal Grandfather     Obesity Paternal Grandfather     Cerebral aneurysm Paternal Grandfather     Hypertension Maternal Grandmother     Dementia Maternal Grandmother     Brain cancer Maternal Grandmother         in remission before she     Melanoma Neg Hx        Review of patient's allergies indicates:   Allergen Reactions    Ceclor [cefaclor] Anaphylaxis    Penicillins Hives     Elevated blood pressure, temp, hives       Medication List with Changes/Refills   Current Medications    ASCORBATE CALCIUM (VITAMIN C ORAL)    Take by mouth.    AZELASTINE (ASTELIN) 137 MCG (0.1 %) NASAL SPRAY    2 sprays (274 mcg total) by Nasal route 2 (two) times daily as needed (Nasal congestion).    B COMPLEX VITAMINS (B COMPLEX-VITAMIN B12) TABLET    Take 1 tablet by mouth once daily.    BENZONATATE (TESSALON) 100 MG CAPSULE    Take 1 capsule (100 mg total) by mouth 3 (three) times daily as needed for Cough.    BUPROPION (WELLBUTRIN XL) 300 MG 24 HR TABLET    Take 1 tablet (300 mg total) by mouth once daily.    DULOXETINE (CYMBALTA) 60 MG CAPSULE    TAKE 1 CAPSULE(60 MG) BY MOUTH EVERY DAY    FLUTICASONE PROPIONATE (FLONASE) 50 MCG/ACTUATION NASAL SPRAY    One spray in each nostril twice daily after 1st using azelastine nasal spray    FUROSEMIDE (LASIX) 40 MG TABLET    Take 1 tablet (40 mg total) by mouth once  daily. for 7 days    HYDROXYZINE HCL (ATARAX) 25 MG TABLET    TK 1 T PO  TID PRN ANXIETY    IPRATROPIUM (ATROVENT) 21 MCG (0.03 %) NASAL SPRAY    2 sprays by Nasal route 3 (three) times daily as needed for Rhinitis (runing nose).    LAMOTRIGINE (LAMICTAL) 100 MG TABLET    Take 1 tablet (100 mg total) by mouth once daily.    MONTELUKAST (SINGULAIR) 10 MG TABLET    Take 1 tablet (10 mg total) by mouth every evening. Take during allergy season.    MULTIVITAMIN CAPSULE    Take 1 capsule by mouth once daily.    PANTOPRAZOLE (PROTONIX) 40 MG TABLET    TK 1 T PO QD    POTASSIUM CHLORIDE SA (K-DUR,KLOR-CON M) 10 MEQ TABLET    Take 1 tablet (10 mEq total) by mouth once daily.    ROPINIROLE (REQUIP) 0.5 MG TABLET    Take 2 tablets (1 mg total) by mouth every evening.       Review of Systems  Constitution: Denies chills, fever, and sweats.  HENT: Denies headaches or blurry vision.  Cardiovascular: Denies chest pain or irregular heart beat.  Respiratory: Denies cough or shortness of breath.  Gastrointestinal: Denies abdominal pain, nausea, or vomiting.  Musculoskeletal: Positive for leg swelling with ulcers.  Neurological: Denies dizziness or focal weakness.  Psychiatric/Behavioral: Normal mental status.  Hematologic/Lymphatic: Denies bleeding problem or easy bruising/bleeding.  Skin: Denies rash or suspicious lesions    Physical Examination  BP (!) 161/80   Pulse 84   Ht 6' (1.829 m)   Wt (!) 189.9 kg (418 lb 10.5 oz)   BMI 56.78 kg/m²     Constitutional: No acute distress, conversant  HEENT: Sclera anicteric, Pupils equal, round and reactive to light, extraocular motions intact, Oropharynx clear  Neck: No JVD, no carotid bruits  Cardiovascular: regular rate and rhythm, no murmur, rubs or gallops, normal S1/S2  Pulmonary: Clear to auscultation bilaterally  Abdominal: Abdomen soft, nontender, nondistended, positive bowel sounds  Extremities: BLE's with 2+ pitting edema, prominent varicose veins, and changes consistent with  lymphedema    Pulses:  Carotid pulses are 2+ on the right side, and 2+ on the left side.  Radial pulses are 2+ on the right side, and 2+ on the left side.   Femoral pulses are 2+ on the right side, and 2+ on the left side.  Popliteal pulses are 2+ on the right side, and 2+ on the left side.   Dorsalis pedis pulses are 2+ on the right side, and 2+ on the left side.   Posterior tibial pulses are 2+ on the right side, and 2+ on the left side.    Skin: No ecchymosis, erythema, or ulcers  Psych: Alert and oriented x 3, appropriate affect  Neuro: CNII-XII intact, no focal deficits    Labs:  Most Recent Data  CBC:   Lab Results   Component Value Date    WBC 6.15 10/29/2021    HGB 13.0 (L) 10/29/2021    HCT 40.5 10/29/2021     10/29/2021    MCV 94 10/29/2021    RDW 13.3 10/29/2021     BMP:   Lab Results   Component Value Date     08/02/2022    K 4.1 08/02/2022     08/02/2022    CO2 26 08/02/2022    BUN 11 08/02/2022    CREATININE 0.8 08/02/2022     08/02/2022    CALCIUM 9.0 08/02/2022    MG 1.9 11/11/2020    PHOS 3.0 10/31/2017     LFTS;   Lab Results   Component Value Date    PROT 7.0 06/21/2022    ALBUMIN 3.8 06/21/2022    BILITOT 0.6 06/21/2022    AST 22 06/21/2022    ALKPHOS 77 06/21/2022    ALT 20 06/21/2022     COAGS: No results found for: INR, PROTIME, PTT  FLP:   Lab Results   Component Value Date    CHOL 185 10/29/2021    HDL 48 10/29/2021    LDLCALC 123.8 10/29/2021    TRIG 66 10/29/2021    CHOLHDL 25.9 10/29/2021     CARDIAC:   Lab Results   Component Value Date    BNP <10 08/02/2022     Imaging:    BLE Venous Reflux Study 8/2/2022:  Hemodynamically significant venous reflux (reflux lasting greater than 500ms) in the bilateral greater saphenous veins.    Assessment/Plan:  Jered Contreras is a 47 y.o. male with venous insufficiency, morbid obesity s/p gastric sleeve, TAYO (on CPAP), who presents for an initial appointment.      1. BLE Lymphedema and Venous insufficiency with  Venous Stasis Ulcerations- Discontinue lasix and start bumex 0.5 mg bid.  Check cmp in 1 week.  Refer to wound care.  Check ARIANA study.  After ulcers heal, refer to lymphedema clinic.  Pt to limit sodium intake to 2,000 mg daily.  Limit volume intake to 1.5 liters daily.  Elevate legs when resting.     2. Morbid Obesity s/p Gastric Sleeve- Encourage diet, exercise and weight loss.  Follow up with Bariatric Surgery.    3. TAYO- Continue CPAP.    Follow up in 1 month    Total duration of face to face visit time 30 minutes.  Total time spent counseling greater than fifty percent of total visit time.  Counseling included discussion regarding imaging findings, diagnosis, possibilities, treatment options, risks and benefits.  The patient had many questions regarding the options and long-term effects.    Fredo Marquez MD, PhD  Interventional Cardiology

## 2022-08-05 NOTE — PATIENT INSTRUCTIONS
Assessment/Plan:  Jered Contreras is a 47 y.o. male with venous insufficiency, morbid obesity s/p gastric sleeve, TAYO (on CPAP), who presents for an initial appointment.      1. BLE Lymphedema and Venous insufficiency with Venous Stasis Ulcerations- Discontinue lasix and start bumex 0.5 mg bid.  Check cmp in 1 week.  Refer to wound care.  Check ARIANA study.  After ulcers heal, refer to lymphedema clinic.  Pt to limit sodium intake to 2,000 mg daily.  Limit volume intake to 1.5 liters daily.  Elevate legs when resting.     2. Morbid Obesity s/p Gastric Sleeve- Encourage diet, exercise and weight loss.  Follow up with Bariatric Surgery.    3. TAYO- Continue CPAP.    Follow up in 1 month

## 2022-08-09 ENCOUNTER — OFFICE VISIT (OUTPATIENT)
Dept: WOUND CARE | Facility: CLINIC | Age: 47
End: 2022-08-09
Payer: COMMERCIAL

## 2022-08-09 VITALS
WEIGHT: 315 LBS | HEART RATE: 75 BPM | DIASTOLIC BLOOD PRESSURE: 82 MMHG | SYSTOLIC BLOOD PRESSURE: 146 MMHG | OXYGEN SATURATION: 99 % | HEIGHT: 72 IN | BODY MASS INDEX: 42.66 KG/M2 | RESPIRATION RATE: 18 BRPM

## 2022-08-09 DIAGNOSIS — L97.929 VENOUS STASIS ULCERS OF BOTH LOWER EXTREMITIES: ICD-10-CM

## 2022-08-09 DIAGNOSIS — I89.0 LYMPHEDEMA OF BOTH LOWER EXTREMITIES: ICD-10-CM

## 2022-08-09 DIAGNOSIS — I87.2 VENOUS INSUFFICIENCY OF BOTH LOWER EXTREMITIES: ICD-10-CM

## 2022-08-09 DIAGNOSIS — S81.802A OPEN WOUND OF LEFT LOWER EXTREMITY, INITIAL ENCOUNTER: ICD-10-CM

## 2022-08-09 DIAGNOSIS — I83.029 VENOUS STASIS ULCERS OF BOTH LOWER EXTREMITIES: ICD-10-CM

## 2022-08-09 DIAGNOSIS — S81.801A OPEN WOUND OF RIGHT LOWER EXTREMITY, INITIAL ENCOUNTER: Primary | ICD-10-CM

## 2022-08-09 DIAGNOSIS — L97.919 VENOUS STASIS ULCERS OF BOTH LOWER EXTREMITIES: ICD-10-CM

## 2022-08-09 DIAGNOSIS — I83.019 VENOUS STASIS ULCERS OF BOTH LOWER EXTREMITIES: ICD-10-CM

## 2022-08-09 PROCEDURE — 3008F BODY MASS INDEX DOCD: CPT | Mod: CPTII,S$GLB,,

## 2022-08-09 PROCEDURE — 99204 PR OFFICE/OUTPT VISIT, NEW, LEVL IV, 45-59 MIN: ICD-10-PCS | Mod: 25,S$GLB,,

## 2022-08-09 PROCEDURE — 1159F MED LIST DOCD IN RCRD: CPT | Mod: CPTII,S$GLB,,

## 2022-08-09 PROCEDURE — 3008F PR BODY MASS INDEX (BMI) DOCUMENTED: ICD-10-PCS | Mod: CPTII,S$GLB,,

## 2022-08-09 PROCEDURE — 3077F PR MOST RECENT SYSTOLIC BLOOD PRESSURE >= 140 MM HG: ICD-10-PCS | Mod: CPTII,S$GLB,,

## 2022-08-09 PROCEDURE — 3079F DIAST BP 80-89 MM HG: CPT | Mod: CPTII,S$GLB,,

## 2022-08-09 PROCEDURE — 99204 OFFICE O/P NEW MOD 45 MIN: CPT | Mod: 25,S$GLB,,

## 2022-08-09 PROCEDURE — 97597 DBRDMT OPN WND 1ST 20 CM/<: CPT | Mod: S$GLB,,,

## 2022-08-09 PROCEDURE — 99999 PR PBB SHADOW E&M-EST. PATIENT-LVL V: CPT | Mod: PBBFAC,,,

## 2022-08-09 PROCEDURE — 1160F RVW MEDS BY RX/DR IN RCRD: CPT | Mod: CPTII,S$GLB,,

## 2022-08-09 PROCEDURE — 1160F PR REVIEW ALL MEDS BY PRESCRIBER/CLIN PHARMACIST DOCUMENTED: ICD-10-PCS | Mod: CPTII,S$GLB,,

## 2022-08-09 PROCEDURE — 3077F SYST BP >= 140 MM HG: CPT | Mod: CPTII,S$GLB,,

## 2022-08-09 PROCEDURE — 97597 DEBRIDEMENT: ICD-10-PCS | Mod: S$GLB,,,

## 2022-08-09 PROCEDURE — 1159F PR MEDICATION LIST DOCUMENTED IN MEDICAL RECORD: ICD-10-PCS | Mod: CPTII,S$GLB,,

## 2022-08-09 PROCEDURE — 3079F PR MOST RECENT DIASTOLIC BLOOD PRESSURE 80-89 MM HG: ICD-10-PCS | Mod: CPTII,S$GLB,,

## 2022-08-09 PROCEDURE — 99999 PR PBB SHADOW E&M-EST. PATIENT-LVL V: ICD-10-PCS | Mod: PBBFAC,,,

## 2022-08-09 NOTE — PROGRESS NOTES
Subjective:       Patient ID: Jered Contreras is a 47 y.o. male with PMHx of meralgia paresthetic of right side, WAYNE, MDD, hx of panic attacks, AR, nasal valve collapse, nasal septal deviation, heart palpitations, venous insufficiency of bilateral lower extremities, varicose veins, hx of bariatric surgery, GERD, TAYO, and lymphedema of bilateral lower extremities    Chief Complaint: Wound Check    Patient presents for an evaluation of bilateral lower extremity wounds. He is followed by Dr. Marquez for venous insufficiency and reports compliance with bumex. Patient reports not using compression stockings or circaids. Pt reports having these wounds for about 3 weeks. He is unsure if they are related to insect bites. Patient reports excessive clear drainage. He reports leaving the wounds open to air and not dressing them.  Denies fever, chills, erythema, warmth, purulent drainage, or pain.      Review of Systems   Constitutional: Negative for activity change, chills, diaphoresis, fatigue and fever.   Respiratory: Negative for apnea, chest tightness and shortness of breath.    Cardiovascular: Positive for leg swelling. Negative for chest pain and palpitations.   Musculoskeletal: Negative for gait problem and joint swelling.   Skin: Positive for wound. Negative for pallor and rash.   Neurological: Negative for syncope, weakness and numbness.   Psychiatric/Behavioral: Negative for agitation. The patient is not nervous/anxious.    All other systems reviewed and are negative.      Objective:      Physical Exam  Vitals reviewed.   Constitutional:       General: He is not in acute distress.     Appearance: Normal appearance. He is obese.   Cardiovascular:      Rate and Rhythm: Normal rate and regular rhythm.      Pulses: Normal pulses.   Pulmonary:      Effort: No respiratory distress.   Musculoskeletal:         General: No swelling.      Right lower leg: Edema present.      Left lower leg: Edema present.   Skin:      General: Skin is warm and dry.      Capillary Refill: Capillary refill takes less than 2 seconds.      Findings: Wound present.          Neurological:      General: No focal deficit present.      Mental Status: He is alert and oriented to person, place, and time.   Psychiatric:         Mood and Affect: Mood normal.         Behavior: Behavior normal.         Thought Content: Thought content normal.         Judgment: Judgment normal.         Assessment:       1. Open wound of right lower extremity, initial encounter    2. Open wound of left lower extremity, initial encounter    3. Venous insufficiency of both lower extremities    4. Lymphedema of both lower extremities    5. Venous stasis ulcers of both lower extremities    6. BMI 50.0-59.9, adult           Wound Right anterior;lower Leg (Active)        Pre-existing:    Primary Wound Type:    Side: Right   Orientation: anterior;lower   Location: Leg   Wound Number:    Ankle-Brachial Index:    Pulses:    Removal Indication and Assessment:    Wound Outcome:    (Retired) Wound Type:    (Retired) Wound Length (cm):    (Retired) Wound Width (cm):    (Retired) Depth (cm):    Wound Description (Comments):    Removal Indications:    Wound Image    08/09/22 1055   Dressing Appearance Open to air 08/09/22 1055   Drainage Amount None 08/09/22 1055   Black (%), Wound Tissue Color 100 % 08/09/22 1055   Periwound Area Intact;Pink;Edematous 08/09/22 1055   Wound Length (cm) 0.4 cm 08/09/22 1055   Wound Width (cm) 3.3 cm 08/09/22 1055   Wound Depth (cm) 0 cm 08/09/22 1055   Wound Volume (cm^3) 0 cm^3 08/09/22 1055   Wound Surface Area (cm^2) 1.32 cm^2 08/09/22 1055   Care Cleansed with:;Soap and water;Wound cleanser 08/09/22 1055   Dressing Applied;Calcium alginate;Compression wrap 08/09/22 1055   Periwound Care Skin barrier film applied 08/09/22 1055   Compression Unna's Boot;Three layer compression 08/09/22 1055            Wound Left anterior;lower Leg (Active)        Pre-existing:     Primary Wound Type:    Side: Left   Orientation: anterior;lower   Location: Leg   Wound Number:    Ankle-Brachial Index:    Pulses:    Removal Indication and Assessment:    Wound Outcome:    (Retired) Wound Type:    (Retired) Wound Length (cm):    (Retired) Wound Width (cm):    (Retired) Depth (cm):    Wound Description (Comments):    Removal Indications:    Wound Image    08/09/22 1055   Dressing Appearance Open to air 08/09/22 1055   Drainage Amount None 08/09/22 1055   Black (%), Wound Tissue Color 100 % 08/09/22 1055   Periwound Area Intact;Pink;Edematous 08/09/22 1055   Wound Length (cm) 0.1 cm 08/09/22 1055   Wound Width (cm) 0.1 cm 08/09/22 1055   Wound Depth (cm) 0 cm 08/09/22 1055   Wound Volume (cm^3) 0 cm^3 08/09/22 1055   Wound Surface Area (cm^2) 0.01 cm^2 08/09/22 1055   Care Cleansed with:;Soap and water;Wound cleanser 08/09/22 1055   Dressing Applied;Compression wrap 08/09/22 1055   Periwound Care Skin barrier film applied 08/09/22 1055   Compression Unna's Boot;Three layer compression 08/09/22 1055            Wound Left lower;lateral Leg (Active)        Pre-existing:    Primary Wound Type:    Side: Left   Orientation: lower;lateral   Location: Leg   Wound Number:    Ankle-Brachial Index:    Pulses:    Removal Indication and Assessment:    Wound Outcome:    (Retired) Wound Type:    (Retired) Wound Length (cm):    (Retired) Wound Width (cm):    (Retired) Depth (cm):    Wound Description (Comments):    Removal Indications:    Dressing Appearance Open to air 08/09/22 1055   Drainage Amount None 08/09/22 1055   Black (%), Wound Tissue Color 100 % 08/09/22 1055   Periwound Area Intact;Pink;Edematous 08/09/22 1055   Wound Length (cm) 0.2 cm 08/09/22 1055   Wound Width (cm) 0.2 cm 08/09/22 1055   Wound Depth (cm) 0 cm 08/09/22 1055   Wound Volume (cm^3) 0 cm^3 08/09/22 1055   Wound Surface Area (cm^2) 0.04 cm^2 08/09/22 1055   Care Cleansed with:;Soap and water;Wound cleanser 08/09/22 1055   Dressing  Applied;Compression wrap 08/09/22 1055   Periwound Care Skin barrier film applied 08/09/22 1055   Compression Unna's Boot;Three layer compression 08/09/22 1055   * left lateral lower leg picture unable to upload*      Jered was seen in the clinic room and placed in the supine position on the treatment table.  Wounds noted to be open to air, and the legs were cleansed with Easi-clense sponges and dried thoroughly. Sharp debridement with 5mm curette to all wounds- necrotic tissue removed. Pt tolerated procedure well. Topical lidocaine not needed due to neuropathy.     Plan of Care: Calmoseptine to periwound area, Aquacel to right lower extremity wounds, and covered with a 3 layer calamine wrap. The patient's feet were positioned at a 90 degree angle.  A calamine  wrap, followed by kerlix roll gauze and coban were applied using a spiral technique avoiding creases or folds.  The wrap was started behind the first metatarsal and ended below the tibial tubercle of the knee.  There was overlap of each turn half the width of the previous turn.  The compression wrap will be changed every 7 days.      Plan:       Orders Placed This Encounter   Procedures    Debridement     This order was created via procedure documentation     Bilateral lower extremities dressed as detailed above.   Patient was instructed to not get the dressings wet and to use cast covers for showering.  Should the dressing become wet, he is to remove it, place a wet-to-dry dressing over the wound, cover with gauze and roll gauze and use ace wraps for compression and to secure bandages.  He should then notify this office as soon as possible to have a new dressing applied.    RTC in 1 week    Belinda Mendoza PA-C

## 2022-08-09 NOTE — PROCEDURES
"Debridement    Date/Time: 8/9/2022 9:30 AM  Performed by: Belinda Mendoza PA-C  Authorized by: Belinda Mendoza PA-C     Time out: Immediately prior to procedure a "time out" was called to verify the correct patient, procedure, equipment, support staff and site/side marked as required.    Consent Done?:  Yes (Written)  Local anesthesia used?: No      Wound Details:    Location:  Right leg    Type of Debridement:  Excisional       Length (cm):  0.4       Area (sq cm):  1.32       Width (cm):  3.3       Percent Debrided (%):  100       Depth (cm):  0       Total Area Debrided (sq cm):  1.32    Depth of debridement:  Epidermis/Dermis    Tissue debrided:  Dermis and Epidermis    Devitalized tissue debrided:  Necrotic/Eschar, Biofilm and Exudate    Instruments:  Curette    Additional wounds:  2    2nd Wound Details:     Location:  Left leg (anterior)    Type of Debridement:  Excisional       Length (cm):  0.1       Area (sq cm):  0.01       Width (cm):  0.1       Percent Debrided (%):  100       Depth (cm):  0       Total Area Debrided (sq cm):  0.01    Depth of debridement:  Epidermis/Dermis    Tissue debrided:  Dermis and Epidermis    Devitalized tissue debrided:  Biofilm, Exudate, Fibrin, Slough and Necrotic/Eschar    Instruments:  Curette    3rd Wound Details:     Location:  Left leg (lateral)    Type of Debridement:  Excisional       Length (cm):  0.2       Area (sq cm):  0.04       Width (cm):  0.2       Percent Debrided (%):  100       Depth (cm):  0       Total Area Debrided (sq cm):  0.04    Depth of debridement:  Epidermis/Dermis    Tissue debrided:  Dermis and Epidermis    Devitalized tissue debrided:  Biofilm, Exudate, Fibrin, Necrotic/Eschar and Slough    Instruments:  Curette    Bleeding:  Minimal  Hemostasis Achieved: Yes    Method Used:  Pressure  Patient tolerance:  Patient tolerated the procedure well with no immediate complications      "

## 2022-08-10 ENCOUNTER — OFFICE VISIT (OUTPATIENT)
Dept: WOUND CARE | Facility: CLINIC | Age: 47
End: 2022-08-10
Payer: COMMERCIAL

## 2022-08-10 ENCOUNTER — PATIENT MESSAGE (OUTPATIENT)
Dept: WOUND CARE | Facility: CLINIC | Age: 47
End: 2022-08-10

## 2022-08-10 DIAGNOSIS — S81.801A OPEN WOUND OF RIGHT LOWER EXTREMITY, INITIAL ENCOUNTER: ICD-10-CM

## 2022-08-10 DIAGNOSIS — I89.0 LYMPHEDEMA OF BOTH LOWER EXTREMITIES: ICD-10-CM

## 2022-08-10 DIAGNOSIS — I83.029 VENOUS STASIS ULCERS OF BOTH LOWER EXTREMITIES: ICD-10-CM

## 2022-08-10 DIAGNOSIS — L97.919 VENOUS STASIS ULCERS OF BOTH LOWER EXTREMITIES: ICD-10-CM

## 2022-08-10 DIAGNOSIS — S81.802A OPEN WOUND OF LEFT LOWER EXTREMITY, INITIAL ENCOUNTER: Primary | ICD-10-CM

## 2022-08-10 DIAGNOSIS — I87.2 VENOUS INSUFFICIENCY OF BOTH LOWER EXTREMITIES: ICD-10-CM

## 2022-08-10 DIAGNOSIS — I83.019 VENOUS STASIS ULCERS OF BOTH LOWER EXTREMITIES: ICD-10-CM

## 2022-08-10 DIAGNOSIS — L97.929 VENOUS STASIS ULCERS OF BOTH LOWER EXTREMITIES: ICD-10-CM

## 2022-08-10 PROCEDURE — 29581 PR APPL MLT-LAYER VENOUS WOUND COMPRESS BELOW KNEE: ICD-10-PCS | Mod: 50,S$GLB,,

## 2022-08-10 PROCEDURE — 99999 PR PBB SHADOW E&M-EST. PATIENT-LVL II: ICD-10-PCS | Mod: PBBFAC,,,

## 2022-08-10 PROCEDURE — 99999 PR PBB SHADOW E&M-EST. PATIENT-LVL II: CPT | Mod: PBBFAC,,,

## 2022-08-10 PROCEDURE — 99213 PR OFFICE/OUTPT VISIT, EST, LEVL III, 20-29 MIN: ICD-10-PCS | Mod: 25,S$GLB,,

## 2022-08-10 PROCEDURE — 99213 OFFICE O/P EST LOW 20 MIN: CPT | Mod: 25,S$GLB,,

## 2022-08-10 PROCEDURE — 29581 APPL MULTLAYER CMPRN SYS LEG: CPT | Mod: 50,S$GLB,,

## 2022-08-10 NOTE — PROGRESS NOTES
Subjective:       Patient ID: Jered Contreras is a 47 y.o. male with PMHx of meralgia paresthetic of right side, WAYNE, MDD, hx of panic attacks, AR, nasal valve collapse, nasal septal deviation, heart palpitations, venous insufficiency of bilateral lower extremities, varicose veins, hx of bariatric surgery, GERD, TAYO, and lymphedema of bilateral lower extremities    Chief Complaint: Wound Check    Patient presents for a revaluation of bilateral lower extremity wounds. He is followed by Dr. Marquez for venous insufficiency and reports compliance with bumex. Patient contacted office earlier stating he has a headache (a possible side effect of calcium alginate).  Denies fever, chills, erythema, warmth, purulent drainage, or pain.      Wound Check      Review of Systems   Constitutional: Negative for activity change, chills, diaphoresis, fatigue and fever.   Eyes: Negative for visual disturbance.   Respiratory: Negative for apnea, chest tightness and shortness of breath.    Cardiovascular: Positive for leg swelling. Negative for chest pain and palpitations.   Musculoskeletal: Negative for gait problem and joint swelling.   Skin: Positive for wound. Negative for pallor and rash.   Neurological: Positive for headaches. Negative for dizziness, syncope, facial asymmetry, weakness, light-headedness and numbness.   Psychiatric/Behavioral: Negative for agitation. The patient is not nervous/anxious.    All other systems reviewed and are negative.      Objective:      Physical Exam  Vitals reviewed.   Constitutional:       General: He is not in acute distress.     Appearance: Normal appearance. He is obese.   Cardiovascular:      Rate and Rhythm: Normal rate and regular rhythm.      Pulses: Normal pulses.   Pulmonary:      Effort: No respiratory distress.   Musculoskeletal:         General: No swelling.      Right lower leg: Edema present.      Left lower leg: Edema present.   Skin:     General: Skin is warm and dry.       Capillary Refill: Capillary refill takes less than 2 seconds.      Findings: Wound present.          Neurological:      General: No focal deficit present.      Mental Status: He is alert and oriented to person, place, and time.   Psychiatric:         Mood and Affect: Mood normal.         Behavior: Behavior normal.         Thought Content: Thought content normal.         Judgment: Judgment normal.         Assessment:       1. Open wound of left lower extremity, initial encounter    2. Open wound of right lower extremity, initial encounter    3. Venous insufficiency of both lower extremities    4. Lymphedema of both lower extremities    5. Venous stasis ulcers of both lower extremities    6. BMI 50.0-59.9, adult           Wound Right anterior;lower Leg (Active)        Pre-existing:    Primary Wound Type:    Side: Right   Orientation: anterior;lower   Location: Leg   Wound Number:    Ankle-Brachial Index:    Pulses:    Removal Indication and Assessment:    Wound Outcome:    (Retired) Wound Type:    (Retired) Wound Length (cm):    (Retired) Wound Width (cm):    (Retired) Depth (cm):    Wound Description (Comments):    Removal Indications:    Wound Image   08/10/22 1500   Dressing Appearance Dried drainage 08/10/22 1500   Drainage Amount Small 08/10/22 1500   Drainage Characteristics/Odor Yellow 08/10/22 1500   Red (%), Wound Tissue Color 100 % 08/10/22 1500   Periwound Area Intact;Pink;Edematous 08/10/22 1500   Wound Length (cm) 0.4 cm 08/10/22 1500   Wound Width (cm) 3.3 cm 08/10/22 1500   Wound Depth (cm) 0 cm 08/10/22 1500   Wound Volume (cm^3) 0 cm^3 08/10/22 1500   Wound Surface Area (cm^2) 1.32 cm^2 08/10/22 1500   Care Cleansed with:;Antimicrobial agent;Wound cleanser 08/10/22 1500   Dressing Applied;Absorptive Pad;Compression wrap;Rolled gauze 08/10/22 1500   Periwound Care Skin barrier film applied 08/10/22 1500   Compression Unna's Boot;Three layer compression 08/10/22 1500            Wound Left  anterior;lower Leg (Active)        Pre-existing:    Primary Wound Type:    Side: Left   Orientation: anterior;lower   Location: Leg   Wound Number:    Ankle-Brachial Index:    Pulses:    Removal Indication and Assessment:    Wound Outcome:    (Retired) Wound Type:    (Retired) Wound Length (cm):    (Retired) Wound Width (cm):    (Retired) Depth (cm):    Wound Description (Comments):    Removal Indications:    Wound Image   08/10/22 1500   Dressing Appearance Dry;Intact;Clean 08/10/22 1500   Drainage Amount None 08/10/22 1500   Red (%), Wound Tissue Color 100 % 08/10/22 1500   Wound Length (cm) 0.1 cm 08/10/22 1500   Wound Width (cm) 0.1 cm 08/10/22 1500   Wound Depth (cm) 0 cm 08/10/22 1500   Wound Volume (cm^3) 0 cm^3 08/10/22 1500   Wound Surface Area (cm^2) 0.01 cm^2 08/10/22 1500   Care Cleansed with:;Antimicrobial agent;Wound cleanser 08/10/22 1500   Dressing Applied;Compression wrap;Rolled gauze 08/10/22 1500   Periwound Care Skin barrier film applied 08/10/22 1500   Compression Unna's Boot;Three layer compression 08/10/22 1500            Wound Left lower;lateral Leg (Active)        Pre-existing:    Primary Wound Type:    Side: Left   Orientation: lower;lateral   Location: Leg   Wound Number:    Ankle-Brachial Index:    Pulses:    Removal Indication and Assessment:    Wound Outcome:    (Retired) Wound Type:    (Retired) Wound Length (cm):    (Retired) Wound Width (cm):    (Retired) Depth (cm):    Wound Description (Comments):    Removal Indications:    Dressing Appearance Dry;Intact;Clean 08/10/22 1500   Drainage Amount None 08/10/22 1500   Red (%), Wound Tissue Color 100 % 08/10/22 1500   Periwound Area Intact;Edematous;Pink 08/10/22 1500   Wound Length (cm) 0.2 cm 08/10/22 1500   Wound Width (cm) 0.2 cm 08/10/22 1500   Wound Surface Area (cm^2) 0.04 cm^2 08/10/22 1500   Care Cleansed with:;Antimicrobial agent;Soap and water 08/10/22 1500   Dressing Applied;Compression wrap;Rolled gauze 08/10/22 1500    Periwound Care Skin barrier film applied 08/10/22 1500   Compression Unna's Boot;Three layer compression 08/10/22 1500   * left lateral lower leg picture unable to upload*      Jered was seen in the clinic room and placed in the supine position on the treatment table.  Compression wraps were removed with scissors, and the legs were cleansed with Easi-clense sponges and dried thoroughly.     Plan of Care: Calmoseptine to periwound area, mextra pads to right lower extremity wounds, and covered with a 3 layer calamine wrap. The patient's feet were positioned at a 90 degree angle.  A calamine  wrap, followed by kerlix roll gauze and coban were applied using a spiral technique avoiding creases or folds.  The wrap was started behind the first metatarsal and ended below the tibial tubercle of the knee.  There was overlap of each turn half the width of the previous turn.  The compression wrap will be changed every 7 days.      Plan:     Discussed headache as possible side effect of calcium alginate. Discontinued product. Instructed patient to contact PCP if headache persists discontinuing calcium alginate. Pt voiced understanding.    Bilateral lower extremities dressed as detailed above.   Patient was instructed to not get the dressings wet and to use cast covers for showering.  Should the dressing become wet, he is to remove it, place a wet-to-dry dressing over the wound, cover with gauze and roll gauze and use ace wraps for compression and to secure bandages.  He should then notify this office as soon as possible to have a new dressing applied.    RTC in 1 week    Belinda Mendoza PA-C

## 2022-08-14 ENCOUNTER — PATIENT MESSAGE (OUTPATIENT)
Dept: INTERNAL MEDICINE | Facility: CLINIC | Age: 47
End: 2022-08-14
Payer: COMMERCIAL

## 2022-08-16 ENCOUNTER — OFFICE VISIT (OUTPATIENT)
Dept: WOUND CARE | Facility: CLINIC | Age: 47
End: 2022-08-16
Payer: COMMERCIAL

## 2022-08-16 ENCOUNTER — LAB VISIT (OUTPATIENT)
Dept: LAB | Facility: HOSPITAL | Age: 47
End: 2022-08-16
Attending: INTERNAL MEDICINE
Payer: COMMERCIAL

## 2022-08-16 VITALS
BODY MASS INDEX: 41.75 KG/M2 | SYSTOLIC BLOOD PRESSURE: 170 MMHG | DIASTOLIC BLOOD PRESSURE: 82 MMHG | OXYGEN SATURATION: 98 % | WEIGHT: 315 LBS | HEART RATE: 89 BPM | TEMPERATURE: 98 F | HEIGHT: 73 IN

## 2022-08-16 DIAGNOSIS — L97.919 VENOUS STASIS ULCERS OF BOTH LOWER EXTREMITIES: ICD-10-CM

## 2022-08-16 DIAGNOSIS — I87.2 VENOUS INSUFFICIENCY OF BOTH LOWER EXTREMITIES: ICD-10-CM

## 2022-08-16 DIAGNOSIS — L97.929 VENOUS STASIS ULCERS OF BOTH LOWER EXTREMITIES: ICD-10-CM

## 2022-08-16 DIAGNOSIS — S81.801A OPEN WOUND OF RIGHT LOWER EXTREMITY, INITIAL ENCOUNTER: Primary | ICD-10-CM

## 2022-08-16 DIAGNOSIS — I89.0 LYMPHEDEMA OF BOTH LOWER EXTREMITIES: ICD-10-CM

## 2022-08-16 DIAGNOSIS — S81.802A OPEN WOUND OF LEFT LOWER EXTREMITY, INITIAL ENCOUNTER: ICD-10-CM

## 2022-08-16 DIAGNOSIS — I83.019 VENOUS STASIS ULCERS OF BOTH LOWER EXTREMITIES: ICD-10-CM

## 2022-08-16 DIAGNOSIS — I83.029 VENOUS STASIS ULCERS OF BOTH LOWER EXTREMITIES: ICD-10-CM

## 2022-08-16 LAB
ALBUMIN SERPL BCP-MCNC: 3.8 G/DL (ref 3.5–5.2)
ALP SERPL-CCNC: 78 U/L (ref 55–135)
ALT SERPL W/O P-5'-P-CCNC: 22 U/L (ref 10–44)
ANION GAP SERPL CALC-SCNC: 9 MMOL/L (ref 8–16)
AST SERPL-CCNC: 22 U/L (ref 10–40)
BILIRUB SERPL-MCNC: 0.4 MG/DL (ref 0.1–1)
BUN SERPL-MCNC: 14 MG/DL (ref 6–20)
CALCIUM SERPL-MCNC: 9.5 MG/DL (ref 8.7–10.5)
CHLORIDE SERPL-SCNC: 104 MMOL/L (ref 95–110)
CO2 SERPL-SCNC: 26 MMOL/L (ref 23–29)
CREAT SERPL-MCNC: 1.1 MG/DL (ref 0.5–1.4)
EST. GFR  (NO RACE VARIABLE): >60 ML/MIN/1.73 M^2
GLUCOSE SERPL-MCNC: 93 MG/DL (ref 70–110)
POTASSIUM SERPL-SCNC: 4 MMOL/L (ref 3.5–5.1)
PROT SERPL-MCNC: 7.2 G/DL (ref 6–8.4)
SODIUM SERPL-SCNC: 139 MMOL/L (ref 136–145)

## 2022-08-16 PROCEDURE — 3079F DIAST BP 80-89 MM HG: CPT | Mod: CPTII,S$GLB,,

## 2022-08-16 PROCEDURE — 3008F PR BODY MASS INDEX (BMI) DOCUMENTED: ICD-10-PCS | Mod: CPTII,S$GLB,,

## 2022-08-16 PROCEDURE — 1160F RVW MEDS BY RX/DR IN RCRD: CPT | Mod: CPTII,S$GLB,,

## 2022-08-16 PROCEDURE — 1160F PR REVIEW ALL MEDS BY PRESCRIBER/CLIN PHARMACIST DOCUMENTED: ICD-10-PCS | Mod: CPTII,S$GLB,,

## 2022-08-16 PROCEDURE — 80053 COMPREHEN METABOLIC PANEL: CPT | Performed by: INTERNAL MEDICINE

## 2022-08-16 PROCEDURE — 3077F PR MOST RECENT SYSTOLIC BLOOD PRESSURE >= 140 MM HG: ICD-10-PCS | Mod: CPTII,S$GLB,,

## 2022-08-16 PROCEDURE — 99213 OFFICE O/P EST LOW 20 MIN: CPT | Mod: 25,S$GLB,,

## 2022-08-16 PROCEDURE — 99213 PR OFFICE/OUTPT VISIT, EST, LEVL III, 20-29 MIN: ICD-10-PCS | Mod: 25,S$GLB,,

## 2022-08-16 PROCEDURE — 1159F PR MEDICATION LIST DOCUMENTED IN MEDICAL RECORD: ICD-10-PCS | Mod: CPTII,S$GLB,,

## 2022-08-16 PROCEDURE — 99999 PR PBB SHADOW E&M-EST. PATIENT-LVL IV: ICD-10-PCS | Mod: PBBFAC,,,

## 2022-08-16 PROCEDURE — 3008F BODY MASS INDEX DOCD: CPT | Mod: CPTII,S$GLB,,

## 2022-08-16 PROCEDURE — 3079F PR MOST RECENT DIASTOLIC BLOOD PRESSURE 80-89 MM HG: ICD-10-PCS | Mod: CPTII,S$GLB,,

## 2022-08-16 PROCEDURE — 97597 DBRDMT OPN WND 1ST 20 CM/<: CPT | Mod: S$GLB,,,

## 2022-08-16 PROCEDURE — 99999 PR PBB SHADOW E&M-EST. PATIENT-LVL IV: CPT | Mod: PBBFAC,,,

## 2022-08-16 PROCEDURE — 3077F SYST BP >= 140 MM HG: CPT | Mod: CPTII,S$GLB,,

## 2022-08-16 PROCEDURE — 36415 COLL VENOUS BLD VENIPUNCTURE: CPT | Performed by: INTERNAL MEDICINE

## 2022-08-16 PROCEDURE — 1159F MED LIST DOCD IN RCRD: CPT | Mod: CPTII,S$GLB,,

## 2022-08-16 PROCEDURE — 97597 DEBRIDEMENT: ICD-10-PCS | Mod: S$GLB,,,

## 2022-08-16 NOTE — PROGRESS NOTES
Subjective:       Patient ID: Jered Contreras is a 47 y.o. male with PMHx of meralgia paresthetic of right side, WAYNE, MDD, hx of panic attacks, AR, nasal valve collapse, nasal septal deviation, heart palpitations, venous insufficiency of bilateral lower extremities, varicose veins, hx of bariatric surgery, GERD, TAYO, and lymphedema of bilateral lower extremities    Chief Complaint: Wound Check    Patient presents for a revaluation of bilateral lower extremity wounds. He is followed by Dr. Marquez for venous insufficiency and reports compliance with bumex. Patient requests not to have his legs wrapped. Denies fever, chills, erythema, warmth, purulent drainage, or pain.      Wound Check      Review of Systems   Constitutional: Negative for activity change, chills, diaphoresis, fatigue and fever.   Eyes: Negative for visual disturbance.   Respiratory: Negative for apnea, chest tightness and shortness of breath.    Cardiovascular: Positive for leg swelling. Negative for chest pain and palpitations.   Musculoskeletal: Negative for gait problem and joint swelling.   Skin: Positive for wound. Negative for pallor and rash.   Neurological: Negative for dizziness, syncope, facial asymmetry, weakness, light-headedness, numbness and headaches.   Psychiatric/Behavioral: Negative for agitation. The patient is not nervous/anxious.    All other systems reviewed and are negative.      Objective:      Physical Exam  Vitals reviewed.   Constitutional:       General: He is not in acute distress.     Appearance: Normal appearance. He is obese.   Cardiovascular:      Rate and Rhythm: Normal rate and regular rhythm.      Pulses: Normal pulses.   Pulmonary:      Effort: No respiratory distress.   Musculoskeletal:         General: No swelling.      Right lower leg: Edema present.      Left lower leg: Edema present.   Skin:     General: Skin is warm and dry.      Capillary Refill: Capillary refill takes less than 2 seconds.       Findings: Wound present.          Neurological:      General: No focal deficit present.      Mental Status: He is alert and oriented to person, place, and time.   Psychiatric:         Mood and Affect: Mood normal.         Behavior: Behavior normal.         Thought Content: Thought content normal.         Judgment: Judgment normal.         Assessment:       1. Open wound of right lower extremity, initial encounter    2. Venous insufficiency of both lower extremities    3. Lymphedema of both lower extremities    4. Venous stasis ulcers of both lower extremities    5. BMI 50.0-59.9, adult    6. Open wound of left lower extremity, initial encounter           Wound Right anterior;lower Leg (Active)        Pre-existing:    Primary Wound Type:    Side: Right   Orientation: anterior;lower   Location: Leg   Wound Number:    Ankle-Brachial Index:    Pulses:    Removal Indication and Assessment:    Wound Outcome:    (Retired) Wound Type:    (Retired) Wound Length (cm):    (Retired) Wound Width (cm):    (Retired) Depth (cm):    Wound Description (Comments):    Removal Indications:    Wound Image    08/16/22 1038   Dressing Appearance Dry;Intact;Clean 08/16/22 1038   Drainage Amount None 08/16/22 1038   Red (%), Wound Tissue Color 70 % 08/16/22 1038   Yellow (%), Wound Tissue Color 30 % 08/16/22 1038   Periwound Area Intact;Pink;Edematous 08/16/22 1038   Wound Length (cm) 0.4 cm 08/16/22 1038   Wound Width (cm) 0.5 cm 08/16/22 1038   Wound Depth (cm) 0 cm 08/16/22 1038   Wound Volume (cm^3) 0 cm^3 08/16/22 1038   Wound Surface Area (cm^2) 0.2 cm^2 08/16/22 1038   Care Cleansed with:;Soap and water;Wound cleanser 08/16/22 1038   Dressing Applied;Hydrogel;Island/border 08/16/22 1038            Wound Left anterior;lower Leg (Active)        Pre-existing:    Primary Wound Type:    Side: Left   Orientation: anterior;lower   Location: Leg   Wound Number:    Ankle-Brachial Index:    Pulses:    Removal Indication and Assessment:    Wound  Outcome:    (Retired) Wound Type:    (Retired) Wound Length (cm):    (Retired) Wound Width (cm):    (Retired) Depth (cm):    Wound Description (Comments):    Removal Indications:    Wound Image   08/16/22 1038   Dressing Appearance Dry;Intact;Clean 08/16/22 1038   Drainage Amount None 08/16/22 1038   Care Cleansed with:;Soap and water;Wound cleanser 08/16/22 1038            Wound Left lower;lateral Leg (Active)        Pre-existing:    Primary Wound Type:    Side: Left   Orientation: lower;lateral   Location: Leg   Wound Number:    Ankle-Brachial Index:    Pulses:    Removal Indication and Assessment:    Wound Outcome:    (Retired) Wound Type:    (Retired) Wound Length (cm):    (Retired) Wound Width (cm):    (Retired) Depth (cm):    Wound Description (Comments):    Removal Indications:    Dressing Appearance Dry;Intact;Clean 08/16/22 1043   Drainage Amount None 08/16/22 1043   Care Cleansed with:;Soap and water;Wound cleanser 08/16/22 1043   * left lateral lower leg picture unable to upload*      Jered was seen in the clinic room and placed in the supine position on the treatment table.  Compression wraps were removed with scissors, and the legs were cleansed with Easi-clense sponges and dried thoroughly. Placed 4% Lidocaine Hydrochloride for 10 minutes to right lower leg wound. Sharp debridement with 5mm curette and removed necrotic tissue- Pt tolerated procedure well. LLE wounds noted to be healed.     Plan of Care: Hydrogel with island dressing to right lower leg wound.    Plan:     Discussed unna boot vs island dressings. Discussed how venous insufficiency and lymphedema causes leg swelling which causes delayed wound healing. Pt still requests not to have an unna boot placed.    Bilateral lower extremities dressed as detailed above.   Patient was instructed to not get the dressings wet and to use cast covers for showering.  Should the dressing become wet, he is to remove it, place a wet-to-dry dressing over the  wound, cover with gauze and roll gauze and use ace wraps for compression and to secure bandages.  He should then notify this office as soon as possible to have a new dressing applied.    RTC in 1 week    Belinda Mendoza PA-C

## 2022-08-16 NOTE — PROCEDURES
Debridement    Date/Time: 8/16/2022 9:30 AM  Performed by: Belinda Mendoza PA-C  Authorized by: Belinda Mendoza PA-C     Consent Done?:  Yes (Written)    Preparation: Patient was prepped and draped in usual sterile fashion    Local anesthesia used?: Yes    Local anesthetic:  Topical anesthetic (Topical 4% Lidocaine Hydrochloride)    Wound Details:    Location:  Right leg    Type of Debridement:  Excisional       Length (cm):  0.4       Area (sq cm):  0.2       Width (cm):  0.5       Percent Debrided (%):  100       Depth (cm):  0       Total Area Debrided (sq cm):  0.2    Depth of debridement:  Epidermis/Dermis    Tissue debrided:  Dermis and Epidermis    Devitalized tissue debrided:  Biofilm, Exudate, Fibrin and Slough    Instruments:  Curette    Bleeding:  Minimal  Hemostasis Achieved: Yes    Method Used:  Pressure  Patient tolerance:  Patient tolerated the procedure well with no immediate complications

## 2022-08-21 ENCOUNTER — PATIENT MESSAGE (OUTPATIENT)
Dept: PODIATRY | Facility: CLINIC | Age: 47
End: 2022-08-21
Payer: COMMERCIAL

## 2022-08-23 ENCOUNTER — TELEPHONE (OUTPATIENT)
Dept: PSYCHIATRY | Facility: HOSPITAL | Age: 47
End: 2022-08-23
Payer: COMMERCIAL

## 2022-08-23 ENCOUNTER — PATIENT MESSAGE (OUTPATIENT)
Dept: PSYCHIATRY | Facility: CLINIC | Age: 47
End: 2022-08-23
Payer: COMMERCIAL

## 2022-08-30 ENCOUNTER — LAB VISIT (OUTPATIENT)
Dept: LAB | Facility: HOSPITAL | Age: 47
End: 2022-08-30
Attending: ALLERGY & IMMUNOLOGY
Payer: COMMERCIAL

## 2022-08-30 ENCOUNTER — OFFICE VISIT (OUTPATIENT)
Dept: ALLERGY | Facility: CLINIC | Age: 47
End: 2022-08-30
Payer: COMMERCIAL

## 2022-08-30 VITALS — HEIGHT: 73 IN | BODY MASS INDEX: 41.75 KG/M2 | WEIGHT: 315 LBS

## 2022-08-30 DIAGNOSIS — J31.0 CHRONIC RHINITIS: ICD-10-CM

## 2022-08-30 DIAGNOSIS — J32.8 OTHER CHRONIC SINUSITIS: ICD-10-CM

## 2022-08-30 DIAGNOSIS — H10.423 SIMPLE CHRONIC CONJUNCTIVITIS OF BOTH EYES: ICD-10-CM

## 2022-08-30 DIAGNOSIS — H10.423 SIMPLE CHRONIC CONJUNCTIVITIS OF BOTH EYES: Primary | ICD-10-CM

## 2022-08-30 PROCEDURE — 1159F PR MEDICATION LIST DOCUMENTED IN MEDICAL RECORD: ICD-10-PCS | Mod: CPTII,S$GLB,, | Performed by: ALLERGY & IMMUNOLOGY

## 2022-08-30 PROCEDURE — 3008F PR BODY MASS INDEX (BMI) DOCUMENTED: ICD-10-PCS | Mod: CPTII,S$GLB,, | Performed by: ALLERGY & IMMUNOLOGY

## 2022-08-30 PROCEDURE — 86003 ALLG SPEC IGE CRUDE XTRC EA: CPT | Mod: 59 | Performed by: ALLERGY & IMMUNOLOGY

## 2022-08-30 PROCEDURE — 99999 PR PBB SHADOW E&M-EST. PATIENT-LVL IV: CPT | Mod: PBBFAC,,, | Performed by: ALLERGY & IMMUNOLOGY

## 2022-08-30 PROCEDURE — 1160F RVW MEDS BY RX/DR IN RCRD: CPT | Mod: CPTII,S$GLB,, | Performed by: ALLERGY & IMMUNOLOGY

## 2022-08-30 PROCEDURE — 36415 COLL VENOUS BLD VENIPUNCTURE: CPT | Mod: PO | Performed by: ALLERGY & IMMUNOLOGY

## 2022-08-30 PROCEDURE — 86003 ALLG SPEC IGE CRUDE XTRC EA: CPT | Performed by: ALLERGY & IMMUNOLOGY

## 2022-08-30 PROCEDURE — 1160F PR REVIEW ALL MEDS BY PRESCRIBER/CLIN PHARMACIST DOCUMENTED: ICD-10-PCS | Mod: CPTII,S$GLB,, | Performed by: ALLERGY & IMMUNOLOGY

## 2022-08-30 PROCEDURE — 99203 PR OFFICE/OUTPT VISIT, NEW, LEVL III, 30-44 MIN: ICD-10-PCS | Mod: S$GLB,,, | Performed by: ALLERGY & IMMUNOLOGY

## 2022-08-30 PROCEDURE — 99999 PR PBB SHADOW E&M-EST. PATIENT-LVL IV: ICD-10-PCS | Mod: PBBFAC,,, | Performed by: ALLERGY & IMMUNOLOGY

## 2022-08-30 PROCEDURE — 3008F BODY MASS INDEX DOCD: CPT | Mod: CPTII,S$GLB,, | Performed by: ALLERGY & IMMUNOLOGY

## 2022-08-30 PROCEDURE — 99203 OFFICE O/P NEW LOW 30 MIN: CPT | Mod: S$GLB,,, | Performed by: ALLERGY & IMMUNOLOGY

## 2022-08-30 PROCEDURE — 1159F MED LIST DOCD IN RCRD: CPT | Mod: CPTII,S$GLB,, | Performed by: ALLERGY & IMMUNOLOGY

## 2022-08-30 NOTE — PROGRESS NOTES
Subjective:       Patient ID: Jered Contreras is a 47 y.o. male.    Chief Complaint:  Allergies      48 yo man presents for consult from Dr Onofre Vega for possible allergies. He states he has had 2 sinus surgeries, one for deviated septum other to repair collapse. He has constant runny nose, sneezing, watery eyes. Some times itchy nose and eyes, not often congested but if really bad will be. Some PND. no chest symptoms. Has all year but worse in spring and summer. Worse after rains. Worse outside especially doing yard work. No time of day worse. No triggers. Is on montelukast daily, Claritin daily, fluticasone 1 SEN daily and azelastine 1 SEN daily. This does help but cant miss any. Has H/O asthma but no issues in 15 years. No eczema. No food, insect or latex allergy.       Environmental History: see history section for home environment  Review of Systems   Constitutional:  Negative for activity change, appetite change, chills, fatigue, fever and unexpected weight change.   HENT:  Positive for congestion, postnasal drip, rhinorrhea and sneezing. Negative for ear discharge, ear pain, facial swelling, hearing loss, mouth sores, nosebleeds, sinus pressure, sore throat, tinnitus, trouble swallowing and voice change.    Eyes:  Positive for discharge and itching. Negative for redness and visual disturbance.   Respiratory:  Negative for cough, chest tightness, shortness of breath and wheezing.    Cardiovascular:  Positive for leg swelling. Negative for chest pain and palpitations.   Gastrointestinal:  Negative for abdominal distention, abdominal pain, constipation, diarrhea, nausea and vomiting.   Genitourinary:  Negative for difficulty urinating.   Musculoskeletal:  Negative for arthralgias, back pain, joint swelling and myalgias.   Skin:  Positive for wound. Negative for color change, pallor and rash.   Neurological:  Negative for dizziness, tremors, speech difficulty, weakness, light-headedness and  headaches.   Hematological:  Negative for adenopathy. Does not bruise/bleed easily.   Psychiatric/Behavioral:  Negative for agitation, confusion, decreased concentration and sleep disturbance. The patient is not nervous/anxious.       Objective:      Physical Exam  Vitals and nursing note reviewed.   Constitutional:       General: He is not in acute distress.     Appearance: Normal appearance. He is not ill-appearing.   HENT:      Head: Normocephalic and atraumatic.      Right Ear: External ear normal.      Left Ear: External ear normal.      Nose: No rhinorrhea.   Eyes:      General:         Right eye: No discharge.         Left eye: No discharge.      Conjunctiva/sclera: Conjunctivae normal.   Cardiovascular:      Rate and Rhythm: Normal rate and regular rhythm.   Pulmonary:      Effort: Pulmonary effort is normal. No respiratory distress.   Abdominal:      General: There is no distension.   Musculoskeletal:         General: Normal range of motion.      Cervical back: Normal range of motion.   Skin:     General: Skin is warm and dry.      Findings: No erythema or rash.   Neurological:      Mental Status: He is alert and oriented to person, place, and time.   Psychiatric:         Mood and Affect: Mood normal.         Behavior: Behavior normal.         Thought Content: Thought content normal.         Judgment: Judgment normal.       Laboratory:   none performed   Assessment:       1. Simple chronic conjunctivitis of both eyes    2. Chronic rhinitis    3. Other chronic sinusitis         Plan:       Advised symptoms can be allergic rhinitis vs chronic non allergic rhinitis, tate end immunocaps to eval  Continue montelukast and loratadine daily  Continue fluticasone but can increase to 2 sEN daily and azelastine can increase to 2 SEN BID as needed  Phone review  Dr Vega notified of completed consult via [employer]

## 2022-08-31 ENCOUNTER — HOSPITAL ENCOUNTER (OUTPATIENT)
Dept: CARDIOLOGY | Facility: HOSPITAL | Age: 47
Discharge: HOME | End: 2022-08-31
Attending: INTERNAL MEDICINE
Payer: COMMERCIAL

## 2022-08-31 DIAGNOSIS — I87.2 VENOUS INSUFFICIENCY OF BOTH LOWER EXTREMITIES: ICD-10-CM

## 2022-08-31 DIAGNOSIS — I89.0 LYMPHEDEMA OF BOTH LOWER EXTREMITIES: ICD-10-CM

## 2022-08-31 LAB
IMMEDIATE ARM BP: 134 MMHG
IMMEDIATE LEFT ABI: 1.04
IMMEDIATE LEFT TIBIAL: 140 MMHG
IMMEDIATE RIGHT ABI: 1.09
IMMEDIATE RIGHT TIBIAL: 146 MMHG
LEFT ABI: 1.14
LEFT ARM BP: 119 MMHG
LEFT DORSALIS PEDIS: 141 MMHG
LEFT POSTERIOR TIBIAL: 137 MMHG
RIGHT ABI: 1.25
RIGHT ARM BP: 124 MMHG
RIGHT DORSALIS PEDIS: 155 MMHG
RIGHT POSTERIOR TIBIAL: 140 MMHG
TOE RAISES: 70

## 2022-08-31 PROCEDURE — 93924 ANKLE BRACHIAL INDICES (ABI): ICD-10-PCS | Mod: 26,,, | Performed by: INTERNAL MEDICINE

## 2022-08-31 PROCEDURE — 93924 LWR XTR VASC STDY BILAT: CPT

## 2022-08-31 PROCEDURE — 93924 LWR XTR VASC STDY BILAT: CPT | Mod: 26,,, | Performed by: INTERNAL MEDICINE

## 2022-09-02 LAB
A ALTERNATA IGE QN: <0.1 KU/L
A FUMIGATUS IGE QN: <0.1 KU/L
ALLERGEN CHAETOMIUM GLOBOSUM IGE: <0.1 KU/L
ALLERGEN WALNUT TREE IGE: <0.1 KU/L
ALLERGEN WHITE PINE TREE IGE: <0.1 KU/L
BAHIA GRASS IGE QN: <0.1 KU/L
BALD CYPRESS IGE QN: <0.1 KU/L
BERMUDA GRASS IGE QN: <0.1 KU/L
C HERBARUM IGE QN: <0.1 KU/L
C LUNATA IGE QN: <0.1 KU/L
CAT DANDER IGE QN: <0.1 KU/L
CHAETOMIUM GLOB. CLASS: NORMAL
COMMON RAGWEED IGE QN: <0.1 KU/L
COTTONWOOD IGE QN: <0.1 KU/L
D FARINAE IGE QN: <0.1 KU/L
D PTERONYSS IGE QN: <0.1 KU/L
DEPRECATED A ALTERNATA IGE RAST QL: NORMAL
DEPRECATED A FUMIGATUS IGE RAST QL: NORMAL
DEPRECATED BAHIA GRASS IGE RAST QL: NORMAL
DEPRECATED BALD CYPRESS IGE RAST QL: NORMAL
DEPRECATED BERMUDA GRASS IGE RAST QL: NORMAL
DEPRECATED C HERBARUM IGE RAST QL: NORMAL
DEPRECATED C LUNATA IGE RAST QL: NORMAL
DEPRECATED CAT DANDER IGE RAST QL: NORMAL
DEPRECATED COMMON RAGWEED IGE RAST QL: NORMAL
DEPRECATED COTTONWOOD IGE RAST QL: NORMAL
DEPRECATED D FARINAE IGE RAST QL: NORMAL
DEPRECATED D PTERONYSS IGE RAST QL: NORMAL
DEPRECATED DOG DANDER IGE RAST QL: NORMAL
DEPRECATED ELDER IGE RAST QL: NORMAL
DEPRECATED ENGL PLANTAIN IGE RAST QL: NORMAL
DEPRECATED JOHNSON GRASS IGE RAST QL: NORMAL
DEPRECATED LONDON PLANE IGE RAST QL: NORMAL
DEPRECATED MUGWORT IGE RAST QL: NORMAL
DEPRECATED P NOTATUM IGE RAST QL: NORMAL
DEPRECATED PECAN/HICK TREE IGE RAST QL: NORMAL
DEPRECATED ROACH IGE RAST QL: NORMAL
DEPRECATED S ROSTRATA IGE RAST QL: NORMAL
DEPRECATED SALTWORT IGE RAST QL: NORMAL
DEPRECATED SILVER BIRCH IGE RAST QL: NORMAL
DEPRECATED TIMOTHY IGE RAST QL: NORMAL
DEPRECATED WHITE OAK IGE RAST QL: NORMAL
DEPRECATED WILLOW IGE RAST QL: NORMAL
DOG DANDER IGE QN: <0.1 KU/L
ELDER IGE QN: <0.1 KU/L
ENGL PLANTAIN IGE QN: <0.1 KU/L
JOHNSON GRASS IGE QN: <0.1 KU/L
LONDON PLANE IGE QN: <0.1 KU/L
MUGWORT IGE QN: <0.1 KU/L
P NOTATUM IGE QN: <0.1 KU/L
PECAN/HICK TREE IGE QN: <0.1 KU/L
ROACH IGE QN: <0.1 KU/L
S ROSTRATA IGE QN: <0.1 KU/L
SALTWORT IGE QN: <0.1 KU/L
SILVER BIRCH IGE QN: <0.1 KU/L
TIMOTHY IGE QN: <0.1 KU/L
WALNUT TREE CLASS: NORMAL
WHITE OAK IGE QN: <0.1 KU/L
WHITE PINE CLASS: NORMAL
WILLOW IGE QN: <0.1 KU/L

## 2022-09-08 ENCOUNTER — PATIENT MESSAGE (OUTPATIENT)
Dept: ALLERGY | Facility: CLINIC | Age: 47
End: 2022-09-08
Payer: COMMERCIAL

## 2022-09-14 ENCOUNTER — PATIENT MESSAGE (OUTPATIENT)
Dept: WOUND CARE | Facility: CLINIC | Age: 47
End: 2022-09-14
Payer: COMMERCIAL

## 2022-09-15 ENCOUNTER — OFFICE VISIT (OUTPATIENT)
Dept: WOUND CARE | Facility: CLINIC | Age: 47
End: 2022-09-15
Payer: COMMERCIAL

## 2022-09-15 VITALS
DIASTOLIC BLOOD PRESSURE: 74 MMHG | WEIGHT: 315 LBS | TEMPERATURE: 99 F | HEIGHT: 73 IN | BODY MASS INDEX: 41.75 KG/M2 | SYSTOLIC BLOOD PRESSURE: 158 MMHG | HEART RATE: 86 BPM

## 2022-09-15 DIAGNOSIS — L03.119 CELLULITIS OF LOWER EXTREMITY, UNSPECIFIED LATERALITY: ICD-10-CM

## 2022-09-15 DIAGNOSIS — S81.802A OPEN WOUND OF LEFT LOWER EXTREMITY, INITIAL ENCOUNTER: ICD-10-CM

## 2022-09-15 DIAGNOSIS — L97.919 VENOUS STASIS ULCERS OF BOTH LOWER EXTREMITIES: ICD-10-CM

## 2022-09-15 DIAGNOSIS — I83.029 VENOUS STASIS ULCERS OF BOTH LOWER EXTREMITIES: ICD-10-CM

## 2022-09-15 DIAGNOSIS — I87.2 VENOUS INSUFFICIENCY OF BOTH LOWER EXTREMITIES: ICD-10-CM

## 2022-09-15 DIAGNOSIS — I83.019 VENOUS STASIS ULCERS OF BOTH LOWER EXTREMITIES: ICD-10-CM

## 2022-09-15 DIAGNOSIS — S81.801A OPEN WOUND OF RIGHT LOWER EXTREMITY, INITIAL ENCOUNTER: Primary | ICD-10-CM

## 2022-09-15 DIAGNOSIS — L97.929 VENOUS STASIS ULCERS OF BOTH LOWER EXTREMITIES: ICD-10-CM

## 2022-09-15 DIAGNOSIS — I89.0 LYMPHEDEMA OF BOTH LOWER EXTREMITIES: ICD-10-CM

## 2022-09-15 PROCEDURE — 99214 PR OFFICE/OUTPT VISIT, EST, LEVL IV, 30-39 MIN: ICD-10-PCS | Mod: 25,S$GLB,,

## 2022-09-15 PROCEDURE — 3008F BODY MASS INDEX DOCD: CPT | Mod: CPTII,S$GLB,,

## 2022-09-15 PROCEDURE — 3078F PR MOST RECENT DIASTOLIC BLOOD PRESSURE < 80 MM HG: ICD-10-PCS | Mod: CPTII,S$GLB,,

## 2022-09-15 PROCEDURE — 1159F MED LIST DOCD IN RCRD: CPT | Mod: CPTII,S$GLB,,

## 2022-09-15 PROCEDURE — 99999 PR PBB SHADOW E&M-EST. PATIENT-LVL IV: CPT | Mod: PBBFAC,,,

## 2022-09-15 PROCEDURE — 29580 STRAPPING UNNA BOOT: CPT | Mod: 50,S$GLB,,

## 2022-09-15 PROCEDURE — 99999 PR PBB SHADOW E&M-EST. PATIENT-LVL IV: ICD-10-PCS | Mod: PBBFAC,,,

## 2022-09-15 PROCEDURE — 99214 OFFICE O/P EST MOD 30 MIN: CPT | Mod: 25,S$GLB,,

## 2022-09-15 PROCEDURE — 3077F PR MOST RECENT SYSTOLIC BLOOD PRESSURE >= 140 MM HG: ICD-10-PCS | Mod: CPTII,S$GLB,,

## 2022-09-15 PROCEDURE — 1159F PR MEDICATION LIST DOCUMENTED IN MEDICAL RECORD: ICD-10-PCS | Mod: CPTII,S$GLB,,

## 2022-09-15 PROCEDURE — 3078F DIAST BP <80 MM HG: CPT | Mod: CPTII,S$GLB,,

## 2022-09-15 PROCEDURE — 3077F SYST BP >= 140 MM HG: CPT | Mod: CPTII,S$GLB,,

## 2022-09-15 PROCEDURE — 29580 PR STRAPPING UNNA BOOT: ICD-10-PCS | Mod: 50,S$GLB,,

## 2022-09-15 PROCEDURE — 3008F PR BODY MASS INDEX (BMI) DOCUMENTED: ICD-10-PCS | Mod: CPTII,S$GLB,,

## 2022-09-15 RX ORDER — DOXYCYCLINE HYCLATE 100 MG
100 TABLET ORAL EVERY 12 HOURS
Qty: 28 TABLET | Refills: 0 | Status: SHIPPED | OUTPATIENT
Start: 2022-09-15 | End: 2022-09-29

## 2022-09-15 NOTE — PROGRESS NOTES
Subjective:       Patient ID: Jered Contreras is a 47 y.o. male with PMHx of meralgia paresthetic of right side, WAYNE, MDD, hx of panic attacks, AR, nasal valve collapse, nasal septal deviation, heart palpitations, venous insufficiency of bilateral lower extremities, varicose veins, hx of bariatric surgery, GERD, TAYO, and lymphedema of bilateral lower extremities    Chief Complaint: Wound Check    Patient presents for a revaluation of bilateral lower extremity wounds. He is followed by Dr. Marquez for venous insufficiency and reports compliance with bumex. Patient reports excessive drainage. Pt has not followed in the wound care clinic since 8/16 due to transportation issues. Denies fever, chills, erythema, warmth, purulent drainage, or pain.      Wound Check    Review of Systems   Constitutional:  Negative for activity change, chills, diaphoresis, fatigue and fever.   Eyes:  Negative for visual disturbance.   Respiratory:  Negative for apnea, chest tightness and shortness of breath.    Cardiovascular:  Positive for leg swelling. Negative for chest pain and palpitations.   Musculoskeletal:  Negative for gait problem and joint swelling.   Skin:  Positive for wound. Negative for pallor and rash.   Neurological:  Negative for dizziness, syncope, facial asymmetry, weakness, light-headedness, numbness and headaches.   Psychiatric/Behavioral:  Negative for agitation. The patient is not nervous/anxious.    All other systems reviewed and are negative.    Objective:      Physical Exam  Vitals reviewed.   Constitutional:       General: He is not in acute distress.     Appearance: Normal appearance. He is obese.   Cardiovascular:      Rate and Rhythm: Normal rate and regular rhythm.      Pulses: Normal pulses.   Pulmonary:      Effort: No respiratory distress.   Musculoskeletal:         General: No swelling.      Right lower leg: Edema present.      Left lower leg: Edema present.   Skin:     General: Skin is warm and  dry.      Capillary Refill: Capillary refill takes less than 2 seconds.      Findings: Erythema and wound present.      Comments: Scattered wounds to bilateral lower extremities  Erythema and warmth noted to bilateral lower extremities   Neurological:      General: No focal deficit present.      Mental Status: He is alert and oriented to person, place, and time.   Psychiatric:         Mood and Affect: Mood normal.         Behavior: Behavior normal.         Thought Content: Thought content normal.         Judgment: Judgment normal.       Assessment:       1. Open wound of right lower extremity, initial encounter    2. Open wound of left lower extremity, initial encounter    3. Venous insufficiency of both lower extremities    4. Lymphedema of both lower extremities    5. Venous stasis ulcers of both lower extremities    6. BMI 50.0-59.9, adult    7. Cellulitis of lower extremity, unspecified laterality           Altered Skin Integrity Right anterior;lower;lateral;posterior;medial Leg (Active)       Altered Skin Integrity Present on Admission:    Side: Right   Orientation: anterior;lower;lateral;posterior;medial   Location: Leg   Wound Number:    Is this injury device related?:    Primary Wound Type:    Description of Altered Skin Integrity:    Ankle-Brachial Index:    Pulses:    Removal Indication and Assessment:    Wound Outcome:    (Retired) Wound Length (cm):    (Retired) Wound Width (cm):    (Retired) Depth (cm):    Wound Description (Comments):    Removal Indications:    Wound Image    09/15/22 0946   Dressing Appearance Dry;Intact;Clean;Moist drainage 09/15/22 0946   Drainage Amount Large 09/15/22 0946   Drainage Characteristics/Odor Serosanguineous 09/15/22 0946   Red (%), Wound Tissue Color 100 % 09/15/22 0946   Periwound Area Intact;Edematous;Warm;Redness 09/15/22 0946   Care Cleansed with:;Soap and water;Wound cleanser 09/15/22 0946   Dressing Applied;Absorptive Pad;Compression wrap;Rolled gauze;Other  (comment) 09/15/22 0946   Periwound Care Skin barrier film applied 09/15/22 0946   Compression Unna's Boot;Three layer compression 09/15/22 0946            Altered Skin Integrity Left anterior;lower;lateral;posterior;medial Leg (Active)       Altered Skin Integrity Present on Admission:    Side: Left   Orientation: anterior;lower;lateral;posterior;medial   Location: Leg   Wound Number:    Is this injury device related?:    Primary Wound Type:    Description of Altered Skin Integrity:    Ankle-Brachial Index:    Pulses:    Removal Indication and Assessment:    Wound Outcome:    (Retired) Wound Length (cm):    (Retired) Wound Width (cm):    (Retired) Depth (cm):    Wound Description (Comments):    Removal Indications:    Wound Image    09/15/22 0946   Dressing Appearance Dry;Intact;Clean;Moist drainage 09/15/22 0946   Drainage Amount Moderate 09/15/22 0946   Drainage Characteristics/Odor Serosanguineous 09/15/22 0946   Red (%), Wound Tissue Color 100 % 09/15/22 0946   Periwound Area Intact;Warm;Edematous;Redness 09/15/22 0946   Care Cleansed with:;Soap and water;Wound cleanser 09/15/22 0946   Dressing Applied;Absorptive Pad;Compression wrap;Rolled gauze;Other (comment) 09/15/22 0946   Periwound Care Skin barrier film applied 09/15/22 0946   Compression Unna's Boot;Three layer compression 09/15/22 0946     Jered was seen in the clinic room and placed in the supine position on the treatment table.  Dressings were removed with scissors, and the legs were cleansed with Easi-clense sponges and dried thoroughly.     Plan of Care: Calmoseptine to periwound skin, drawtex to wound bed, mextra absorbent pad, and a 3 layer calamine unna boot. The patient's foot was positioned at a 90 degree angle.  A calamine wrap, followed by kerlix roll gauze and coban were applied using a spiral technique avoiding creases or folds.  The wrap was started behind the first metatarsal and ended below the tibial tubercle of the knee.  There was  overlap of each turn half the width of the previous turn.  The compression wrap will be changed every 7 days.      Plan:     Discussed cellulitis with patient. Patient highly allergic to cephalosporins and penicillin. Prescribed doxy- pt known to tolerate. Discussed side effects of medication with patient. Instructed patient to take medication as prescribed. Pt voiced understanding.    Bilateral lower extremities dressed as detailed above.   Patient was instructed to not get the dressings wet and to use cast covers for showering.  Should the dressing become wet, he is to remove it, place a wet-to-dry dressing over the wound, cover with gauze and roll gauze and use ace wraps for compression and to secure bandages.  He should then notify this office as soon as possible to have a new dressing applied.    Instructed patient to continue taking bumex as prescribed and to elevate legs whenever sedentary.    Written and verbal instructions given to patient.  RTC in 1 week    Belinda Mendoza PA-C

## 2022-09-22 ENCOUNTER — OFFICE VISIT (OUTPATIENT)
Dept: WOUND CARE | Facility: CLINIC | Age: 47
End: 2022-09-22
Payer: COMMERCIAL

## 2022-09-22 ENCOUNTER — OFFICE VISIT (OUTPATIENT)
Dept: CARDIOLOGY | Facility: CLINIC | Age: 47
End: 2022-09-22
Payer: COMMERCIAL

## 2022-09-22 VITALS
HEIGHT: 73 IN | SYSTOLIC BLOOD PRESSURE: 132 MMHG | WEIGHT: 315 LBS | BODY MASS INDEX: 41.75 KG/M2 | HEART RATE: 87 BPM | TEMPERATURE: 98 F | DIASTOLIC BLOOD PRESSURE: 75 MMHG

## 2022-09-22 VITALS
HEART RATE: 87 BPM | HEIGHT: 73 IN | BODY MASS INDEX: 41.75 KG/M2 | WEIGHT: 315 LBS | DIASTOLIC BLOOD PRESSURE: 75 MMHG | SYSTOLIC BLOOD PRESSURE: 132 MMHG

## 2022-09-22 DIAGNOSIS — I83.029 VENOUS STASIS ULCERS OF BOTH LOWER EXTREMITIES: ICD-10-CM

## 2022-09-22 DIAGNOSIS — L03.119 CELLULITIS OF LOWER EXTREMITY, UNSPECIFIED LATERALITY: ICD-10-CM

## 2022-09-22 DIAGNOSIS — L97.919 VENOUS STASIS ULCERS OF BOTH LOWER EXTREMITIES: ICD-10-CM

## 2022-09-22 DIAGNOSIS — S81.801A OPEN WOUND OF RIGHT LOWER EXTREMITY, INITIAL ENCOUNTER: Primary | ICD-10-CM

## 2022-09-22 DIAGNOSIS — G47.33 OBSTRUCTIVE SLEEP APNEA TREATED WITH CONTINUOUS POSITIVE AIRWAY PRESSURE (CPAP): ICD-10-CM

## 2022-09-22 DIAGNOSIS — I87.2 VENOUS INSUFFICIENCY OF BOTH LOWER EXTREMITIES: ICD-10-CM

## 2022-09-22 DIAGNOSIS — I89.0 LYMPHEDEMA OF BOTH LOWER EXTREMITIES: Primary | ICD-10-CM

## 2022-09-22 DIAGNOSIS — S81.802A OPEN WOUND OF LEFT LOWER EXTREMITY, INITIAL ENCOUNTER: ICD-10-CM

## 2022-09-22 DIAGNOSIS — L97.929 VENOUS STASIS ULCERS OF BOTH LOWER EXTREMITIES: ICD-10-CM

## 2022-09-22 DIAGNOSIS — I83.019 VENOUS STASIS ULCERS OF BOTH LOWER EXTREMITIES: ICD-10-CM

## 2022-09-22 DIAGNOSIS — I83.12 VARICOSE VEINS OF BOTH LOWER EXTREMITIES WITH INFLAMMATION: ICD-10-CM

## 2022-09-22 DIAGNOSIS — R60.0 EDEMA OF BOTH LOWER EXTREMITIES: ICD-10-CM

## 2022-09-22 DIAGNOSIS — I83.11 VARICOSE VEINS OF BOTH LOWER EXTREMITIES WITH INFLAMMATION: ICD-10-CM

## 2022-09-22 DIAGNOSIS — I89.0 LYMPHEDEMA OF BOTH LOWER EXTREMITIES: ICD-10-CM

## 2022-09-22 PROCEDURE — 99212 PR OFFICE/OUTPT VISIT, EST, LEVL II, 10-19 MIN: ICD-10-PCS | Mod: 25,S$GLB,,

## 2022-09-22 PROCEDURE — 29580 PR STRAPPING UNNA BOOT: ICD-10-PCS | Mod: 50,S$GLB,,

## 2022-09-22 PROCEDURE — 99999 PR PBB SHADOW E&M-EST. PATIENT-LVL IV: CPT | Mod: PBBFAC,,,

## 2022-09-22 PROCEDURE — 1159F MED LIST DOCD IN RCRD: CPT | Mod: CPTII,S$GLB,, | Performed by: INTERNAL MEDICINE

## 2022-09-22 PROCEDURE — 99215 PR OFFICE/OUTPT VISIT, EST, LEVL V, 40-54 MIN: ICD-10-PCS | Mod: S$GLB,,, | Performed by: INTERNAL MEDICINE

## 2022-09-22 PROCEDURE — 1159F PR MEDICATION LIST DOCUMENTED IN MEDICAL RECORD: ICD-10-PCS | Mod: CPTII,S$GLB,,

## 2022-09-22 PROCEDURE — 99999 PR PBB SHADOW E&M-EST. PATIENT-LVL IV: ICD-10-PCS | Mod: PBBFAC,,,

## 2022-09-22 PROCEDURE — 3075F PR MOST RECENT SYSTOLIC BLOOD PRESS GE 130-139MM HG: ICD-10-PCS | Mod: CPTII,S$GLB,, | Performed by: INTERNAL MEDICINE

## 2022-09-22 PROCEDURE — 99999 PR PBB SHADOW E&M-EST. PATIENT-LVL IV: ICD-10-PCS | Mod: PBBFAC,,, | Performed by: INTERNAL MEDICINE

## 2022-09-22 PROCEDURE — 3078F DIAST BP <80 MM HG: CPT | Mod: CPTII,S$GLB,, | Performed by: INTERNAL MEDICINE

## 2022-09-22 PROCEDURE — 3078F PR MOST RECENT DIASTOLIC BLOOD PRESSURE < 80 MM HG: ICD-10-PCS | Mod: CPTII,S$GLB,,

## 2022-09-22 PROCEDURE — 3075F SYST BP GE 130 - 139MM HG: CPT | Mod: CPTII,S$GLB,, | Performed by: INTERNAL MEDICINE

## 2022-09-22 PROCEDURE — 3078F DIAST BP <80 MM HG: CPT | Mod: CPTII,S$GLB,,

## 2022-09-22 PROCEDURE — 1159F MED LIST DOCD IN RCRD: CPT | Mod: CPTII,S$GLB,,

## 2022-09-22 PROCEDURE — 3008F PR BODY MASS INDEX (BMI) DOCUMENTED: ICD-10-PCS | Mod: CPTII,S$GLB,,

## 2022-09-22 PROCEDURE — 3075F SYST BP GE 130 - 139MM HG: CPT | Mod: CPTII,S$GLB,,

## 2022-09-22 PROCEDURE — 99212 OFFICE O/P EST SF 10 MIN: CPT | Mod: 25,S$GLB,,

## 2022-09-22 PROCEDURE — 3008F BODY MASS INDEX DOCD: CPT | Mod: CPTII,S$GLB,,

## 2022-09-22 PROCEDURE — 1159F PR MEDICATION LIST DOCUMENTED IN MEDICAL RECORD: ICD-10-PCS | Mod: CPTII,S$GLB,, | Performed by: INTERNAL MEDICINE

## 2022-09-22 PROCEDURE — 3078F PR MOST RECENT DIASTOLIC BLOOD PRESSURE < 80 MM HG: ICD-10-PCS | Mod: CPTII,S$GLB,, | Performed by: INTERNAL MEDICINE

## 2022-09-22 PROCEDURE — 29580 STRAPPING UNNA BOOT: CPT | Mod: 50,S$GLB,,

## 2022-09-22 PROCEDURE — 99215 OFFICE O/P EST HI 40 MIN: CPT | Mod: S$GLB,,, | Performed by: INTERNAL MEDICINE

## 2022-09-22 PROCEDURE — 3008F PR BODY MASS INDEX (BMI) DOCUMENTED: ICD-10-PCS | Mod: CPTII,S$GLB,, | Performed by: INTERNAL MEDICINE

## 2022-09-22 PROCEDURE — 3008F BODY MASS INDEX DOCD: CPT | Mod: CPTII,S$GLB,, | Performed by: INTERNAL MEDICINE

## 2022-09-22 PROCEDURE — 99999 PR PBB SHADOW E&M-EST. PATIENT-LVL IV: CPT | Mod: PBBFAC,,, | Performed by: INTERNAL MEDICINE

## 2022-09-22 PROCEDURE — 3075F PR MOST RECENT SYSTOLIC BLOOD PRESS GE 130-139MM HG: ICD-10-PCS | Mod: CPTII,S$GLB,,

## 2022-09-22 NOTE — PATIENT INSTRUCTIONS
Assessment/Plan:  Jered Contreras is a 47 y.o. male with venous insufficiency, morbid obesity s/p gastric sleeve, TAYO (on CPAP), who presents for an initial appointment.      1. BLE Lymphedema and Venous insufficiency with Venous Stasis Ulcerations- Improving.  Continue bumex 0.5 mg bid.  Continue wound care.  Check ARIANA study.  After ulcers heal, refer to lymphedema clinic.  Pt to limit sodium intake to 2,000 mg daily.  Limit volume intake to 1.5 liters daily.  Elevate legs when resting.     2. Morbid Obesity s/p Gastric Sleeve- Encourage diet, exercise and weight loss.  Follow up with Bariatric Surgery.    3. TAYO- Continue CPAP.    Follow up in 4 months

## 2022-09-22 NOTE — PROGRESS NOTES
[facility]      CC: Venous Insufficiency      Patient ID: Jered Contreras is a 47 y.o. male with venous insufficiency, morbid obesity s/p gastric sleeve, TAYO (on CPAP), who presents for an initial appointment.  Pertinent history/events are as follows:     -Pt kindly referred by Dr. Dacosta for evaluation of venous insufficiency.     -At our initial clinic visit on 8/5/2022, Mr. Contreras reported leg swelling starting in 6/2022.  States he subsequently developed ulcer at the bilateral lower legs, which are in different stages of healing.  He has no claudication or rest pain.  BLE Venous Reflux Study on 8/2/2022 revealed hemodynamically significant venous reflux (reflux lasting greater than 500ms) in the bilateral greater saphenous veins.  Plan:    BLE Lymphedema and Venous insufficiency with Venous Stasis Ulcerations- Discontinue lasix and start bumex 0.5 mg bid.  Check cmp in 1 week.  Refer to wound care.  Check ARIANA study.  After ulcers heal, refer to lymphedema clinic.  Pt to limit sodium intake to 2,000 mg daily.  Limit volume intake to 1.5 liters daily.  Elevate legs when resting.   Morbid Obesity s/p Gastric Sleeve- Encourage diet, exercise and weight loss.  Follow up with Bariatric Surgery.  TAYO- Continue CPAP.    HPI:  Mr. Contreras reports improvement in BLE edema and wounds.  ARIANA Study on 8/31/2022 revealed normal resting ARIANA's. Reduced exercise ARIANA's are consistent with mild PAD.    Past Medical History:   Diagnosis Date    Allergic rhinitis     Asthma     Depression     Hx of psychiatric care     Morbid obesity     Obstructive sleep apnea treated with continuous positive airway pressure (CPAP) 7/10/2018    Psychiatric problem     Therapy      Past Surgical History:   Procedure Laterality Date    APPENDECTOMY      APPLICATION OF CARTILAGE GRAFT Bilateral 12/6/2021    Procedure: APPLICATION, CARTILAGE GRAFT;  Surgeon: JAN Arredondo MD;  Location: [facility];  Service: ENT;   Laterality: Bilateral;  from right ear    gastric sleeve  2019    [facility]    NASAL SEPTOPLASTY Bilateral 2021    Procedure: SEPTOPLASTY, NOSE;  Surgeon: JAN Arredondo MD;  Location: [facility] OR;  Service: ENT;  Laterality: Bilateral;    NASAL STENOSIS REPAIR Bilateral 2021    Procedure: REPAIR, STENOSIS, NOSE, VESTIBULE;  Surgeon: JAN Arredondo MD;  Location: [facility] OR;  Service: ENT;  Laterality: Bilateral;    SINUS SURGERY      VASECTOMY       Social History     Socioeconomic History    Marital status:    Occupational History    Occupation: Information Tech     Employer: [employer]   Tobacco Use    Smoking status: Never    Smokeless tobacco: Never   Substance and Sexual Activity    Alcohol use: Yes     Alcohol/week: 1.0 standard drink     Types: 1 Cans of beer per week     Comment: RARELY    Drug use: No    Sexual activity: Yes     Partners: Female     Family History   Problem Relation Age of Onset    Allergies Mother     Rheum arthritis Mother     Lung cancer Mother         post lobectomy    Dementia Mother     Diabetes Father     Hypertension Father     Allergies Maternal Grandmother     Hypertension Maternal Grandmother     Dementia Maternal Grandmother     Brain cancer Maternal Grandmother         in remission before she     Rheum arthritis Maternal Grandfather     Diabetes Paternal Grandmother     Alzheimer's disease Paternal Grandmother     Hypertension Paternal Grandfather     Obesity Paternal Grandfather     Cerebral aneurysm Paternal Grandfather     Melanoma Neg Hx        Review of patient's allergies indicates:   Allergen Reactions    Ceclor [cefaclor] Anaphylaxis    Penicillins Hives     Elevated blood pressure, temp, hives    Calcium alginate Other (See Comments)     Headache       Medication List with Changes/Refills   Current Medications    ASCORBATE CALCIUM (VITAMIN C ORAL)    Take by mouth.    AZELASTINE (ASTELIN) 137 MCG (0.1 %)  NASAL SPRAY    2 sprays (274 mcg total) by Nasal route 2 (two) times daily as needed (Nasal congestion).    B COMPLEX VITAMINS (B COMPLEX-VITAMIN B12) TABLET    Take 1 tablet by mouth once daily.    BENZONATATE (TESSALON) 100 MG CAPSULE    Take 1 capsule (100 mg total) by mouth 3 (three) times daily as needed for Cough.    BUMETANIDE (BUMEX) 0.5 MG TAB    Take 1 tablet (0.5 mg total) by mouth 2 (two) times daily.    BUPROPION (WELLBUTRIN XL) 300 MG 24 HR TABLET    Take 1 tablet (300 mg total) by mouth once daily.    DOXYCYCLINE (VIBRA-TABS) 100 MG TABLET    Take 1 tablet (100 mg total) by mouth every 12 (twelve) hours. for 14 days    DULOXETINE (CYMBALTA) 60 MG CAPSULE    TAKE 1 CAPSULE(60 MG) BY MOUTH EVERY DAY    FLUTICASONE PROPIONATE (FLONASE) 50 MCG/ACTUATION NASAL SPRAY    One spray in each nostril twice daily after 1st using azelastine nasal spray    HYDROXYZINE HCL (ATARAX) 25 MG TABLET    TK 1 T PO  TID PRN ANXIETY    IPRATROPIUM (ATROVENT) 21 MCG (0.03 %) NASAL SPRAY    2 sprays by Nasal route 3 (three) times daily as needed for Rhinitis (runing nose).    LAMOTRIGINE (LAMICTAL) 100 MG TABLET    Take 1 tablet (100 mg total) by mouth once daily.    MONTELUKAST (SINGULAIR) 10 MG TABLET    Take 1 tablet (10 mg total) by mouth every evening. Take during allergy season.    MULTIVITAMIN CAPSULE    Take 1 capsule by mouth once daily.    PANTOPRAZOLE (PROTONIX) 40 MG TABLET    TK 1 T PO QD    POTASSIUM CHLORIDE SA (K-DUR,KLOR-CON M) 10 MEQ TABLET    Take 1 tablet (10 mEq total) by mouth once daily.    ROPINIROLE (REQUIP) 0.5 MG TABLET    Take 2 tablets (1 mg total) by mouth every evening.       Review of Systems  Constitution: Denies chills, fever, and sweats.  HENT: Denies headaches or blurry vision.  Cardiovascular: Denies chest pain or irregular heart beat.  Respiratory: Denies cough or shortness of breath.  Gastrointestinal: Denies abdominal pain, nausea, or vomiting.  Musculoskeletal: Positive for leg swelling  "with ulcers.  Neurological: Denies dizziness or focal weakness.  Psychiatric/Behavioral: Normal mental status.  Hematologic/Lymphatic: Denies bleeding problem or easy bruising/bleeding.  Skin: Denies rash or suspicious lesions    Physical Examination  /75   Pulse 87   Ht 6' 1" (1.854 m)   Wt (!) 192 kg (423 lb 4.5 oz)   BMI 55.85 kg/m²     Constitutional: No acute distress, conversant  HEENT: Sclera anicteric, Pupils equal, round and reactive to light, extraocular motions intact, Oropharynx clear  Neck: No JVD, no carotid bruits  Cardiovascular: regular rate and rhythm, no murmur, rubs or gallops, normal S1/S2  Pulmonary: Clear to auscultation bilaterally  Abdominal: Abdomen soft, nontender, nondistended, positive bowel sounds  Extremities: BLE's with 2+ pitting edema, prominent varicose veins, and changes consistent with lymphedema    Pulses:  Carotid pulses are 2+ on the right side, and 2+ on the left side.  Radial pulses are 2+ on the right side, and 2+ on the left side.   Femoral pulses are 2+ on the right side, and 2+ on the left side.  Popliteal pulses are 2+ on the right side, and 2+ on the left side.   Dorsalis pedis pulses are 2+ on the right side, and 2+ on the left side.   Posterior tibial pulses are 2+ on the right side, and 2+ on the left side.    Skin: No ecchymosis, erythema, or ulcers  Psych: Alert and oriented x 3, appropriate affect  Neuro: CNII-XII intact, no focal deficits    Labs:  Most Recent Data  CBC:   Lab Results   Component Value Date    WBC 6.15 10/29/2021    HGB 13.0 (L) 10/29/2021    HCT 40.5 10/29/2021     10/29/2021    MCV 94 10/29/2021    RDW 13.3 10/29/2021     BMP:   Lab Results   Component Value Date     08/16/2022    K 4.0 08/16/2022     08/16/2022    CO2 26 08/16/2022    BUN 14 08/16/2022    CREATININE 1.1 08/16/2022    GLU 93 08/16/2022    CALCIUM 9.5 08/16/2022    MG 1.9 11/11/2020    PHOS 3.0 10/31/2017     LFTS;   Lab Results   Component Value " Date    PROT 7.2 08/16/2022    ALBUMIN 3.8 08/16/2022    BILITOT 0.4 08/16/2022    AST 22 08/16/2022    ALKPHOS 78 08/16/2022    ALT 22 08/16/2022     COAGS: No results found for: INR, PROTIME, PTT  FLP:   Lab Results   Component Value Date    CHOL 185 10/29/2021    HDL 48 10/29/2021    LDLCALC 123.8 10/29/2021    TRIG 66 10/29/2021    CHOLHDL 25.9 10/29/2021     CARDIAC:   Lab Results   Component Value Date    BNP <10 08/02/2022     Imaging:    ARIANA Study 8/31/2022:  Normal resting ARIANA's.  Reduced exercise ARIANA's are consistent with mild PAD.     BLE Venous Reflux Study 8/2/2022:  Hemodynamically significant venous reflux (reflux lasting greater than 500ms) in the bilateral greater saphenous veins.    Assessment/Plan:  Jered Contreras is a 47 y.o. male with venous insufficiency, morbid obesity s/p gastric sleeve, TAYO (on CPAP), who presents for an initial appointment.      1. BLE Lymphedema and Venous insufficiency with Venous Stasis Ulcerations- Improving.  Continue bumex 0.5 mg bid.  Continue wound care.  Check ARIANA study.  After ulcers heal, refer to lymphedema clinic.  Pt to limit sodium intake to 2,000 mg daily.  Limit volume intake to 1.5 liters daily.  Elevate legs when resting.     2. Morbid Obesity s/p Gastric Sleeve- Encourage diet, exercise and weight loss.  Follow up with Bariatric Surgery.    3. TAYO- Continue CPAP.    Follow up in 4 months    Total duration of face to face visit time 30 minutes.  Total time spent counseling greater than fifty percent of total visit time.  Counseling included discussion regarding imaging findings, diagnosis, possibilities, treatment options, risks and benefits.  The patient had many questions regarding the options and long-term effects.    Fredo Marquez MD, PhD  Interventional Cardiology

## 2022-09-22 NOTE — PROGRESS NOTES
Subjective:       Patient ID: Jered Contreras is a 47 y.o. male with PMHx of meralgia paresthetic of right side, WAYNE, MDD, hx of panic attacks, AR, nasal valve collapse, nasal septal deviation, heart palpitations, venous insufficiency of bilateral lower extremities, varicose veins, hx of bariatric surgery, GERD, TAYO, and lymphedema of bilateral lower extremities    Chief Complaint: Wound Check    Patient presents for a revaluation of bilateral lower extremity wounds. He is followed by Dr. Marquez for venous insufficiency and reports compliance with bumex. No complaints at this time. Denies fever, chills, erythema, warmth, drainage, or pain.      Wound Check    Review of Systems   Constitutional:  Negative for activity change, chills, diaphoresis, fatigue and fever.   Eyes:  Negative for visual disturbance.   Respiratory:  Negative for apnea, chest tightness and shortness of breath.    Cardiovascular:  Positive for leg swelling. Negative for chest pain and palpitations.   Musculoskeletal:  Negative for gait problem and joint swelling.   Skin:  Positive for wound. Negative for pallor and rash.   Neurological:  Negative for dizziness, syncope, facial asymmetry, weakness, light-headedness, numbness and headaches.   Psychiatric/Behavioral:  Negative for agitation. The patient is not nervous/anxious.    All other systems reviewed and are negative.    Objective:      Physical Exam  Vitals reviewed.   Constitutional:       General: He is not in acute distress.     Appearance: Normal appearance. He is obese.   Cardiovascular:      Rate and Rhythm: Normal rate and regular rhythm.      Pulses: Normal pulses.   Pulmonary:      Effort: No respiratory distress.   Musculoskeletal:         General: No swelling.      Right lower leg: Edema present.      Left lower leg: Edema present.   Skin:     General: Skin is warm and dry.      Capillary Refill: Capillary refill takes less than 2 seconds.      Findings: Wound present.  No erythema.      Comments: Scattered wounds to bilateral lower extremities  Erythema and warmth - resolved to bilateral lower extremities   Neurological:      General: No focal deficit present.      Mental Status: He is alert and oriented to person, place, and time.   Psychiatric:         Mood and Affect: Mood normal.         Behavior: Behavior normal.         Thought Content: Thought content normal.         Judgment: Judgment normal.       Assessment:       1. Open wound of right lower extremity, initial encounter    2. Open wound of left lower extremity, initial encounter    3. Venous insufficiency of both lower extremities    4. Lymphedema of both lower extremities    5. Venous stasis ulcers of both lower extremities    6. BMI 50.0-59.9, adult    7. Cellulitis of lower extremity, unspecified laterality           Altered Skin Integrity Right anterior;lower;lateral;posterior;medial Leg (Active)       Altered Skin Integrity Present on Admission:    Side: Right   Orientation: anterior;lower;lateral;posterior;medial   Location: Leg   Wound Number:    Is this injury device related?:    Primary Wound Type:    Description of Altered Skin Integrity:    Ankle-Brachial Index:    Pulses:    Removal Indication and Assessment:    Wound Outcome:    (Retired) Wound Length (cm):    (Retired) Wound Width (cm):    (Retired) Depth (cm):    Wound Description (Comments):    Removal Indications:    Wound Image    09/22/22 1508   Dressing Appearance Dry;Intact;Clean;Dried drainage 09/22/22 1508   Drainage Amount Scant 09/22/22 1508   Drainage Characteristics/Odor Clear 09/22/22 1508   Red (%), Wound Tissue Color 100 % 09/22/22 1508   Periwound Area Intact;Pink;Edematous 09/22/22 1508   Care Cleansed with:;Soap and water;Wound cleanser 09/22/22 1508   Dressing Applied;Compression wrap;Rolled gauze 09/22/22 1508   Compression Unna's Boot;Three layer compression 09/22/22 1508            Altered Skin Integrity Left  anterior;lower;lateral;posterior;medial Leg (Active)       Altered Skin Integrity Present on Admission:    Side: Left   Orientation: anterior;lower;lateral;posterior;medial   Location: Leg   Wound Number:    Is this injury device related?:    Primary Wound Type:    Description of Altered Skin Integrity:    Ankle-Brachial Index:    Pulses:    Removal Indication and Assessment:    Wound Outcome:    (Retired) Wound Length (cm):    (Retired) Wound Width (cm):    (Retired) Depth (cm):    Wound Description (Comments):    Removal Indications:    Wound Image    09/22/22 1508   Dressing Appearance Dry;Intact;Clean;Dried drainage 09/22/22 1508   Drainage Amount Scant 09/22/22 1508   Drainage Characteristics/Odor Clear 09/22/22 1508   Red (%), Wound Tissue Color 100 % 09/22/22 1508   Periwound Area Intact;Pink;Edematous 09/22/22 1508   Care Cleansed with:;Soap and water;Wound cleanser 09/22/22 1508   Dressing Applied;Compression wrap;Rolled gauze 09/22/22 1508   Compression Unna's Boot;Three layer compression 09/22/22 1508     Jered was seen in the clinic room and placed in the supine position on the treatment table.  Dressings were removed with scissors, and the legs were cleansed with Easi-clense sponges and dried thoroughly. Scabs removed with long qtip.    Plan of Care: Calmoseptine to periwound skin, drawtex to wound bed, mextra absorbent pad, and a 3 layer calamine unna boot. The patient's foot was positioned at a 90 degree angle.  A calamine wrap, followed by kerlix roll gauze and coban were applied using a spiral technique avoiding creases or folds.  The wrap was started behind the first metatarsal and ended below the tibial tubercle of the knee.  There was overlap of each turn half the width of the previous turn.  The compression wrap will be changed every 7 days.      Plan:     Instructed patient to continue antibiotic prescription.    Bilateral lower extremities dressed as detailed above.   Patient was instructed  to not get the dressings wet and to use cast covers for showering.  Should the dressing become wet, he is to remove it, place a wet-to-dry dressing over the wound, cover with gauze and roll gauze and use ace wraps for compression and to secure bandages.  He should then notify this office as soon as possible to have a new dressing applied.    Instructed patient to continue taking bumex as prescribed and to elevate legs whenever sedentary.    Written and verbal instructions given to patient.  RTC in 1 week    Belinda Mendoza PA-C

## 2022-09-29 ENCOUNTER — OFFICE VISIT (OUTPATIENT)
Dept: WOUND CARE | Facility: CLINIC | Age: 47
End: 2022-09-29
Payer: COMMERCIAL

## 2022-09-29 VITALS
HEIGHT: 73 IN | BODY MASS INDEX: 41.75 KG/M2 | SYSTOLIC BLOOD PRESSURE: 158 MMHG | HEART RATE: 94 BPM | TEMPERATURE: 99 F | WEIGHT: 315 LBS | DIASTOLIC BLOOD PRESSURE: 76 MMHG

## 2022-09-29 DIAGNOSIS — S81.801A OPEN WOUND OF RIGHT LOWER EXTREMITY, INITIAL ENCOUNTER: Primary | ICD-10-CM

## 2022-09-29 DIAGNOSIS — I89.0 LYMPHEDEMA OF BOTH LOWER EXTREMITIES: ICD-10-CM

## 2022-09-29 DIAGNOSIS — I87.2 VENOUS INSUFFICIENCY OF BOTH LOWER EXTREMITIES: ICD-10-CM

## 2022-09-29 DIAGNOSIS — S81.802A OPEN WOUND OF LEFT LOWER EXTREMITY, INITIAL ENCOUNTER: ICD-10-CM

## 2022-09-29 PROCEDURE — 3008F PR BODY MASS INDEX (BMI) DOCUMENTED: ICD-10-PCS | Mod: CPTII,S$GLB,,

## 2022-09-29 PROCEDURE — 99999 PR PBB SHADOW E&M-EST. PATIENT-LVL IV: CPT | Mod: PBBFAC,,,

## 2022-09-29 PROCEDURE — 3008F BODY MASS INDEX DOCD: CPT | Mod: CPTII,S$GLB,,

## 2022-09-29 PROCEDURE — 1159F MED LIST DOCD IN RCRD: CPT | Mod: CPTII,S$GLB,,

## 2022-09-29 PROCEDURE — 99999 PR PBB SHADOW E&M-EST. PATIENT-LVL IV: ICD-10-PCS | Mod: PBBFAC,,,

## 2022-09-29 PROCEDURE — 3077F SYST BP >= 140 MM HG: CPT | Mod: CPTII,S$GLB,,

## 2022-09-29 PROCEDURE — 3077F PR MOST RECENT SYSTOLIC BLOOD PRESSURE >= 140 MM HG: ICD-10-PCS | Mod: CPTII,S$GLB,,

## 2022-09-29 PROCEDURE — 3078F PR MOST RECENT DIASTOLIC BLOOD PRESSURE < 80 MM HG: ICD-10-PCS | Mod: CPTII,S$GLB,,

## 2022-09-29 PROCEDURE — 99499 NO LOS: ICD-10-PCS | Mod: S$GLB,,,

## 2022-09-29 PROCEDURE — 99499 UNLISTED E&M SERVICE: CPT | Mod: S$GLB,,,

## 2022-09-29 PROCEDURE — 29580 PR STRAPPING UNNA BOOT: ICD-10-PCS | Mod: 50,S$GLB,,

## 2022-09-29 PROCEDURE — 3078F DIAST BP <80 MM HG: CPT | Mod: CPTII,S$GLB,,

## 2022-09-29 PROCEDURE — 29580 STRAPPING UNNA BOOT: CPT | Mod: 50,S$GLB,,

## 2022-09-29 PROCEDURE — 1159F PR MEDICATION LIST DOCUMENTED IN MEDICAL RECORD: ICD-10-PCS | Mod: CPTII,S$GLB,,

## 2022-09-29 NOTE — PROGRESS NOTES
Subjective:       Patient ID: Jered Contreras is a 47 y.o. male with PMHx of meralgia paresthetic of right side, WAYNE, MDD, hx of panic attacks, AR, nasal valve collapse, nasal septal deviation, heart palpitations, venous insufficiency of bilateral lower extremities, varicose veins, hx of bariatric surgery, GERD, TAYO, and lymphedema of bilateral lower extremities    Chief Complaint: Wound Check (Open wound of right lower extremity, initial encounter)    Patient presents for a revaluation of bilateral lower extremity wounds. He is followed by Dr. Marquez for venous insufficiency and reports compliance with bumex. No complaints at this time. Denies fever, chills, erythema, warmth, drainage, or pain.      Wound Check    Review of Systems   Constitutional:  Negative for activity change, chills, diaphoresis, fatigue and fever.   Eyes:  Negative for visual disturbance.   Respiratory:  Negative for apnea, chest tightness and shortness of breath.    Cardiovascular:  Positive for leg swelling. Negative for chest pain and palpitations.   Musculoskeletal:  Negative for gait problem and joint swelling.   Skin:  Positive for wound. Negative for pallor and rash.   Neurological:  Negative for dizziness, syncope, facial asymmetry, weakness, light-headedness, numbness and headaches.   Psychiatric/Behavioral:  Negative for agitation. The patient is not nervous/anxious.    All other systems reviewed and are negative.    Objective:      Physical Exam  Vitals reviewed.   Constitutional:       General: He is not in acute distress.     Appearance: Normal appearance. He is obese.   Cardiovascular:      Rate and Rhythm: Normal rate and regular rhythm.      Pulses: Normal pulses.   Pulmonary:      Effort: No respiratory distress.   Musculoskeletal:         General: No swelling.      Right lower leg: Edema present.      Left lower leg: Edema present.   Skin:     General: Skin is warm and dry.      Capillary Refill: Capillary refill  takes less than 2 seconds.      Findings: Wound present. No erythema.      Comments: Scattered wounds to bilateral lower extremities   Neurological:      General: No focal deficit present.      Mental Status: He is alert and oriented to person, place, and time.   Psychiatric:         Mood and Affect: Mood normal.         Behavior: Behavior normal.         Thought Content: Thought content normal.         Judgment: Judgment normal.       Assessment:       1. Open wound of right lower extremity, initial encounter    2. Open wound of left lower extremity, initial encounter    3. Venous insufficiency of both lower extremities    4. Lymphedema of both lower extremities           Altered Skin Integrity Right anterior;lower;lateral;posterior;medial Leg (Active)       Altered Skin Integrity Present on Admission:    Side: Right   Orientation: anterior;lower;lateral;posterior;medial   Location: Leg   Wound Number:    Is this injury device related?:    Primary Wound Type:    Description of Altered Skin Integrity:    Ankle-Brachial Index:    Pulses:    Removal Indication and Assessment:    Wound Outcome:    (Retired) Wound Length (cm):    (Retired) Wound Width (cm):    (Retired) Depth (cm):    Wound Description (Comments):    Removal Indications:    Wound Image   09/29/22 1527   Dressing Appearance Dry;Intact;Clean;Dried drainage 09/29/22 1527   Drainage Amount Scant 09/29/22 1527   Drainage Characteristics/Odor Smiley 09/29/22 1527   Red (%), Wound Tissue Color 100 % 09/29/22 1527   Periwound Area Intact;Pink;Edematous 09/29/22 1527   Care Cleansed with:;Soap and water;Wound cleanser 09/29/22 1527   Dressing Applied;Compression wrap;Rolled gauze 09/29/22 1527   Compression Unna's Boot;Three layer compression 09/29/22 1527            Altered Skin Integrity Left anterior;lower;lateral;posterior;medial Leg (Active)       Altered Skin Integrity Present on Admission:    Side: Left   Orientation: anterior;lower;lateral;posterior;medial    Location: Leg   Wound Number:    Is this injury device related?:    Primary Wound Type:    Description of Altered Skin Integrity:    Ankle-Brachial Index:    Pulses:    Removal Indication and Assessment:    Wound Outcome:    (Retired) Wound Length (cm):    (Retired) Wound Width (cm):    (Retired) Depth (cm):    Wound Description (Comments):    Removal Indications:    Wound Image   09/29/22 1527   Dressing Appearance Dry;Intact;Clean;Dried drainage 09/29/22 1527   Drainage Amount Scant 09/29/22 1527   Drainage Characteristics/Odor Smiley 09/29/22 1527   Red (%), Wound Tissue Color 100 % 09/29/22 1527   Periwound Area Intact;Pink;Edematous 09/29/22 1527   Care Cleansed with:;Soap and water;Wound cleanser 09/29/22 1527   Dressing Applied;Compression wrap;Rolled gauze 09/29/22 1527   Compression Unna's Boot;Three layer compression 09/29/22 1527     Jered was seen in the clinic room and placed in the supine position on the treatment table.  Dressings were removed with scissors, and the legs were cleansed with Easi-clense sponges and dried thoroughly. Scabs removed with long qtip.    Plan of Care: BLE: 3 layer calamine unna boot. The patient's foot was positioned at a 90 degree angle.  A calamine wrap, followed by kerlix roll gauze and coban were applied using a spiral technique avoiding creases or folds.  The wrap was started behind the first metatarsal and ended below the tibial tubercle of the knee.  There was overlap of each turn half the width of the previous turn.  The compression wrap will be changed every 7 days.      Plan:       Bilateral lower extremities dressed as detailed above.   Patient was instructed to not get the dressings wet and to use cast covers for showering.  Should the dressing become wet, he is to remove it, place a wet-to-dry dressing over the wound, cover with gauze and roll gauze and use ace wraps for compression and to secure bandages.  He should then notify this office as soon as possible  to have a new dressing applied.    Instructed patient to continue taking bumex as prescribed and to elevate legs whenever sedentary.      Written and verbal instructions given to patient.  RTC in 1 week    Belinda Mendoza PA-C

## 2022-10-05 ENCOUNTER — OFFICE VISIT (OUTPATIENT)
Dept: WOUND CARE | Facility: CLINIC | Age: 47
End: 2022-10-05
Payer: COMMERCIAL

## 2022-10-05 VITALS
TEMPERATURE: 98 F | HEIGHT: 73 IN | HEART RATE: 81 BPM | WEIGHT: 315 LBS | DIASTOLIC BLOOD PRESSURE: 76 MMHG | BODY MASS INDEX: 41.75 KG/M2 | SYSTOLIC BLOOD PRESSURE: 159 MMHG

## 2022-10-05 DIAGNOSIS — L97.929 VENOUS STASIS ULCERS OF BOTH LOWER EXTREMITIES: ICD-10-CM

## 2022-10-05 DIAGNOSIS — S81.801A OPEN WOUND OF RIGHT LOWER EXTREMITY, INITIAL ENCOUNTER: Primary | ICD-10-CM

## 2022-10-05 DIAGNOSIS — S81.802A OPEN WOUND OF LEFT LOWER EXTREMITY, INITIAL ENCOUNTER: ICD-10-CM

## 2022-10-05 DIAGNOSIS — I83.019 VENOUS STASIS ULCERS OF BOTH LOWER EXTREMITIES: ICD-10-CM

## 2022-10-05 DIAGNOSIS — I87.2 VENOUS INSUFFICIENCY OF BOTH LOWER EXTREMITIES: ICD-10-CM

## 2022-10-05 DIAGNOSIS — L97.919 VENOUS STASIS ULCERS OF BOTH LOWER EXTREMITIES: ICD-10-CM

## 2022-10-05 DIAGNOSIS — I89.0 LYMPHEDEMA OF BOTH LOWER EXTREMITIES: ICD-10-CM

## 2022-10-05 DIAGNOSIS — I83.029 VENOUS STASIS ULCERS OF BOTH LOWER EXTREMITIES: ICD-10-CM

## 2022-10-05 PROCEDURE — 99214 PR OFFICE/OUTPT VISIT, EST, LEVL IV, 30-39 MIN: ICD-10-PCS | Mod: S$GLB,,,

## 2022-10-05 PROCEDURE — 99999 PR PBB SHADOW E&M-EST. PATIENT-LVL II: ICD-10-PCS | Mod: PBBFAC,,,

## 2022-10-05 PROCEDURE — 99214 OFFICE O/P EST MOD 30 MIN: CPT | Mod: S$GLB,,,

## 2022-10-05 PROCEDURE — 99999 PR PBB SHADOW E&M-EST. PATIENT-LVL II: CPT | Mod: PBBFAC,,,

## 2022-10-05 NOTE — PROGRESS NOTES
Subjective:       Patient ID: Jered Contreras is a 47 y.o. male     Chief Complaint: Wound Check    Patient presents for a revaluation of bilateral lower extremity wounds. He is followed by Dr. Marquez for venous insufficiency and reports compliance with bumex. Patient is having bariatric surgery tomorrow at an external hospital in [place], [place]. Facility requested removal of bilateral compression wraps. No complaints at this time. Denies fever, chills, erythema, warmth, drainage, or pain.      Wound Check    Review of Systems   Constitutional:  Negative for activity change, chills, diaphoresis, fatigue and fever.   Eyes:  Negative for visual disturbance.   Respiratory:  Negative for apnea, chest tightness and shortness of breath.    Cardiovascular:  Positive for leg swelling. Negative for chest pain and palpitations.   Musculoskeletal:  Negative for gait problem and joint swelling.   Skin:  Positive for wound. Negative for pallor and rash.   Neurological:  Negative for dizziness, syncope, facial asymmetry, weakness, light-headedness, numbness and headaches.   Psychiatric/Behavioral:  Negative for agitation. The patient is not nervous/anxious.    All other systems reviewed and are negative.    Objective:      Physical Exam  Vitals reviewed.   Constitutional:       General: He is not in acute distress.     Appearance: Normal appearance. He is obese.   Cardiovascular:      Rate and Rhythm: Normal rate and regular rhythm.      Pulses: Normal pulses.   Pulmonary:      Effort: No respiratory distress.   Musculoskeletal:         General: No swelling.      Right lower leg: Edema present.      Left lower leg: Edema present.   Skin:     General: Skin is warm and dry.      Capillary Refill: Capillary refill takes less than 2 seconds.      Findings: Wound present. No erythema.      Comments: Scattered wounds to bilateral lower extremities   Neurological:      General: No focal deficit present.      Mental  Status: He is alert and oriented to person, place, and time.   Psychiatric:         Mood and Affect: Mood normal.         Behavior: Behavior normal.         Thought Content: Thought content normal.         Judgment: Judgment normal.       Assessment:       1. Open wound of right lower extremity, initial encounter    2. Open wound of left lower extremity, initial encounter    3. Venous insufficiency of both lower extremities    4. Lymphedema of both lower extremities    5. Venous stasis ulcers of both lower extremities    6. BMI 50.0-59.9, adult           Altered Skin Integrity Right anterior;lower;lateral;posterior;medial Leg (Active)       Altered Skin Integrity Present on Admission:    Side: Right   Orientation: anterior;lower;lateral;posterior;medial   Location: Leg   Wound Number:    Is this injury device related?:    Primary Wound Type:    Description of Altered Skin Integrity:    Ankle-Brachial Index:    Pulses:    Removal Indication and Assessment:    Wound Outcome:    (Retired) Wound Length (cm):    (Retired) Wound Width (cm):    (Retired) Depth (cm):    Wound Description (Comments):    Removal Indications:    Wound Image   10/05/22 1552   Dressing Appearance Dry;Intact;Clean;Dried drainage 10/05/22 1552   Drainage Amount Scant 10/05/22 1552   Drainage Characteristics/Odor Smiley 10/05/22 1552   Red (%), Wound Tissue Color 100 % 10/05/22 1552   Periwound Area Intact;Pink;Edematous 10/05/22 1552   Care Cleansed with:;Soap and water 10/05/22 1552   Dressing Applied;Island/border;Hydrogel;Silicone 10/05/22 1552   Compression Tubular elasticized bandage 10/05/22 1552            Altered Skin Integrity Left anterior;lower;lateral;posterior;medial Leg (Active)       Altered Skin Integrity Present on Admission:    Side: Left   Orientation: anterior;lower;lateral;posterior;medial   Location: Leg   Wound Number:    Is this injury device related?:    Primary Wound Type:    Description of Altered Skin Integrity:     Ankle-Brachial Index:    Pulses:    Removal Indication and Assessment:    Wound Outcome:    (Retired) Wound Length (cm):    (Retired) Wound Width (cm):    (Retired) Depth (cm):    Wound Description (Comments):    Removal Indications:    Wound Image   10/05/22 1552   Dressing Appearance Dry;Intact;Clean;Dried drainage 10/05/22 1552   Drainage Amount Scant 10/05/22 1552   Drainage Characteristics/Odor Smiley 10/05/22 1552   Red (%), Wound Tissue Color 100 % 10/05/22 1552   Periwound Area Intact;Pink;Edematous 10/05/22 1552   Care Cleansed with:;Soap and water 10/05/22 1552   Dressing Applied;Island/border;Hydrogel;Silicone 10/05/22 1552   Compression Tubular elasticized bandage 10/05/22 1552     Jered was seen in the clinic room and placed in the supine position on the treatment table.  Dressings were removed with scissors, and the legs were cleansed with Easi-clense sponges and dried thoroughly. Scabs removed with long qtip.    Plan of Care: Hydrogel to wound bed covered with mepilex border dressings and tubigrip size G.    Plan:       Bilateral lower extremities dressed as detailed above.   Instructed patient to shower with Hibiclens as facility instructed tonight. Gave patient mepilex border dressings. Instructed patient to place them over open wounds.     Patient was instructed to not get the dressings wet and to use cast covers for showering.  Should the dressing become wet, he is to remove it, place a wet-to-dry dressing over the wound, cover with gauze and roll gauze and use ace wraps for compression and to secure bandages.  He should then notify this office as soon as possible to have a new dressing applied.    Instructed patient to continue taking bumex as prescribed and to elevate legs whenever sedentary.    Discussed bariatric surgery and BMI as related to wound healing.     Written and verbal instructions given to patient.  RTC in 1 week    Belinda Mendoza PA-C

## 2022-10-09 ENCOUNTER — PATIENT MESSAGE (OUTPATIENT)
Dept: PSYCHIATRY | Facility: CLINIC | Age: 47
End: 2022-10-09
Payer: COMMERCIAL

## 2022-10-09 DIAGNOSIS — K21.9 GASTROESOPHAGEAL REFLUX DISEASE, UNSPECIFIED WHETHER ESOPHAGITIS PRESENT: ICD-10-CM

## 2022-10-09 DIAGNOSIS — Z98.84 HISTORY OF BARIATRIC SURGERY: ICD-10-CM

## 2022-10-09 NOTE — TELEPHONE ENCOUNTER
No new care gaps identified.  [facility] Embedded Care Gaps. Reference number: [id number]. 10/09/2022   8:42:33 AM MARLONT

## 2022-10-10 RX ORDER — PANTOPRAZOLE SODIUM 40 MG/1
40 TABLET, DELAYED RELEASE ORAL DAILY
Qty: 90 TABLET | Refills: 3 | Status: SHIPPED | OUTPATIENT
Start: 2022-10-10 | End: 2023-02-14

## 2022-10-10 NOTE — TELEPHONE ENCOUNTER
Refill Decision Note   Jered Contreras  is requesting a refill authorization.  Brief Assessment and Rationale for Refill:  Approve     Medication Therapy Plan:       Medication Reconciliation Completed: No   Comments:     No Care Gaps recommended.     Note composed:10:58 AM 10/10/2022

## 2022-10-11 ENCOUNTER — PATIENT MESSAGE (OUTPATIENT)
Dept: WOUND CARE | Facility: CLINIC | Age: 47
End: 2022-10-11
Payer: COMMERCIAL

## 2022-10-11 RX ORDER — LAMOTRIGINE 100 MG/1
100 TABLET ORAL DAILY
Qty: 30 TABLET | Refills: 2 | Status: SHIPPED | OUTPATIENT
Start: 2022-10-11 | End: 2022-11-07 | Stop reason: SDUPTHER

## 2022-10-11 RX ORDER — DULOXETIN HYDROCHLORIDE 60 MG/1
CAPSULE, DELAYED RELEASE ORAL
Qty: 30 CAPSULE | Refills: 1 | Status: SHIPPED | OUTPATIENT
Start: 2022-10-11 | End: 2022-11-07 | Stop reason: SDUPTHER

## 2022-10-11 RX ORDER — BUPROPION HYDROCHLORIDE 300 MG/1
300 TABLET ORAL DAILY
Qty: 30 TABLET | Refills: 1 | Status: SHIPPED | OUTPATIENT
Start: 2022-10-11 | End: 2022-11-07 | Stop reason: SDUPTHER

## 2022-10-13 ENCOUNTER — OFFICE VISIT (OUTPATIENT)
Dept: WOUND CARE | Facility: CLINIC | Age: 47
End: 2022-10-13
Payer: COMMERCIAL

## 2022-10-13 VITALS
WEIGHT: 315 LBS | BODY MASS INDEX: 41.75 KG/M2 | SYSTOLIC BLOOD PRESSURE: 146 MMHG | DIASTOLIC BLOOD PRESSURE: 79 MMHG | HEART RATE: 87 BPM | TEMPERATURE: 98 F | HEIGHT: 73 IN

## 2022-10-13 DIAGNOSIS — I87.2 VENOUS INSUFFICIENCY OF BOTH LOWER EXTREMITIES: Primary | ICD-10-CM

## 2022-10-13 DIAGNOSIS — Z87.828 HEALED WOUND: ICD-10-CM

## 2022-10-13 PROCEDURE — 1159F MED LIST DOCD IN RCRD: CPT | Mod: CPTII,S$GLB,,

## 2022-10-13 PROCEDURE — 1159F PR MEDICATION LIST DOCUMENTED IN MEDICAL RECORD: ICD-10-PCS | Mod: CPTII,S$GLB,,

## 2022-10-13 PROCEDURE — 3077F SYST BP >= 140 MM HG: CPT | Mod: CPTII,S$GLB,,

## 2022-10-13 PROCEDURE — 3078F DIAST BP <80 MM HG: CPT | Mod: CPTII,S$GLB,,

## 2022-10-13 PROCEDURE — 3078F PR MOST RECENT DIASTOLIC BLOOD PRESSURE < 80 MM HG: ICD-10-PCS | Mod: CPTII,S$GLB,,

## 2022-10-13 PROCEDURE — 99214 OFFICE O/P EST MOD 30 MIN: CPT | Mod: S$GLB,,,

## 2022-10-13 PROCEDURE — 99999 PR PBB SHADOW E&M-EST. PATIENT-LVL IV: ICD-10-PCS | Mod: PBBFAC,,,

## 2022-10-13 PROCEDURE — 99999 PR PBB SHADOW E&M-EST. PATIENT-LVL IV: CPT | Mod: PBBFAC,,,

## 2022-10-13 PROCEDURE — 99214 PR OFFICE/OUTPT VISIT, EST, LEVL IV, 30-39 MIN: ICD-10-PCS | Mod: S$GLB,,,

## 2022-10-13 PROCEDURE — 3077F PR MOST RECENT SYSTOLIC BLOOD PRESSURE >= 140 MM HG: ICD-10-PCS | Mod: CPTII,S$GLB,,

## 2022-10-13 NOTE — PROGRESS NOTES
Subjective:       Patient ID: Jered Contreras is a 47 y.o. male     Chief Complaint: Wound Check    Patient presents for a revaluation of bilateral lower extremity wounds. He is followed by Dr. Marquez for venous insufficiency and reports compliance with bumex. No complaints at this time. Denies fever, chills, erythema, warmth, drainage, or pain.      Wound Check    Review of Systems   Constitutional:  Negative for activity change, chills, diaphoresis, fatigue and fever.   Eyes:  Negative for visual disturbance.   Respiratory:  Negative for apnea, chest tightness and shortness of breath.    Cardiovascular:  Positive for leg swelling. Negative for chest pain and palpitations.   Musculoskeletal:  Negative for gait problem and joint swelling.   Skin:  Positive for wound. Negative for pallor and rash.   Neurological:  Negative for dizziness, syncope, facial asymmetry, weakness, light-headedness, numbness and headaches.   Psychiatric/Behavioral:  Negative for agitation. The patient is not nervous/anxious.    All other systems reviewed and are negative.    Objective:      Physical Exam  Vitals reviewed.   Constitutional:       General: He is not in acute distress.     Appearance: Normal appearance. He is obese.   Cardiovascular:      Rate and Rhythm: Normal rate and regular rhythm.      Pulses: Normal pulses.   Pulmonary:      Effort: No respiratory distress.   Musculoskeletal:         General: No swelling.      Right lower leg: Edema present.      Left lower leg: Edema present.   Skin:     General: Skin is warm and dry.      Capillary Refill: Capillary refill takes less than 2 seconds.      Findings: Wound present. No erythema.      Comments: Scattered wounds to bilateral lower extremities   Neurological:      General: No focal deficit present.      Mental Status: He is alert and oriented to person, place, and time.   Psychiatric:         Mood and Affect: Mood normal.         Behavior: Behavior normal.          Thought Content: Thought content normal.         Judgment: Judgment normal.       Assessment:       1. Venous insufficiency of both lower extremities    2. Healed wound           Altered Skin Integrity Right anterior;lower;lateral;posterior;medial Leg (Active)       Altered Skin Integrity Present on Admission:    Side: Right   Orientation: anterior;lower;lateral;posterior;medial   Location: Leg   Wound Number:    Is this injury device related?:    Primary Wound Type:    Description of Altered Skin Integrity:    Ankle-Brachial Index:    Pulses:    Removal Indication and Assessment:    Wound Outcome:    (Retired) Wound Length (cm):    (Retired) Wound Width (cm):    (Retired) Depth (cm):    Wound Description (Comments):    Removal Indications:    Wound Image   10/13/22 1521   Dressing Appearance Open to air 10/13/22 1521   Drainage Amount None 10/13/22 1521   Care Cleansed with:;Soap and water 10/13/22 1521   Compression Tubular elasticized bandage 10/13/22 1521            Altered Skin Integrity Left anterior;lower;lateral;posterior;medial Leg (Active)       Altered Skin Integrity Present on Admission:    Side: Left   Orientation: anterior;lower;lateral;posterior;medial   Location: Leg   Wound Number:    Is this injury device related?:    Primary Wound Type:    Description of Altered Skin Integrity:    Ankle-Brachial Index:    Pulses:    Removal Indication and Assessment:    Wound Outcome:    (Retired) Wound Length (cm):    (Retired) Wound Width (cm):    (Retired) Depth (cm):    Wound Description (Comments):    Removal Indications:    Wound Image   10/13/22 1521   Dressing Appearance Open to air 10/13/22 1521   Drainage Amount None 10/13/22 1521   Care Cleansed with:;Soap and water 10/13/22 1521   Compression Tubular elasticized bandage 10/13/22 1521     Jered was seen in the clinic room and placed in the supine position on the treatment table.  The legs were cleansed with Easi-clense sponges and dried thoroughly.  Scabs removed with long qtip. No open wounds noted.    Plan of Care: Tubigrip size G to bilateral lower extremities.    Plan:       Bilateral lower extremities dressed as detailed above.   Patient ordered bilateral circiads. Instructed patient to place them on first thing in the morning and to remove before bed. Pt voiced understanding.     No open wounds noted.  Precautions given to prevent wounds from reopening.    Written and verbal instructions given to patient.  RTC PRN    Belinda Mendoza PA-C

## 2022-10-18 ENCOUNTER — CLINICAL SUPPORT (OUTPATIENT)
Dept: REHABILITATION | Facility: HOSPITAL | Age: 47
End: 2022-10-18
Payer: COMMERCIAL

## 2022-10-18 DIAGNOSIS — I89.0 LYMPHEDEMA OF BOTH LOWER EXTREMITIES: Primary | ICD-10-CM

## 2022-10-18 DIAGNOSIS — R60.0 EDEMA OF BOTH LOWER EXTREMITIES: ICD-10-CM

## 2022-10-18 DIAGNOSIS — I89.0 LYMPHEDEMA: ICD-10-CM

## 2022-10-18 DIAGNOSIS — R60.0 BILATERAL LOWER EXTREMITY EDEMA: ICD-10-CM

## 2022-10-18 PROCEDURE — 97166 OT EVAL MOD COMPLEX 45 MIN: CPT

## 2022-10-18 PROCEDURE — 97530 THERAPEUTIC ACTIVITIES: CPT

## 2022-10-20 NOTE — PLAN OF CARE
[facility]  Occupational Therapy Initial Evaluation    Date: 10/18/2022  Name: Jered Contreras  Clinic Number: [id number]    Therapy Diagnosis:   Encounter Diagnoses   Name Primary?    Lymphedema     Bilateral lower extremity edema     Lymphedema of both lower extremities Yes    Edema of both lower extremities      Physician: Angela Dacosta MD    Physician Orders: OT Eval and Treat  Medical Diagnosis: Lymphedema  Bilateral lower extremity edema  Evaluation Date: 10/18/2022  Insurance Authorization Period Expiration: 8/1/22-8/1/23  Plan of Care Certification Period: 11/28/22  Date of Return to MD: unknown  Visit # / Visits authorized: 1 / 1  FOTO: see media    Precautions:  Standard    Time In:11:00  Time Out: 12:00  Total Appointment Time (timed & untimed codes): 60 minutes    SUBJECTIVE     Date of Onset: 6/2022  Prior Therapy: nonw    History of Current Condition/Mechanism of Injury: Jered Contreras is a 47 y.o. male who presents to [facility] for evaluation secondary to lymphedema. Patient was referred to therapy by Angela Dacosta MD , which is the patient's primary care doctor. Patient reports his leg began to swell 6/22. In 7/22 he was diagnosed with cellulitis and treated with antibiotics.  In 8/22 He was diagnosed with venous insuffiencey and venous stasis ulcers and referred to wound care.  He was wrapped in a 3 layer compression wrap with medication and the wounds resolved. He reports being discharged from wound care last week.     Patient reports that he had bariatric bypass 2 weeks ago, 10/6/22, and is off lasix. He believes the legs are doing much better since his surgery.    Patient states his medical team recommended compression socks, however he has been unsuccessful in finding a pair that fit.    Falls: none reported    Occupation/Working presently: employed  Duties: IT    Functional  Limitations/Social History:    Previous functional status includes: Independent with all ADLs.     Current Functional Status    Limitation of Functional Status as follows:   ADLs/IADLs:     - Feeding: independent    - Bathing: independent    - Dressing/Grooming: independent    - Driving: independent       Pain:  Functional Pain Scale Rating 0-10: Current 0/10, worst 5/10, best 0/10   Location: Lower legs  Description: Aching  Aggravating Factors: Standing, Touching, and Walking  Easing Factors: lying down    Patient's Goals for Therapy: to find something to wear on the legs for compression    Pt denies CHF, KF, DVT, CA, infection, severe PAD    Imaging:    Impression:     Hemodynamically significant venous reflux (reflux lasting greater than 500ms) in the bilateral greater saphenous veins.     Electronically signed by resident: Sonia Dsouza  Date:                                            08/02/2022  Time:                                           14:40     Electronically signed by: Lonny Smith MD  Date:                                            08/02/2022  Time:                                           14:58    Medical History:   Past Medical History:   Diagnosis Date    Allergic rhinitis     Asthma     Depression     Hx of psychiatric care     Morbid obesity     Obstructive sleep apnea treated with continuous positive airway pressure (CPAP) 7/10/2018    Psychiatric problem     Therapy        Surgical History:    has a past surgical history that includes Appendectomy; Sinus surgery; Vasectomy (2017); gastric sleeve (09/2019); Nasal septoplasty (Bilateral, 12/6/2021); Nasal stenosis repair (Bilateral, 12/6/2021); and Application of cartilage graft (Bilateral, 12/6/2021).    Medications:   has a current medication list which includes the following prescription(s): ascorbic acid, azelastine, b complex vitamins, benzonatate, bumetanide, bupropion, duloxetine, fluticasone propionate, hydroxyzine hcl,  ipratropium, lamotrigine, montelukast, multivitamin, pantoprazole, potassium chloride sa, and ropinirole.    Allergies:   Review of patient's allergies indicates:   Allergen Reactions    Ceclor [cefaclor] Anaphylaxis    Penicillins Hives     Elevated blood pressure, temp, hives    Calcium alginate Other (See Comments)     Headache          OBJECTIVE     Patient arrived and ambulated independently to the treatment area.    Affected Areas: RLE and LLE  Refill: Day and Rapid   Stemmer Sign: Positive LLE, negative RLE  Shape: The LLE is full in the calf with small roll over the dorsal foot and a crease at the distal lateral leg and evidence of prior wounds which are now closed on the anterior/lateral leg.  The RIGHT LOWER EXTREMITY is smaller in size than the L, there is a bulge but not a roll over the dorsal foot and evidence of less area of prior wounds, also now closed on the lnterior/lateral leg.  Tissue Texture: spongy, there is pitting with refill throughout BLE  Skin Integrity: intact and closed wound  Sensation: grossly assessed, intact      ROM/Strength: grossly assessed. Pt. Has functional strength and ROM of BLE for long ambulation.    Girth Measurements (in centimeters)  LANDMARK LEFT LE  10/18/22 RIGHT LE  10/18/22 DIFF   at eval                     Floor+35cm 58.0 cm 54.5 cm -3.5 cm   Floor+30cm 53.5 cm 50.0 cm -3.5 cm   Floor+20cm 41.5 cm 43.0 cm +1.5 cm   Floor+10cm 34.5 cm 36.0 cm +1.5 cm   Pinky +8cm 25.5 cm 26.5 cm +1.0 cm   Metatarsals 26.0 cm 27.0 cm +1.0 cm       Treatment   Total Treatment time (time-based codes) separate from Evaluation: 15minutes    Jered received the treatments listed below: evaluation    Therapeutic activities to improve functional performance for 15  minutes, including:  Pt was educated in potential compression needs.  Demo of products including socks, garments, and Inelastic Velcro wraps.   Discussed cost/coverage and authorization per insurance with Durable Medical  Equipment(DME) provider.  Compression require orders from referring provider and coverage or purchase of products from DME or self order. Therapist and patient looked at velcro style compression garments online and checked sizing until one was found that is large enough to fit.     Patient was measured for, as a curtesy, but told therapist cannot guarantee fit.   According to the size chart, patient will fit in a Circ Aid Juxta-Lite Lower Legging.  His measurement are C L58, R55; B L 35, R 36; E 41. Patient plans to look at several compression websites and compare prices and order on his own.       Discussed wear schedule, don/doff, wash and management of products.  Product information provided.   Vendor list provided.    Informed insurance coverage of compression is per DME provider and typically Medicare and Medicare group plans may not cover cost beyond pair of standard sized knee high garments.   Commitment to attendance as well as commitment to securing compression needs is critical to edema management.        Patient Education and Home Exercises      Education provided:   1. Educated on definition of lymphedema.  2. Explained the Complete Decongestive Therapy protocol in depth  3. Educated on Phase 1 and 2 of protocol.  4. Reviewed treatment frequency and likely duration of weeks  5.Contraindications for treatment.  6. Plan of care and goals.  7. Educated on home management protocols.     Home Exercises and Patient Education Provided  Education provided:   - Pt was educated in lymphedema etiology and management plans.  Pt was provided with written risk reductions and precautions for managing lymphedema.     Patient/Family Education: role of OT, goals for OT, scheduling/cancellations - pt verbalized understanding. Discussed insurance limitations with patient.      ASSESSMENT     Jered Contreras is a 47 y.o. male referred to outpatient occupational therapy and presents with a medical diagnosis of  lymphedema secondary to venous insuffiency. Lymphedema, left untreated increases risk of infection, gait deviation causing ortho problems and poor body image. Patient presents with the following therapy deficits: lymphedema of bilateral lower limbs and demonstrates limitations as described in the chart below. Following medical record review it is determined that pt will benefit from complete decongestive therapy services for the treatment and management of this chronic condition. The following goals were discussed with the patient and patient is in agreement with them as to be addressed in the treatment plan. The patient's rehab potential is Good.     Anticipated barriers to occupational therapy: none  Pt has no cultural, educational or language barriers to learning provided.    Profile and History Assessment of Occupational Performance Level of Clinical Decision Making Complexity Score   Occupational Profile:   Jered Contreras is a 47 y.o. male who lives alone and is currently employed Jered Contreras has difficulty with  ADLs and IADLs as listed previously, which  Affecting hisdaily functional abilities.      Comorbidities:    has a past medical history of Allergic rhinitis, Asthma, Depression, psychiatric care, Morbid obesity, Obstructive sleep apnea treated with continuous positive airway pressure (CPAP), Psychiatric problem, and Therapy.    Medical and Therapy History Review:   Expanded               Performance Deficits    Physical:  Edema    Cognitive:  No Deficits    Psychosocial:    Generalized anxiety disorder, major depressive disorder, recurrent episode, moderate, history of panic attacks     Clinical Decision Making:  moderate    Assessment Process:  Detailed Assessments    Modification/Need for Assistance:  Not Necessary    Intervention Selection:  Several Treatment Options       moderate  Based on PMHX, co morbidities , data from assessments and functional level of assistance  required with task and clinical presentation directly impacting function.       The following goals were discussed with the patient and patient is in agreement with them as to be addressed in the treatment plan.     Goals:   Short Term Goals for 3 weeks: (phase 1 of protocol)  Complete decongestion B LE- Ongoing 10/18/2022   Patient will be educated on lymphedema precautions and signs of infection. - Ongoing 10/18/2022   Patient will perform deep abdominal breathing TID- Ongoing 10/18/2022   Patient will tolerate daily activities with multilayered bandaging.- Ongoing 10/18/2022   Appropriate compression garments to be ordered/delivered- Ongoing 10/18/2022      Long Term Goals for 6 weeks: (Phase 2 of goals)  Patient will be independent with home management of this chronic condition.  Patient to sharon/doff compression garment.   Patient will demonstrate compliance with all home management recommendations.  Patient will maintain reduction at monthly follow up appointments for 3 months     PLAN   Plan of Care Certification: 10/18/2022 to 11/28/22.     Outpatient Occupational Therapy 1 times weekly for 6 weeks to include the following interventions: Edema Control and assistance in ordering compression wraps that can be worn daily.      Angela Allen, OT, CLWT      I CERTIFY THE NEED FOR THESE SERVICES FURNISHED UNDER THIS PLAN OF TREATMENT AND WHILE UNDER MY CARE  Physician's comments:      Physician's Signature: ___________________________________________________

## 2022-10-24 ENCOUNTER — APPOINTMENT (OUTPATIENT)
Dept: RADIOLOGY | Facility: OTHER | Age: 47
End: 2022-10-24
Attending: PODIATRIST
Payer: COMMERCIAL

## 2022-10-24 ENCOUNTER — PATIENT MESSAGE (OUTPATIENT)
Dept: PSYCHIATRY | Facility: CLINIC | Age: 47
End: 2022-10-24
Payer: COMMERCIAL

## 2022-10-24 ENCOUNTER — OFFICE VISIT (OUTPATIENT)
Dept: PODIATRY | Facility: CLINIC | Age: 47
End: 2022-10-24
Payer: COMMERCIAL

## 2022-10-24 VITALS
DIASTOLIC BLOOD PRESSURE: 76 MMHG | HEART RATE: 88 BPM | BODY MASS INDEX: 41.75 KG/M2 | WEIGHT: 315 LBS | HEIGHT: 73 IN | SYSTOLIC BLOOD PRESSURE: 140 MMHG

## 2022-10-24 DIAGNOSIS — M79.672 LEFT FOOT PAIN: ICD-10-CM

## 2022-10-24 DIAGNOSIS — M84.375A STRESS REACTION OF LEFT FOOT, INITIAL ENCOUNTER: ICD-10-CM

## 2022-10-24 DIAGNOSIS — M79.672 LEFT FOOT PAIN: Primary | ICD-10-CM

## 2022-10-24 DIAGNOSIS — L85.3 DRY SKIN: ICD-10-CM

## 2022-10-24 PROCEDURE — 1160F PR REVIEW ALL MEDS BY PRESCRIBER/CLIN PHARMACIST DOCUMENTED: ICD-10-PCS | Mod: CPTII,S$GLB,, | Performed by: PODIATRIST

## 2022-10-24 PROCEDURE — 1160F RVW MEDS BY RX/DR IN RCRD: CPT | Mod: CPTII,S$GLB,, | Performed by: PODIATRIST

## 2022-10-24 PROCEDURE — 99999 PR PBB SHADOW E&M-EST. PATIENT-LVL IV: CPT | Mod: PBBFAC,,, | Performed by: PODIATRIST

## 2022-10-24 PROCEDURE — 99214 PR OFFICE/OUTPT VISIT, EST, LEVL IV, 30-39 MIN: ICD-10-PCS | Mod: S$GLB,,, | Performed by: PODIATRIST

## 2022-10-24 PROCEDURE — 3077F PR MOST RECENT SYSTOLIC BLOOD PRESSURE >= 140 MM HG: ICD-10-PCS | Mod: CPTII,S$GLB,, | Performed by: PODIATRIST

## 2022-10-24 PROCEDURE — 73630 X-RAY EXAM OF FOOT: CPT | Mod: 26,LT,, | Performed by: RADIOLOGY

## 2022-10-24 PROCEDURE — 73630 XR FOOT COMPLETE 3 VIEW LEFT: ICD-10-PCS | Mod: 26,LT,, | Performed by: RADIOLOGY

## 2022-10-24 PROCEDURE — 73630 X-RAY EXAM OF FOOT: CPT | Mod: TC,LT

## 2022-10-24 PROCEDURE — 99214 OFFICE O/P EST MOD 30 MIN: CPT | Mod: S$GLB,,, | Performed by: PODIATRIST

## 2022-10-24 PROCEDURE — 3077F SYST BP >= 140 MM HG: CPT | Mod: CPTII,S$GLB,, | Performed by: PODIATRIST

## 2022-10-24 PROCEDURE — 3078F PR MOST RECENT DIASTOLIC BLOOD PRESSURE < 80 MM HG: ICD-10-PCS | Mod: CPTII,S$GLB,, | Performed by: PODIATRIST

## 2022-10-24 PROCEDURE — 1159F PR MEDICATION LIST DOCUMENTED IN MEDICAL RECORD: ICD-10-PCS | Mod: CPTII,S$GLB,, | Performed by: PODIATRIST

## 2022-10-24 PROCEDURE — 3078F DIAST BP <80 MM HG: CPT | Mod: CPTII,S$GLB,, | Performed by: PODIATRIST

## 2022-10-24 PROCEDURE — 99999 PR PBB SHADOW E&M-EST. PATIENT-LVL IV: ICD-10-PCS | Mod: PBBFAC,,, | Performed by: PODIATRIST

## 2022-10-24 PROCEDURE — 1159F MED LIST DOCD IN RCRD: CPT | Mod: CPTII,S$GLB,, | Performed by: PODIATRIST

## 2022-10-24 RX ORDER — HYDROCODONE BITARTRATE AND ACETAMINOPHEN 7.5; 325 MG/1; MG/1
1 TABLET ORAL EVERY 4 HOURS PRN
COMMUNITY
Start: 2022-10-08 | End: 2023-02-14

## 2022-10-24 NOTE — PROGRESS NOTES
Chief Complaint   Patient presents with    Foot Pain     Extreme pain in left foot and heel             HPI:       Jered Contreras is a 47 y.o. male who presents to clinic with concerns of pain sub 5th metatarsal and bilateral heel (left > right ). Present for a couple of weeks.  No acute trauma recalled.  S/p bariatric surgery about 2 weeks ago.    Pain occurs with direct pressure.  He got new shoes.            Patient Active Problem List   Diagnosis    AR (allergic rhinitis)    BMI 50.0-59.9, adult    Heart palpitations    WAYNE (generalized anxiety disorder)    History of panic attacks    MDD (major depressive disorder), recurrent episode, moderate    Meralgia paresthetica of right side    Nasal valve collapse    Nasal septal deviation    Hypertrophy of both inferior nasal turbinates    Obstructive sleep apnea treated with continuous positive airway pressure (CPAP)    History of bariatric surgery    Disorders of smell    GERD (gastroesophageal reflux disease)    Nasal valve stenosis    Lymphedema of both lower extremities    Venous insufficiency of both lower extremities    Venous stasis ulcers of both lower extremities    Edema of both lower extremities    Varicose veins of both lower extremities with inflammation           Current Outpatient Medications on File Prior to Visit   Medication Sig Dispense Refill    ASCORBATE CALCIUM (VITAMIN C ORAL) Take by mouth.      azelastine (ASTELIN) 137 mcg (0.1 %) nasal spray 2 sprays (274 mcg total) by Nasal route 2 (two) times daily as needed (Nasal congestion). 30 mL 5    b complex vitamins (B COMPLEX-VITAMIN B12) tablet Take 1 tablet by mouth once daily.      benzonatate (TESSALON) 100 MG capsule Take 1 capsule (100 mg total) by mouth 3 (three) times daily as needed for Cough. 60 capsule 3    bumetanide (BUMEX) 0.5 MG Tab Take 1 tablet (0.5 mg total) by mouth 2 (two) times daily. 180 tablet 3    buPROPion (WELLBUTRIN XL) 300 MG 24 hr tablet Take 1 tablet (300 mg  total) by mouth once daily. 30 tablet 1    DULoxetine (CYMBALTA) 60 MG capsule TAKE 1 CAPSULE(60 MG) BY MOUTH EVERY DAY 30 capsule 1    fluticasone propionate (FLONASE) 50 mcg/actuation nasal spray One spray in each nostril twice daily after 1st using azelastine nasal spray 18.2 mL 11    HYDROcodone-acetaminophen (NORCO) 7.5-325 mg per tablet Take 1 tablet by mouth every 4 (four) hours as needed.      hydrOXYzine HCL (ATARAX) 25 MG tablet TK 1 T PO  TID PRN ANXIETY 30 tablet 6    ipratropium (ATROVENT) 21 mcg (0.03 %) nasal spray 2 sprays by Nasal route 3 (three) times daily as needed for Rhinitis (runing nose). 30 mL 5    lamoTRIgine (LAMICTAL) 100 MG tablet Take 1 tablet (100 mg total) by mouth once daily. 30 tablet 2    montelukast (SINGULAIR) 10 mg tablet Take 1 tablet (10 mg total) by mouth every evening. Take during allergy season. 90 tablet 3    multivitamin capsule Take 1 capsule by mouth once daily.      pantoprazole (PROTONIX) 40 MG tablet Take 1 tablet (40 mg total) by mouth once daily. 90 tablet 3    potassium chloride SA (K-DUR,KLOR-CON M) 10 MEQ tablet Take 1 tablet (10 mEq total) by mouth once daily. 30 tablet 11    rOPINIRole (REQUIP) 0.5 MG tablet Take 2 tablets (1 mg total) by mouth every evening. 30 tablet 6     No current facility-administered medications on file prior to visit.           Review of patient's allergies indicates:   Allergen Reactions    Ceclor [cefaclor] Anaphylaxis    Penicillins Hives     Elevated blood pressure, temp, hives    Calcium alginate Other (See Comments)     Headache         Social History     Socioeconomic History    Marital status:    Occupational History    Occupation: Information Tech     Employer: [employer]   Tobacco Use    Smoking status: Never    Smokeless tobacco: Never   Substance and Sexual Activity    Alcohol use: Yes     Alcohol/week: 1.0 standard drink     Types: 1 Cans of beer per week     Comment: RARELY    Drug use: No    Sexual  "activity: Yes     Partners: Female           ROS:  General ROS: negative for  chills, fatigue or fever  Cardiovascular ROS: no chest pain or dyspnea on exertion  Musculoskeletal ROS: negative for joint pain or joint stiffness.  Negative for loss of strength.  Positive for foot pain.   Neuro ROS: Negative for syncope, numbness, or muscle weakness  Skin ROS: Negative for rash, itching or nail/hair changes.           OBJECTIVE:         Vitals:    10/24/22 1431   BP: (!) 140/76   Pulse: 88   Weight: (!) 181.9 kg (401 lb)   Height: 6' 1" (1.854 m)        Bilateral  Lower extremity exam:  Vasc:   Palpable pedal pulses.   Feet appropriately warm to touch.   Cap refill time is within normal limits   Edema: minimal      Neurological:    Light touch, proprioception, and Sharp/dull sensation are all intact.   There is no Tinel's along the tarsal tunnel.    Mulders click:   absent  Reflexes:   2+    Derm:   No open lesions, macerations, or rashes  Bruising:  absent  Redness:  absent  Pedal hair:  present      MSK:    Pes planus bilateral   Prominent sub 5th metatarsal head left foot with overlying callus formation.  No underlying wounds.   Mild fat pad atrophy noted.         2/26/2021 Xrays  Left calcaneus FINDINGS:  Visualized osseous structures appear intact, with no definite evidence of recent or healing fracture, lytic destructive process, or other significant abnormality noted.  Soft tissues appear unremarkable as well.  A tiny plantar calcaneal spur is incidentally seen.        10/24/2022  Left foot xrays:  FINDINGS:  No acute fracture or dislocation seen.  Small plantar calcaneal spur.  No significant soft tissue edema or radiopaque retained foreign body.        ASSESSMENT/PLAN:           Problem List Items Addressed This Visit    None  Visit Diagnoses       Left foot pain    -  Primary    Relevant Orders    X-Ray Foot Complete Left (Completed)    Stress reaction of left foot, initial encounter        Dry skin          "           I counseled the patient on the patient's conditions, their implications and medical management.   I ordered xrays and reviewed the xrays with the patient.  Shoe and activity modification as needed for relief.   Rest and elevate.  Icing okay.   Avoid prolonged standing or weight bearing.   Continue weight loss.   Prescription for urea capsules for foot soaks for dry skin bilateral.  Call or return to clinic prn if these symptoms worsen or fail to improve as anticipated.

## 2022-10-30 ENCOUNTER — PATIENT MESSAGE (OUTPATIENT)
Dept: INTERNAL MEDICINE | Facility: CLINIC | Age: 47
End: 2022-10-30
Payer: COMMERCIAL

## 2022-10-30 ENCOUNTER — PATIENT MESSAGE (OUTPATIENT)
Dept: PODIATRY | Facility: CLINIC | Age: 47
End: 2022-10-30
Payer: COMMERCIAL

## 2022-11-02 DIAGNOSIS — M84.375A STRESS REACTION OF LEFT FOOT, INITIAL ENCOUNTER: ICD-10-CM

## 2022-11-02 DIAGNOSIS — M79.672 LEFT FOOT PAIN: Primary | ICD-10-CM

## 2022-11-02 RX ORDER — METHYLPREDNISOLONE 4 MG/1
4 TABLET ORAL DAILY
Qty: 1 TABLET | Refills: 0 | Status: SHIPPED | OUTPATIENT
Start: 2022-11-02 | End: 2022-12-13

## 2022-11-07 ENCOUNTER — OFFICE VISIT (OUTPATIENT)
Dept: PSYCHIATRY | Facility: CLINIC | Age: 47
End: 2022-11-07
Payer: COMMERCIAL

## 2022-11-07 DIAGNOSIS — F41.1 GAD (GENERALIZED ANXIETY DISORDER): Chronic | ICD-10-CM

## 2022-11-07 DIAGNOSIS — F33.1 MDD (MAJOR DEPRESSIVE DISORDER), RECURRENT EPISODE, MODERATE: Primary | Chronic | ICD-10-CM

## 2022-11-07 PROCEDURE — 99999 PR PBB SHADOW E&M-EST. PATIENT-LVL II: CPT | Mod: PBBFAC,,, | Performed by: NURSE PRACTITIONER

## 2022-11-07 PROCEDURE — 99999 PR PBB SHADOW E&M-EST. PATIENT-LVL II: ICD-10-PCS | Mod: PBBFAC,,, | Performed by: NURSE PRACTITIONER

## 2022-11-07 PROCEDURE — 99214 OFFICE O/P EST MOD 30 MIN: CPT | Mod: 95,S$GLB,, | Performed by: NURSE PRACTITIONER

## 2022-11-07 PROCEDURE — 99214 PR OFFICE/OUTPT VISIT, EST, LEVL IV, 30-39 MIN: ICD-10-PCS | Mod: 95,S$GLB,, | Performed by: NURSE PRACTITIONER

## 2022-11-07 RX ORDER — LAMOTRIGINE 100 MG/1
100 TABLET ORAL DAILY
Qty: 90 TABLET | Refills: 1 | Status: SHIPPED | OUTPATIENT
Start: 2022-11-07 | End: 2023-03-13 | Stop reason: SDUPTHER

## 2022-11-07 RX ORDER — DULOXETIN HYDROCHLORIDE 60 MG/1
CAPSULE, DELAYED RELEASE ORAL
Qty: 90 CAPSULE | Refills: 1 | Status: SHIPPED | OUTPATIENT
Start: 2022-11-07 | End: 2023-02-14 | Stop reason: SDUPTHER

## 2022-11-07 RX ORDER — BUPROPION HYDROCHLORIDE 300 MG/1
300 TABLET ORAL DAILY
Qty: 90 TABLET | Refills: 1 | Status: SHIPPED | OUTPATIENT
Start: 2022-11-07 | End: 2023-02-05

## 2022-11-07 NOTE — PROGRESS NOTES
"Outpatient Psychiatry Follow-Up Visit (MD/NP)    11/7/2022   The patient location is: Home in [place], [place]  The chief complaint leading to consultation is:  Depression, anxiety and history of panic attacks    Visit type: audiovisual    Face to Face time with patient: 30 minutes  35 minutes of total time spent on the encounter, which includes face to face time and non-face to face time preparing to see the patient (eg, review of tests), Obtaining and/or reviewing separately obtained history, Documenting clinical information in the electronic or other health record, Independently interpreting results (not separately reported) and communicating results to the patient/family/caregiver, or Care coordination (not separately reported).         Each patient to whom he or she provides medical services by telemedicine is:  (1) informed of the relationship between the physician and patient and the respective role of any other health care provider with respect to management of the patient; and (2) notified that he or she may decline to receive medical services by telemedicine and may withdraw from such care at any time.    Notes:       Clinical Status of Patient:  Outpatient (Ambulatory)    Chief Complaint:  Jered Contreras is a 47 y.o. male who presents today for follow-up of depression, anxiety and  history of panic attacks .  Met with patient.      Interval History and Content of Current Session:  Interim Events/Subjective Report/Content of Current Session:   Reviewed messages from communication with patient since his last visit    Patient described his mood as "average" and affect was euthymic. He reported multiple stressors, his wife has been sick for a month, has spinal stenosis in her neck, will be having surgery soon, and he is worried about losing his job. He reported managed anxiety, mood, and depression at this time with his current medication regime. He reported feeling optimistic despite his stressors. " He rated his depression at 5/10 and anxiety 5/10 with 10/10 being the most severe. He stated he had a couple of panic attacks but was able to manage them well through breathing and positive thinking.     He stated he is still taking Lamictal 100 mg po daily, Cymbalta 60 mg po daily. and Wellbutrin xl 300 mg po daily and finds his medications to be very effective in managing his anxiety, mood, and depression. He reported okay sleep at this time (has sleep apnea and uses CPAP). He takes melatonin as needed. He is not taking vistaril. We discussed the importance of utilizing effective sleep hygiene skills and melatonin as needed. Provided supportive therapy and encouraged utilization of CBT skills. Patient denied SI/HI/AH/VH and no delusion noted or reported.       He still works from home. He still works as an IT. Patient denied symptoms of stuart and denied alcohol abuse or any type of illicit drug use.    He stated he had a revision to his gastric surgery and feels fatigued after the surgery. He reported decreased appetite and weight loss of 40 lbs.     From his past session  He stated he has problems with focus, attention, and finishing tasks that is creating problems in his personal and professional life but more with his personal life. We discussed ADHD evaluation in the future.     Psychiatric Review Of Systems - Is patient experiencing or having changes in:  sleep: okay sleep.   appetite: yes down  Weight: yes. Lost 40 lbs  energy/anergy: no  interest/pleasure/anhedonia: no  somatic symptoms: no  anxiety/panic: yes but managed  guilty/hopelessness: no  concentration: yes  S.I.B.s/risky behavior: no  Irritability: improved   Racing thoughts: no  Impulsive behaviors: no  Paranoia:no  AVH:no  Suicidal thoughts/plan/intent: no      Psychotropic medication review  Previous Trials-  -lexapro (worked for a while but stopped working and PCP switched him to Zoloft)  -Zoloft 200 mg po daily  -vistaril 25 mg po prn  daily    Current meds-  -Wellbutrin xl 300mg po daily  -Cymbalta 60 mg po daily  -Lamictal 100 mg po daily       Psychotherapy:  Target symptoms: depression, anxiety   Why chosen therapy is appropriate versus another modality: relevant to diagnosis, patient responds to this modality, evidence based practice  Outcome monitoring methods: self-report, observation  Therapeutic intervention type: insight oriented psychotherapy, behavior modifying psychotherapy, supportive psychotherapy  Topics discussed/themes: building skills sets for symptom management, symptom recognition  The patient's response to the intervention is motivated. The patient's progress toward treatment goals is fair.   Duration of intervention: 10 minutes.    Review of Systems   PSYCHIATRIC: Pertinant items are noted in the narrative.  CONSTITUTIONAL: No weight gain or loss.   MUSCULOSKELETAL: No pain or stiffness of the joints.  NEUROLOGIC: No weakness, sensory changes, seizures, confusion, memory loss, tremor or other abnormal movements.  ENDOCRINE: No polydipsia or polyuria.  INTEGUMENTARY: No rashes or lacerations.  EYES: No exophthalmos, jaundice or blindness.  ENT: No dizziness, tinnitus or hearing loss.  RESPIRATORY: No shortness of breath.  CARDIOVASCULAR: No tachycardia or chest pain.  GASTROINTESTINAL: No nausea, vomiting, pain, constipation or diarrhea.  GENITOURINARY: No frequency, dysuria or sexual dysfunction.  HEMATOLOGIC/LYMPHATIC: No excessive bleeding, prolonged or excessive bleeding after dental extraction/injury.  ALLERGIC/IMMUNOLOGIC: No allergic response to materials, foods or animals at this time.    Past Medical, Family and Social History: The patient's past medical, family and social history have been reviewed and updated as appropriate within the electronic medical record - see encounter notes.    Compliance: yes    Side effects: None    Risk Parameters:  Patient reports no suicidal ideation  Patient reports no homicidal  "ideation  Patient reports no self-injurious behavior  Patient reports no violent behavior    Exam (detailed: at least 9 elements; comprehensive: all 15 elements)   Constitutional  Vitals:  Most recent vital signs, dated greater than 90 days prior to this appointment, were reviewed.   There were no vitals filed for this visit.     General:  unremarkable, age appropriate     Musculoskeletal  Muscle Strength/Tone:  no dystonia, no tremor, no tic   Gait & Station:  non-ataxic     Psychiatric  Speech:  no latency; no press   Mood & Affect:  "average "  euthymic and appropriate   Thought Process:  normal and logical   Associations:  intact   Thought Content:  normal, no suicidality, no homicidality, delusions, or paranoia   Insight:  intact, has awareness of illness   Judgement: behavior is adequate to circumstances   Orientation:  grossly intact   Memory: intact for content of interview, grossly intact   Language: grossly intact, able to name, able to repeat   Attention Span & Concentration:  able to focus   Fund of Knowledge:  intact and appropriate to age and level of education, familiar with aspects of current personal life     Assessment and Diagnosis   Status/Progress: Based on the examination today, the patient's problem(s) is/are improved.  New problems have not been presented today.   Co-morbidities, Diagnostic uncertainty and Lack of compliance are not complicating management of the primary condition.  There are no active rule-out diagnoses for this patient at this time.     General Impression: Doing okay and stable.       ICD-10-CM ICD-9-CM   1. Moderate episode of recurrent major depressive disorder  F33.1 296.32   2. WAYNE (generalized anxiety disorder)  F41.1 300.02   3. History of panic attacks  Z86.59 V11.8       Intervention/Counseling/Treatment Plan   Medication Management: Continue current medications. The risks and benefits of medication were discussed with the patient.  Medication Management:   Continue " Wellbutrin xl 300 mg po daily for MDD and WAYNE  Continue Cymbalta 60 mg po daily or WAYNE and MDD  Continue Lamictal 100 mg po daily for mood stabilization. Warned against SJS and all the associated side effects and he agreed to take it.   Restart psychotherapy to learn effective coping skills   Continue utilization of effective CBT skills, positive self-talk, cognitive reframing, meditation, mindfulness, deep breathing, and relaxations skills.  Encouraged effective sleep hygiene and continue melatonin 3-5 mg po qhs prn for insomnia      Labs: reviewed most recent labs  The treatment plan and follow up plan were reviewed with the patient.  Discussed with patient informed consent, risks vs. benefits, alternative treatments, side effect profile and the inherent unpredictability of individual responses to these treatments. The patient expresses understanding of the above and displays the capacity to agree with this current plan and had no other questions.  Encouraged Patient to keep future appointments.   Take medications as prescribed   In the event of an emergency patient was advised to go to the emergency room.      Return to Clinic: 2 months, 3 months or earlier as needed    YI Kwon-BC

## 2022-11-21 ENCOUNTER — PATIENT MESSAGE (OUTPATIENT)
Dept: ADMINISTRATIVE | Facility: HOSPITAL | Age: 47
End: 2022-11-21
Payer: COMMERCIAL

## 2022-11-21 DIAGNOSIS — Z12.11 SCREENING FOR COLON CANCER: ICD-10-CM

## 2022-12-13 ENCOUNTER — OFFICE VISIT (OUTPATIENT)
Dept: INTERNAL MEDICINE | Facility: CLINIC | Age: 47
End: 2022-12-13
Payer: COMMERCIAL

## 2022-12-13 VITALS
HEART RATE: 85 BPM | BODY MASS INDEX: 41.75 KG/M2 | HEIGHT: 73 IN | WEIGHT: 315 LBS | OXYGEN SATURATION: 98 % | SYSTOLIC BLOOD PRESSURE: 130 MMHG | TEMPERATURE: 98 F | DIASTOLIC BLOOD PRESSURE: 80 MMHG

## 2022-12-13 DIAGNOSIS — J01.00 ACUTE MAXILLARY SINUSITIS, RECURRENCE NOT SPECIFIED: Primary | ICD-10-CM

## 2022-12-13 PROCEDURE — 99213 PR OFFICE/OUTPT VISIT, EST, LEVL III, 20-29 MIN: ICD-10-PCS | Mod: S$GLB,,, | Performed by: NURSE PRACTITIONER

## 2022-12-13 PROCEDURE — 3008F PR BODY MASS INDEX (BMI) DOCUMENTED: ICD-10-PCS | Mod: CPTII,S$GLB,, | Performed by: NURSE PRACTITIONER

## 2022-12-13 PROCEDURE — 1159F PR MEDICATION LIST DOCUMENTED IN MEDICAL RECORD: ICD-10-PCS | Mod: CPTII,S$GLB,, | Performed by: NURSE PRACTITIONER

## 2022-12-13 PROCEDURE — 3008F BODY MASS INDEX DOCD: CPT | Mod: CPTII,S$GLB,, | Performed by: NURSE PRACTITIONER

## 2022-12-13 PROCEDURE — 99999 PR PBB SHADOW E&M-EST. PATIENT-LVL V: ICD-10-PCS | Mod: PBBFAC,,, | Performed by: NURSE PRACTITIONER

## 2022-12-13 PROCEDURE — 3079F PR MOST RECENT DIASTOLIC BLOOD PRESSURE 80-89 MM HG: ICD-10-PCS | Mod: CPTII,S$GLB,, | Performed by: NURSE PRACTITIONER

## 2022-12-13 PROCEDURE — 1160F PR REVIEW ALL MEDS BY PRESCRIBER/CLIN PHARMACIST DOCUMENTED: ICD-10-PCS | Mod: CPTII,S$GLB,, | Performed by: NURSE PRACTITIONER

## 2022-12-13 PROCEDURE — 99999 PR PBB SHADOW E&M-EST. PATIENT-LVL V: CPT | Mod: PBBFAC,,, | Performed by: NURSE PRACTITIONER

## 2022-12-13 PROCEDURE — 3075F SYST BP GE 130 - 139MM HG: CPT | Mod: CPTII,S$GLB,, | Performed by: NURSE PRACTITIONER

## 2022-12-13 PROCEDURE — 99213 OFFICE O/P EST LOW 20 MIN: CPT | Mod: S$GLB,,, | Performed by: NURSE PRACTITIONER

## 2022-12-13 PROCEDURE — 3075F PR MOST RECENT SYSTOLIC BLOOD PRESS GE 130-139MM HG: ICD-10-PCS | Mod: CPTII,S$GLB,, | Performed by: NURSE PRACTITIONER

## 2022-12-13 PROCEDURE — 1160F RVW MEDS BY RX/DR IN RCRD: CPT | Mod: CPTII,S$GLB,, | Performed by: NURSE PRACTITIONER

## 2022-12-13 PROCEDURE — 3079F DIAST BP 80-89 MM HG: CPT | Mod: CPTII,S$GLB,, | Performed by: NURSE PRACTITIONER

## 2022-12-13 PROCEDURE — 1159F MED LIST DOCD IN RCRD: CPT | Mod: CPTII,S$GLB,, | Performed by: NURSE PRACTITIONER

## 2022-12-13 RX ORDER — PREDNISONE 20 MG/1
40 TABLET ORAL DAILY
Qty: 6 TABLET | Refills: 0 | Status: SHIPPED | OUTPATIENT
Start: 2022-12-13 | End: 2022-12-16

## 2022-12-13 RX ORDER — DOXYCYCLINE 100 MG/1
100 CAPSULE ORAL 2 TIMES DAILY
Qty: 14 CAPSULE | Refills: 0 | Status: SHIPPED | OUTPATIENT
Start: 2022-12-13 | End: 2022-12-20

## 2022-12-13 RX ORDER — UREA 40 %
CREAM (GRAM) TOPICAL
COMMUNITY
Start: 2022-10-27

## 2022-12-13 NOTE — PROGRESS NOTES
Subjective:       Patient ID: Jered Contreras is a 47 y.o. male.    Chief Complaint: Sinus Problem and Immunizations      Patient is a 47 y.o. male who traditionally follows with Angela Dacosta MD presenting today for:    Sinus Pain  Patient complains of nasal congestion, post nasal drip, and sneezing. Onset of symptoms was 7 days ago. Symptoms have been gradually worsening since that time.     Review of patient's allergies indicates:   Allergen Reactions    Ceclor [cefaclor] Anaphylaxis    Penicillins Hives     Elevated blood pressure, temp, hives    Calcium alginate Other (See Comments)     Headache       Medication List with Changes/Refills   New Medications    DOXYCYCLINE (VIBRAMYCIN) 100 MG CAP    Take 1 capsule (100 mg total) by mouth 2 (two) times daily. for 7 days    PREDNISONE (DELTASONE) 20 MG TABLET    Take 2 tablets (40 mg total) by mouth once daily. for 3 days   Current Medications    ASCORBATE CALCIUM (VITAMIN C ORAL)    Take by mouth.    AZELASTINE (ASTELIN) 137 MCG (0.1 %) NASAL SPRAY    2 sprays (274 mcg total) by Nasal route 2 (two) times daily as needed (Nasal congestion).    B COMPLEX VITAMINS (B COMPLEX-VITAMIN B12) TABLET    Take 1 tablet by mouth once daily.    BENZONATATE (TESSALON) 100 MG CAPSULE    Take 1 capsule (100 mg total) by mouth 3 (three) times daily as needed for Cough.    BUMETANIDE (BUMEX) 0.5 MG TAB    Take 1 tablet (0.5 mg total) by mouth 2 (two) times daily.    BUPROPION (WELLBUTRIN XL) 300 MG 24 HR TABLET    Take 1 tablet (300 mg total) by mouth once daily.    BUPROPION (WELLBUTRIN XL) 300 MG 24 HR TABLET    TAKE 1 TABLET(300 MG) BY MOUTH EVERY DAY    BUPROPION (WELLBUTRIN XL) 300 MG 24 HR TABLET    Take 1 tablet (300 mg total) by mouth once daily.    DULOXETINE (CYMBALTA) 60 MG CAPSULE    TAKE 1 CAPSULE(60 MG) BY MOUTH EVERY DAY    DULOXETINE (CYMBALTA) 60 MG CAPSULE    TAKE 1 CAPSULE(60 MG) BY MOUTH EVERY DAY    DULOXETINE (CYMBALTA) 60 MG CAPSULE    TAKE 1  "CAPSULE(60 MG) BY MOUTH EVERY DAY    FLUTICASONE PROPIONATE (FLONASE) 50 MCG/ACTUATION NASAL SPRAY    One spray in each nostril twice daily after 1st using azelastine nasal spray    HYDROCODONE-ACETAMINOPHEN (NORCO) 7.5-325 MG PER TABLET    Take 1 tablet by mouth every 4 (four) hours as needed.    HYDROXYZINE HCL (ATARAX) 25 MG TABLET    TK 1 T PO  TID PRN ANXIETY    IPRATROPIUM (ATROVENT) 21 MCG (0.03 %) NASAL SPRAY    2 sprays by Nasal route 3 (three) times daily as needed for Rhinitis (runing nose).    LAMOTRIGINE (LAMICTAL) 100 MG TABLET    Take 1 tablet (100 mg total) by mouth once daily.    MONTELUKAST (SINGULAIR) 10 MG TABLET    Take 1 tablet (10 mg total) by mouth every evening. Take during allergy season.    MULTIVITAMIN CAPSULE    Take 1 capsule by mouth once daily.    PANTOPRAZOLE (PROTONIX) 40 MG TABLET    Take 1 tablet (40 mg total) by mouth once daily.    POTASSIUM CHLORIDE SA (K-DUR,KLOR-CON M) 10 MEQ TABLET    Take 1 tablet (10 mEq total) by mouth once daily.    ROPINIROLE (REQUIP) 0.5 MG TABLET    Take 2 tablets (1 mg total) by mouth every evening.    UREA (CARMOL) 40 % CREA    Apply topically.   Discontinued Medications    METHYLPREDNISOLONE (MEDROL DOSEPACK) 4 MG TABLET    Take 1 tablet (4 mg total) by mouth once daily. Use as instructed on dose pack for foot pain     Medical, social and surgical history has been reviewed with the patient.      Review of Systems   HENT:  Positive for postnasal drip, sinus pressure/congestion and sneezing. Negative for ear pain.    Respiratory:  Positive for cough.    Neurological:  Positive for headaches.      Objective:   /80 (BP Location: Right arm, Patient Position: Sitting, BP Method: Large (Manual))   Pulse 85   Temp 97.5 °F (36.4 °C) (Skin)   Ht 6' 1" (1.854 m)   Wt (!) 167.5 kg (369 lb 4.3 oz)   SpO2 98%   BMI 48.72 kg/m²       Physical Exam  Vitals reviewed.   Constitutional:       Appearance: Normal appearance.   HENT:      Head: Normocephalic " and atraumatic.      Right Ear: Tympanic membrane is bulging. Tympanic membrane is not erythematous.      Left Ear: Tympanic membrane is bulging. Tympanic membrane is not erythematous.      Nose:      Right Turbinates: Swollen.      Left Turbinates: Swollen.      Right Sinus: Maxillary sinus tenderness present. No frontal sinus tenderness.      Left Sinus: Maxillary sinus tenderness present. No frontal sinus tenderness.      Mouth/Throat:      Pharynx: Posterior oropharyngeal erythema present.   Cardiovascular:      Rate and Rhythm: Normal rate and regular rhythm.      Heart sounds: Normal heart sounds. No murmur heard.  Pulmonary:      Effort: Pulmonary effort is normal.      Breath sounds: Normal breath sounds. No wheezing.   Skin:     General: Skin is warm and dry.   Neurological:      Mental Status: He is alert and oriented to person, place, and time.       Last Labs:  Glucose   Date Value Ref Range Status   08/16/2022 93 70 - 110 mg/dL Final   08/02/2022 100 70 - 110 mg/dL Final     BUN   Date Value Ref Range Status   08/16/2022 14 6 - 20 mg/dL Final   08/02/2022 11 6 - 20 mg/dL Final     Creatinine   Date Value Ref Range Status   08/16/2022 1.1 0.5 - 1.4 mg/dL Final   08/02/2022 0.8 0.5 - 1.4 mg/dL Final     Cholesterol   Date Value Ref Range Status   10/29/2021 185 120 - 199 mg/dL Final     Comment:     The National Cholesterol Education Program (NCEP) has set the  following guidelines (reference ranges) for Cholesterol:  Optimal.....................<200 mg/dL  Borderline High.............200-239 mg/dL  High........................> or = 240 mg/dL     03/16/2021 159 120 - 199 mg/dL Final     Comment:     The National Cholesterol Education Program (NCEP) has set the  following guidelines (reference ranges) for Cholesterol:  Optimal.....................<200 mg/dL  Borderline High.............200-239 mg/dL  High........................> or = 240 mg/dL       Hemoglobin A1C   Date Value Ref Range Status    10/29/2021 4.9 4.0 - 5.6 % Final     Comment:     ADA Screening Guidelines:  5.7-6.4%  Consistent with prediabetes  >or=6.5%  Consistent with diabetes    High levels of fetal hemoglobin interfere with the HbA1C  assay. Heterozygous hemoglobin variants (HbS, HgC, etc)do  not significantly interfere with this assay.   However, presence of multiple variants may affect accuracy.     03/16/2021 4.8 4.0 - 5.6 % Final     Comment:     ADA Screening Guidelines:  5.7-6.4%  Consistent with prediabetes  >or=6.5%  Consistent with diabetes    High levels of fetal hemoglobin interfere with the HbA1C  assay. Heterozygous hemoglobin variants (HbS, HgC, etc)do  not significantly interfere with this assay.   However, presence of multiple variants may affect accuracy.       Hemoglobin   Date Value Ref Range Status   10/29/2021 13.0 (L) 14.0 - 18.0 g/dL Final   03/16/2021 13.6 (L) 14.0 - 18.0 g/dL Final     Hematocrit   Date Value Ref Range Status   10/29/2021 40.5 40.0 - 54.0 % Final   03/16/2021 42.1 40.0 - 54.0 % Final       I have reviewed the following:     Details / Date    [x]   Labs     []   Micro     []   Pathology     []   Imaging     []   Cardiology Procedures     []   Other        Assessment and Plan:     1. Acute maxillary sinusitis, recurrence not specified  - doxycycline (VIBRAMYCIN) 100 MG Cap; Take 1 capsule (100 mg total) by mouth 2 (two) times daily. for 7 days  Dispense: 14 capsule; Refill: 0  - predniSONE (DELTASONE) 20 MG tablet; Take 2 tablets (40 mg total) by mouth once daily. for 3 days  Dispense: 6 tablet; Refill: 0

## 2023-01-03 ENCOUNTER — PATIENT MESSAGE (OUTPATIENT)
Dept: INTERNAL MEDICINE | Facility: CLINIC | Age: 48
End: 2023-01-03
Payer: COMMERCIAL

## 2023-01-04 NOTE — TELEPHONE ENCOUNTER
Pt c/o recurrent sinus infection from 3 wks ago. Having congestion in sinuses, chest and productive cough--yellow phlegm.   Pt request same antibiotic used from last visit?    Upcoming appt on 1/10/23

## 2023-01-05 ENCOUNTER — PATIENT MESSAGE (OUTPATIENT)
Dept: WOUND CARE | Facility: CLINIC | Age: 48
End: 2023-01-05
Payer: COMMERCIAL

## 2023-01-05 ENCOUNTER — PATIENT MESSAGE (OUTPATIENT)
Dept: INTERNAL MEDICINE | Facility: CLINIC | Age: 48
End: 2023-01-05
Payer: COMMERCIAL

## 2023-01-07 ENCOUNTER — OFFICE VISIT (OUTPATIENT)
Dept: URGENT CARE | Facility: CLINIC | Age: 48
End: 2023-01-07
Payer: COMMERCIAL

## 2023-01-07 VITALS
SYSTOLIC BLOOD PRESSURE: 152 MMHG | HEART RATE: 75 BPM | BODY MASS INDEX: 41.75 KG/M2 | DIASTOLIC BLOOD PRESSURE: 95 MMHG | OXYGEN SATURATION: 98 % | HEIGHT: 73 IN | RESPIRATION RATE: 18 BRPM | TEMPERATURE: 99 F | WEIGHT: 315 LBS

## 2023-01-07 DIAGNOSIS — U07.1 COVID-19 VIRUS DETECTED: ICD-10-CM

## 2023-01-07 DIAGNOSIS — U07.1 COVID-19 VIRUS INFECTION: Primary | ICD-10-CM

## 2023-01-07 LAB
CTP QC/QA: YES
CTP QC/QA: YES
MOLECULAR STREP A: NEGATIVE
SARS-COV-2 AG RESP QL IA.RAPID: POSITIVE

## 2023-01-07 PROCEDURE — 3008F PR BODY MASS INDEX (BMI) DOCUMENTED: ICD-10-PCS | Mod: CPTII,S$GLB,, | Performed by: NURSE PRACTITIONER

## 2023-01-07 PROCEDURE — 3008F BODY MASS INDEX DOCD: CPT | Mod: CPTII,S$GLB,, | Performed by: NURSE PRACTITIONER

## 2023-01-07 PROCEDURE — 1160F PR REVIEW ALL MEDS BY PRESCRIBER/CLIN PHARMACIST DOCUMENTED: ICD-10-PCS | Mod: CPTII,S$GLB,, | Performed by: NURSE PRACTITIONER

## 2023-01-07 PROCEDURE — 87651 STREP A DNA AMP PROBE: CPT | Mod: QW,S$GLB,, | Performed by: NURSE PRACTITIONER

## 2023-01-07 PROCEDURE — 87811 SARS CORONAVIRUS 2 ANTIGEN POCT, MANUAL READ: ICD-10-PCS | Mod: QW,S$GLB,, | Performed by: NURSE PRACTITIONER

## 2023-01-07 PROCEDURE — 3080F PR MOST RECENT DIASTOLIC BLOOD PRESSURE >= 90 MM HG: ICD-10-PCS | Mod: CPTII,S$GLB,, | Performed by: NURSE PRACTITIONER

## 2023-01-07 PROCEDURE — 1159F MED LIST DOCD IN RCRD: CPT | Mod: CPTII,S$GLB,, | Performed by: NURSE PRACTITIONER

## 2023-01-07 PROCEDURE — 99213 PR OFFICE/OUTPT VISIT, EST, LEVL III, 20-29 MIN: ICD-10-PCS | Mod: S$GLB,,, | Performed by: NURSE PRACTITIONER

## 2023-01-07 PROCEDURE — 3080F DIAST BP >= 90 MM HG: CPT | Mod: CPTII,S$GLB,, | Performed by: NURSE PRACTITIONER

## 2023-01-07 PROCEDURE — 99213 OFFICE O/P EST LOW 20 MIN: CPT | Mod: S$GLB,,, | Performed by: NURSE PRACTITIONER

## 2023-01-07 PROCEDURE — 1160F RVW MEDS BY RX/DR IN RCRD: CPT | Mod: CPTII,S$GLB,, | Performed by: NURSE PRACTITIONER

## 2023-01-07 PROCEDURE — 87651 POCT STREP A MOLECULAR: ICD-10-PCS | Mod: QW,S$GLB,, | Performed by: NURSE PRACTITIONER

## 2023-01-07 PROCEDURE — 3077F SYST BP >= 140 MM HG: CPT | Mod: CPTII,S$GLB,, | Performed by: NURSE PRACTITIONER

## 2023-01-07 PROCEDURE — 1159F PR MEDICATION LIST DOCUMENTED IN MEDICAL RECORD: ICD-10-PCS | Mod: CPTII,S$GLB,, | Performed by: NURSE PRACTITIONER

## 2023-01-07 PROCEDURE — 87811 SARS-COV-2 COVID19 W/OPTIC: CPT | Mod: QW,S$GLB,, | Performed by: NURSE PRACTITIONER

## 2023-01-07 PROCEDURE — 3077F PR MOST RECENT SYSTOLIC BLOOD PRESSURE >= 140 MM HG: ICD-10-PCS | Mod: CPTII,S$GLB,, | Performed by: NURSE PRACTITIONER

## 2023-01-07 RX ORDER — PROMETHAZINE HYDROCHLORIDE AND DEXTROMETHORPHAN HYDROBROMIDE 6.25; 15 MG/5ML; MG/5ML
5 SYRUP ORAL NIGHTLY PRN
Qty: 118 ML | Refills: 0 | Status: SHIPPED | OUTPATIENT
Start: 2023-01-07 | End: 2023-02-14

## 2023-01-07 NOTE — PATIENT INSTRUCTIONS
- Follow up with your PCP or specialty clinic as directed in the next 1-2 weeks if not improved or as needed.  You can call [phone] to schedule an appointment with the appropriate provider.    - Go to the ER or seek medical attention immediately if you develop new or worsening symptoms.    - You must understand that you have received an Urgent Care treatment only and that you may be released before all of your medical problems are known or treated.   - You, the patient, will arrange for follow up care as instructed.   - If your condition worsens or fails to improve we recommend that you receive another evaluation at the ER immediately or contact your PCP to discuss your concerns or return here.     Recommend daily antihistamine (Claritin, Zyrtec, or Allegra), decongestant (Mucinex, or Coricidin if hx of HTN), cough suppressant, Nasal spray (Flonase), Tylenol (if not contraindicated) for pain or fever, along with plenty of fluids and rest. Discussed POSITIVE Covid result w/ patient. Discussed quarantine instructions. Patient verbally understood and agreed with treatment plan. ER for worsening symptoms.     You have tested POSITIVE for COVID-19 today.           ISOLATION    If you tested positive and do not have symptoms, you must isolate for 5 days starting on the day of the positive test. I       If you tested positive and have symptoms, you must isolate for 5 days starting on the day of the first symptoms,  not the day of the positive test.       This is the most important part, both the CDC and the [facility] emphasize that you do not test out of isolation.       After 5 days, if your symptoms have improved and you have not had fever on day 5, you can return to the community on day 6- NO TESTING REQUIRED!        In fact, we do not retest if you were positive in the last 90 days.       After your 5 days of isolation are completed, the CDC recommends strict mask use for the first 5 days that you come out of  isolation.     CDC Testing and Quarantine Guidelines for patients with exposure to a known-positive COVID-19 person:    ·     A 'close exposure' is defined as anyone who has had an exposure (masked or unmasked) to a known COVID -19 positive person            within 6 feet of someone            for a cumulative total of 15 minutes or more over a 24-hour period.    ·     vaccinated Have been boosted or completed the primary series of Pfizer or Moderna vaccine within the last 6 months or completed the primary series of J&J vaccine within the last 2 months and/or had a positive test within 90 days            do NOT need to quarantine after contact with someone who had COVID-19 unless they have symptoms.            fully vaccinated people who have not had a positive test within 90 days, should get tested 3-5 days after their exposure, even if they don't have symptoms and wear a mask indoors in public for 10 days following exposure or until their test result is negative on day 5.            If you develop symptoms test and quarantine.         ·     Unvaccinated, or are more than six months out from their second mRNA dose (or more than 2 months after the J&J vaccine) and not yet boosted,  and/or NOT had a positive test within 90 days and meet 'close exposure'    you are required by CDC guidelines to quarantine for at least 5 days from time of exposure followed by 5 days of strict masking. It is recommended, but not required to test after 5 days, unless you develop symptoms, in which case you should test at that time.    If you do decide to test at 5 days and are asymptomatic, the risk is that if you test without symptoms on Day 5 for example) and you are positive, your 5 day isolation begins on that day, and you turned your 5 day quarantine into 10 days.            If your exposure does not meet the above definition, you can return to your normal daily activities to include social distancing, wearing a mask and frequent  handwashing.    Alternatively, if a 5-day quarantine is not feasible, it is imperative that an exposed person wear a well-fitting mask at all times when around others for 10 days after exposure.

## 2023-01-07 NOTE — PROGRESS NOTES
"Subjective:       Patient ID: Jered Contreras is a 47 y.o. male.    Vitals:  height is 6' 1" (1.854 m) and weight is 167.4 kg (369 lb) (abnormal). His tympanic temperature is 99.1 °F (37.3 °C). His blood pressure is 152/95 (abnormal) and his pulse is 75. His respiration is 18 and oxygen saturation is 98%.     Chief Complaint: Sore Throat    47-year-old male presents to clinic for evaluation of sore throat, cough, congestion, ear pressure times 3-4 days.  Patient is vaccinated for COVID, denies any known sick contacts.  He has been taking Robitussin and Tylenol cold for his symptoms.  He denies chest pain or shortness of breath.  He is awake and alert, answers questions appropriately, no acute distress noted on today's visit.    Sore Throat   This is a new problem. The current episode started in the past 7 days. The problem has been unchanged. There has been no fever. Associated symptoms include congestion, coughing and ear pain. Pertinent negatives include no abdominal pain, shortness of breath or vomiting. He has tried acetaminophen for the symptoms. The treatment provided mild relief.     Constitution: Negative for activity change, appetite change, chills, sweating, fatigue and fever.   HENT:  Positive for ear pain, congestion and sore throat.    Cardiovascular:  Negative for chest pain.   Respiratory:  Positive for cough and sputum production. Negative for shortness of breath.    Gastrointestinal:  Negative for abdominal pain, nausea and vomiting.   Neurological:  Negative for dizziness.     Objective:      Physical Exam   Constitutional: He is oriented to person, place, and time. He appears well-developed.  Non-toxic appearance. He does not appear ill. No distress.   HENT:   Head: Normocephalic and atraumatic. Head is without abrasion, without contusion and without laceration.   Ears:   Right Ear: External ear normal.   Left Ear: External ear normal.   Mouth/Throat: Oropharynx is clear and moist and " mucous membranes are normal.   Eyes: Conjunctivae, EOM and lids are normal. Right eye exhibits no discharge. Left eye exhibits no discharge.   Neck: Trachea normal and phonation normal.   Cardiovascular: Normal rate.   Pulmonary/Chest: Effort normal. No respiratory distress.   Abdominal: Normal appearance.   Musculoskeletal: Normal range of motion.         General: Normal range of motion.   Neurological: He is alert and oriented to person, place, and time.   Skin: Skin is warm, dry, intact, not diaphoretic and not pale. No abrasion, No burn and No ecchymosis   Psychiatric: His speech is normal and behavior is normal. Mood, judgment and thought content normal.   Nursing note and vitals reviewed.      Results for orders placed or performed in visit on 01/07/23   POCT Strep A, Molecular   Result Value Ref Range    Molecular Strep A, POC Negative Negative     Acceptable Yes    SARS Coronavirus 2 Antigen, POCT Manual Read   Result Value Ref Range    SARS Coronavirus 2 Antigen Positive (A) Negative     Acceptable Yes        Assessment:       1. COVID-19 virus infection          Plan:         COVID-19 virus infection  -     POCT Strep A, Molecular  -     SARS Coronavirus 2 Antigen, POCT Manual Read  -     nirmatrelvir-ritonavir 300 mg (150 mg x 2)-100 mg copackaged tablets (EUA); Take 3 tablets by mouth 2 (two) times daily for 5 days. Each dose contains 2 nirmatrelvir (pink tablets) and 1 ritonavir (white tablet). Take all 3 tablets together  Dispense: 30 tablet; Refill: 0  -     promethazine-dextromethorphan (PROMETHAZINE-DM) 6.25-15 mg/5 mL Syrp; Take 5 mLs by mouth nightly as needed (cough).  Dispense: 118 mL; Refill: 0      Patient Instructions   - Follow up with your PCP or specialty clinic as directed in the next 1-2 weeks if not improved or as needed.  You can call [phone] to schedule an appointment with the appropriate provider.    - Go to the ER or seek medical attention  immediately if you develop new or worsening symptoms.    - You must understand that you have received an Urgent Care treatment only and that you may be released before all of your medical problems are known or treated.   - You, the patient, will arrange for follow up care as instructed.   - If your condition worsens or fails to improve we recommend that you receive another evaluation at the ER immediately or contact your PCP to discuss your concerns or return here.     Recommend daily antihistamine (Claritin, Zyrtec, or Allegra), decongestant (Mucinex, or Coricidin if hx of HTN), cough suppressant, Nasal spray (Flonase), Tylenol (if not contraindicated) for pain or fever, along with plenty of fluids and rest. Discussed POSITIVE Covid result w/ patient. Discussed quarantine instructions. Patient verbally understood and agreed with treatment plan. ER for worsening symptoms.     You have tested POSITIVE for COVID-19 today.           ISOLATION    If you tested positive and do not have symptoms, you must isolate for 5 days starting on the day of the positive test. I       If you tested positive and have symptoms, you must isolate for 5 days starting on the day of the first symptoms,  not the day of the positive test.       This is the most important part, both the CDC and the [facility] emphasize that you do not test out of isolation.       After 5 days, if your symptoms have improved and you have not had fever on day 5, you can return to the community on day 6- NO TESTING REQUIRED!        In fact, we do not retest if you were positive in the last 90 days.       After your 5 days of isolation are completed, the CDC recommends strict mask use for the first 5 days that you come out of isolation.     CDC Testing and Quarantine Guidelines for patients with exposure to a known-positive COVID-19 person:    ·     A 'close exposure' is defined as anyone who has had an exposure (masked or unmasked) to a known COVID -19 positive  person            within 6 feet of someone            for a cumulative total of 15 minutes or more over a 24-hour period.    ·     vaccinated Have been boosted or completed the primary series of Pfizer or Moderna vaccine within the last 6 months or completed the primary series of J&J vaccine within the last 2 months and/or had a positive test within 90 days            do NOT need to quarantine after contact with someone who had COVID-19 unless they have symptoms.            fully vaccinated people who have not had a positive test within 90 days, should get tested 3-5 days after their exposure, even if they don't have symptoms and wear a mask indoors in public for 10 days following exposure or until their test result is negative on day 5.            If you develop symptoms test and quarantine.         ·     Unvaccinated, or are more than six months out from their second mRNA dose (or more than 2 months after the J&J vaccine) and not yet boosted,  and/or NOT had a positive test within 90 days and meet 'close exposure'    you are required by CDC guidelines to quarantine for at least 5 days from time of exposure followed by 5 days of strict masking. It is recommended, but not required to test after 5 days, unless you develop symptoms, in which case you should test at that time.    If you do decide to test at 5 days and are asymptomatic, the risk is that if you test without symptoms on Day 5 for example) and you are positive, your 5 day isolation begins on that day, and you turned your 5 day quarantine into 10 days.            If your exposure does not meet the above definition, you can return to your normal daily activities to include social distancing, wearing a mask and frequent handwashing.    Alternatively, if a 5-day quarantine is not feasible, it is imperative that an exposed person wear a well-fitting mask at all times when around others for 10 days after exposure.

## 2023-01-07 NOTE — LETTER
[address] [facility], [address] ? [place] [address] ? Phone [phone] ? Fax [phone]           Return to Work/School    Patient: Jered Contreras  YOB: 1975   Date: 01/07/2023      To Whom It May Concern:     Jered Contreras was in contact with/seen in my office on 01/07/2023. COVID-19 is present in our communities across the [facility]. Not all patients are eligible or appropriate to be tested. In this situation, your employee meets the following criteria:     Jered Contreras has met the criteria for COVID-19 testing and has a POSITIVE result. He can return to work once they are asymptomatic for 24 hours without the use of fever reducing medications AND at least five days from the start of symptoms (or from the first positive result if they have no symptoms).      If you have any questions or concerns, or if I can be of further assistance, please do not hesitate to contact me.     Sincerely,    Otoniel Lundberg NP

## 2023-01-10 ENCOUNTER — PATIENT MESSAGE (OUTPATIENT)
Dept: ADMINISTRATIVE | Facility: HOSPITAL | Age: 48
End: 2023-01-10
Payer: COMMERCIAL

## 2023-01-10 ENCOUNTER — PATIENT OUTREACH (OUTPATIENT)
Dept: ADMINISTRATIVE | Facility: HOSPITAL | Age: 48
End: 2023-01-10
Payer: COMMERCIAL

## 2023-01-10 NOTE — PROGRESS NOTES
Health Maintenance Due   Topic Date Due    COVID-19 Vaccine (5 - Booster for Pfizer series) 06/11/2022    Colorectal Cancer Screening  Never done    Influenza Vaccine (1) 09/01/2022       HM updated. Chart reviewed. Reminder sent to patient for FOBT outreach.      Michelle Beck LPN   Clinical Care Coordinator  Primary Care and Wellness

## 2023-01-11 ENCOUNTER — PATIENT MESSAGE (OUTPATIENT)
Dept: INTERNAL MEDICINE | Facility: CLINIC | Age: 48
End: 2023-01-11
Payer: COMMERCIAL

## 2023-02-01 ENCOUNTER — IMMUNIZATION (OUTPATIENT)
Dept: INTERNAL MEDICINE | Facility: CLINIC | Age: 48
End: 2023-02-01
Payer: COMMERCIAL

## 2023-02-01 DIAGNOSIS — Z23 NEED FOR VACCINATION: Primary | ICD-10-CM

## 2023-02-01 PROCEDURE — 90686 FLU VACCINE (QUAD) GREATER THAN OR EQUAL TO 3YO PRESERVATIVE FREE IM: ICD-10-PCS | Mod: S$GLB,,, | Performed by: INTERNAL MEDICINE

## 2023-02-01 PROCEDURE — 0124A PR IMMUNIZ ADMIN, SARS-COV- 2 COVID-19 VACCINE, 30MCG/0.3ML, BIVALENT, BOOSTER DOSE: CPT | Mod: S$GLB,,, | Performed by: INTERNAL MEDICINE

## 2023-02-01 PROCEDURE — 91312 COVID-19, MRNA, LNP-S, BIVALENT BOOSTER, PF, 30 MCG/0.3 ML DOSE: ICD-10-PCS | Mod: S$GLB,,, | Performed by: INTERNAL MEDICINE

## 2023-02-01 PROCEDURE — 0124A COVID-19, MRNA, LNP-S, BIVALENT BOOSTER, PF, 30 MCG/0.3 ML DOSE: CPT | Mod: CV19,PBBFAC | Performed by: INTERNAL MEDICINE

## 2023-02-01 PROCEDURE — 91312 COVID-19, MRNA, LNP-S, BIVALENT BOOSTER, PF, 30 MCG/0.3 ML DOSE: CPT | Mod: S$GLB,,, | Performed by: INTERNAL MEDICINE

## 2023-02-01 PROCEDURE — 0124A PR IMMUNIZ ADMIN, SARS-COV- 2 COVID-19 VACCINE, 30MCG/0.3ML, BIVALENT, BOOSTER DOSE: ICD-10-PCS | Mod: S$GLB,,, | Performed by: INTERNAL MEDICINE

## 2023-02-01 PROCEDURE — 90686 IIV4 VACC NO PRSV 0.5 ML IM: CPT | Mod: S$GLB,,, | Performed by: INTERNAL MEDICINE

## 2023-02-01 PROCEDURE — 90471 IMMUNIZATION ADMIN: CPT | Mod: S$GLB,,, | Performed by: INTERNAL MEDICINE

## 2023-02-01 PROCEDURE — 90471 FLU VACCINE (QUAD) GREATER THAN OR EQUAL TO 3YO PRESERVATIVE FREE IM: ICD-10-PCS | Mod: S$GLB,,, | Performed by: INTERNAL MEDICINE

## 2023-02-14 ENCOUNTER — OFFICE VISIT (OUTPATIENT)
Dept: CARDIOLOGY | Facility: CLINIC | Age: 48
End: 2023-02-14
Payer: COMMERCIAL

## 2023-02-14 VITALS
DIASTOLIC BLOOD PRESSURE: 80 MMHG | WEIGHT: 315 LBS | SYSTOLIC BLOOD PRESSURE: 161 MMHG | HEART RATE: 106 BPM | BODY MASS INDEX: 41.75 KG/M2 | HEIGHT: 73 IN

## 2023-02-14 DIAGNOSIS — I89.0 LYMPHEDEMA OF BOTH LOWER EXTREMITIES: ICD-10-CM

## 2023-02-14 DIAGNOSIS — I83.11 VARICOSE VEINS OF BOTH LOWER EXTREMITIES WITH INFLAMMATION: ICD-10-CM

## 2023-02-14 DIAGNOSIS — G47.33 OBSTRUCTIVE SLEEP APNEA TREATED WITH CONTINUOUS POSITIVE AIRWAY PRESSURE (CPAP): ICD-10-CM

## 2023-02-14 DIAGNOSIS — L97.929 VENOUS STASIS ULCERS OF BOTH LOWER EXTREMITIES: ICD-10-CM

## 2023-02-14 DIAGNOSIS — I83.12 VARICOSE VEINS OF BOTH LOWER EXTREMITIES WITH INFLAMMATION: ICD-10-CM

## 2023-02-14 DIAGNOSIS — R60.0 EDEMA OF BOTH LOWER EXTREMITIES: Primary | ICD-10-CM

## 2023-02-14 DIAGNOSIS — E66.01 MORBID OBESITY: ICD-10-CM

## 2023-02-14 DIAGNOSIS — L97.919 VENOUS STASIS ULCERS OF BOTH LOWER EXTREMITIES: ICD-10-CM

## 2023-02-14 DIAGNOSIS — I83.019 VENOUS STASIS ULCERS OF BOTH LOWER EXTREMITIES: ICD-10-CM

## 2023-02-14 DIAGNOSIS — Z98.84 HISTORY OF BARIATRIC SURGERY: Chronic | ICD-10-CM

## 2023-02-14 DIAGNOSIS — I87.2 VENOUS INSUFFICIENCY OF BOTH LOWER EXTREMITIES: ICD-10-CM

## 2023-02-14 DIAGNOSIS — I83.029 VENOUS STASIS ULCERS OF BOTH LOWER EXTREMITIES: ICD-10-CM

## 2023-02-14 PROCEDURE — 1159F MED LIST DOCD IN RCRD: CPT | Mod: CPTII,S$GLB,, | Performed by: INTERNAL MEDICINE

## 2023-02-14 PROCEDURE — 1159F PR MEDICATION LIST DOCUMENTED IN MEDICAL RECORD: ICD-10-PCS | Mod: CPTII,S$GLB,, | Performed by: INTERNAL MEDICINE

## 2023-02-14 PROCEDURE — 3079F PR MOST RECENT DIASTOLIC BLOOD PRESSURE 80-89 MM HG: ICD-10-PCS | Mod: CPTII,S$GLB,, | Performed by: INTERNAL MEDICINE

## 2023-02-14 PROCEDURE — 3079F DIAST BP 80-89 MM HG: CPT | Mod: CPTII,S$GLB,, | Performed by: INTERNAL MEDICINE

## 2023-02-14 PROCEDURE — 99999 PR PBB SHADOW E&M-EST. PATIENT-LVL IV: ICD-10-PCS | Mod: PBBFAC,,, | Performed by: INTERNAL MEDICINE

## 2023-02-14 PROCEDURE — 3077F SYST BP >= 140 MM HG: CPT | Mod: CPTII,S$GLB,, | Performed by: INTERNAL MEDICINE

## 2023-02-14 PROCEDURE — 99999 PR PBB SHADOW E&M-EST. PATIENT-LVL IV: CPT | Mod: PBBFAC,,, | Performed by: INTERNAL MEDICINE

## 2023-02-14 PROCEDURE — 99215 PR OFFICE/OUTPT VISIT, EST, LEVL V, 40-54 MIN: ICD-10-PCS | Mod: S$GLB,,, | Performed by: INTERNAL MEDICINE

## 2023-02-14 PROCEDURE — 3008F PR BODY MASS INDEX (BMI) DOCUMENTED: ICD-10-PCS | Mod: CPTII,S$GLB,, | Performed by: INTERNAL MEDICINE

## 2023-02-14 PROCEDURE — 99215 OFFICE O/P EST HI 40 MIN: CPT | Mod: S$GLB,,, | Performed by: INTERNAL MEDICINE

## 2023-02-14 PROCEDURE — 3077F PR MOST RECENT SYSTOLIC BLOOD PRESSURE >= 140 MM HG: ICD-10-PCS | Mod: CPTII,S$GLB,, | Performed by: INTERNAL MEDICINE

## 2023-02-14 PROCEDURE — 3008F BODY MASS INDEX DOCD: CPT | Mod: CPTII,S$GLB,, | Performed by: INTERNAL MEDICINE

## 2023-02-14 RX ORDER — BUMETANIDE 0.5 MG/1
0.5 TABLET ORAL 2 TIMES DAILY
Qty: 180 TABLET | Refills: 3 | Status: SHIPPED | OUTPATIENT
Start: 2023-02-14 | End: 2023-03-27

## 2023-02-14 NOTE — PROGRESS NOTES
[facility]      CC: Venous Insufficiency      Patient ID: Jered Contreras is a 47 y.o. male with BLE lymphedema, venous insufficiency, morbid obesity s/p gastric sleeve, TAYO (on CPAP), who presents for a follow up appointment.  Pertinent history/events are as follows:     -Pt kindly referred by Dr. Dacosta for evaluation of venous insufficiency.     -At our initial clinic visit on 8/5/2022, Mr. Contreras reported leg swelling starting in 6/2022.  States he subsequently developed ulcer at the bilateral lower legs, which are in different stages of healing.  He has no claudication or rest pain.  BLE Venous Reflux Study on 8/2/2022 revealed hemodynamically significant venous reflux (reflux lasting greater than 500ms) in the bilateral greater saphenous veins.  Plan:    BLE Lymphedema and Venous insufficiency with Venous Stasis Ulcerations- Discontinue lasix and start bumex 0.5 mg bid.  Check cmp in 1 week.  Refer to wound care.  Check ARIANA study.  After ulcers heal, refer to lymphedema clinic.  Pt to limit sodium intake to 2,000 mg daily.  Limit volume intake to 1.5 liters daily.  Elevate legs when resting.   Morbid Obesity s/p Gastric Sleeve- Encourage diet, exercise and weight loss.  Follow up with Bariatric Surgery.  TAYO- Continue CPAP.    -At follow up visit on 9/22/2022, Mr. Contreras reported improvement in BLE edema and wounds.  ARIANA Study on 8/31/2022 revealed normal resting ARIANA's. Reduced exercise ARIANA's are consistent with mild PAD.  Plan:   BLE Lymphedema and Venous insufficiency with Venous Stasis Ulcerations- Improving.  Continue bumex 0.5 mg bid.  Continue wound care.  After ulcers heal, refer to lymphedema clinic.  Pt to limit sodium intake to 2,000 mg daily.  Limit volume intake to 1.5 liters daily.  Elevate legs when resting.   Morbid Obesity s/p Gastric Sleeve- Encourage diet, exercise and weight loss.  Follow up with Bariatric Surgery.  TAYO- Continue CPAP.    HPI:    Diane reports worsening leg swelling starting in 12/2022.  He has no overt ulcers, no claudication or rest pain.  States he stopped taking bumex 0.5 mg bid after having bariatric surgery in 9/2022, but restarted bumex at 0.5 mg daily last week.      Past Medical History:   Diagnosis Date    Allergic rhinitis     Asthma     Depression     Hx of psychiatric care     Morbid obesity     Obstructive sleep apnea treated with continuous positive airway pressure (CPAP) 7/10/2018    Psychiatric problem     Therapy      Past Surgical History:   Procedure Laterality Date    APPENDECTOMY      APPLICATION OF CARTILAGE GRAFT Bilateral 12/6/2021    Procedure: APPLICATION, CARTILAGE GRAFT;  Surgeon: JAN Arredondo MD;  Location: [facility];  Service: ENT;  Laterality: Bilateral;  from right ear    gastric sleeve  09/2019    [facility]    NASAL SEPTOPLASTY Bilateral 12/6/2021    Procedure: SEPTOPLASTY, NOSE;  Surgeon: JAN Arredondo MD;  Location: [facility];  Service: ENT;  Laterality: Bilateral;    NASAL STENOSIS REPAIR Bilateral 12/6/2021    Procedure: REPAIR, STENOSIS, NOSE, VESTIBULE;  Surgeon: JAN Arredondo MD;  Location: [facility];  Service: ENT;  Laterality: Bilateral;    SINUS SURGERY      VASECTOMY  2017     Social History     Socioeconomic History    Marital status:    Occupational History    Occupation: Information Tech     Employer: [employer]   Tobacco Use    Smoking status: Never     Passive exposure: Never    Smokeless tobacco: Never   Substance and Sexual Activity    Alcohol use: Yes     Alcohol/week: 1.0 standard drink     Types: 1 Cans of beer per week     Comment: RARELY    Drug use: No    Sexual activity: Yes     Partners: Female     Family History   Problem Relation Age of Onset    Allergies Mother     Rheum arthritis Mother     Lung cancer Mother         post lobectomy    Dementia Mother     Diabetes Father     Hypertension Father     Allergies Maternal  Grandmother     Hypertension Maternal Grandmother     Dementia Maternal Grandmother     Brain cancer Maternal Grandmother         in remission before she     Rheum arthritis Maternal Grandfather     Diabetes Paternal Grandmother     Alzheimer's disease Paternal Grandmother     Hypertension Paternal Grandfather     Obesity Paternal Grandfather     Cerebral aneurysm Paternal Grandfather     Melanoma Neg Hx        Review of patient's allergies indicates:   Allergen Reactions    Ceclor [cefaclor] Anaphylaxis    Penicillins Hives     Elevated blood pressure, temp, hives    Calcium alginate Other (See Comments)     Headache       Medication List with Changes/Refills   Current Medications    ASCORBATE CALCIUM (VITAMIN C ORAL)    Take by mouth.    AZELASTINE (ASTELIN) 137 MCG (0.1 %) NASAL SPRAY    2 sprays (274 mcg total) by Nasal route 2 (two) times daily as needed (Nasal congestion).    B COMPLEX VITAMINS (B COMPLEX-VITAMIN B12) TABLET    Take 1 tablet by mouth once daily.    BUMETANIDE (BUMEX) 0.5 MG TAB    Take 1 tablet (0.5 mg total) by mouth 2 (two) times daily.    BUPROPION (WELLBUTRIN XL) 300 MG 24 HR TABLET    Take 1 tablet (300 mg total) by mouth once daily.    DULOXETINE (CYMBALTA) 60 MG CAPSULE    TAKE 1 CAPSULE(60 MG) BY MOUTH EVERY DAY    FLUTICASONE PROPIONATE (FLONASE) 50 MCG/ACTUATION NASAL SPRAY    One spray in each nostril twice daily after 1st using azelastine nasal spray    HYDROXYZINE HCL (ATARAX) 25 MG TABLET    TK 1 T PO  TID PRN ANXIETY    IPRATROPIUM (ATROVENT) 21 MCG (0.03 %) NASAL SPRAY    2 sprays by Nasal route 3 (three) times daily as needed for Rhinitis (runing nose).    LAMOTRIGINE (LAMICTAL) 100 MG TABLET    Take 1 tablet (100 mg total) by mouth once daily.    MONTELUKAST (SINGULAIR) 10 MG TABLET    Take 1 tablet (10 mg total) by mouth every evening. Take during allergy season.    MULTIVITAMIN CAPSULE    Take 1 capsule by mouth once daily.    POTASSIUM CHLORIDE SA (K-DUR,KLOR-CON M) 10  "MEQ TABLET    Take 1 tablet (10 mEq total) by mouth once daily.    ROPINIROLE (REQUIP) 0.5 MG TABLET    Take 2 tablets (1 mg total) by mouth every evening.    UREA (CARMOL) 40 % CREA    Apply topically.   Discontinued Medications    BENZONATATE (TESSALON) 100 MG CAPSULE    Take 1 capsule (100 mg total) by mouth 3 (three) times daily as needed for Cough.    BUPROPION (WELLBUTRIN XL) 300 MG 24 HR TABLET    TAKE 1 TABLET(300 MG) BY MOUTH EVERY DAY    DULOXETINE (CYMBALTA) 60 MG CAPSULE    TAKE 1 CAPSULE(60 MG) BY MOUTH EVERY DAY    DULOXETINE (CYMBALTA) 60 MG CAPSULE    TAKE 1 CAPSULE(60 MG) BY MOUTH EVERY DAY    HYDROCODONE-ACETAMINOPHEN (NORCO) 7.5-325 MG PER TABLET    Take 1 tablet by mouth every 4 (four) hours as needed.    PANTOPRAZOLE (PROTONIX) 40 MG TABLET    Take 1 tablet (40 mg total) by mouth once daily.    PROMETHAZINE-DEXTROMETHORPHAN (PROMETHAZINE-DM) 6.25-15 MG/5 ML SYRP    Take 5 mLs by mouth nightly as needed (cough).       Review of Systems  Constitution: Denies chills, fever, and sweats.  HENT: Denies headaches or blurry vision.  Cardiovascular: Denies chest pain or irregular heart beat.  Respiratory: Denies cough or shortness of breath.  Gastrointestinal: Denies abdominal pain, nausea, or vomiting.  Musculoskeletal: Positive for leg swelling with ulcers.  Neurological: Denies dizziness or focal weakness.  Psychiatric/Behavioral: Normal mental status.  Hematologic/Lymphatic: Denies bleeding problem or easy bruising/bleeding.  Skin: Denies rash or suspicious lesions    Physical Examination  BP (!) 161/80   Pulse 106   Ht 6' 1" (1.854 m)   Wt (!) 178 kg (392 lb 6.7 oz)   BMI 51.77 kg/m²     Constitutional: No acute distress, conversant  HEENT: Sclera anicteric, Pupils equal, round and reactive to light, extraocular motions intact, Oropharynx clear  Neck: No JVD, no carotid bruits  Cardiovascular: regular rate and rhythm, no murmur, rubs or gallops, normal S1/S2  Pulmonary: Clear to auscultation " bilaterally  Abdominal: Abdomen soft, nontender, nondistended, positive bowel sounds  Extremities: BLE's with 2+ pitting edema, prominent varicose veins, and changes consistent with lymphedema    Pulses:  Carotid pulses are 2+ on the right side, and 2+ on the left side.  Radial pulses are 2+ on the right side, and 2+ on the left side.   Femoral pulses are 2+ on the right side, and 2+ on the left side.  Popliteal pulses are 2+ on the right side, and 2+ on the left side.   Dorsalis pedis pulses are 2+ on the right side, and 2+ on the left side.   Posterior tibial pulses are 2+ on the right side, and 2+ on the left side.    Skin: No ecchymosis, erythema, or ulcers  Psych: Alert and oriented x 3, appropriate affect  Neuro: CNII-XII intact, no focal deficits    Labs:  Most Recent Data  CBC:   Lab Results   Component Value Date    WBC 6.15 10/29/2021    HGB 13.0 (L) 10/29/2021    HCT 40.5 10/29/2021     10/29/2021    MCV 94 10/29/2021    RDW 13.3 10/29/2021     BMP:   Lab Results   Component Value Date     08/16/2022    K 4.0 08/16/2022     08/16/2022    CO2 26 08/16/2022    BUN 14 08/16/2022    CREATININE 1.1 08/16/2022    GLU 93 08/16/2022    CALCIUM 9.5 08/16/2022    MG 1.9 11/11/2020    PHOS 3.0 10/31/2017     LFTS;   Lab Results   Component Value Date    PROT 7.2 08/16/2022    ALBUMIN 3.8 08/16/2022    BILITOT 0.4 08/16/2022    AST 22 08/16/2022    ALKPHOS 78 08/16/2022    ALT 22 08/16/2022     COAGS: No results found for: INR, PROTIME, PTT  FLP:   Lab Results   Component Value Date    CHOL 185 10/29/2021    HDL 48 10/29/2021    LDLCALC 123.8 10/29/2021    TRIG 66 10/29/2021    CHOLHDL 25.9 10/29/2021     CARDIAC:   Lab Results   Component Value Date    BNP <10 08/02/2022     Imaging:    ARIANA Study 8/31/2022:  Normal resting ARIANA's.  Reduced exercise ARIANA's are consistent with mild PAD.     BLE Venous Reflux Study 8/2/2022:  Hemodynamically significant venous reflux (reflux lasting greater than 500ms)  in the bilateral greater saphenous veins.    Assessment/Plan:  Jered Contreras is a 47 y.o. male BLE lymphedema, with venous insufficiency, morbid obesity s/p gastric sleeve, TAYO (on CPAP), who presents for a follow up appointment.      1. BLE Lymphedema and Venous insufficiency/BLE Lymphedema- Refer to lymphedema clinic.  Restart bumex 0.5 mg bid.  Check cmp in 1 week.  Pt to limit sodium intake to 2,000 mg daily.  Limit volume intake to 1.5 liters daily.  Elevate legs when resting.     2. Morbid Obesity s/p Gastric Sleeve- Encourage diet, exercise and weight loss.      3. TAYO- Continue CPAP.    Follow up in 4 months    Total duration of face to face visit time 30 minutes.  Total time spent counseling greater than fifty percent of total visit time.  Counseling included discussion regarding imaging findings, diagnosis, possibilities, treatment options, risks and benefits.  The patient had many questions regarding the options and long-term effects.    Fredo Marquez MD, PhD  Interventional Cardiology

## 2023-02-14 NOTE — PATIENT INSTRUCTIONS
Assessment/Plan:  Jered Contreras is a 47 y.o. male BLE lymphedema, with venous insufficiency, morbid obesity s/p gastric sleeve, TAYO (on CPAP), who presents for a follow up appointment.      1. BLE Lymphedema and Venous insufficiency/BLE Lymphedema- Refer to lymphedema clinic.  Restart bumex 0.5 mg bid.  Check cmp in 1 week.  Pt to limit sodium intake to 2,000 mg daily.  Limit volume intake to 1.5 liters daily.  Elevate legs when resting.     2. Morbid Obesity s/p Gastric Sleeve- Encourage diet, exercise and weight loss.      3. TAYO- Continue CPAP.    Follow up in 4 months

## 2023-02-15 ENCOUNTER — OFFICE VISIT (OUTPATIENT)
Dept: OPTOMETRY | Facility: CLINIC | Age: 48
End: 2023-02-15
Payer: COMMERCIAL

## 2023-02-15 DIAGNOSIS — H53.9 VISUAL DISTURBANCES: Primary | ICD-10-CM

## 2023-02-15 DIAGNOSIS — H52.7 REFRACTIVE ERROR: ICD-10-CM

## 2023-02-15 PROCEDURE — 99999 PR PBB SHADOW E&M-EST. PATIENT-LVL III: ICD-10-PCS | Mod: PBBFAC,,, | Performed by: OPTOMETRIST

## 2023-02-15 PROCEDURE — 92004 COMPRE OPH EXAM NEW PT 1/>: CPT | Mod: S$GLB,,, | Performed by: OPTOMETRIST

## 2023-02-15 PROCEDURE — 1159F PR MEDICATION LIST DOCUMENTED IN MEDICAL RECORD: ICD-10-PCS | Mod: CPTII,S$GLB,, | Performed by: OPTOMETRIST

## 2023-02-15 PROCEDURE — 99999 PR PBB SHADOW E&M-EST. PATIENT-LVL III: CPT | Mod: PBBFAC,,, | Performed by: OPTOMETRIST

## 2023-02-15 PROCEDURE — 1159F MED LIST DOCD IN RCRD: CPT | Mod: CPTII,S$GLB,, | Performed by: OPTOMETRIST

## 2023-02-15 PROCEDURE — 1160F RVW MEDS BY RX/DR IN RCRD: CPT | Mod: CPTII,S$GLB,, | Performed by: OPTOMETRIST

## 2023-02-15 PROCEDURE — 92004 PR EYE EXAM, NEW PATIENT,COMPREHESV: ICD-10-PCS | Mod: S$GLB,,, | Performed by: OPTOMETRIST

## 2023-02-15 PROCEDURE — 92015 DETERMINE REFRACTIVE STATE: CPT | Mod: S$GLB,,, | Performed by: OPTOMETRIST

## 2023-02-15 PROCEDURE — 92015 PR REFRACTION: ICD-10-PCS | Mod: S$GLB,,, | Performed by: OPTOMETRIST

## 2023-02-15 PROCEDURE — 1160F PR REVIEW ALL MEDS BY PRESCRIBER/CLIN PHARMACIST DOCUMENTED: ICD-10-PCS | Mod: CPTII,S$GLB,, | Performed by: OPTOMETRIST

## 2023-02-15 NOTE — PROGRESS NOTES
Subjective:       Patient ID: Jered Contreras is a 47 y.o. male      Chief Complaint   Patient presents with    Concerns About Ocular Health     History of Present Illness  Dls: 1/29/20 Dr. Garza     46 y/o male presents today with c/o blurry vision at distance and near ou.     Pt wears single vision glasses.     No tearing  No itching  No burning  No pain  + ha's  No floaters  No flashes    Eye meds  None         Assessment/Plan:     1. Visual disturbances  Good ocular health OU    2. Refractive error  Discussed presbyopia. Educated patient on refractive error and discussed lens options. Dispensed updated spectacle Rx. Educated about adaptation period to new specs.    Eyeglass Final Rx       Eyeglass Final Rx         Sphere Cylinder Axis Add    Right -0.25 +1.25 040 +1.25    Left -0.25 +1.00 160 +1.25      Expiration Date: 2/15/2024                      Follow up in about 1 year (around 2/15/2024).

## 2023-02-17 ENCOUNTER — PATIENT MESSAGE (OUTPATIENT)
Dept: PSYCHIATRY | Facility: CLINIC | Age: 48
End: 2023-02-17
Payer: COMMERCIAL

## 2023-02-17 RX ORDER — DULOXETIN HYDROCHLORIDE 30 MG/1
30 CAPSULE, DELAYED RELEASE ORAL DAILY
Qty: 30 CAPSULE | Refills: 1 | Status: SHIPPED | OUTPATIENT
Start: 2023-02-17 | End: 2023-03-13 | Stop reason: SDUPTHER

## 2023-02-23 ENCOUNTER — LAB VISIT (OUTPATIENT)
Dept: LAB | Facility: HOSPITAL | Age: 48
End: 2023-02-23
Attending: INTERNAL MEDICINE
Payer: COMMERCIAL

## 2023-02-23 DIAGNOSIS — R60.0 EDEMA OF BOTH LOWER EXTREMITIES: ICD-10-CM

## 2023-02-23 LAB
ALBUMIN SERPL BCP-MCNC: 3.7 G/DL (ref 3.5–5.2)
ALP SERPL-CCNC: 90 U/L (ref 55–135)
ALT SERPL W/O P-5'-P-CCNC: 22 U/L (ref 10–44)
ANION GAP SERPL CALC-SCNC: 9 MMOL/L (ref 8–16)
AST SERPL-CCNC: 23 U/L (ref 10–40)
BILIRUB SERPL-MCNC: 0.5 MG/DL (ref 0.1–1)
BUN SERPL-MCNC: 7 MG/DL (ref 6–20)
CALCIUM SERPL-MCNC: 9.4 MG/DL (ref 8.7–10.5)
CHLORIDE SERPL-SCNC: 103 MMOL/L (ref 95–110)
CO2 SERPL-SCNC: 27 MMOL/L (ref 23–29)
CREAT SERPL-MCNC: 0.8 MG/DL (ref 0.5–1.4)
EST. GFR  (NO RACE VARIABLE): >60 ML/MIN/1.73 M^2
GLUCOSE SERPL-MCNC: 91 MG/DL (ref 70–110)
POTASSIUM SERPL-SCNC: 4 MMOL/L (ref 3.5–5.1)
PROT SERPL-MCNC: 7 G/DL (ref 6–8.4)
SODIUM SERPL-SCNC: 139 MMOL/L (ref 136–145)

## 2023-02-23 PROCEDURE — 36415 COLL VENOUS BLD VENIPUNCTURE: CPT | Performed by: INTERNAL MEDICINE

## 2023-02-23 PROCEDURE — 80053 COMPREHEN METABOLIC PANEL: CPT | Performed by: INTERNAL MEDICINE

## 2023-03-01 ENCOUNTER — PATIENT MESSAGE (OUTPATIENT)
Dept: CARDIOLOGY | Facility: CLINIC | Age: 48
End: 2023-03-01
Payer: COMMERCIAL

## 2023-03-06 ENCOUNTER — OFFICE VISIT (OUTPATIENT)
Dept: PODIATRY | Facility: CLINIC | Age: 48
End: 2023-03-06
Payer: COMMERCIAL

## 2023-03-06 VITALS
DIASTOLIC BLOOD PRESSURE: 78 MMHG | WEIGHT: 315 LBS | HEIGHT: 73 IN | SYSTOLIC BLOOD PRESSURE: 161 MMHG | BODY MASS INDEX: 41.75 KG/M2 | HEART RATE: 90 BPM

## 2023-03-06 DIAGNOSIS — M79.672 PAIN OF LEFT HEEL: ICD-10-CM

## 2023-03-06 DIAGNOSIS — M72.2 PLANTAR FASCIITIS: Primary | ICD-10-CM

## 2023-03-06 PROCEDURE — 99214 OFFICE O/P EST MOD 30 MIN: CPT | Mod: S$GLB,,, | Performed by: PODIATRIST

## 2023-03-06 PROCEDURE — 3078F PR MOST RECENT DIASTOLIC BLOOD PRESSURE < 80 MM HG: ICD-10-PCS | Mod: CPTII,S$GLB,, | Performed by: PODIATRIST

## 2023-03-06 PROCEDURE — 1159F PR MEDICATION LIST DOCUMENTED IN MEDICAL RECORD: ICD-10-PCS | Mod: CPTII,S$GLB,, | Performed by: PODIATRIST

## 2023-03-06 PROCEDURE — 99214 PR OFFICE/OUTPT VISIT, EST, LEVL IV, 30-39 MIN: ICD-10-PCS | Mod: S$GLB,,, | Performed by: PODIATRIST

## 2023-03-06 PROCEDURE — 3008F PR BODY MASS INDEX (BMI) DOCUMENTED: ICD-10-PCS | Mod: CPTII,S$GLB,, | Performed by: PODIATRIST

## 2023-03-06 PROCEDURE — 99999 PR PBB SHADOW E&M-EST. PATIENT-LVL IV: CPT | Mod: PBBFAC,,, | Performed by: PODIATRIST

## 2023-03-06 PROCEDURE — 99999 PR PBB SHADOW E&M-EST. PATIENT-LVL IV: ICD-10-PCS | Mod: PBBFAC,,, | Performed by: PODIATRIST

## 2023-03-06 PROCEDURE — 3077F SYST BP >= 140 MM HG: CPT | Mod: CPTII,S$GLB,, | Performed by: PODIATRIST

## 2023-03-06 PROCEDURE — 3077F PR MOST RECENT SYSTOLIC BLOOD PRESSURE >= 140 MM HG: ICD-10-PCS | Mod: CPTII,S$GLB,, | Performed by: PODIATRIST

## 2023-03-06 PROCEDURE — 1160F RVW MEDS BY RX/DR IN RCRD: CPT | Mod: CPTII,S$GLB,, | Performed by: PODIATRIST

## 2023-03-06 PROCEDURE — 1159F MED LIST DOCD IN RCRD: CPT | Mod: CPTII,S$GLB,, | Performed by: PODIATRIST

## 2023-03-06 PROCEDURE — 3008F BODY MASS INDEX DOCD: CPT | Mod: CPTII,S$GLB,, | Performed by: PODIATRIST

## 2023-03-06 PROCEDURE — 1160F PR REVIEW ALL MEDS BY PRESCRIBER/CLIN PHARMACIST DOCUMENTED: ICD-10-PCS | Mod: CPTII,S$GLB,, | Performed by: PODIATRIST

## 2023-03-06 PROCEDURE — 3078F DIAST BP <80 MM HG: CPT | Mod: CPTII,S$GLB,, | Performed by: PODIATRIST

## 2023-03-06 RX ORDER — HYDROCODONE BITARTRATE AND ACETAMINOPHEN 10; 300 MG/1; MG/1
1 TABLET ORAL
Qty: 28 EACH | Refills: 0 | Status: SHIPPED | OUTPATIENT
Start: 2023-03-06 | End: 2023-03-13

## 2023-03-06 NOTE — PROGRESS NOTES
Chief Complaint   Patient presents with    Plantar Fasciitis     Having issues with plantar fasciitis again and want to discuss treatment options.             HPI:       Jered Contreras is a 47 y.o. male who presents to clinic with concerns of chronic pain left heel.  Exacerbated in the past month.  No trauma recalled.  Pain with weight bearing and direct pressure/walking.  No alleviated with shoe or insole modifications.             Patient Active Problem List   Diagnosis    AR (allergic rhinitis)    BMI 50.0-59.9, adult    Heart palpitations    Morbid obesity    WAYNE (generalized anxiety disorder)    History of panic attacks    MDD (major depressive disorder), recurrent episode, moderate    Meralgia paresthetica of right side    Nasal valve collapse    Nasal septal deviation    Hypertrophy of both inferior nasal turbinates    Obstructive sleep apnea treated with continuous positive airway pressure (CPAP)    History of bariatric surgery    Disorders of smell    GERD (gastroesophageal reflux disease)    Nasal valve stenosis    Lymphedema of both lower extremities    Venous insufficiency of both lower extremities    Venous stasis ulcers of both lower extremities    Edema of both lower extremities    Varicose veins of both lower extremities with inflammation    Refractive error           Current Outpatient Medications on File Prior to Visit   Medication Sig Dispense Refill    ASCORBATE CALCIUM (VITAMIN C ORAL) Take by mouth.      azelastine (ASTELIN) 137 mcg (0.1 %) nasal spray 2 sprays (274 mcg total) by Nasal route 2 (two) times daily as needed (Nasal congestion). 30 mL 5    b complex vitamins (B COMPLEX-VITAMIN B12) tablet Take 1 tablet by mouth once daily.      bumetanide (BUMEX) 0.5 MG Tab Take 1 tablet (0.5 mg total) by mouth 2 (two) times daily. 180 tablet 3    buPROPion (WELLBUTRIN XL) 300 MG 24 hr tablet Take 1 tablet (300 mg total) by mouth once daily. 30 tablet 2    DULoxetine (CYMBALTA) 30 MG  capsule Take 1 capsule (30 mg total) by mouth once daily. 30 capsule 1    DULoxetine (CYMBALTA) 60 MG capsule TAKE 1 CAPSULE(60 MG) BY MOUTH EVERY DAY 30 capsule 2    fluticasone propionate (FLONASE) 50 mcg/actuation nasal spray One spray in each nostril twice daily after 1st using azelastine nasal spray 18.2 mL 11    hydrOXYzine HCL (ATARAX) 25 MG tablet TK 1 T PO  TID PRN ANXIETY 30 tablet 6    ipratropium (ATROVENT) 21 mcg (0.03 %) nasal spray 2 sprays by Nasal route 3 (three) times daily as needed for Rhinitis (runing nose). 30 mL 5    montelukast (SINGULAIR) 10 mg tablet Take 1 tablet (10 mg total) by mouth every evening. Take during allergy season. 90 tablet 3    multivitamin capsule Take 1 capsule by mouth once daily.      potassium chloride SA (K-DUR,KLOR-CON M) 10 MEQ tablet Take 1 tablet (10 mEq total) by mouth once daily. 30 tablet 11    rOPINIRole (REQUIP) 0.5 MG tablet Take 2 tablets (1 mg total) by mouth every evening. 30 tablet 6    urea (CARMOL) 40 % Crea Apply topically.      lamoTRIgine (LAMICTAL) 100 MG tablet Take 1 tablet (100 mg total) by mouth once daily. 90 tablet 1     No current facility-administered medications on file prior to visit.           Review of patient's allergies indicates:   Allergen Reactions    Ceclor [cefaclor] Anaphylaxis    Penicillins Hives     Elevated blood pressure, temp, hives    Calcium alginate Other (See Comments)     Headache         Social History     Socioeconomic History    Marital status:    Occupational History    Occupation: Information Tech     Employer: [employer]   Tobacco Use    Smoking status: Never     Passive exposure: Never    Smokeless tobacco: Never   Substance and Sexual Activity    Alcohol use: Yes     Alcohol/week: 1.0 standard drink     Types: 1 Cans of beer per week     Comment: RARELY    Drug use: No    Sexual activity: Yes     Partners: Female           ROS:  General ROS: negative for  chills, fatigue or fever  Cardiovascular  "ROS: no chest pain or dyspnea on exertion  Musculoskeletal ROS: negative for joint pain or joint stiffness.  Negative for loss of strength.  Positive for foot pain.   Neuro ROS: Negative for syncope, numbness, or muscle weakness  Skin ROS: Negative for rash, itching or nail/hair changes.           OBJECTIVE:         Vitals:    03/06/23 1325   BP: (!) 161/78   Pulse: 90   Weight: (!) 177.8 kg (392 lb)   Height: 6' 1" (1.854 m)        Bilateral  Lower extremity exam:  Vasc:   Palpable pedal pulses.   Feet appropriately warm to touch.   Cap refill time is within normal limits   Edema: minimal      Neurological:    Light touch, proprioception, and Sharp/dull sensation are all intact.   There is no Tinel's along the tarsal tunnel.    Mulders click:   absent  Reflexes:   2+    Derm:   No open lesions, macerations, or rashes  Bruising:  absent  Redness:  absent  Pedal hair:  present      MSK:    Pes planus bilateral   tenderness to palpation plantar left heel.  There appears to be an area of dry flaky skin and erythema.  No open wounds  no drainage evident.   Mild fat pad atrophy noted.         2/26/2021 Xrays  Left calcaneus FINDINGS:  Visualized osseous structures appear intact, with no definite evidence of recent or healing fracture, lytic destructive process, or other significant abnormality noted.  Soft tissues appear unremarkable as well.  A tiny plantar calcaneal spur is incidentally seen.        10/24/2022  Left foot xrays:  FINDINGS:  No acute fracture or dislocation seen.  Small plantar calcaneal spur.  No significant soft tissue edema or radiopaque retained foreign body.        ASSESSMENT/PLAN:           Problem List Items Addressed This Visit    None  Visit Diagnoses       Plantar fasciitis    -  Primary    Relevant Medications    HYDROcodone-acetaminophen  mg Tab    Other Relevant Orders    MRI Foot ([facility]) Left W W/O Contrast    Pain of left heel        Relevant Medications    " HYDROcodone-acetaminophen  mg Tab    Other Relevant Orders    MRI Foot ([facility]) Left W W/O Contrast                I counseled the patient on the patient's conditions, their implications and medical management.   Prior imaging reviewed.  No MRI.  Will order.     In the meantime, Shoe and activity modification as needed for relief.  I recommend CAM boot to help offload.   CAM boot provided.      Rest and elevate for now.   Meds as ordered.   Follow up after MRI.       I spent a total of 33 minutes on the day of the visit.  This includes face to face time and non-face to face time preparing to see the patient (eg, review of tests), obtaining and/or reviewing separately obtained history, documenting clinical information in the electronic or other health record, independently interpreting results and communicating results to the patient/family/caregiver, or care coordinator.

## 2023-03-11 ENCOUNTER — PATIENT MESSAGE (OUTPATIENT)
Dept: PODIATRY | Facility: CLINIC | Age: 48
End: 2023-03-11
Payer: COMMERCIAL

## 2023-03-13 ENCOUNTER — HOSPITAL ENCOUNTER (OUTPATIENT)
Dept: RADIOLOGY | Facility: HOSPITAL | Age: 48
Discharge: HOME | End: 2023-03-13
Attending: PODIATRIST
Payer: COMMERCIAL

## 2023-03-13 ENCOUNTER — OFFICE VISIT (OUTPATIENT)
Dept: PSYCHIATRY | Facility: CLINIC | Age: 48
End: 2023-03-13
Payer: COMMERCIAL

## 2023-03-13 DIAGNOSIS — F41.0 PANIC ATTACKS: ICD-10-CM

## 2023-03-13 DIAGNOSIS — F33.1 MDD (MAJOR DEPRESSIVE DISORDER), RECURRENT EPISODE, MODERATE: Primary | Chronic | ICD-10-CM

## 2023-03-13 DIAGNOSIS — M79.672 LEFT FOOT PAIN: Primary | ICD-10-CM

## 2023-03-13 DIAGNOSIS — M72.2 PLANTAR FASCIITIS: ICD-10-CM

## 2023-03-13 DIAGNOSIS — F41.1 GAD (GENERALIZED ANXIETY DISORDER): Chronic | ICD-10-CM

## 2023-03-13 DIAGNOSIS — M79.672 PAIN OF LEFT HEEL: ICD-10-CM

## 2023-03-13 PROCEDURE — 99214 PR OFFICE/OUTPT VISIT, EST, LEVL IV, 30-39 MIN: ICD-10-PCS | Mod: 95,,, | Performed by: NURSE PRACTITIONER

## 2023-03-13 PROCEDURE — 99214 OFFICE O/P EST MOD 30 MIN: CPT | Mod: 95,,, | Performed by: NURSE PRACTITIONER

## 2023-03-13 PROCEDURE — A9585 GADOBUTROL INJECTION: HCPCS | Performed by: PODIATRIST

## 2023-03-13 PROCEDURE — 73720 MRI LWR EXTREMITY W/O&W/DYE: CPT | Mod: 26,LT,, | Performed by: RADIOLOGY

## 2023-03-13 PROCEDURE — 1159F MED LIST DOCD IN RCRD: CPT | Mod: CPTII,95,, | Performed by: NURSE PRACTITIONER

## 2023-03-13 PROCEDURE — 73720 MRI LWR EXTREMITY W/O&W/DYE: CPT | Mod: TC,LT

## 2023-03-13 PROCEDURE — 25500020 PHARM REV CODE 255: Performed by: PODIATRIST

## 2023-03-13 PROCEDURE — 1160F RVW MEDS BY RX/DR IN RCRD: CPT | Mod: CPTII,95,, | Performed by: NURSE PRACTITIONER

## 2023-03-13 PROCEDURE — 1160F PR REVIEW ALL MEDS BY PRESCRIBER/CLIN PHARMACIST DOCUMENTED: ICD-10-PCS | Mod: CPTII,95,, | Performed by: NURSE PRACTITIONER

## 2023-03-13 PROCEDURE — 73720 MRI FOOT (HINDFOOT) LEFT W W/O CONTRAST: ICD-10-PCS | Mod: 26,LT,, | Performed by: RADIOLOGY

## 2023-03-13 PROCEDURE — 1159F PR MEDICATION LIST DOCUMENTED IN MEDICAL RECORD: ICD-10-PCS | Mod: CPTII,95,, | Performed by: NURSE PRACTITIONER

## 2023-03-13 RX ORDER — BUPROPION HYDROCHLORIDE 300 MG/1
300 TABLET ORAL DAILY
Qty: 30 TABLET | Refills: 2 | Status: SHIPPED | OUTPATIENT
Start: 2023-03-13 | End: 2023-10-09 | Stop reason: SDUPTHER

## 2023-03-13 RX ORDER — GADOBUTROL 604.72 MG/ML
10 INJECTION INTRAVENOUS
Status: COMPLETED | OUTPATIENT
Start: 2023-03-13 | End: 2023-03-13

## 2023-03-13 RX ORDER — LAMOTRIGINE 100 MG/1
100 TABLET ORAL DAILY
Qty: 90 TABLET | Refills: 3 | Status: SHIPPED | OUTPATIENT
Start: 2023-03-13 | End: 2023-10-09 | Stop reason: SDUPTHER

## 2023-03-13 RX ORDER — HYDROCODONE BITARTRATE AND ACETAMINOPHEN 5; 325 MG/1; MG/1
1 TABLET ORAL EVERY 6 HOURS PRN
Qty: 30 TABLET | Refills: 0 | Status: SHIPPED | OUTPATIENT
Start: 2023-03-13 | End: 2023-03-27

## 2023-03-13 RX ORDER — DULOXETIN HYDROCHLORIDE 60 MG/1
CAPSULE, DELAYED RELEASE ORAL
Qty: 30 CAPSULE | Refills: 3 | Status: SHIPPED | OUTPATIENT
Start: 2023-03-13 | End: 2023-10-04 | Stop reason: SDUPTHER

## 2023-03-13 RX ORDER — DULOXETIN HYDROCHLORIDE 30 MG/1
30 CAPSULE, DELAYED RELEASE ORAL DAILY
Qty: 30 CAPSULE | Refills: 3 | Status: SHIPPED | OUTPATIENT
Start: 2023-03-13 | End: 2023-08-09

## 2023-03-13 RX ADMIN — GADOBUTROL 10 ML: 604.72 INJECTION INTRAVENOUS at 11:03

## 2023-03-13 NOTE — PROGRESS NOTES
"Outpatient Psychiatry Follow-Up Visit (MD/NP)    3/13/2023   The patient location is: Home in [place], [place]  The chief complaint leading to consultation is:  Depression, anxiety and history of panic attacks    Visit type: audiovisual    Face to Face time with patient: 30 minutes  35 minutes of total time spent on the encounter, which includes face to face time and non-face to face time preparing to see the patient (eg, review of tests), Obtaining and/or reviewing separately obtained history, Documenting clinical information in the electronic or other health record, Independently interpreting results (not separately reported) and communicating results to the patient/family/caregiver, or Care coordination (not separately reported).         Each patient to whom he or she provides medical services by telemedicine is:  (1) informed of the relationship between the physician and patient and the respective role of any other health care provider with respect to management of the patient; and (2) notified that he or she may decline to receive medical services by telemedicine and may withdraw from such care at any time.    Notes:       Clinical Status of Patient:  Outpatient (Ambulatory)    Chief Complaint:  Jered Contreras is a 47 y.o. male who presents today for follow-up of depression, anxiety and  history of panic attacks .  Met with patient.      Interval History and Content of Current Session:  Interim Events/Subjective Report/Content of Current Session:   Reviewed messages from communication with patient since his last visit    Patient described his mood as "pretty good" and affect was euthymic. He stated his wife had surgery neck surgery and now she is working with surgeon on figuring out why her arms are numb. He reported that he switched jobs in December for a different [employer] company and stated he likes it. He reported managed anxiety, mood, and depression at this time with his current medication regimen. He " stated increasing duloxetine to 90 mg daily has improved his depression and anxiety. He reported feeling optimistic. He stated his depression fluctuates with situational stressors but managed well at this time. He stated he has not had any panic attacks for a while now. He stated he is utilizing effective communication skills.     He stated he is still taking Lamictal 100 mg po daily, Cymbalta 90 mg po daily. and Wellbutrin xl 300 mg po daily and finds his medications to be very effective in managing his anxiety, mood, and depression. He reported okay sleep at this time (has sleep apnea and uses CPAP). He takes melatonin as needed. He is not taking vistaril. We discussed the importance of utilizing effective sleep hygiene skills and melatonin as needed. Provided supportive therapy and encouraged utilization of CBT skills. Patient denied SI/HI/AH/VH and no delusion noted or reported. Patient denied symptoms of stuart or hypomania. He denied alcohol abuse or any type of illicit drug use.    He stated he had a revision to his gastric surgery and feels well now. He reported normal appetite and a little weight gain since his last visit. He stated his BP is controlled at this time but it was elevated at his last visit due plantar fasciitis. We dicussed getting his vitals ASAP, today,  and if elevated to follow up with PCP to avoid any negative outcome from untreated elevated BP and he verbalized understanding and agreed to get his vitals today and will follow up with his PCP.       From his past session  He stated he has problems with focus, attention, and finishing tasks that is creating problems in his personal and professional life but more with his personal life. We discussed ADHD evaluation in the future.     Psychiatric Review Of Systems - Is patient experiencing or having changes in:  sleep: okay sleep.   appetite: normal  Weight: yes. Gained a little weight  energy/anergy: normal  interest/pleasure/anhedonia:  no  somatic symptoms: no  anxiety/panic: yes but managed  guilty/hopelessness: no  concentration: no  S.I.B.s/risky behavior: no  Irritability: improved   Racing thoughts: no  Impulsive behaviors: no  Paranoia:no  AVH:no  Suicidal thoughts/plan/intent: no  Se: no  ETOH: no  Drugs: no      Psychotropic medication review  Previous Trials-  -lexapro (worked for a while but stopped working and PCP switched him to Zoloft)  -Zoloft 200 mg po daily  -vistaril 25 mg po prn daily    Current meds-  -Wellbutrin xl 300mg po daily  -Cymbalta 90 mg po daily  -Lamictal 100 mg po daily       Psychotherapy:  Target symptoms: depression, anxiety   Why chosen therapy is appropriate versus another modality: relevant to diagnosis, patient responds to this modality, evidence based practice  Outcome monitoring methods: self-report, observation  Therapeutic intervention type: insight oriented psychotherapy, behavior modifying psychotherapy, supportive psychotherapy  Topics discussed/themes: building skills sets for symptom management, symptom recognition  The patient's response to the intervention is motivated. The patient's progress toward treatment goals is fair.   Duration of intervention: 10 minutes.    Review of Systems   PSYCHIATRIC: Pertinant items are noted in the narrative.  CONSTITUTIONAL: No weight gain or loss.   MUSCULOSKELETAL: No pain or stiffness of the joints.  NEUROLOGIC: No weakness, sensory changes, seizures, confusion, memory loss, tremor or other abnormal movements.  ENDOCRINE: No polydipsia or polyuria.  INTEGUMENTARY: No rashes or lacerations.  EYES: No exophthalmos, jaundice or blindness.  ENT: No dizziness, tinnitus or hearing loss.  RESPIRATORY: No shortness of breath.  CARDIOVASCULAR: No tachycardia or chest pain.  GASTROINTESTINAL: No nausea, vomiting, pain, constipation or diarrhea.  GENITOURINARY: No frequency, dysuria or sexual dysfunction.  HEMATOLOGIC/LYMPHATIC: No excessive bleeding, prolonged or  "excessive bleeding after dental extraction/injury.  ALLERGIC/IMMUNOLOGIC: No allergic response to materials, foods or animals at this time.    Past Medical, Family and Social History: The patient's past medical, family and social history have been reviewed and updated as appropriate within the electronic medical record - see encounter notes.    Compliance: yes    Side effects: None    Risk Parameters:  Patient reports no suicidal ideation  Patient reports no homicidal ideation  Patient reports no self-injurious behavior  Patient reports no violent behavior    Exam (detailed: at least 9 elements; comprehensive: all 15 elements)   Constitutional  Vitals:  Most recent vital signs, dated greater than 90 days prior to this appointment, were reviewed.   There were no vitals filed for this visit.     General:  unremarkable, age appropriate     Musculoskeletal  Muscle Strength/Tone:  no dystonia, no tremor, no tic   Gait & Station:  non-ataxic     Psychiatric  Speech:  no latency; no press   Mood & Affect:  "Pretty good "  euthymic and appropriate   Thought Process:  normal and logical   Associations:  intact   Thought Content:  normal, no suicidality, no homicidality, delusions, or paranoia   Insight:  intact, has awareness of illness   Judgement: behavior is adequate to circumstances   Orientation:  grossly intact   Memory: intact for content of interview, grossly intact   Language: grossly intact, able to name, able to repeat   Attention Span & Concentration:  able to focus   Fund of Knowledge:  intact and appropriate to age and level of education, familiar with aspects of current personal life     Assessment and Diagnosis   Status/Progress: Based on the examination today, the patient's problem(s) is/are improved.  New problems have not been presented today.   Co-morbidities, Diagnostic uncertainty and Lack of compliance are not complicating management of the primary condition.  There are no active rule-out diagnoses for " this patient at this time.     General Impression: Doing okay and stable.       ICD-10-CM ICD-9-CM   1. Moderate episode of recurrent major depressive disorder  F33.1 296.32   2. WAYNE (generalized anxiety disorder)  F41.1 300.02   3. panic attacks          Intervention/Counseling/Treatment Plan   Medication Management: Continue current medications. The risks and benefits of medication were discussed with the patient.  Medication Management:   Reviewed communication after his last visit  Encouraged patient to get his vitals today and if elevated recommended following up with his primary provider or cardiologist and he agreed.   Continue Wellbutrin xl 300 mg po daily for MDD and WAYNE  Continue Cymbalta 90 mg po daily or WAYNE and MDD  Continue Lamictal 100 mg po daily for mood stabilization. Warned against SJS and all the associated side effects and he agreed to take it.   Recommended psychotherapy to learn effective coping skills   Continue utilization of effective CBT skills, positive self-talk, cognitive reframing, meditation, mindfulness, deep breathing, and relaxations skills.  Encouraged effective sleep hygiene and continue melatonin 3-5 mg po qhs prn for insomnia      Labs: reviewed most recent labs  The treatment plan and follow up plan were reviewed with the patient.  Discussed with patient informed consent, risks vs. benefits, alternative treatments, side effect profile and the inherent unpredictability of individual responses to these treatments. The patient expresses understanding of the above and displays the capacity to agree with this current plan and had no other questions.  Encouraged Patient to keep future appointments.   Take medications as prescribed   In the event of an emergency patient was advised to go to the emergency room.      Return to Clinic: 2 months, 3 months or earlier as needed    YI Kwon-BC

## 2023-03-14 ENCOUNTER — DOCUMENTATION ONLY (OUTPATIENT)
Dept: CARDIOLOGY | Facility: CLINIC | Age: 48
End: 2023-03-14
Payer: COMMERCIAL

## 2023-03-14 NOTE — PROGRESS NOTES
Patient has been complaint with compression of 20-30mmhg, elevation and exercise for at least 4 weeks yet swelling persists.

## 2023-03-27 ENCOUNTER — OFFICE VISIT (OUTPATIENT)
Dept: PRIMARY CARE CLINIC | Facility: CLINIC | Age: 48
End: 2023-03-27
Payer: COMMERCIAL

## 2023-03-27 ENCOUNTER — LAB VISIT (OUTPATIENT)
Dept: LAB | Facility: HOSPITAL | Age: 48
End: 2023-03-27
Attending: NURSE PRACTITIONER
Payer: COMMERCIAL

## 2023-03-27 VITALS
HEART RATE: 90 BPM | DIASTOLIC BLOOD PRESSURE: 68 MMHG | WEIGHT: 315 LBS | OXYGEN SATURATION: 96 % | BODY MASS INDEX: 41.75 KG/M2 | HEIGHT: 73 IN | SYSTOLIC BLOOD PRESSURE: 122 MMHG

## 2023-03-27 DIAGNOSIS — R30.0 DYSURIA: Primary | ICD-10-CM

## 2023-03-27 DIAGNOSIS — N41.9 PROSTATITIS, UNSPECIFIED PROSTATITIS TYPE: ICD-10-CM

## 2023-03-27 DIAGNOSIS — R97.20 ELEVATED PSA: ICD-10-CM

## 2023-03-27 DIAGNOSIS — R30.0 DYSURIA: ICD-10-CM

## 2023-03-27 LAB
BACTERIA #/AREA URNS AUTO: ABNORMAL /HPF
BILIRUB UR QL STRIP: NEGATIVE
CLARITY UR REFRACT.AUTO: ABNORMAL
COLOR UR AUTO: YELLOW
COMPLEXED PSA SERPL-MCNC: 6.1 NG/ML (ref 0–4)
GLUCOSE UR QL STRIP: NEGATIVE
HGB UR QL STRIP: ABNORMAL
HYALINE CASTS UR QL AUTO: 0 /LPF
KETONES UR QL STRIP: ABNORMAL
LEUKOCYTE ESTERASE UR QL STRIP: ABNORMAL
MICROSCOPIC COMMENT: ABNORMAL
NITRITE UR QL STRIP: POSITIVE
PH UR STRIP: 6 [PH] (ref 5–8)
PROT UR QL STRIP: ABNORMAL
RBC #/AREA URNS AUTO: >100 /HPF (ref 0–4)
SP GR UR STRIP: 1.02 (ref 1–1.03)
SQUAMOUS #/AREA URNS AUTO: 1 /HPF
URN SPEC COLLECT METH UR: ABNORMAL
WBC #/AREA URNS AUTO: >100 /HPF (ref 0–5)
WBC CLUMPS UR QL AUTO: ABNORMAL

## 2023-03-27 PROCEDURE — 3008F PR BODY MASS INDEX (BMI) DOCUMENTED: ICD-10-PCS | Mod: CPTII,S$GLB,, | Performed by: NURSE PRACTITIONER

## 2023-03-27 PROCEDURE — 81001 URINALYSIS AUTO W/SCOPE: CPT | Performed by: NURSE PRACTITIONER

## 2023-03-27 PROCEDURE — 3008F BODY MASS INDEX DOCD: CPT | Mod: CPTII,S$GLB,, | Performed by: NURSE PRACTITIONER

## 2023-03-27 PROCEDURE — 36415 COLL VENOUS BLD VENIPUNCTURE: CPT | Mod: PN | Performed by: NURSE PRACTITIONER

## 2023-03-27 PROCEDURE — 87186 SC STD MICRODIL/AGAR DIL: CPT | Performed by: NURSE PRACTITIONER

## 2023-03-27 PROCEDURE — 84153 ASSAY OF PSA TOTAL: CPT | Performed by: NURSE PRACTITIONER

## 2023-03-27 PROCEDURE — 99213 OFFICE O/P EST LOW 20 MIN: CPT | Mod: S$GLB,,, | Performed by: NURSE PRACTITIONER

## 2023-03-27 PROCEDURE — 3074F PR MOST RECENT SYSTOLIC BLOOD PRESSURE < 130 MM HG: ICD-10-PCS | Mod: CPTII,S$GLB,, | Performed by: NURSE PRACTITIONER

## 2023-03-27 PROCEDURE — 1160F PR REVIEW ALL MEDS BY PRESCRIBER/CLIN PHARMACIST DOCUMENTED: ICD-10-PCS | Mod: CPTII,S$GLB,, | Performed by: NURSE PRACTITIONER

## 2023-03-27 PROCEDURE — 99213 PR OFFICE/OUTPT VISIT, EST, LEVL III, 20-29 MIN: ICD-10-PCS | Mod: S$GLB,,, | Performed by: NURSE PRACTITIONER

## 2023-03-27 PROCEDURE — 99999 PR PBB SHADOW E&M-EST. PATIENT-LVL IV: CPT | Mod: PBBFAC,,, | Performed by: NURSE PRACTITIONER

## 2023-03-27 PROCEDURE — 87077 CULTURE AEROBIC IDENTIFY: CPT | Performed by: NURSE PRACTITIONER

## 2023-03-27 PROCEDURE — 3078F PR MOST RECENT DIASTOLIC BLOOD PRESSURE < 80 MM HG: ICD-10-PCS | Mod: CPTII,S$GLB,, | Performed by: NURSE PRACTITIONER

## 2023-03-27 PROCEDURE — 1160F RVW MEDS BY RX/DR IN RCRD: CPT | Mod: CPTII,S$GLB,, | Performed by: NURSE PRACTITIONER

## 2023-03-27 PROCEDURE — 3078F DIAST BP <80 MM HG: CPT | Mod: CPTII,S$GLB,, | Performed by: NURSE PRACTITIONER

## 2023-03-27 PROCEDURE — 87088 URINE BACTERIA CULTURE: CPT | Performed by: NURSE PRACTITIONER

## 2023-03-27 PROCEDURE — 1159F MED LIST DOCD IN RCRD: CPT | Mod: CPTII,S$GLB,, | Performed by: NURSE PRACTITIONER

## 2023-03-27 PROCEDURE — 3074F SYST BP LT 130 MM HG: CPT | Mod: CPTII,S$GLB,, | Performed by: NURSE PRACTITIONER

## 2023-03-27 PROCEDURE — 87086 URINE CULTURE/COLONY COUNT: CPT | Performed by: NURSE PRACTITIONER

## 2023-03-27 PROCEDURE — 1159F PR MEDICATION LIST DOCUMENTED IN MEDICAL RECORD: ICD-10-PCS | Mod: CPTII,S$GLB,, | Performed by: NURSE PRACTITIONER

## 2023-03-27 PROCEDURE — 99999 PR PBB SHADOW E&M-EST. PATIENT-LVL IV: ICD-10-PCS | Mod: PBBFAC,,, | Performed by: NURSE PRACTITIONER

## 2023-03-27 RX ORDER — NITROFURANTOIN 25; 75 MG/1; MG/1
100 CAPSULE ORAL 2 TIMES DAILY
Qty: 10 CAPSULE | Refills: 0 | Status: SHIPPED | OUTPATIENT
Start: 2023-03-27 | End: 2023-11-20

## 2023-03-27 RX ORDER — NITROFURANTOIN 25; 75 MG/1; MG/1
100 CAPSULE ORAL 2 TIMES DAILY
Qty: 10 CAPSULE | Refills: 0 | Status: SHIPPED | OUTPATIENT
Start: 2023-03-27 | End: 2023-03-27

## 2023-03-27 NOTE — PROGRESS NOTES
"[facility] Note    Chief Complaint      Chief Complaint   Patient presents with    Urinary Tract Infection       History of Present Illness      Jered Contreras is a 47 y.o. male who presents today for   Chief Complaint   Patient presents with    Urinary Tract Infection         Patient reports stabbing "hot knife type" pain to groin when attempting to void. He denies any blood in urine. He denies any drainage from penis. He denies any SOB, chest pain, N/V, unintentional weight loss, loss of appetite, fatigue, diarrhea, constipation. He is active daily and remains independent with ADL's.     Obesity:  I discussed diet of low calorie, low fat, low cholesterol, low carbohydrate, no fast foods diet. I have advised walking for 30 minutes for 2 days a week for 2 weeks and increasing a 30 minute day every 2 weeks. Patient verbalizes understanding.        Review of Systems   Constitutional: Negative.    HENT: Negative.     Eyes: Negative.    Cardiovascular: Negative.    Gastrointestinal: Negative.    Genitourinary:  Positive for dysuria and urgency.   Musculoskeletal: Negative.    Skin: Negative.    Neurological: Negative.    Endo/Heme/Allergies: Negative.    Psychiatric/Behavioral: Negative.     All 12 systems otherwise negative.     Family History:  family history includes Allergies in his maternal grandmother and mother; Alzheimer's disease in his paternal grandmother; Brain cancer in his maternal grandmother; Cataracts in his mother; Cerebral aneurysm in his paternal grandfather; Dementia in his maternal grandmother and mother; Diabetes in his father and paternal grandmother; Glaucoma in his mother; Hypertension in his father, maternal grandmother, and paternal grandfather; Lung cancer in his mother; No Known Problems in his maternal aunt, maternal uncle, paternal aunt, paternal uncle, and another family member; Obesity in his paternal grandfather; Rheum arthritis in his maternal grandfather " and mother.     Medications:  Outpatient Encounter Medications as of 3/27/2023   Medication Sig Note Dispense Refill    ASCORBATE CALCIUM (VITAMIN C ORAL) Take by mouth.       azelastine (ASTELIN) 137 mcg (0.1 %) nasal spray 2 sprays (274 mcg total) by Nasal route 2 (two) times daily as needed (Nasal congestion).  30 mL 5    b complex vitamins (B COMPLEX-VITAMIN B12) tablet Take 1 tablet by mouth once daily.       buPROPion (WELLBUTRIN XL) 300 MG 24 hr tablet Take 1 tablet (300 mg total) by mouth once daily.  30 tablet 2    DULoxetine (CYMBALTA) 30 MG capsule Take 1 capsule (30 mg total) by mouth once daily.  30 capsule 3    DULoxetine (CYMBALTA) 60 MG capsule TAKE 1 CAPSULE(60 MG) BY MOUTH EVERY DAY  30 capsule 3    fluticasone propionate (FLONASE) 50 mcg/actuation nasal spray One spray in each nostril twice daily after 1st using azelastine nasal spray 8/30/2022: PRN 18.2 mL 11    hydrOXYzine HCL (ATARAX) 25 MG tablet TK 1 T PO  TID PRN ANXIETY  30 tablet 6    ipratropium (ATROVENT) 21 mcg (0.03 %) nasal spray 2 sprays by Nasal route 3 (three) times daily as needed for Rhinitis (runing nose).  30 mL 5    lamoTRIgine (LAMICTAL) 100 MG tablet Take 1 tablet (100 mg total) by mouth once daily.  90 tablet 3    montelukast (SINGULAIR) 10 mg tablet Take 1 tablet (10 mg total) by mouth every evening. Take during allergy season.  90 tablet 3    multivitamin capsule Take 1 capsule by mouth once daily.       rOPINIRole (REQUIP) 0.5 MG tablet Take 2 tablets (1 mg total) by mouth every evening. 8/30/2022: PRN 30 tablet 6    urea (CARMOL) 40 % Crea Apply topically.       nitrofurantoin, macrocrystal-monohydrate, (MACROBID) 100 MG capsule Take 1 capsule (100 mg total) by mouth 2 (two) times daily.  10 capsule 0    [DISCONTINUED] bumetanide (BUMEX) 0.5 MG Tab Take 1 tablet (0.5 mg total) by mouth 2 (two) times daily. (Patient not taking: Reported on 3/27/2023)  180 tablet 3    [DISCONTINUED] HYDROcodone-acetaminophen (NORCO) 5-325  "mg per tablet Take 1 tablet by mouth every 6 (six) hours as needed for Pain. (Patient not taking: Reported on 3/27/2023)  30 tablet 0    [DISCONTINUED] nitrofurantoin, macrocrystal-monohydrate, (MACROBID) 100 MG capsule Take 1 capsule (100 mg total) by mouth 2 (two) times daily.  10 capsule 0    [DISCONTINUED] potassium chloride SA (K-DUR,KLOR-CON M) 10 MEQ tablet Take 1 tablet (10 mEq total) by mouth once daily. (Patient not taking: Reported on 3/27/2023)  30 tablet 11     No facility-administered encounter medications on file as of 3/27/2023.       Allergies:  Review of patient's allergies indicates:   Allergen Reactions    Ceclor [cefaclor] Anaphylaxis    Penicillins Hives     Elevated blood pressure, temp, hives    Calcium alginate Other (See Comments)     Headache       Health Maintenance:  Health Maintenance   Topic Date Due    TETANUS VACCINE  03/13/2027    Lipid Panel  04/11/2027    Hepatitis C Screening  Completed     Health Maintenance Topics with due status: Not Due       Topic Last Completion Date    TETANUS VACCINE 03/13/2017    Hemoglobin A1c (Diabetic Prevention Screening) 10/29/2021    Lipid Panel 04/11/2022       Physical Exam      Vital Signs  Pulse: 90  SpO2: 96 %  BP: 122/68  BP Location: Right arm  Patient Position: Sitting  Pain Score:   4  Pain Loc: Generalized  Height and Weight  Height: 6' 1" (185.4 cm)  Weight: (!) 170.3 kg (375 lb 7.1 oz)  BSA (Calculated - sq m): 2.96 sq meters  BMI (Calculated): 49.5  Weight in (lb) to have BMI = 25: 189.1]    Physical Exam  Vitals reviewed.   Constitutional:       Appearance: Normal appearance. He is obese.   HENT:      Head: Normocephalic and atraumatic.      Mouth/Throat:      Mouth: Mucous membranes are moist.      Pharynx: Oropharynx is clear.   Eyes:      Extraocular Movements: Extraocular movements intact.      Conjunctiva/sclera: Conjunctivae normal.      Pupils: Pupils are equal, round, and reactive to light.   Cardiovascular:      Rate and " Rhythm: Normal rate and regular rhythm.      Pulses: Normal pulses.      Heart sounds: Normal heart sounds.   Pulmonary:      Effort: Pulmonary effort is normal.      Breath sounds: Normal breath sounds.   Musculoskeletal:         General: Normal range of motion.      Cervical back: Normal range of motion and neck supple.   Skin:     General: Skin is warm and dry.      Capillary Refill: Capillary refill takes less than 2 seconds.   Neurological:      General: No focal deficit present.      Mental Status: He is alert and oriented to person, place, and time. Mental status is at baseline.   Psychiatric:         Mood and Affect: Mood normal.         Behavior: Behavior normal.         Thought Content: Thought content normal.         Judgment: Judgment normal.          Assessment/Plan     Jered Contreras is a 47 y.o.male with:    Dysuria  -     Urinalysis, Reflex to Urine Culture Urine, Clean Catch  -     PSA, Screening; Future; Expected date: 03/27/2023  -     Discontinue: nitrofurantoin, macrocrystal-monohydrate, (MACROBID) 100 MG capsule; Take 1 capsule (100 mg total) by mouth 2 (two) times daily.  Dispense: 10 capsule; Refill: 0  -     nitrofurantoin, macrocrystal-monohydrate, (MACROBID) 100 MG capsule; Take 1 capsule (100 mg total) by mouth 2 (two) times daily.  Dispense: 10 capsule; Refill: 0        As above, continue current medications and maintain follow up with specialists.  Return to clinic as needed.    I spent 20 minutes on the day of this encounter for preparing, evaluating, examining, treating, and discussing plan of care with this patient.  Greater than 50% of this time was spent face to face with patient.  All questions were answered to patient's satisfaction.           Karen L Spencer, NP-C [facility]

## 2023-03-28 RX ORDER — CIPROFLOXACIN 500 MG/1
500 TABLET ORAL EVERY 12 HOURS
Qty: 56 TABLET | Refills: 0 | Status: SHIPPED | OUTPATIENT
Start: 2023-03-28 | End: 2023-04-25

## 2023-03-29 LAB — BACTERIA UR CULT: ABNORMAL

## 2023-04-05 ENCOUNTER — PATIENT MESSAGE (OUTPATIENT)
Dept: PODIATRY | Facility: CLINIC | Age: 48
End: 2023-04-05
Payer: COMMERCIAL

## 2023-04-06 DIAGNOSIS — M72.2 PLANTAR FASCIITIS: ICD-10-CM

## 2023-04-06 DIAGNOSIS — M79.672 LEFT FOOT PAIN: Primary | ICD-10-CM

## 2023-04-06 RX ORDER — HYDROCODONE BITARTRATE AND ACETAMINOPHEN 10; 325 MG/1; MG/1
1 TABLET ORAL EVERY 8 HOURS PRN
Qty: 21 TABLET | Refills: 0 | Status: SHIPPED | OUTPATIENT
Start: 2023-04-06 | End: 2023-04-21 | Stop reason: SDUPTHER

## 2023-04-10 ENCOUNTER — TELEPHONE (OUTPATIENT)
Dept: CARDIOLOGY | Facility: CLINIC | Age: 48
End: 2023-04-10
Payer: COMMERCIAL

## 2023-04-21 ENCOUNTER — PATIENT MESSAGE (OUTPATIENT)
Dept: ADMINISTRATIVE | Facility: HOSPITAL | Age: 48
End: 2023-04-21
Payer: COMMERCIAL

## 2023-04-21 ENCOUNTER — PATIENT MESSAGE (OUTPATIENT)
Dept: PODIATRY | Facility: CLINIC | Age: 48
End: 2023-04-21
Payer: COMMERCIAL

## 2023-05-05 ENCOUNTER — DOCUMENTATION ONLY (OUTPATIENT)
Dept: REHABILITATION | Facility: HOSPITAL | Age: 48
End: 2023-05-05

## 2023-05-14 ENCOUNTER — PATIENT MESSAGE (OUTPATIENT)
Dept: PODIATRY | Facility: CLINIC | Age: 48
End: 2023-05-14
Payer: COMMERCIAL

## 2023-05-15 DIAGNOSIS — M79.672 LEFT FOOT PAIN: ICD-10-CM

## 2023-05-15 DIAGNOSIS — M72.2 PLANTAR FASCIITIS: Primary | ICD-10-CM

## 2023-05-15 DIAGNOSIS — M79.672 PAIN OF LEFT HEEL: ICD-10-CM

## 2023-05-15 RX ORDER — HYDROCODONE BITARTRATE AND ACETAMINOPHEN 10; 325 MG/1; MG/1
1 TABLET ORAL EVERY 8 HOURS PRN
Qty: 20 TABLET | Refills: 0 | Status: SHIPPED | OUTPATIENT
Start: 2023-05-15 | End: 2023-05-22

## 2023-06-07 ENCOUNTER — PATIENT MESSAGE (OUTPATIENT)
Dept: PODIATRY | Facility: CLINIC | Age: 48
End: 2023-06-07
Payer: COMMERCIAL

## 2023-06-08 DIAGNOSIS — M79.672 PAIN OF LEFT HEEL: ICD-10-CM

## 2023-06-08 DIAGNOSIS — M72.2 PLANTAR FASCIITIS: Primary | ICD-10-CM

## 2023-06-08 RX ORDER — HYDROCODONE BITARTRATE AND ACETAMINOPHEN 10; 325 MG/1; MG/1
1 TABLET ORAL EVERY 8 HOURS PRN
Qty: 20 TABLET | Refills: 0 | Status: SHIPPED | OUTPATIENT
Start: 2023-06-08 | End: 2023-06-15

## 2023-07-06 NOTE — PROGRESS NOTES
Answers for HPI/ROS submitted by the patient on 1/29/2022  None of these: Yes  None of these : Yes  Snoring?: Yes  Sleep Apnea?: Yes  None of these : Yes  None of these: Yes  None of these: Yes  None of these: Yes  None of these : Yes  Seasonal Allergies?: Yes  None of these : Yes  None of these: Yes  None of these: Yes  None of these: Yes       Skin normal color for race, warm, dry and intact. No evidence of rash.

## 2023-07-10 ENCOUNTER — LAB VISIT (OUTPATIENT)
Dept: LAB | Facility: HOSPITAL | Age: 48
End: 2023-07-10
Payer: COMMERCIAL

## 2023-07-10 ENCOUNTER — OFFICE VISIT (OUTPATIENT)
Dept: UROLOGY | Facility: CLINIC | Age: 48
End: 2023-07-10
Payer: COMMERCIAL

## 2023-07-10 ENCOUNTER — PATIENT MESSAGE (OUTPATIENT)
Dept: PODIATRY | Facility: CLINIC | Age: 48
End: 2023-07-10
Payer: COMMERCIAL

## 2023-07-10 VITALS
BODY MASS INDEX: 42.66 KG/M2 | HEIGHT: 72 IN | WEIGHT: 315 LBS | HEART RATE: 89 BPM | DIASTOLIC BLOOD PRESSURE: 82 MMHG | SYSTOLIC BLOOD PRESSURE: 131 MMHG

## 2023-07-10 DIAGNOSIS — N41.9 PROSTATITIS, UNSPECIFIED PROSTATITIS TYPE: ICD-10-CM

## 2023-07-10 DIAGNOSIS — R97.20 ELEVATED PSA: ICD-10-CM

## 2023-07-10 LAB
BILIRUB SERPL-MCNC: NORMAL MG/DL
BLOOD URINE, POC: NORMAL
CLARITY, POC UA: CLEAR
COLOR, POC UA: YELLOW
COMPLEXED PSA SERPL-MCNC: 0.37 NG/ML (ref 0–4)
GLUCOSE UR QL STRIP: NORMAL
KETONES UR QL STRIP: NORMAL
LEUKOCYTE ESTERASE URINE, POC: NORMAL
NITRITE, POC UA: NORMAL
PH, POC UA: 5
PROTEIN, POC: NORMAL
SPECIFIC GRAVITY, POC UA: 1010
UROBILINOGEN, POC UA: NORMAL

## 2023-07-10 PROCEDURE — 99204 OFFICE O/P NEW MOD 45 MIN: CPT | Mod: S$GLB,,, | Performed by: NURSE PRACTITIONER

## 2023-07-10 PROCEDURE — 1160F PR REVIEW ALL MEDS BY PRESCRIBER/CLIN PHARMACIST DOCUMENTED: ICD-10-PCS | Mod: CPTII,S$GLB,, | Performed by: NURSE PRACTITIONER

## 2023-07-10 PROCEDURE — 99204 PR OFFICE/OUTPT VISIT, NEW, LEVL IV, 45-59 MIN: ICD-10-PCS | Mod: S$GLB,,, | Performed by: NURSE PRACTITIONER

## 2023-07-10 PROCEDURE — 3008F PR BODY MASS INDEX (BMI) DOCUMENTED: ICD-10-PCS | Mod: CPTII,S$GLB,, | Performed by: NURSE PRACTITIONER

## 2023-07-10 PROCEDURE — 3079F PR MOST RECENT DIASTOLIC BLOOD PRESSURE 80-89 MM HG: ICD-10-PCS | Mod: CPTII,S$GLB,, | Performed by: NURSE PRACTITIONER

## 2023-07-10 PROCEDURE — 84153 ASSAY OF PSA TOTAL: CPT | Performed by: NURSE PRACTITIONER

## 2023-07-10 PROCEDURE — 1160F RVW MEDS BY RX/DR IN RCRD: CPT | Mod: CPTII,S$GLB,, | Performed by: NURSE PRACTITIONER

## 2023-07-10 PROCEDURE — 3008F BODY MASS INDEX DOCD: CPT | Mod: CPTII,S$GLB,, | Performed by: NURSE PRACTITIONER

## 2023-07-10 PROCEDURE — 81002 POCT URINE DIPSTICK WITHOUT MICROSCOPE: ICD-10-PCS | Mod: S$GLB,,, | Performed by: NURSE PRACTITIONER

## 2023-07-10 PROCEDURE — 1159F PR MEDICATION LIST DOCUMENTED IN MEDICAL RECORD: ICD-10-PCS | Mod: CPTII,S$GLB,, | Performed by: NURSE PRACTITIONER

## 2023-07-10 PROCEDURE — 3075F SYST BP GE 130 - 139MM HG: CPT | Mod: CPTII,S$GLB,, | Performed by: NURSE PRACTITIONER

## 2023-07-10 PROCEDURE — 99999 PR PBB SHADOW E&M-EST. PATIENT-LVL IV: CPT | Mod: PBBFAC,,, | Performed by: NURSE PRACTITIONER

## 2023-07-10 PROCEDURE — 81002 URINALYSIS NONAUTO W/O SCOPE: CPT | Mod: S$GLB,,, | Performed by: NURSE PRACTITIONER

## 2023-07-10 PROCEDURE — 3075F PR MOST RECENT SYSTOLIC BLOOD PRESS GE 130-139MM HG: ICD-10-PCS | Mod: CPTII,S$GLB,, | Performed by: NURSE PRACTITIONER

## 2023-07-10 PROCEDURE — 3079F DIAST BP 80-89 MM HG: CPT | Mod: CPTII,S$GLB,, | Performed by: NURSE PRACTITIONER

## 2023-07-10 PROCEDURE — 99999 PR PBB SHADOW E&M-EST. PATIENT-LVL IV: ICD-10-PCS | Mod: PBBFAC,,, | Performed by: NURSE PRACTITIONER

## 2023-07-10 PROCEDURE — 1159F MED LIST DOCD IN RCRD: CPT | Mod: CPTII,S$GLB,, | Performed by: NURSE PRACTITIONER

## 2023-07-10 PROCEDURE — 36415 COLL VENOUS BLD VENIPUNCTURE: CPT | Performed by: NURSE PRACTITIONER

## 2023-07-10 NOTE — PROGRESS NOTES
CHIEF COMPLAINT:    Mr. Contreras is a 47 y.o. male presenting for elevated PSA.      PRESENTING ILLNESS:    Jered Contreras is a 47 y.o. male with a PMH of allergic rhinitis, obesity, MDD,  who presents for elevated PSA    New patient to urology department. Presents today as referral secondary elevated PSA.  Was seen due to dysuria and found to have urinary tract infection.  PSA level drawn during infection, which can falsely elevate.  No family history of prostate cancer.  Will plan to repeat PSA today.    He reports a good urinary stream and complete bladder emptying.  Does have urinary frequency and urgency. Drinks plenty of sodas and water.  Also having nocturia 2-3 times nightly.  Has history of marycarmen, and needs new cpap machine.    Urine cultures: e coli 3/27/23      REVIEW OF SYSTEMS:    Review of Systems   Constitutional:  Negative for chills and fever.   Respiratory:  Negative for shortness of breath.    Cardiovascular:  Negative for chest pain.   Gastrointestinal:  Negative for constipation and diarrhea.   Genitourinary:  Positive for frequency and urgency. Negative for dysuria, flank pain and hematuria.   Neurological:  Negative for dizziness and weakness.     PATIENT HISTORY:    Past Medical History:   Diagnosis Date    Allergic rhinitis     Asthma     Corneal abrasion     Depression      of psychiatric care     Morbid obesity     Obstructive sleep apnea treated with continuous positive airway pressure (CPAP) 07/10/2018    Psychiatric problem     Therapy        Family History   Problem Relation Age of Onset    Allergies Mother     Rheum arthritis Mother     Lung cancer Mother         post lobectomy    Dementia Mother     Glaucoma Mother     Cataracts Mother     Diabetes Father     Hypertension Father     No Known Problems Maternal Aunt     No Known Problems Maternal Uncle     No Known Problems Paternal Aunt     No Known Problems Paternal Uncle     Allergies Maternal Grandmother      Hypertension Maternal Grandmother     Dementia Maternal Grandmother     Brain cancer Maternal Grandmother         in remission before she     Rheum arthritis Maternal Grandfather     Diabetes Paternal Grandmother     Alzheimer's disease Paternal Grandmother     Hypertension Paternal Grandfather     Obesity Paternal Grandfather     Cerebral aneurysm Paternal Grandfather     No Known Problems Other     Melanoma Neg Hx        Allergies:  Ceclor [cefaclor], Penicillins, and Calcium alginate    Medications:    Current Outpatient Medications:     ASCORBATE CALCIUM (VITAMIN C ORAL), Take by mouth., Disp: , Rfl:     azelastine (ASTELIN) 137 mcg (0.1 %) nasal spray, 2 sprays (274 mcg total) by Nasal route 2 (two) times daily as needed (Nasal congestion)., Disp: 30 mL, Rfl: 5    b complex vitamins (B COMPLEX-VITAMIN B12) tablet, Take 1 tablet by mouth once daily., Disp: , Rfl:     buPROPion (WELLBUTRIN XL) 300 MG 24 hr tablet, Take 1 tablet (300 mg total) by mouth once daily., Disp: 30 tablet, Rfl: 2    DULoxetine (CYMBALTA) 60 MG capsule, TAKE 1 CAPSULE(60 MG) BY MOUTH EVERY DAY, Disp: 30 capsule, Rfl: 3    fluticasone propionate (FLONASE) 50 mcg/actuation nasal spray, One spray in each nostril twice daily after 1st using azelastine nasal spray, Disp: 18.2 mL, Rfl: 11    hydrOXYzine HCL (ATARAX) 25 MG tablet, TK 1 T PO  TID PRN ANXIETY, Disp: 30 tablet, Rfl: 6    ipratropium (ATROVENT) 21 mcg (0.03 %) nasal spray, 2 sprays by Nasal route 3 (three) times daily as needed for Rhinitis (runing nose)., Disp: 30 mL, Rfl: 5    lamoTRIgine (LAMICTAL) 100 MG tablet, Take 1 tablet (100 mg total) by mouth once daily., Disp: 90 tablet, Rfl: 3    montelukast (SINGULAIR) 10 mg tablet, Take 1 tablet (10 mg total) by mouth every evening. Take during allergy season., Disp: 90 tablet, Rfl: 3    multivitamin capsule, Take 1 capsule by mouth once daily., Disp: , Rfl:     nitrofurantoin, macrocrystal-monohydrate, (MACROBID) 100 MG capsule,  Take 1 capsule (100 mg total) by mouth 2 (two) times daily., Disp: 10 capsule, Rfl: 0    rOPINIRole (REQUIP) 0.5 MG tablet, Take 2 tablets (1 mg total) by mouth every evening., Disp: 30 tablet, Rfl: 6    urea (CARMOL) 40 % Crea, Apply topically., Disp: , Rfl:     PHYSICAL EXAMINATION:    Physical Exam  Vitals and nursing note reviewed.   Constitutional:       Appearance: Normal appearance. He is well-developed. He is obese.   HENT:      Head: Normocephalic and atraumatic.   Eyes:      Pupils: Pupils are equal, round, and reactive to light.   Pulmonary:      Effort: Pulmonary effort is normal.   Genitourinary:     Penis: Normal.       Testes: Normal.         Right: Mass, tenderness or swelling not present.         Left: Mass, tenderness or swelling not present.      Prostate: Not enlarged and not tender.      Rectum: Normal.      Comments: Prostate smooth, no nodules felt  Musculoskeletal:         General: Normal range of motion.   Skin:     General: Skin is warm and dry.   Neurological:      Mental Status: He is alert and oriented to person, place, and time.   Psychiatric:         Behavior: Behavior normal.         LABS:    U/a performed in office today: yellow, ph 5, 1.010, otherwise unremarkable    Lab Results   Component Value Date    PSA 6.1 (H) 03/27/2023       IMPRESSION:  Encounter Diagnoses   Name Primary?    Elevated PSA     Prostatitis, unspecified prostatitis type          PLAN:  Problem List Items Addressed This Visit    None  Visit Diagnoses       Elevated PSA        Prostatitis, unspecified prostatitis type                1. Elevated PSA   - ROBB wnl   - repeat psa today   - psa elevated likely due to previous infection  2. Urinary frequency/urgency/nocturia   Behavior modifications   -Empty bladder before bed  - limit fluids 3-4 hours before bed.  Drink just enough to take p.m. meds if taken within 3-4 hours prior to bedtime  -discussed bladder irritants and their avoidance.    3. RTC based on lab.       Madeleine Maher NP    I spent over 45 minutes with the patient. Over 50% of the visit was spent in counseling.     Home

## 2023-07-12 ENCOUNTER — PATIENT MESSAGE (OUTPATIENT)
Dept: INTERNAL MEDICINE | Facility: CLINIC | Age: 48
End: 2023-07-12
Payer: COMMERCIAL

## 2023-07-12 DIAGNOSIS — M72.2 PLANTAR FASCIITIS: ICD-10-CM

## 2023-07-12 DIAGNOSIS — M79.672 PAIN OF LEFT HEEL: ICD-10-CM

## 2023-07-12 RX ORDER — HYDROCODONE BITARTRATE AND ACETAMINOPHEN 10; 325 MG/1; MG/1
1 TABLET ORAL EVERY 8 HOURS PRN
Qty: 20 TABLET | Refills: 0 | Status: CANCELLED | OUTPATIENT
Start: 2023-07-12 | End: 2023-07-19

## 2023-07-12 NOTE — TELEPHONE ENCOUNTER
Pt asking if you can take over prescribing norco.  Pt podiatrist was the one who gave it to pt but pt was informed that the podiatrist is unable to prescribe it anymore.

## 2023-07-28 ENCOUNTER — OFFICE VISIT (OUTPATIENT)
Dept: PAIN MEDICINE | Facility: CLINIC | Age: 48
End: 2023-07-28
Payer: COMMERCIAL

## 2023-07-28 VITALS
WEIGHT: 315 LBS | BODY MASS INDEX: 42.66 KG/M2 | RESPIRATION RATE: 18 BRPM | HEART RATE: 79 BPM | SYSTOLIC BLOOD PRESSURE: 141 MMHG | HEIGHT: 72 IN | OXYGEN SATURATION: 100 % | DIASTOLIC BLOOD PRESSURE: 86 MMHG

## 2023-07-28 DIAGNOSIS — M79.672 PAIN OF LEFT HEEL: ICD-10-CM

## 2023-07-28 DIAGNOSIS — M79.10 MYALGIA: ICD-10-CM

## 2023-07-28 DIAGNOSIS — M72.2 PLANTAR FASCIITIS: Primary | ICD-10-CM

## 2023-07-28 PROCEDURE — 3079F PR MOST RECENT DIASTOLIC BLOOD PRESSURE 80-89 MM HG: ICD-10-PCS | Mod: CPTII,S$GLB,, | Performed by: ANESTHESIOLOGY

## 2023-07-28 PROCEDURE — 1159F MED LIST DOCD IN RCRD: CPT | Mod: CPTII,S$GLB,, | Performed by: ANESTHESIOLOGY

## 2023-07-28 PROCEDURE — 99204 PR OFFICE/OUTPT VISIT, NEW, LEVL IV, 45-59 MIN: ICD-10-PCS | Mod: S$GLB,,, | Performed by: ANESTHESIOLOGY

## 2023-07-28 PROCEDURE — 3008F PR BODY MASS INDEX (BMI) DOCUMENTED: ICD-10-PCS | Mod: CPTII,S$GLB,, | Performed by: ANESTHESIOLOGY

## 2023-07-28 PROCEDURE — 3077F PR MOST RECENT SYSTOLIC BLOOD PRESSURE >= 140 MM HG: ICD-10-PCS | Mod: CPTII,S$GLB,, | Performed by: ANESTHESIOLOGY

## 2023-07-28 PROCEDURE — 1159F PR MEDICATION LIST DOCUMENTED IN MEDICAL RECORD: ICD-10-PCS | Mod: CPTII,S$GLB,, | Performed by: ANESTHESIOLOGY

## 2023-07-28 PROCEDURE — 3008F BODY MASS INDEX DOCD: CPT | Mod: CPTII,S$GLB,, | Performed by: ANESTHESIOLOGY

## 2023-07-28 PROCEDURE — 3077F SYST BP >= 140 MM HG: CPT | Mod: CPTII,S$GLB,, | Performed by: ANESTHESIOLOGY

## 2023-07-28 PROCEDURE — 99204 OFFICE O/P NEW MOD 45 MIN: CPT | Mod: S$GLB,,, | Performed by: ANESTHESIOLOGY

## 2023-07-28 PROCEDURE — 99999 PR PBB SHADOW E&M-EST. PATIENT-LVL IV: CPT | Mod: PBBFAC,,, | Performed by: ANESTHESIOLOGY

## 2023-07-28 PROCEDURE — 99999 PR PBB SHADOW E&M-EST. PATIENT-LVL IV: ICD-10-PCS | Mod: PBBFAC,,, | Performed by: ANESTHESIOLOGY

## 2023-07-28 PROCEDURE — 3079F DIAST BP 80-89 MM HG: CPT | Mod: CPTII,S$GLB,, | Performed by: ANESTHESIOLOGY

## 2023-07-28 RX ORDER — CYCLOBENZAPRINE HCL 5 MG
5 TABLET ORAL NIGHTLY
Qty: 30 TABLET | Refills: 0 | Status: SHIPPED | OUTPATIENT
Start: 2023-07-28

## 2023-07-28 NOTE — PROGRESS NOTES
PCP: Angela Dacosta MD    REFERRING PHYSICIAN: Dayday Armstrong    CHIEF COMPLAINT: Foot pain    Original HISTORY OF PRESENT ILLNESS: Jered Contreras presents to the clinic for the evaluation of the above pain. The pain started 2 years ago after no particular event. He blames it on his weight.     Original Pain Description:  The pain is located in the L>R plantar fascia and does not radiate. The pain is described as stabbing. Exacerbating factors: Walking, rain. Mitigating factors rest. Symptoms interfere with daily activity and work. The patient feels like symptoms have been unchanged.     Original PAIN SCORES:  Best: Pain is 2  Worst: Pain is 9  Current: Pain is 3    INTERVAL HISTORY:     6 weeks of Conservative therapy:  PT: None (Must include dates)  Chiro: No  HEP: HEP prescribed by podiatry which he does every morning       Treatments / Medications: (Ice/Heat/NSAIDS/APAP/etc):  Ice  Heat  Ibuprofen  Aleve  Biofreeze      Interventional Pain Procedures: (Previous injections)  None    Past Medical History:   Diagnosis Date    Allergic rhinitis     Asthma     Corneal abrasion     Depression      of psychiatric care     Morbid obesity     Obstructive sleep apnea treated with continuous positive airway pressure (CPAP) 07/10/2018    Psychiatric problem     Therapy      Past Surgical History:   Procedure Laterality Date    APPENDECTOMY      APPLICATION OF CARTILAGE GRAFT Bilateral 12/6/2021    Procedure: APPLICATION, CARTILAGE GRAFT;  Surgeon: JAN Arredondo MD;  Location: [facility];  Service: ENT;  Laterality: Bilateral;  from right ear    gastric sleeve  09/2019    [facility]    NASAL SEPTOPLASTY Bilateral 12/6/2021    Procedure: SEPTOPLASTY, NOSE;  Surgeon: JAN Arredondo MD;  Location: [facility];  Service: ENT;  Laterality: Bilateral;    NASAL STENOSIS REPAIR Bilateral 12/6/2021    Procedure: REPAIR, STENOSIS, NOSE, VESTIBULE;  Surgeon: JAN Arredondo MD;   Location: [facility] OR;  Service: ENT;  Laterality: Bilateral;    SINUS SURGERY      VASECTOMY  2017     Social History     Socioeconomic History    Marital status:    Occupational History    Occupation: Information Tech     Employer: [employer]   Tobacco Use    Smoking status: Never     Passive exposure: Never    Smokeless tobacco: Never   Substance and Sexual Activity    Alcohol use: Yes     Alcohol/week: 1.0 standard drink     Types: 1 Cans of beer per week     Comment: RARELY    Drug use: No    Sexual activity: Yes     Partners: Female     Family History   Problem Relation Age of Onset    Allergies Mother     Rheum arthritis Mother     Lung cancer Mother         post lobectomy    Dementia Mother     Glaucoma Mother     Cataracts Mother     Diabetes Father     Hypertension Father     No Known Problems Maternal Aunt     No Known Problems Maternal Uncle     No Known Problems Paternal Aunt     No Known Problems Paternal Uncle     Allergies Maternal Grandmother     Hypertension Maternal Grandmother     Dementia Maternal Grandmother     Brain cancer Maternal Grandmother         in remission before she     Rheum arthritis Maternal Grandfather     Diabetes Paternal Grandmother     Alzheimer's disease Paternal Grandmother     Hypertension Paternal Grandfather     Obesity Paternal Grandfather     Cerebral aneurysm Paternal Grandfather     No Known Problems Other     Melanoma Neg Hx        Review of patient's allergies indicates:   Allergen Reactions    Ceclor [cefaclor] Anaphylaxis    Penicillins Hives     Elevated blood pressure, temp, hives    Calcium alginate Other (See Comments)     Headache       Current Outpatient Medications   Medication Sig    ASCORBATE CALCIUM (VITAMIN C ORAL) Take by mouth.    azelastine (ASTELIN) 137 mcg (0.1 %) nasal spray 2 sprays (274 mcg total) by Nasal route 2 (two) times daily as needed (Nasal congestion).    b complex vitamins (B COMPLEX-VITAMIN B12) tablet Take 1 tablet by  mouth once daily.    buPROPion (WELLBUTRIN XL) 300 MG 24 hr tablet Take 1 tablet (300 mg total) by mouth once daily.    DULoxetine (CYMBALTA) 60 MG capsule TAKE 1 CAPSULE(60 MG) BY MOUTH EVERY DAY    fluticasone propionate (FLONASE) 50 mcg/actuation nasal spray One spray in each nostril twice daily after 1st using azelastine nasal spray    hydrOXYzine HCL (ATARAX) 25 MG tablet TK 1 T PO  TID PRN ANXIETY    ipratropium (ATROVENT) 21 mcg (0.03 %) nasal spray 2 sprays by Nasal route 3 (three) times daily as needed for Rhinitis (runing nose).    montelukast (SINGULAIR) 10 mg tablet Take 1 tablet (10 mg total) by mouth every evening. Take during allergy season.    multivitamin capsule Take 1 capsule by mouth once daily.    nitrofurantoin, macrocrystal-monohydrate, (MACROBID) 100 MG capsule Take 1 capsule (100 mg total) by mouth 2 (two) times daily.    rOPINIRole (REQUIP) 0.5 MG tablet Take 2 tablets (1 mg total) by mouth every evening.    urea (CARMOL) 40 % Crea Apply topically.    cyclobenzaprine (FLEXERIL) 5 MG tablet Take 1 tablet (5 mg total) by mouth nightly.    lamoTRIgine (LAMICTAL) 100 MG tablet Take 1 tablet (100 mg total) by mouth once daily.     No current facility-administered medications for this visit.       ROS:  GENERAL: No fever. No chills. No fatigue. Denies weight loss. Denies weight gain.  HEENT: Denies headaches. Denies vision change. Denies eye pain. Denies double vision. Denies ear pain.   CV: Denies chest pain.   PULM: Denies of shortness of breath.  GI: Denies constipation. No diarrhea. No abdominal pain. Denies nausea. Denies vomiting. No blood in stool.  HEME: Denies bleeding problems.  : Denies urgency. No painful urination. No blood in urine.  MS: Denies joint stiffness. Denies joint swelling.  Denies back pain. +Foot pain.   SKIN: Denies rash.   NEURO: Denies seizures. No weakness.  PSYCH:  Denies difficulty sleeping. No anxiety. Denies depression. No suicidal thoughts.       VITALS:    Vitals:    07/28/23 1135   BP: (!) 141/86   Pulse: 79   Resp: 18   SpO2: 100%   Weight: (!) 163.5 kg (360 lb 7.2 oz)   Height: 6' (1.829 m)   PainSc:   4   PainLoc: Foot         PHYSICAL EXAM:   GENERAL: Well appearing, in no acute distress, alert and oriented x3.  PSYCH:  Mood and affect appropriate.  SKIN: Skin color, texture, turgor normal, no rashes or lesions.  HEENT:  Normocephalic, atraumatic. Cranial nerves grossly intact.  NECK: No pain to palpation over the cervical paraspinous muscles. No pain to palpation over facets. No pain with neck flexion, extension, or lateral flexion.   PULM: No evidence of respiratory difficulty, symmetric chest rise.  GI:  Non-distended  BACK: Normal range of motion. No pain to palpation over the spinous processes. No pain to palpation over facet joints. There is no pain with palpation over the sacroiliac joints bilaterally.   EXTREMITIES: No deformities, edema, or skin discoloration.   MUSCULOSKELETAL: Shoulder, hip, and knee provocative maneuvers are negative. No atrophy is noted.  NEURO: Sensation is equal and appropriate bilaterally. Bilateral upper and lower extremity strength is normal and symmetric. Bilateral upper and lower extremity coordination and muscle stretch reflexes are physiologic and symmetric. Plantar response are downgoing. Straight leg raising in the supine position is negative to radicular pain.   GAIT: normal.      LABS:      IMAGING:    XR FOOT COMPLETE 3 VIEW LEFT     CLINICAL HISTORY:  5th met head pain;.  Pain in left foot     TECHNIQUE:  AP, lateral and oblique views of the left foot were performed.     COMPARISON:  None     FINDINGS:  No acute fracture or dislocation seen.  Small plantar calcaneal spur.  No significant soft tissue edema or radiopaque retained foreign body.     Impression:     No acute osseous abnormality seen.        Electronically signed by: Ale Sanchez  Date:                                            10/24/2022  Time:                                            15:32    MRI of the LEFT ankle     CLINICAL HISTORY:  Foot pain evaluate for stress fracture     TECHNIQUE:  Multiplanar multisequence MRI examination of the LEFT ankle.  Postcontrast images after 10 cc Gadavist     COMPARISON:  Radiograph of 10/24/2022     FINDINGS:  **MEDIAL COMPARTMENT     Posterior tibial tendon: Intact.No tendinosis.No tenosynovitis.     Flexor digitorum longus tendon: Normal.     Superficial Deltoid: Intact.     Deep Deltoid: Intact.     Tibiospring/ SM Calcaneonavicular (Spring)/Inferoplantar Complex: Intact     **LATERAL COMPARTMENT     Peroneus longus: Intact.     Peroneus brevis: Intact.     Superior peroneal retinaculum: Intact     Ligaments:     Interosseous (syndesmosis): Intact.     Anterior inferior tibiofibular (syndesmosis): Intact.     Posterior inferior tibiofibular (syndesmosis): Intact.     Anterior talofibular ligament: Intact.     Calcaneofibular ligament: Intact.     Posterior talofibular ligament: Intact.     **POSTERIOR COMPARTMENT     Flexor hallucis longus: Normal.     Posterior talus: Normal.     Achilles tendon: Normal.     Plantar fascia: There is minimal edema of the soft tissues plantar to the plantar fascia.  There is  plantar fascial thickening measuring greater than 4 mm.. There is mild osseous edema at the calcaneal insertion of the plantar fascia and calcaneal spur formation. There is no rupture of the fascia. There is no evidence of plantar fibromatosis. Findings are nonspecific yet can be seen with with plantar fasciitis..Lateral cord of plantar fascia demonstrates normal appearance and insertion upon the base of the 5th MT.     **ANTERIOR COMPARTMENT     Tendons:     Anterior tibial tendon: Normal.  Insertion intact.     Extensor hallucis longus: Normal.     Extensor digitorum longus: Normal.     Ligaments:     Dorsal talonavicular ligament: Intact.     **ARTICULATIONS:     Tibiotalar joint: Normal.  No evidence for anterior  osseous spurs.     Subtalar joint: Normal.     Talonavicular joint: Normal.     Calcaneocuboid joint: Normal     Talus and calcaneus: Intact lateral process of talus and anterior process of calcaneus without fracture.     **GENERAL FINDINGS     Osseous Structures: No evidence of fracture     Muscles: Normal.     Nerves and tarsal tunnel: Normal.     Sinus tarsi: Normal.     Diffuse subcutaneous edema.     Impression:     Diffuse subcutaneous edema about the ankle.     Plantar fascial thickening, with minimal edema of calcaneal spur nonspecific finding, can be seen in plantar fasciitis in the appropriate setting        Electronically signed by: Quinten Kelly MD  Date:                                            03/13/2023  Time:                                           11:49    ASSESSMENT: 48 y.o. year old male with pain, consistent with:    Encounter Diagnoses   Name Primary?    Plantar fasciitis Yes    Pain of left heel     Myalgia        DISCUSSION: Mr. Contreras is a nice christine who comes to us mainly for foot pain. It seems to run in his family. He was seeing podiatry and was diagnosed with plantar fasciitis. Our exam was consistent with this as well. He gets a flare up of pain approximately monthly and a severe flare up approximately 1-2X/year. They had previously prescribed opioids for this, but he found he could manage most with Naproxen. He also has right rhomboid pain.       PLAN:  Replace [employer] Monarchs  Naproxen and Ice for mild flare ups  Voltaren QID with any increasing pain  B/L plantar fascia injections for a severe flare up  Flexeril 5mg QHS for rhomboid pain  May consider a short course of Hydrocodone for severe flare ups only if we cannot get him in for injection.   Follow up when pain increasing.     Diamond Hodge  07/28/2023

## 2023-08-01 ENCOUNTER — OFFICE VISIT (OUTPATIENT)
Dept: INTERNAL MEDICINE | Facility: CLINIC | Age: 48
End: 2023-08-01
Payer: COMMERCIAL

## 2023-08-01 VITALS
DIASTOLIC BLOOD PRESSURE: 80 MMHG | WEIGHT: 315 LBS | HEIGHT: 72 IN | TEMPERATURE: 98 F | HEART RATE: 59 BPM | SYSTOLIC BLOOD PRESSURE: 130 MMHG | BODY MASS INDEX: 42.66 KG/M2 | OXYGEN SATURATION: 99 %

## 2023-08-01 DIAGNOSIS — J02.9 SORE THROAT: ICD-10-CM

## 2023-08-01 DIAGNOSIS — B96.89 ACUTE BACTERIAL SINUSITIS: Primary | ICD-10-CM

## 2023-08-01 DIAGNOSIS — J01.90 ACUTE BACTERIAL SINUSITIS: Primary | ICD-10-CM

## 2023-08-01 LAB
CTP QC/QA: YES
CTP QC/QA: YES
MOLECULAR STREP A: NEGATIVE
SARS-COV-2 RDRP RESP QL NAA+PROBE: NEGATIVE

## 2023-08-01 PROCEDURE — 87651 STREP A DNA AMP PROBE: CPT | Mod: QW,S$GLB,, | Performed by: STUDENT IN AN ORGANIZED HEALTH CARE EDUCATION/TRAINING PROGRAM

## 2023-08-01 PROCEDURE — 99999 PR PBB SHADOW E&M-EST. PATIENT-LVL V: CPT | Mod: PBBFAC,,, | Performed by: STUDENT IN AN ORGANIZED HEALTH CARE EDUCATION/TRAINING PROGRAM

## 2023-08-01 PROCEDURE — 3008F PR BODY MASS INDEX (BMI) DOCUMENTED: ICD-10-PCS | Mod: CPTII,S$GLB,, | Performed by: STUDENT IN AN ORGANIZED HEALTH CARE EDUCATION/TRAINING PROGRAM

## 2023-08-01 PROCEDURE — 99213 OFFICE O/P EST LOW 20 MIN: CPT | Mod: S$GLB,,, | Performed by: STUDENT IN AN ORGANIZED HEALTH CARE EDUCATION/TRAINING PROGRAM

## 2023-08-01 PROCEDURE — 87651 POCT STREP A MOLECULAR: ICD-10-PCS | Mod: QW,S$GLB,, | Performed by: STUDENT IN AN ORGANIZED HEALTH CARE EDUCATION/TRAINING PROGRAM

## 2023-08-01 PROCEDURE — 87635: ICD-10-PCS | Mod: QW,S$GLB,, | Performed by: STUDENT IN AN ORGANIZED HEALTH CARE EDUCATION/TRAINING PROGRAM

## 2023-08-01 PROCEDURE — 3075F PR MOST RECENT SYSTOLIC BLOOD PRESS GE 130-139MM HG: ICD-10-PCS | Mod: CPTII,S$GLB,, | Performed by: STUDENT IN AN ORGANIZED HEALTH CARE EDUCATION/TRAINING PROGRAM

## 2023-08-01 PROCEDURE — 3075F SYST BP GE 130 - 139MM HG: CPT | Mod: CPTII,S$GLB,, | Performed by: STUDENT IN AN ORGANIZED HEALTH CARE EDUCATION/TRAINING PROGRAM

## 2023-08-01 PROCEDURE — 1160F RVW MEDS BY RX/DR IN RCRD: CPT | Mod: CPTII,S$GLB,, | Performed by: STUDENT IN AN ORGANIZED HEALTH CARE EDUCATION/TRAINING PROGRAM

## 2023-08-01 PROCEDURE — 3079F PR MOST RECENT DIASTOLIC BLOOD PRESSURE 80-89 MM HG: ICD-10-PCS | Mod: CPTII,S$GLB,, | Performed by: STUDENT IN AN ORGANIZED HEALTH CARE EDUCATION/TRAINING PROGRAM

## 2023-08-01 PROCEDURE — 3079F DIAST BP 80-89 MM HG: CPT | Mod: CPTII,S$GLB,, | Performed by: STUDENT IN AN ORGANIZED HEALTH CARE EDUCATION/TRAINING PROGRAM

## 2023-08-01 PROCEDURE — 3008F BODY MASS INDEX DOCD: CPT | Mod: CPTII,S$GLB,, | Performed by: STUDENT IN AN ORGANIZED HEALTH CARE EDUCATION/TRAINING PROGRAM

## 2023-08-01 PROCEDURE — 99213 PR OFFICE/OUTPT VISIT, EST, LEVL III, 20-29 MIN: ICD-10-PCS | Mod: S$GLB,,, | Performed by: STUDENT IN AN ORGANIZED HEALTH CARE EDUCATION/TRAINING PROGRAM

## 2023-08-01 PROCEDURE — 1159F PR MEDICATION LIST DOCUMENTED IN MEDICAL RECORD: ICD-10-PCS | Mod: CPTII,S$GLB,, | Performed by: STUDENT IN AN ORGANIZED HEALTH CARE EDUCATION/TRAINING PROGRAM

## 2023-08-01 PROCEDURE — 99999 PR PBB SHADOW E&M-EST. PATIENT-LVL V: ICD-10-PCS | Mod: PBBFAC,,, | Performed by: STUDENT IN AN ORGANIZED HEALTH CARE EDUCATION/TRAINING PROGRAM

## 2023-08-01 PROCEDURE — 87635 SARS-COV-2 COVID-19 AMP PRB: CPT | Mod: QW,S$GLB,, | Performed by: STUDENT IN AN ORGANIZED HEALTH CARE EDUCATION/TRAINING PROGRAM

## 2023-08-01 PROCEDURE — 1160F PR REVIEW ALL MEDS BY PRESCRIBER/CLIN PHARMACIST DOCUMENTED: ICD-10-PCS | Mod: CPTII,S$GLB,, | Performed by: STUDENT IN AN ORGANIZED HEALTH CARE EDUCATION/TRAINING PROGRAM

## 2023-08-01 PROCEDURE — 1159F MED LIST DOCD IN RCRD: CPT | Mod: CPTII,S$GLB,, | Performed by: STUDENT IN AN ORGANIZED HEALTH CARE EDUCATION/TRAINING PROGRAM

## 2023-08-01 RX ORDER — DOXYCYCLINE HYCLATE 100 MG
100 TABLET ORAL 2 TIMES DAILY
Qty: 14 TABLET | Refills: 0 | Status: SHIPPED | OUTPATIENT
Start: 2023-08-01 | End: 2023-08-08

## 2023-08-01 NOTE — PROGRESS NOTES
SUBJECTIVE     Chief Complaint   Patient presents with    Sinus Problem       HPI  Jered Contreras is a 48 y.o. male with  WAYNE, MDD, Venous Insufficiency, GERD, morbid obesity s/p bariatric surgery, BLE lymphedema, allergic rhinitis, nasal septal deviation, hypertrophy of nasal turbinates, nasal valve stenosis, TAYO on CPAP  that presents for urgent care evaluation of a sinus problem.     This is a new patient to me but established to [facility].    2 days of symptoms.  B/l maxillary sinus pressure.   Not relieved with Zyrtec, Claritin.   Associated with sore throat, fatigue, PND  No cough, myalgias, fevers, chills, nausea, vomiting, chest pain, SOB.   Has noted white lesions in back of throat.   Symptoms have progressed quickly. Recently spent time visiting wife in the hospital, possibly exposed to other sick people.   Negative for COVID, Strep in clinic today.       PAST MEDICAL HISTORY:  Past Medical History:   Diagnosis Date    Allergic rhinitis     Asthma     Corneal abrasion     Depression     Hx of psychiatric care     Morbid obesity     Obstructive sleep apnea treated with continuous positive airway pressure (CPAP) 07/10/2018    Psychiatric problem     Therapy        PAST SURGICAL HISTORY:  Past Surgical History:   Procedure Laterality Date    APPENDECTOMY      APPLICATION OF CARTILAGE GRAFT Bilateral 12/6/2021    Procedure: APPLICATION, CARTILAGE GRAFT;  Surgeon: JAN Arredondo MD;  Location: [facility] OR;  Service: ENT;  Laterality: Bilateral;  from right ear    gastric sleeve  09/2019    [facility]    NASAL SEPTOPLASTY Bilateral 12/6/2021    Procedure: SEPTOPLASTY, NOSE;  Surgeon: JAN Arredondo MD;  Location: [facility] OR;  Service: ENT;  Laterality: Bilateral;    NASAL STENOSIS REPAIR Bilateral 12/6/2021    Procedure: REPAIR, STENOSIS, NOSE, VESTIBULE;  Surgeon: JAN Arredondo MD;  Location: [facility] OR;  Service: ENT;  Laterality: Bilateral;    SINUS SURGERY      VASECTOMY   2017       FAMILY HISTORY:  Family History   Problem Relation Age of Onset    Allergies Mother     Rheum arthritis Mother     Lung cancer Mother         post lobectomy    Dementia Mother     Glaucoma Mother     Cataracts Mother     Diabetes Father     Hypertension Father     No Known Problems Maternal Aunt     No Known Problems Maternal Uncle     No Known Problems Paternal Aunt     No Known Problems Paternal Uncle     Allergies Maternal Grandmother     Hypertension Maternal Grandmother     Dementia Maternal Grandmother     Brain cancer Maternal Grandmother         in remission before she     Rheum arthritis Maternal Grandfather     Diabetes Paternal Grandmother     Alzheimer's disease Paternal Grandmother     Hypertension Paternal Grandfather     Obesity Paternal Grandfather     Cerebral aneurysm Paternal Grandfather     No Known Problems Other     Melanoma Neg Hx        ALLERGIES AND MEDICATIONS: updated and reviewed.  Review of patient's allergies indicates:   Allergen Reactions    Ceclor [cefaclor] Anaphylaxis    Penicillins Hives     Elevated blood pressure, temp, hives    Calcium alginate Other (See Comments)     Headache     Current Outpatient Medications   Medication Sig Dispense Refill    ASCORBATE CALCIUM (VITAMIN C ORAL) Take by mouth.      azelastine (ASTELIN) 137 mcg (0.1 %) nasal spray 2 sprays (274 mcg total) by Nasal route 2 (two) times daily as needed (Nasal congestion). 30 mL 5    b complex vitamins (B COMPLEX-VITAMIN B12) tablet Take 1 tablet by mouth once daily.      buPROPion (WELLBUTRIN XL) 300 MG 24 hr tablet Take 1 tablet (300 mg total) by mouth once daily. 30 tablet 2    cyclobenzaprine (FLEXERIL) 5 MG tablet Take 1 tablet (5 mg total) by mouth nightly. 30 tablet 0    DULoxetine (CYMBALTA) 60 MG capsule TAKE 1 CAPSULE(60 MG) BY MOUTH EVERY DAY 30 capsule 3    fluticasone propionate (FLONASE) 50 mcg/actuation nasal spray One spray in each nostril twice daily after 1st using azelastine  nasal spray 18.2 mL 11    hydrOXYzine HCL (ATARAX) 25 MG tablet TK 1 T PO  TID PRN ANXIETY 30 tablet 6    ipratropium (ATROVENT) 21 mcg (0.03 %) nasal spray 2 sprays by Nasal route 3 (three) times daily as needed for Rhinitis (runing nose). 30 mL 5    montelukast (SINGULAIR) 10 mg tablet Take 1 tablet (10 mg total) by mouth every evening. Take during allergy season. 90 tablet 3    multivitamin capsule Take 1 capsule by mouth once daily.      nitrofurantoin, macrocrystal-monohydrate, (MACROBID) 100 MG capsule Take 1 capsule (100 mg total) by mouth 2 (two) times daily. 10 capsule 0    rOPINIRole (REQUIP) 0.5 MG tablet Take 2 tablets (1 mg total) by mouth every evening. 30 tablet 6    urea (CARMOL) 40 % Crea Apply topically.      lamoTRIgine (LAMICTAL) 100 MG tablet Take 1 tablet (100 mg total) by mouth once daily. 90 tablet 3     No current facility-administered medications for this visit.       ROS  Review of Systems   Constitutional:  Negative for activity change, chills and fever.   HENT:  Positive for congestion, mouth sores, postnasal drip, sinus pressure, sinus pain and sore throat. Negative for drooling, hearing loss, nosebleeds, rhinorrhea, sneezing, tinnitus and trouble swallowing.    Eyes:  Negative for pain and visual disturbance.   Respiratory:  Negative for cough and shortness of breath.    Cardiovascular:  Negative for chest pain and palpitations.   Gastrointestinal:  Negative for abdominal pain, constipation, diarrhea, nausea and vomiting.   Endocrine: Negative.    Genitourinary: Negative.    Musculoskeletal:  Negative for arthralgias and myalgias.   Skin: Negative.    Allergic/Immunologic: Negative.    Neurological:  Negative for dizziness, light-headedness and headaches.   Hematological: Negative.          OBJECTIVE     Physical Exam  Vitals:    08/01/23 1010   BP: 130/80   Pulse: (!) 59   Temp: 98.2 °F (36.8 °C)    Body mass index is 48.8 kg/m².  Weight: (!) 163.2 kg (359 lb 12.7 oz)   Height: 6'  (182.9 cm)     Physical Exam  Vitals reviewed.   Constitutional:       General: He is not in acute distress.     Appearance: Normal appearance. He is obese.   HENT:      Head: Normocephalic and atraumatic.      Right Ear: Hearing, tympanic membrane, ear canal and external ear normal.      Left Ear: Hearing, tympanic membrane, ear canal and external ear normal.      Nose: Mucosal edema, congestion and rhinorrhea present. Rhinorrhea is purulent.      Right Sinus: Maxillary sinus tenderness present. No frontal sinus tenderness.      Left Sinus: Maxillary sinus tenderness present. No frontal sinus tenderness.      Mouth/Throat:      Mouth: Mucous membranes are moist.      Pharynx: Oropharynx is clear. Uvula midline. Posterior oropharyngeal erythema (in posterior oropharynx) present.      Tonsils: Tonsillar exudate present. No tonsillar abscesses. 2+ on the right. 2+ on the left.   Eyes:      Extraocular Movements: Extraocular movements intact.      Conjunctiva/sclera: Conjunctivae normal.      Pupils: Pupils are equal, round, and reactive to light.   Cardiovascular:      Rate and Rhythm: Normal rate and regular rhythm.      Pulses: Normal pulses.      Heart sounds: Normal heart sounds.   Pulmonary:      Effort: Pulmonary effort is normal.      Breath sounds: Normal breath sounds.   Abdominal:      General: Bowel sounds are normal. There is no distension.      Palpations: Abdomen is soft. There is no mass.      Tenderness: There is no abdominal tenderness. There is no guarding.   Musculoskeletal:         General: Normal range of motion.      Cervical back: Normal range of motion and neck supple. No rigidity or tenderness.      Right lower leg: No edema.      Left lower leg: No edema.   Lymphadenopathy:      Cervical: No cervical adenopathy.   Skin:     General: Skin is warm and dry.   Neurological:      General: No focal deficit present.      Mental Status: He is alert.   Psychiatric:         Mood and Affect: Mood  normal.         Behavior: Behavior normal.           Health Maintenance         Date Due Completion Date    Sign Pain Contract Never done ---    Complete Opioid Risk Tool Never done ---    Colorectal Cancer Screening Never done ---    Influenza Vaccine (1) 09/01/2023 2/1/2023    Hemoglobin A1c (Diabetic Prevention Screening) 10/29/2024 10/29/2021    TETANUS VACCINE 03/13/2027 3/13/2017    Lipid Panel 04/11/2027 4/11/2022              ASSESSMENT     48 y.o. male with     1. Acute bacterial sinusitis    2. Sore throat        PLAN:     1. Acute bacterial sinusitis  - COVID and Strep negative today.   - Counseled on symptomatic treatment. Given copy of recommendations.   - Return precautions given.   - doxycycline (VIBRA-TABS) 100 MG tablet; Take 1 tablet (100 mg total) by mouth 2 (two) times daily. for 7 days  Dispense: 14 tablet; Refill: 0    2. Sore throat  - Reviewed symptomatic treatment with patient.   - POCT Strep A, Molecular  - POCT COVID-19 Rapid Screening        RTC PRN     Kushal Garcia MD  Family Medicine  [facility]  08/01/2023    This document was created using voice recognition software (M*Modal Fluency Direct). Although it may be edited, this document may contain errors related to incorrect recognition of the spoken word. Please call the physician if clarification is needed.           No follow-ups on file.

## 2023-08-01 NOTE — PATIENT INSTRUCTIONS
Sore Throat: Warm salt water gargles to alleviate sore throat or lozenges. I think that the best treatment is Chloraseptic Spray. You spray the back of your throat with it, hold it for 15 seconds, then spit it out. You can repeat this every 2 hours as needed for sore throat.       Nasal/Sinus congestion: Take an antihistamine daily (allegra/ zyrtec/ xyzal/ or claritin). This will also help with runny nose, sneezing, watery eyes. Use a nasal steroid spray such as Flonase (fluticasone) or Nasacort (triamcinolone) daily. This will also help with ear pressure/fluid.Generic medications are okay. You can also consider Afrin nasal spray if congestion is severe, but avoid using for more than 3 days in a row due to risk of dependence.      NSAIDs (aleve, advil) and tylenol can help with pain, aches and fever.     To avoid spreading symptoms: wash hands or use hand  frequently. Cover face when coughing. Do not share utensils, etc.

## 2023-08-09 RX ORDER — DULOXETIN HYDROCHLORIDE 30 MG/1
30 CAPSULE, DELAYED RELEASE ORAL
Qty: 30 CAPSULE | Refills: 3 | Status: SHIPPED | OUTPATIENT
Start: 2023-08-09 | End: 2023-10-09 | Stop reason: SDUPTHER

## 2023-08-14 ENCOUNTER — PATIENT MESSAGE (OUTPATIENT)
Dept: PAIN MEDICINE | Facility: CLINIC | Age: 48
End: 2023-08-14
Payer: COMMERCIAL

## 2023-08-23 ENCOUNTER — OFFICE VISIT (OUTPATIENT)
Dept: PAIN MEDICINE | Facility: CLINIC | Age: 48
End: 2023-08-23
Payer: COMMERCIAL

## 2023-08-23 DIAGNOSIS — M72.2 PLANTAR FASCIITIS: ICD-10-CM

## 2023-08-23 DIAGNOSIS — M79.18 MYOFASCIAL PAIN: Primary | ICD-10-CM

## 2023-08-23 PROCEDURE — 99214 PR OFFICE/OUTPT VISIT, EST, LEVL IV, 30-39 MIN: ICD-10-PCS | Mod: 95,,, | Performed by: ANESTHESIOLOGY

## 2023-08-23 PROCEDURE — 99214 OFFICE O/P EST MOD 30 MIN: CPT | Mod: 95,,, | Performed by: ANESTHESIOLOGY

## 2023-08-23 NOTE — PROGRESS NOTES
Chronic Pain-Tele-Medicine-Established Note (Follow up visit)      The patient location is: Home  The chief complaint leading to consultation is: right shoulder pain  Visit type: Virtual visit with synchronous audio and video  Total time spent with patient: 16 minutes   Each patient to whom he or she provides medical services by telemedicine is:  (1) informed of the relationship between the physician and patient and the respective role of any other health care provider with respect to management of the patient; and (2) notified that he or she may decline to receive medical services by telemedicine and may withdraw from such care at any time.    Notes:     SUBJECTIVE:  Interval History 8/23/23:  Jered Contreras presents tele-medicine appointment for a follow-up appointment for right shoulder pain. We have seen him in the past for his plantar fasciitis but his main complaint today was his right shoulder pain Since the last visit, Jered Contreras states the pain has been worsening. Current pain intensity is 6/10. He describes the pain being worse when he wakes up in the morning but also when he rotates his right shoulder. He also noticed the pain flares up when he bends over to  items with his right arm.        Original HISTORY OF PRESENT ILLNESS: Jered Contreras presents to the clinic for the evaluation of the above pain. The pain started 2 years ago after no particular event. He blames it on his weight.      Original Pain Description:  The pain is located in the L>R plantar fascia and does not radiate. The pain is described as stabbing. Exacerbating factors: Walking, rain. Mitigating factors rest. Symptoms interfere with daily activity and work. The patient feels like symptoms have been unchanged.      Original PAIN SCORES:  Best: Pain is 2  Worst: Pain is 9  Current: Pain is 3    Pain Medications:  Flexeril 5mg QHS     6 weeks of Conservative therapy:  PT: None (Must include  dates)  Chiro: No  HEP: HEP prescribed by podiatry which he does every morning         Treatments / Medications: (Ice/Heat/NSAIDS/APAP/etc):  Ice  Heat  Ibuprofen  Aleve  Biofreeze        Interventional Pain Procedures: (Previous injections)  None    Imaging:  XR FOOT COMPLETE 3 VIEW LEFT     CLINICAL HISTORY:  5th met head pain;.  Pain in left foot     TECHNIQUE:  AP, lateral and oblique views of the left foot were performed.     COMPARISON:  None     FINDINGS:  No acute fracture or dislocation seen.  Small plantar calcaneal spur.  No significant soft tissue edema or radiopaque retained foreign body.     Impression:     No acute osseous abnormality seen.        Electronically signed by: Ale Sanchez  Date:                                            10/24/2022  Time:                                           15:32     MRI of the LEFT ankle     CLINICAL HISTORY:  Foot pain evaluate for stress fracture     TECHNIQUE:  Multiplanar multisequence MRI examination of the LEFT ankle.  Postcontrast images after 10 cc Gadavist     COMPARISON:  Radiograph of 10/24/2022     FINDINGS:  **MEDIAL COMPARTMENT     Posterior tibial tendon: Intact.No tendinosis.No tenosynovitis.     Flexor digitorum longus tendon: Normal.     Superficial Deltoid: Intact.     Deep Deltoid: Intact.     Tibiospring/ SM Calcaneonavicular (Spring)/Inferoplantar Complex: Intact     **LATERAL COMPARTMENT     Peroneus longus: Intact.     Peroneus brevis: Intact.     Superior peroneal retinaculum: Intact     Ligaments:     Interosseous (syndesmosis): Intact.     Anterior inferior tibiofibular (syndesmosis): Intact.     Posterior inferior tibiofibular (syndesmosis): Intact.     Anterior talofibular ligament: Intact.     Calcaneofibular ligament: Intact.     Posterior talofibular ligament: Intact.     **POSTERIOR COMPARTMENT     Flexor hallucis longus: Normal.     Posterior talus: Normal.     Achilles tendon: Normal.     Plantar fascia: There is minimal edema  of the soft tissues plantar to the plantar fascia.  There is  plantar fascial thickening measuring greater than 4 mm.. There is mild osseous edema at the calcaneal insertion of the plantar fascia and calcaneal spur formation. There is no rupture of the fascia. There is no evidence of plantar fibromatosis. Findings are nonspecific yet can be seen with with plantar fasciitis..Lateral cord of plantar fascia demonstrates normal appearance and insertion upon the base of the 5th MT.     **ANTERIOR COMPARTMENT     Tendons:     Anterior tibial tendon: Normal.  Insertion intact.     Extensor hallucis longus: Normal.     Extensor digitorum longus: Normal.     Ligaments:     Dorsal talonavicular ligament: Intact.     **ARTICULATIONS:     Tibiotalar joint: Normal.  No evidence for anterior osseous spurs.     Subtalar joint: Normal.     Talonavicular joint: Normal.     Calcaneocuboid joint: Normal     Talus and calcaneus: Intact lateral process of talus and anterior process of calcaneus without fracture.     **GENERAL FINDINGS     Osseous Structures: No evidence of fracture     Muscles: Normal.     Nerves and tarsal tunnel: Normal.     Sinus tarsi: Normal.     Diffuse subcutaneous edema.     Impression:     Diffuse subcutaneous edema about the ankle.     Plantar fascial thickening, with minimal edema of calcaneal spur nonspecific finding, can be seen in plantar fasciitis in the appropriate setting        Electronically signed by: Quinten Kelly MD  Date:                                            03/13/2023  Time:                                           11:49     Past Medical History:   Diagnosis Date    Allergic rhinitis     Asthma     Corneal abrasion     Depression     Hx of psychiatric care     Morbid obesity     Obstructive sleep apnea treated with continuous positive airway pressure (CPAP) 07/10/2018    Psychiatric problem     Therapy      Past Surgical History:   Procedure Laterality Date    APPENDECTOMY       APPLICATION OF CARTILAGE GRAFT Bilateral 2021    Procedure: APPLICATION, CARTILAGE GRAFT;  Surgeon: JAN Arredondo MD;  Location: [facility] OR;  Service: ENT;  Laterality: Bilateral;  from right ear    gastric sleeve  2019    [facility] [place] [facility]    NASAL SEPTOPLASTY Bilateral 2021    Procedure: SEPTOPLASTY, NOSE;  Surgeon: JAN Arredondo MD;  Location: [facility] OR;  Service: ENT;  Laterality: Bilateral;    NASAL STENOSIS REPAIR Bilateral 2021    Procedure: REPAIR, STENOSIS, NOSE, VESTIBULE;  Surgeon: JAN Arredondo MD;  Location: [facility] OR;  Service: ENT;  Laterality: Bilateral;    SINUS SURGERY      VASECTOMY       Social History     Socioeconomic History    Marital status:    Occupational History    Occupation: Information Tech     Employer: [employer]   Tobacco Use    Smoking status: Never     Passive exposure: Never    Smokeless tobacco: Never   Substance and Sexual Activity    Alcohol use: Yes     Alcohol/week: 1.0 standard drink of alcohol     Types: 1 Cans of beer per week     Comment: RARELY    Drug use: No    Sexual activity: Yes     Partners: Female     Family History   Problem Relation Age of Onset    Allergies Mother     Rheum arthritis Mother     Lung cancer Mother         post lobectomy    Dementia Mother     Glaucoma Mother     Cataracts Mother     Diabetes Father     Hypertension Father     No Known Problems Maternal Aunt     No Known Problems Maternal Uncle     No Known Problems Paternal Aunt     No Known Problems Paternal Uncle     Allergies Maternal Grandmother     Hypertension Maternal Grandmother     Dementia Maternal Grandmother     Brain cancer Maternal Grandmother         in remission before she     Rheum arthritis Maternal Grandfather     Diabetes Paternal Grandmother     Alzheimer's disease Paternal Grandmother     Hypertension Paternal Grandfather     Obesity Paternal Grandfather     Cerebral aneurysm Paternal Grandfather     No Known  Problems Other     Melanoma Neg Hx        Review of patient's allergies indicates:   Allergen Reactions    Ceclor [cefaclor] Anaphylaxis    Penicillins Hives     Elevated blood pressure, temp, hives    Calcium alginate Other (See Comments)     Headache       Current Outpatient Medications   Medication Sig    ASCORBATE CALCIUM (VITAMIN C ORAL) Take by mouth.    azelastine (ASTELIN) 137 mcg (0.1 %) nasal spray 2 sprays (274 mcg total) by Nasal route 2 (two) times daily as needed (Nasal congestion).    b complex vitamins (B COMPLEX-VITAMIN B12) tablet Take 1 tablet by mouth once daily.    buPROPion (WELLBUTRIN XL) 300 MG 24 hr tablet Take 1 tablet (300 mg total) by mouth once daily.    cyclobenzaprine (FLEXERIL) 5 MG tablet Take 1 tablet (5 mg total) by mouth nightly.    DULoxetine (CYMBALTA) 30 MG capsule TAKE 1 CAPSULE(30 MG) BY MOUTH EVERY DAY    DULoxetine (CYMBALTA) 60 MG capsule TAKE 1 CAPSULE(60 MG) BY MOUTH EVERY DAY    fluticasone propionate (FLONASE) 50 mcg/actuation nasal spray One spray in each nostril twice daily after 1st using azelastine nasal spray    hydrOXYzine HCL (ATARAX) 25 MG tablet TK 1 T PO  TID PRN ANXIETY    ipratropium (ATROVENT) 21 mcg (0.03 %) nasal spray 2 sprays by Nasal route 3 (three) times daily as needed for Rhinitis (runing nose).    lamoTRIgine (LAMICTAL) 100 MG tablet Take 1 tablet (100 mg total) by mouth once daily.    montelukast (SINGULAIR) 10 mg tablet Take 1 tablet (10 mg total) by mouth every evening. Take during allergy season.    multivitamin capsule Take 1 capsule by mouth once daily.    nitrofurantoin, macrocrystal-monohydrate, (MACROBID) 100 MG capsule Take 1 capsule (100 mg total) by mouth 2 (two) times daily.    rOPINIRole (REQUIP) 0.5 MG tablet Take 2 tablets (1 mg total) by mouth every evening.    urea (CARMOL) 40 % Crea Apply topically.     No current facility-administered medications for this visit.       REVIEW OF SYSTEMS:    GENERAL:  No weight loss, malaise or  fevers.  HEENT:   No recent changes in vision or hearing  NECK:  Negative for lumps, no difficulty with swallowing.  RESPIRATORY:  Negative for cough, wheezing or shortness of breath, patient denies any recent URI.  CARDIOVASCULAR:  Negative for chest pain, leg swelling or palpitations.  GI:  Negative for abdominal discomfort, blood in stools or black stools or change in bowel habits.  MUSCULOSKELETAL:  See HPI. + right shoulder/back pain  SKIN:  Negative for lesions, rash, and itching.  PSYCH:  No mood disorder or recent psychosocial stressors.  Patients sleep is not disturbed secondary to pain.  HEMATOLOGY/LYMPHOLOGY:  Negative for prolonged bleeding, bruising easily or swollen nodes.  Patient is not currently taking any anti-coagulants  NEURO:   No history of headaches, syncope, paralysis, seizures or tremors.  All other reviewed and negative other than HPI.    OBJECTIVE:    General appearance: Well appearing, in no acute distress, alert and oriented x3.  Psych:  Mood and affect appropriate.  MSK: shows good ROM on zoom call with shoulders bl      ASSESSMENT: 48 y.o. year old male with right shoulder/back pain, consistent with     1. Myofascial pain        2. Myalgia            DISCUSSION: Mr. Contreras is a nice christine who comes to us mainly for foot pain. It seems to run in his family. He was seeing podiatry and was diagnosed with plantar fasciitis. Our exam was consistent with this as well. He gets a flare up of pain approximately monthly and a severe flare up approximately 1-2X/year. They had previously prescribed opioids for this, but he found he could manage most with Naproxen. He also has right rhomboid pain.        PLAN:   1) Pt to be scheduled for in clinic appointment for exam and trigger point injections for right shoulder/back myofascial pain  2) Continue flexeril 5mg QHS  3) Continue Voltaren gel PRN  4) Naproxen PRN for flare ups  5) Continue daily heat therapy on right back/shoulder pain      The  above plan and management options were discussed at length with patient. Patient is in agreement with the above and verbalized understanding. It will be communicated with the referring physician via electronic record, fax, or mail.    Rox Soni MD PGY-3  [facility]   08/23/2023

## 2023-09-12 ENCOUNTER — OFFICE VISIT (OUTPATIENT)
Dept: PSYCHIATRY | Facility: CLINIC | Age: 48
End: 2023-09-12
Payer: COMMERCIAL

## 2023-09-12 ENCOUNTER — PATIENT MESSAGE (OUTPATIENT)
Dept: PSYCHIATRY | Facility: CLINIC | Age: 48
End: 2023-09-12

## 2023-09-12 DIAGNOSIS — F33.1 MDD (MAJOR DEPRESSIVE DISORDER), RECURRENT EPISODE, MODERATE: ICD-10-CM

## 2023-09-12 DIAGNOSIS — F41.1 GAD (GENERALIZED ANXIETY DISORDER): Primary | ICD-10-CM

## 2023-09-12 DIAGNOSIS — R45.4 ANGER: ICD-10-CM

## 2023-09-12 PROCEDURE — 90791 PSYCH DIAGNOSTIC EVALUATION: CPT | Mod: 95,,, | Performed by: SOCIAL WORKER

## 2023-09-12 PROCEDURE — 90791 PR PSYCHIATRIC DIAGNOSTIC EVALUATION: ICD-10-PCS | Mod: 95,,, | Performed by: SOCIAL WORKER
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
## 2023-09-14 ENCOUNTER — OFFICE VISIT (OUTPATIENT)
Dept: SLEEP MEDICINE | Facility: CLINIC | Age: 48
End: 2023-09-14
Payer: COMMERCIAL

## 2023-09-14 VITALS
DIASTOLIC BLOOD PRESSURE: 84 MMHG | HEIGHT: 72 IN | BODY MASS INDEX: 42.66 KG/M2 | SYSTOLIC BLOOD PRESSURE: 137 MMHG | HEART RATE: 74 BPM | WEIGHT: 315 LBS

## 2023-09-14 DIAGNOSIS — G47.33 OSA (OBSTRUCTIVE SLEEP APNEA): Primary | ICD-10-CM

## 2023-09-14 PROCEDURE — 3008F PR BODY MASS INDEX (BMI) DOCUMENTED: ICD-10-PCS | Mod: CPTII,S$GLB,, | Performed by: NURSE PRACTITIONER

## 2023-09-14 PROCEDURE — 99999 PR PBB SHADOW E&M-EST. PATIENT-LVL II: CPT | Mod: PBBFAC,,, | Performed by: NURSE PRACTITIONER

## 2023-09-14 PROCEDURE — 99999 PR PBB SHADOW E&M-EST. PATIENT-LVL II: ICD-10-PCS | Mod: PBBFAC,,, | Performed by: NURSE PRACTITIONER

## 2023-09-14 PROCEDURE — 99214 PR OFFICE/OUTPT VISIT, EST, LEVL IV, 30-39 MIN: ICD-10-PCS | Mod: S$GLB,,, | Performed by: NURSE PRACTITIONER

## 2023-09-14 PROCEDURE — 3008F BODY MASS INDEX DOCD: CPT | Mod: CPTII,S$GLB,, | Performed by: NURSE PRACTITIONER

## 2023-09-14 PROCEDURE — 3079F DIAST BP 80-89 MM HG: CPT | Mod: CPTII,S$GLB,, | Performed by: NURSE PRACTITIONER

## 2023-09-14 PROCEDURE — 3075F PR MOST RECENT SYSTOLIC BLOOD PRESS GE 130-139MM HG: ICD-10-PCS | Mod: CPTII,S$GLB,, | Performed by: NURSE PRACTITIONER

## 2023-09-14 PROCEDURE — 3075F SYST BP GE 130 - 139MM HG: CPT | Mod: CPTII,S$GLB,, | Performed by: NURSE PRACTITIONER

## 2023-09-14 PROCEDURE — 99214 OFFICE O/P EST MOD 30 MIN: CPT | Mod: S$GLB,,, | Performed by: NURSE PRACTITIONER

## 2023-09-14 PROCEDURE — 3079F PR MOST RECENT DIASTOLIC BLOOD PRESSURE 80-89 MM HG: ICD-10-PCS | Mod: CPTII,S$GLB,, | Performed by: NURSE PRACTITIONER

## 2023-09-14 NOTE — PROGRESS NOTES
Cc:TAYO, last seen 3/2022 by Dr. Centeno    Split night study 2015 showed AHI 25.9 and CPAP 9 cm H20 was recommended.   After significant weight loss, repeat study showed AHI 14.5.  He was using 11-20cm but new Resvent machine set 5-9cm. Ramp 3cm!!! Feels its hard to get enough air then gets blast of air. FFM.      Assessment:  TAYO, mild, having air hunger/not sure if effectively treated currently.     Plan:    Dedicated cpap titration study  Consider new machine (has new ins now) and change settings accordingly, otherwise today ramp turned off and adjusted apap 7-11cm.

## 2023-09-21 ENCOUNTER — OFFICE VISIT (OUTPATIENT)
Dept: INTERNAL MEDICINE | Facility: CLINIC | Age: 48
End: 2023-09-21
Payer: COMMERCIAL

## 2023-09-21 VITALS
SYSTOLIC BLOOD PRESSURE: 134 MMHG | WEIGHT: 315 LBS | DIASTOLIC BLOOD PRESSURE: 74 MMHG | OXYGEN SATURATION: 98 % | HEART RATE: 83 BPM | HEIGHT: 72 IN | BODY MASS INDEX: 42.66 KG/M2

## 2023-09-21 DIAGNOSIS — Z00.00 HEALTH CARE MAINTENANCE: ICD-10-CM

## 2023-09-21 DIAGNOSIS — L08.9 WOUND INFECTION: ICD-10-CM

## 2023-09-21 DIAGNOSIS — R10.84 ABDOMINAL PAIN, GENERALIZED: ICD-10-CM

## 2023-09-21 DIAGNOSIS — Z98.84 BARIATRIC SURGERY STATUS: ICD-10-CM

## 2023-09-21 DIAGNOSIS — T14.8XXA WOUND INFECTION: ICD-10-CM

## 2023-09-21 DIAGNOSIS — I89.0 LYMPHEDEMA: Primary | ICD-10-CM

## 2023-09-21 PROCEDURE — 99214 OFFICE O/P EST MOD 30 MIN: CPT | Mod: S$GLB,,, | Performed by: INTERNAL MEDICINE

## 2023-09-21 PROCEDURE — 99999 PR PBB SHADOW E&M-EST. PATIENT-LVL V: CPT | Mod: PBBFAC,,, | Performed by: INTERNAL MEDICINE

## 2023-09-21 PROCEDURE — 3008F BODY MASS INDEX DOCD: CPT | Mod: CPTII,S$GLB,, | Performed by: INTERNAL MEDICINE

## 2023-09-21 PROCEDURE — 3008F PR BODY MASS INDEX (BMI) DOCUMENTED: ICD-10-PCS | Mod: CPTII,S$GLB,, | Performed by: INTERNAL MEDICINE

## 2023-09-21 PROCEDURE — 1160F RVW MEDS BY RX/DR IN RCRD: CPT | Mod: CPTII,S$GLB,, | Performed by: INTERNAL MEDICINE

## 2023-09-21 PROCEDURE — 3075F SYST BP GE 130 - 139MM HG: CPT | Mod: CPTII,S$GLB,, | Performed by: INTERNAL MEDICINE

## 2023-09-21 PROCEDURE — 1160F PR REVIEW ALL MEDS BY PRESCRIBER/CLIN PHARMACIST DOCUMENTED: ICD-10-PCS | Mod: CPTII,S$GLB,, | Performed by: INTERNAL MEDICINE

## 2023-09-21 PROCEDURE — 1159F MED LIST DOCD IN RCRD: CPT | Mod: CPTII,S$GLB,, | Performed by: INTERNAL MEDICINE

## 2023-09-21 PROCEDURE — 1159F PR MEDICATION LIST DOCUMENTED IN MEDICAL RECORD: ICD-10-PCS | Mod: CPTII,S$GLB,, | Performed by: INTERNAL MEDICINE

## 2023-09-21 PROCEDURE — 3078F PR MOST RECENT DIASTOLIC BLOOD PRESSURE < 80 MM HG: ICD-10-PCS | Mod: CPTII,S$GLB,, | Performed by: INTERNAL MEDICINE

## 2023-09-21 PROCEDURE — 3078F DIAST BP <80 MM HG: CPT | Mod: CPTII,S$GLB,, | Performed by: INTERNAL MEDICINE

## 2023-09-21 PROCEDURE — 99214 PR OFFICE/OUTPT VISIT, EST, LEVL IV, 30-39 MIN: ICD-10-PCS | Mod: S$GLB,,, | Performed by: INTERNAL MEDICINE

## 2023-09-21 PROCEDURE — 99999 PR PBB SHADOW E&M-EST. PATIENT-LVL V: ICD-10-PCS | Mod: PBBFAC,,, | Performed by: INTERNAL MEDICINE

## 2023-09-21 PROCEDURE — 3075F PR MOST RECENT SYSTOLIC BLOOD PRESS GE 130-139MM HG: ICD-10-PCS | Mod: CPTII,S$GLB,, | Performed by: INTERNAL MEDICINE

## 2023-09-21 RX ORDER — DOXYCYCLINE 100 MG/1
100 CAPSULE ORAL 2 TIMES DAILY
Qty: 20 CAPSULE | Refills: 0 | Status: SHIPPED | OUTPATIENT
Start: 2023-09-21 | End: 2023-10-01

## 2023-09-21 NOTE — PROGRESS NOTES
Subjective:       Patient ID: Jered Contreras is a 48 y.o. male.    Chief Complaint: Follow-up    HPI  48 y.o. male here for evaluation of  Lesions on legs.     He has a hx of lymphedema with venous insufficiency - previously saw wound clinic in aug -oct '22.   Lymphedema clinic?  Bumex 0.5mg bid- due to gastric bypass  Pt to limit sodium intake to 2,000 mg daily.   Limit volume intake to 1.5 liters daily.    Elevate legs when resting.     He has RLE open wound for 3 weeks.  Thinks started with catching his leg on something.     Wife had neck surgery in July and he is sleeping in living room with her. Not always elevated    Gastric bypass revision 2 years ago. Having severe GI issues after eating. Diarrhea, bad gas, mostly mid abdomen. Worse after eating anything greasy.   Review of Systems   Constitutional:  Negative for fever.   Respiratory:  Negative for shortness of breath.    Cardiovascular:  Negative for chest pain.   Musculoskeletal: Negative.    Skin: Negative.        Objective:   /74 (BP Location: Left arm, Patient Position: Sitting, BP Method: Large (Manual))   Pulse 83   Ht 6' (1.829 m)   Wt (!) 168.1 kg (370 lb 7.7 oz)   SpO2 98%   BMI 50.25 kg/m²      Physical Exam  Constitutional:       General: He is not in acute distress.     Appearance: He is well-developed. He is not diaphoretic.   HENT:      Head: Normocephalic and atraumatic.   Cardiovascular:      Rate and Rhythm: Normal rate and regular rhythm.      Heart sounds: No murmur heard.  Pulmonary:      Effort: Pulmonary effort is normal. No respiratory distress.      Breath sounds: No wheezing or rales.   Skin:     General: Skin is warm and dry.   Neurological:      Mental Status: He is alert and oriented to person, place, and time.   Psychiatric:         Behavior: Behavior normal.       Lymphedema bilateral lower extremities  RLE 3cm wound with erythema and warmth.   Assessment:       1. Lymphedema    2. Wound infection    3.  Abdominal pain, generalized    4. Bariatric surgery status    5. Health care maintenance        Plan:       1. Lymphedema  -     doxycycline (VIBRAMYCIN) 100 MG Cap; Take 1 capsule (100 mg total) by mouth 2 (two) times daily. for 10 days  Dispense: 20 capsule; Refill: 0  -     Ambulatory referral/consult to Wound Clinic; Future; Expected date: 09/28/2023    2. Wound infection  -     doxycycline (VIBRAMYCIN) 100 MG Cap; Take 1 capsule (100 mg total) by mouth 2 (two) times daily. for 10 days  Dispense: 20 capsule; Refill: 0  -     Ambulatory referral/consult to Wound Clinic; Future; Expected date: 09/28/2023    3. Abdominal pain, generalized  -     US Abdomen Complete; Future; Expected date: 09/21/2023    4. Bariatric surgery status  -     US Abdomen Complete; Future; Expected date: 09/21/2023    5. Health care maintenance  -     CBC Auto Differential; Future; Expected date: 09/21/2023  -     Hemoglobin A1C; Future; Expected date: 09/21/2023  -     Comprehensive Metabolic Panel; Future; Expected date: 09/21/2023  -     Lipid Panel; Future; Expected date: 09/21/2023  -     TSH; Future; Expected date: 09/21/2023           Health Maintenance Due   Topic    Colorectal Cancer Screening       Wound clinic

## 2023-09-28 ENCOUNTER — PATIENT MESSAGE (OUTPATIENT)
Dept: PSYCHIATRY | Facility: CLINIC | Age: 48
End: 2023-09-28
Payer: COMMERCIAL

## 2023-09-29 ENCOUNTER — TELEPHONE (OUTPATIENT)
Dept: SLEEP MEDICINE | Facility: CLINIC | Age: 48
End: 2023-09-29
Payer: COMMERCIAL

## 2023-09-29 ENCOUNTER — HOSPITAL ENCOUNTER (OUTPATIENT)
Dept: RADIOLOGY | Facility: HOSPITAL | Age: 48
Discharge: HOME | End: 2023-09-29
Attending: INTERNAL MEDICINE
Payer: COMMERCIAL

## 2023-09-29 DIAGNOSIS — R10.84 ABDOMINAL PAIN, GENERALIZED: ICD-10-CM

## 2023-09-29 DIAGNOSIS — Z98.84 BARIATRIC SURGERY STATUS: ICD-10-CM

## 2023-09-29 PROCEDURE — 76700 US EXAM ABDOM COMPLETE: CPT | Mod: 26,,, | Performed by: RADIOLOGY

## 2023-09-29 PROCEDURE — 76700 US ABDOMEN COMPLETE: ICD-10-PCS | Mod: 26,,, | Performed by: RADIOLOGY

## 2023-09-29 PROCEDURE — 76700 US EXAM ABDOM COMPLETE: CPT | Mod: TC

## 2023-10-05 RX ORDER — DULOXETIN HYDROCHLORIDE 60 MG/1
CAPSULE, DELAYED RELEASE ORAL
Qty: 30 CAPSULE | Refills: 3 | Status: SHIPPED | OUTPATIENT
Start: 2023-10-05 | End: 2023-10-09

## 2023-10-09 ENCOUNTER — OFFICE VISIT (OUTPATIENT)
Dept: PSYCHIATRY | Facility: CLINIC | Age: 48
End: 2023-10-09
Payer: COMMERCIAL

## 2023-10-09 DIAGNOSIS — F41.1 GAD (GENERALIZED ANXIETY DISORDER): Primary | Chronic | ICD-10-CM

## 2023-10-09 DIAGNOSIS — F33.1 MDD (MAJOR DEPRESSIVE DISORDER), RECURRENT EPISODE, MODERATE: Chronic | ICD-10-CM

## 2023-10-09 PROCEDURE — 3044F HG A1C LEVEL LT 7.0%: CPT | Mod: CPTII,95,, | Performed by: NURSE PRACTITIONER

## 2023-10-09 PROCEDURE — 99214 PR OFFICE/OUTPT VISIT, EST, LEVL IV, 30-39 MIN: ICD-10-PCS | Mod: 95,,, | Performed by: NURSE PRACTITIONER

## 2023-10-09 PROCEDURE — 3044F PR MOST RECENT HEMOGLOBIN A1C LEVEL <7.0%: ICD-10-PCS | Mod: CPTII,95,, | Performed by: NURSE PRACTITIONER

## 2023-10-09 PROCEDURE — 99214 OFFICE O/P EST MOD 30 MIN: CPT | Mod: 95,,, | Performed by: NURSE PRACTITIONER

## 2023-10-09 RX ORDER — BUPROPION HYDROCHLORIDE 300 MG/1
300 TABLET ORAL DAILY
Qty: 90 TABLET | Refills: 1 | Status: SHIPPED | OUTPATIENT
Start: 2023-10-09 | End: 2024-01-11

## 2023-10-09 RX ORDER — DULOXETIN HYDROCHLORIDE 30 MG/1
30 CAPSULE, DELAYED RELEASE ORAL DAILY
Qty: 90 CAPSULE | Refills: 1 | Status: SHIPPED | OUTPATIENT
Start: 2023-10-09 | End: 2024-01-07

## 2023-10-09 RX ORDER — DULOXETIN HYDROCHLORIDE 60 MG/1
CAPSULE, DELAYED RELEASE ORAL
Qty: 30 CAPSULE | Refills: 3 | Status: SHIPPED | OUTPATIENT
Start: 2023-10-09 | End: 2023-10-09 | Stop reason: SDUPTHER

## 2023-10-09 RX ORDER — DULOXETIN HYDROCHLORIDE 60 MG/1
60 CAPSULE, DELAYED RELEASE ORAL DAILY
Qty: 90 CAPSULE | Refills: 1 | Status: SHIPPED | OUTPATIENT
Start: 2023-10-09 | End: 2024-01-11

## 2023-10-09 RX ORDER — LAMOTRIGINE 100 MG/1
100 TABLET ORAL DAILY
Qty: 90 TABLET | Refills: 1 | Status: SHIPPED | OUTPATIENT
Start: 2023-10-09 | End: 2024-01-07

## 2023-10-09 NOTE — PROGRESS NOTES
"Outpatient Psychiatry Follow-Up Visit (MD/NP)    10/9/2023   The patient location is: Home in [employer], [place]  The chief complaint leading to consultation is:  Depression, anxiety and history of panic attacks    Visit type: audiovisual    Face to Face time with patient: 23 minutes  30 minutes of total time spent on the encounter, which includes face to face time and non-face to face time preparing to see the patient (eg, review of tests), Obtaining and/or reviewing separately obtained history, Documenting clinical information in the electronic or other health record, Independently interpreting results (not separately reported) and communicating results to the patient/family/caregiver, or Care coordination (not separately reported).         Each patient to whom he or she provides medical services by telemedicine is:  (1) informed of the relationship between the physician and patient and the respective role of any other health care provider with respect to management of the patient; and (2) notified that he or she may decline to receive medical services by telemedicine and may withdraw from such care at any time.    Notes:       Clinical Status of Patient:  Outpatient (Ambulatory)    Chief Complaint:  Jered Contreras is a 48 y.o. male who presents today for follow-up of depression, anxiety and  history of panic attacks .  Met with patient.      Interval History and Content of Current Session:  Interim Events/Subjective Report/Content of Current Session:   Reviewed messages from communication with patient since his last visit    Patient described his mood as "a little stressed and depressed for the past two weeks" and affect was euthymic. He reported gallbladder problems and is planning  to get his surgery of gallbladder removal on 10/19/2023.     He stated his wife had to have a second surgery on her neck surgery and is recovering well. He stated he is worried abut his wife losing her job and finances. He " reported he likes his new job in [employer] company.    He stated he started therapy and is looking forward to his next session.    He reported managed anxiety but reported increased depression due to health problems and recent circumstances with his wife's health and finances. He reported hearing rushing noise in his ears and is wondering if any of his medications are causing this. Encouraged patient to follow up with his ENT and PCP to rule out any medical condition. Informed patient that this is not a common side effect on his medication but will research it.     He stated overall duloxetine to 90 mg daily has been helpful in improving his depression and anxiety. He reported feeling optimistic about his future. He stated his depression fluctuates with situational stressors but managed at this time. He didn't report any panic attacks. He stated he is utilizing effective communication skills.     He stated he is still taking Lamictal 100 mg po daily, Cymbalta 90 mg po daily. and Wellbutrin xl 300 mg po daily and finds his medications to be very effective in managing his anxiety, mood, and depression. He reported okay sleep at this time (has sleep apnea and uses CPAP). He takes melatonin as needed but is not longer as effective as it used to be. He is not taking vistaril. We discussed the importance of utilizing effective sleep hygiene skills and melatonin as needed. Provided supportive therapy and encouraged utilization of CBT skills. Patient denied SI/HI/AH/VH and no delusion noted or reported. Patient denied symptoms of stuart or hypomania. He denied alcohol abuse or any type of illicit drug use.    He stated he had a revision to his gastric surgery that went well except having gallbladder problems at this time. He reported normal appetite and weight loss since his last visit. He stated his BP is controlled at this time and stated he is following up with PCP regarding his BP and medical conditio      From his past  session  He stated he has problems with focus, attention, and finishing tasks and ADHD resources for evaluation were provided for patient     Psychiatric Review Of Systems - Is patient experiencing or having changes in:  sleep: yes. And melatonin is not as effective 4 hours   appetite: normal   Weight: yes. Lost 30 lbs due to problems with gallbladder  energy/anergy: normal  interest/pleasure/anhedonia: no  somatic symptoms: no  anxiety/panic: yes but managed  guilty/hopelessness: no  concentration: yes  S.I.B.s/risky behavior: no  Irritability: improved   Racing thoughts: no  Impulsive behaviors: no  Paranoia:no  AVH:no  Suicidal thoughts/plan/intent: no  Se: no  ETOH: no  Drugs: no      Psychotropic medication review  Previous Trials-  -lexapro (worked for a while but stopped working and PCP switched him to Zoloft)  -Zoloft 200 mg po daily  -vistaril 25 mg po prn daily    Current meds-  -Wellbutrin xl 300mg po daily  -Cymbalta 90 mg po daily  -Lamictal 100 mg po daily       Psychotherapy:  Target symptoms: depression, anxiety   Why chosen therapy is appropriate versus another modality: relevant to diagnosis, patient responds to this modality, evidence based practice  Outcome monitoring methods: self-report, observation  Therapeutic intervention type: insight oriented psychotherapy, behavior modifying psychotherapy, supportive psychotherapy  Topics discussed/themes: building skills sets for symptom management, symptom recognition  The patient's response to the intervention is motivated. The patient's progress toward treatment goals is fair.   Duration of intervention: 10 minutes.    Review of Systems   PSYCHIATRIC: Pertinant items are noted in the narrative.  CONSTITUTIONAL: No weight gain or loss.   MUSCULOSKELETAL: No pain or stiffness of the joints.  NEUROLOGIC: No weakness, sensory changes, seizures, confusion, memory loss, tremor or other abnormal movements.  ENDOCRINE: No polydipsia or  "polyuria.  INTEGUMENTARY: No rashes or lacerations.  EYES: No exophthalmos, jaundice or blindness.  ENT: No dizziness, tinnitus or hearing loss.  RESPIRATORY: No shortness of breath.  CARDIOVASCULAR: No tachycardia or chest pain.  GASTROINTESTINAL: No nausea, vomiting, pain, constipation or diarrhea.  GENITOURINARY: No frequency, dysuria or sexual dysfunction.  HEMATOLOGIC/LYMPHATIC: No excessive bleeding, prolonged or excessive bleeding after dental extraction/injury.  ALLERGIC/IMMUNOLOGIC: No allergic response to materials, foods or animals at this time.    Past Medical, Family and Social History: The patient's past medical, family and social history have been reviewed and updated as appropriate within the electronic medical record - see encounter notes.    Compliance: yes    Side effects: None    Risk Parameters:  Patient reports no suicidal ideation  Patient reports no homicidal ideation  Patient reports no self-injurious behavior  Patient reports no violent behavior    Exam (detailed: at least 9 elements; comprehensive: all 15 elements)   Constitutional  Vitals:  Most recent vital signs, dated greater than 90 days prior to this appointment, were reviewed.   There were no vitals filed for this visit.     General:  unremarkable, age appropriate     Musculoskeletal  Muscle Strength/Tone:  no dystonia, no tremor, no tic   Gait & Station:  non-ataxic     Psychiatric  Speech:  no latency; no press   Mood & Affect:  "Not too good "  euthymic and appropriate due to recent stressors   Thought Process:  normal and logical   Associations:  intact   Thought Content:  normal, no suicidality, no homicidality, delusions, or paranoia   Insight:  intact, has awareness of illness   Judgement: behavior is adequate to circumstances   Orientation:  grossly intact   Memory: intact for content of interview, grossly intact   Language: grossly intact, able to name, able to repeat   Attention Span & Concentration:  able to focus   Fund " of Knowledge:  intact and appropriate to age and level of education, familiar with aspects of current personal life     Assessment and Diagnosis   Status/Progress: Based on the examination today, the patient's problem(s) is/are improved.  New problems have not been presented today.   Co-morbidities, Diagnostic uncertainty and Lack of compliance are not complicating management of the primary condition.  There are no active rule-out diagnoses for this patient at this time.     General Impression: Doing okay and stable. Patient wants to continue with the same dosages of his medications due to their effectiveness and does not want to add or increase any of his dosages at this time. He wants to try therapy and understands how his medical health and worries about finances are affecting his mood.       ICD-10-CM ICD-9-CM   1. Moderate episode of recurrent major depressive disorder  F33.1 296.32   2. WAYNE (generalized anxiety disorder)  F41.1 300.02   3. panic attacks          Intervention/Counseling/Treatment Plan   Medication Management: Continue current medications. The risks and benefits of medication were discussed with the patient.  Medication Management:   Reviewed communication after his last visit  Encouraged patient to get his vitals today and encouraged follow up with his PCP and he agreed.   Continue Wellbutrin xl 300 mg po daily for MDD and WAYNE  Continue Cymbalta 90 mg po daily or WAYNE and MDD  Continue Lamictal 100 mg po daily for mood stabilization. Warned against SJS and all the associated side effects and he agreed to take it.   Recommended psychotherapy to learn effective coping skills   Continue utilization of effective CBT skills, positive self-talk, cognitive reframing, meditation, mindfulness, deep breathing, and relaxations skills.  Encouraged effective sleep hygiene and continue melatonin 3-5 mg po qhs prn for insomnia      Labs: reviewed most recent labs and encouraged follow up with his PCP regarding  CBC and lipids and he agreed.   The treatment plan and follow up plan were reviewed with the patient.  Discussed with patient informed consent, risks vs. benefits, alternative treatments, side effect profile and the inherent unpredictability of individual responses to these treatments. The patient expresses understanding of the above and displays the capacity to agree with this current plan and had no other questions.  Encouraged Patient to keep future appointments.   Take medications as prescribed   In the event of an emergency patient was advised to go to the emergency room.      Return to Clinic: 2 months, 3 months or earlier as needed    YI Kwon-BC

## 2023-10-11 ENCOUNTER — PATIENT MESSAGE (OUTPATIENT)
Dept: INTERNAL MEDICINE | Facility: CLINIC | Age: 48
End: 2023-10-11
Payer: COMMERCIAL

## 2023-10-13 ENCOUNTER — TELEPHONE (OUTPATIENT)
Dept: PAIN MEDICINE | Facility: CLINIC | Age: 48
End: 2023-10-13
Payer: COMMERCIAL

## 2023-10-16 ENCOUNTER — OFFICE VISIT (OUTPATIENT)
Dept: PAIN MEDICINE | Facility: CLINIC | Age: 48
End: 2023-10-16
Payer: COMMERCIAL

## 2023-10-16 VITALS
SYSTOLIC BLOOD PRESSURE: 170 MMHG | BODY MASS INDEX: 42.66 KG/M2 | HEART RATE: 92 BPM | HEIGHT: 72 IN | TEMPERATURE: 97 F | OXYGEN SATURATION: 100 % | RESPIRATION RATE: 18 BRPM | WEIGHT: 315 LBS | DIASTOLIC BLOOD PRESSURE: 92 MMHG

## 2023-10-16 DIAGNOSIS — M79.18 MYOFASCIAL PAIN: ICD-10-CM

## 2023-10-16 DIAGNOSIS — M72.2 PLANTAR FASCIITIS: Primary | ICD-10-CM

## 2023-10-16 PROCEDURE — 99999 PR PBB SHADOW E&M-EST. PATIENT-LVL V: ICD-10-PCS | Mod: PBBFAC,,, | Performed by: ANESTHESIOLOGY

## 2023-10-16 PROCEDURE — 3080F DIAST BP >= 90 MM HG: CPT | Mod: CPTII,S$GLB,, | Performed by: ANESTHESIOLOGY

## 2023-10-16 PROCEDURE — 3008F PR BODY MASS INDEX (BMI) DOCUMENTED: ICD-10-PCS | Mod: CPTII,S$GLB,, | Performed by: ANESTHESIOLOGY

## 2023-10-16 PROCEDURE — 3008F BODY MASS INDEX DOCD: CPT | Mod: CPTII,S$GLB,, | Performed by: ANESTHESIOLOGY

## 2023-10-16 PROCEDURE — 3044F HG A1C LEVEL LT 7.0%: CPT | Mod: CPTII,S$GLB,, | Performed by: ANESTHESIOLOGY

## 2023-10-16 PROCEDURE — 3044F PR MOST RECENT HEMOGLOBIN A1C LEVEL <7.0%: ICD-10-PCS | Mod: CPTII,S$GLB,, | Performed by: ANESTHESIOLOGY

## 2023-10-16 PROCEDURE — 1159F MED LIST DOCD IN RCRD: CPT | Mod: CPTII,S$GLB,, | Performed by: ANESTHESIOLOGY

## 2023-10-16 PROCEDURE — 3077F PR MOST RECENT SYSTOLIC BLOOD PRESSURE >= 140 MM HG: ICD-10-PCS | Mod: CPTII,S$GLB,, | Performed by: ANESTHESIOLOGY

## 2023-10-16 PROCEDURE — 3080F PR MOST RECENT DIASTOLIC BLOOD PRESSURE >= 90 MM HG: ICD-10-PCS | Mod: CPTII,S$GLB,, | Performed by: ANESTHESIOLOGY

## 2023-10-16 PROCEDURE — 3077F SYST BP >= 140 MM HG: CPT | Mod: CPTII,S$GLB,, | Performed by: ANESTHESIOLOGY

## 2023-10-16 PROCEDURE — 99214 PR OFFICE/OUTPT VISIT, EST, LEVL IV, 30-39 MIN: ICD-10-PCS | Mod: S$GLB,,, | Performed by: ANESTHESIOLOGY

## 2023-10-16 PROCEDURE — 99214 OFFICE O/P EST MOD 30 MIN: CPT | Mod: S$GLB,,, | Performed by: ANESTHESIOLOGY

## 2023-10-16 PROCEDURE — 1159F PR MEDICATION LIST DOCUMENTED IN MEDICAL RECORD: ICD-10-PCS | Mod: CPTII,S$GLB,, | Performed by: ANESTHESIOLOGY

## 2023-10-16 PROCEDURE — 99999 PR PBB SHADOW E&M-EST. PATIENT-LVL V: CPT | Mod: PBBFAC,,, | Performed by: ANESTHESIOLOGY

## 2023-10-16 NOTE — PROGRESS NOTES
Chronic Pain Established Patient Note (Follow up visit)      SUBJECTIVE:  Interval History 10/16/23:  Notes his plantar fasciitis has returned. He has not done PT, icing, stretching or used a night splint. He notes his wife has had 2 cervical spine surgeries in the last year so he has not had the capacity to take it easy on his plantar fasciitis.   His primary back pain on the right side between is scapula and his spinous processes. 7/10 currently. 10/10 at worst.  Sharp attacks with dull/aching tail, not every day. Worse if picking something up wrong. Has not done massage, has not used a TENS unit. Used voltaren, which helps to an extent. Peters not have a stretching routine. Denies weakness, numbness, tingling, changes in bowel or bladder function. Has had significant weight loss over prior years from bariatric surgery. Is having gall bladder surgery this coming Thursday.     Interval History 8/23/23:  Jered Contreras presents tele-medicine appointment for a follow-up appointment for right shoulder pain. We have seen him in the past for his plantar fasciitis but his main complaint today was his right shoulder pain Since the last visit, Jered Contreras states the pain has been worsening. Current pain intensity is 6/10. He describes the pain being worse when he wakes up in the morning but also when he rotates his right shoulder. He also noticed the pain flares up when he bends over to  items with his right arm.      Initital HPI 7/28/23: Jered Contreras presents to the clinic for the evaluation of the above pain. The pain started 2 years ago after no particular event. He blames it on his weight.      Original Pain Description:  The pain is located in the L>R plantar fascia and does not radiate. The pain is described as stabbing. Exacerbating factors: Walking, rain. Mitigating factors rest. Symptoms interfere with daily activity and work. The patient feels like symptoms have been  unchanged.      Original PAIN SCORES:  Best: Pain is 2  Worst: Pain is 9  Current: Pain is 3    Pain Medications:  Flexeril 5mg QHS     6 weeks of Conservative therapy:  PT: None (Must include dates)  Chiro: No  HEP: HEP prescribed by podiatry which he does every morning         Treatments / Medications: (Ice/Heat/NSAIDS/APAP/etc):  Ice  Heat  Ibuprofen  Aleve  Biofreeze        Interventional Pain Procedures: (Previous injections)  None    Imaging:  XR FOOT COMPLETE 3 VIEW LEFT     CLINICAL HISTORY:  5th met head pain;.  Pain in left foot     TECHNIQUE:  AP, lateral and oblique views of the left foot were performed.     COMPARISON:  None     FINDINGS:  No acute fracture or dislocation seen.  Small plantar calcaneal spur.  No significant soft tissue edema or radiopaque retained foreign body.     Impression:     No acute osseous abnormality seen.        Electronically signed by: Ale Sanchez  Date:                                            10/24/2022  Time:                                           15:32     MRI of the LEFT ankle     CLINICAL HISTORY:  Foot pain evaluate for stress fracture     TECHNIQUE:  Multiplanar multisequence MRI examination of the LEFT ankle.  Postcontrast images after 10 cc Gadavist     COMPARISON:  Radiograph of 10/24/2022     FINDINGS:  **MEDIAL COMPARTMENT     Posterior tibial tendon: Intact.No tendinosis.No tenosynovitis.     Flexor digitorum longus tendon: Normal.     Superficial Deltoid: Intact.     Deep Deltoid: Intact.     Tibiospring/ SM Calcaneonavicular (Spring)/Inferoplantar Complex: Intact     **LATERAL COMPARTMENT     Peroneus longus: Intact.     Peroneus brevis: Intact.     Superior peroneal retinaculum: Intact     Ligaments:     Interosseous (syndesmosis): Intact.     Anterior inferior tibiofibular (syndesmosis): Intact.     Posterior inferior tibiofibular (syndesmosis): Intact.     Anterior talofibular ligament: Intact.     Calcaneofibular ligament: Intact.     Posterior  talofibular ligament: Intact.     **POSTERIOR COMPARTMENT     Flexor hallucis longus: Normal.     Posterior talus: Normal.     Achilles tendon: Normal.     Plantar fascia: There is minimal edema of the soft tissues plantar to the plantar fascia.  There is  plantar fascial thickening measuring greater than 4 mm.. There is mild osseous edema at the calcaneal insertion of the plantar fascia and calcaneal spur formation. There is no rupture of the fascia. There is no evidence of plantar fibromatosis. Findings are nonspecific yet can be seen with with plantar fasciitis..Lateral cord of plantar fascia demonstrates normal appearance and insertion upon the base of the 5th MT.     **ANTERIOR COMPARTMENT     Tendons:     Anterior tibial tendon: Normal.  Insertion intact.     Extensor hallucis longus: Normal.     Extensor digitorum longus: Normal.     Ligaments:     Dorsal talonavicular ligament: Intact.     **ARTICULATIONS:     Tibiotalar joint: Normal.  No evidence for anterior osseous spurs.     Subtalar joint: Normal.     Talonavicular joint: Normal.     Calcaneocuboid joint: Normal     Talus and calcaneus: Intact lateral process of talus and anterior process of calcaneus without fracture.     **GENERAL FINDINGS     Osseous Structures: No evidence of fracture     Muscles: Normal.     Nerves and tarsal tunnel: Normal.     Sinus tarsi: Normal.     Diffuse subcutaneous edema.     Impression:     Diffuse subcutaneous edema about the ankle.     Plantar fascial thickening, with minimal edema of calcaneal spur nonspecific finding, can be seen in plantar fasciitis in the appropriate setting        Electronically signed by: Quinten Kelly MD  Date:                                            03/13/2023  Time:                                           11:49     Past Medical History:   Diagnosis Date    Allergic rhinitis     Asthma     Corneal abrasion     Depression     Hx of psychiatric care     Morbid obesity     Obstructive sleep  apnea treated with continuous positive airway pressure (CPAP) 07/10/2018    Psychiatric problem     Therapy      Past Surgical History:   Procedure Laterality Date    APPENDECTOMY      APPLICATION OF CARTILAGE GRAFT Bilateral 2021    Procedure: APPLICATION, CARTILAGE GRAFT;  Surgeon: JAN Arredondo MD;  Location: [facility];  Service: ENT;  Laterality: Bilateral;  from right ear    gastric sleeve  2019    [facility] [place] [facility]    NASAL SEPTOPLASTY Bilateral 2021    Procedure: SEPTOPLASTY, NOSE;  Surgeon: JAN Arredondo MD;  Location: [facility] OR;  Service: ENT;  Laterality: Bilateral;    NASAL STENOSIS REPAIR Bilateral 2021    Procedure: REPAIR, STENOSIS, NOSE, VESTIBULE;  Surgeon: JAN Arredondo MD;  Location: [facility] OR;  Service: ENT;  Laterality: Bilateral;    SINUS SURGERY      VASECTOMY       Social History     Socioeconomic History    Marital status:    Occupational History    Occupation: Information Tech     Employer: [employer]   Tobacco Use    Smoking status: Never     Passive exposure: Never    Smokeless tobacco: Never   Substance and Sexual Activity    Alcohol use: Yes     Alcohol/week: 1.0 standard drink of alcohol     Types: 1 Cans of beer per week     Comment: RARELY    Drug use: No    Sexual activity: Yes     Partners: Female     Family History   Problem Relation Age of Onset    Allergies Mother     Rheum arthritis Mother     Lung cancer Mother         post lobectomy    Dementia Mother     Glaucoma Mother     Cataracts Mother     Diabetes Father     Hypertension Father     No Known Problems Maternal Aunt     No Known Problems Maternal Uncle     No Known Problems Paternal Aunt     No Known Problems Paternal Uncle     Allergies Maternal Grandmother     Hypertension Maternal Grandmother     Dementia Maternal Grandmother     Brain cancer Maternal Grandmother         in remission before she     Rheum arthritis Maternal Grandfather     Diabetes Paternal  Grandmother     Alzheimer's disease Paternal Grandmother     Hypertension Paternal Grandfather     Obesity Paternal Grandfather     Cerebral aneurysm Paternal Grandfather     No Known Problems Other     Melanoma Neg Hx        Review of patient's allergies indicates:   Allergen Reactions    Ceclor [cefaclor] Anaphylaxis    Penicillins Hives     Elevated blood pressure, temp, hives    Calcium alginate Other (See Comments)     Headache       Current Outpatient Medications   Medication Sig    ASCORBATE CALCIUM (VITAMIN C ORAL) Take by mouth.    azelastine (ASTELIN) 137 mcg (0.1 %) nasal spray 2 sprays (274 mcg total) by Nasal route 2 (two) times daily as needed (Nasal congestion).    b complex vitamins (B COMPLEX-VITAMIN B12) tablet Take 1 tablet by mouth once daily.    buPROPion (WELLBUTRIN XL) 300 MG 24 hr tablet Take 1 tablet (300 mg total) by mouth once daily.    cyclobenzaprine (FLEXERIL) 5 MG tablet Take 1 tablet (5 mg total) by mouth nightly.    DULoxetine (CYMBALTA) 30 MG capsule Take 1 capsule (30 mg total) by mouth once daily.    DULoxetine (CYMBALTA) 60 MG capsule Take 1 capsule (60 mg total) by mouth once daily.    fluticasone propionate (FLONASE) 50 mcg/actuation nasal spray One spray in each nostril twice daily after 1st using azelastine nasal spray    hydrOXYzine HCL (ATARAX) 25 MG tablet TK 1 T PO  TID PRN ANXIETY    ipratropium (ATROVENT) 21 mcg (0.03 %) nasal spray 2 sprays by Nasal route 3 (three) times daily as needed for Rhinitis (runing nose).    lamoTRIgine (LAMICTAL) 100 MG tablet Take 1 tablet (100 mg total) by mouth once daily.    montelukast (SINGULAIR) 10 mg tablet Take 1 tablet (10 mg total) by mouth every evening. Take during allergy season.    multivitamin capsule Take 1 capsule by mouth once daily.    nitrofurantoin, macrocrystal-monohydrate, (MACROBID) 100 MG capsule Take 1 capsule (100 mg total) by mouth 2 (two) times daily.    pneumoc 20-conor conj-dip cr,PF, (PREVNAR-20, PF,) 0.5 mL  Syrg injection Inject 0.5 mLs into the muscle.    rOPINIRole (REQUIP) 0.5 MG tablet Take 2 tablets (1 mg total) by mouth every evening.    urea (CARMOL) 40 % Crea Apply topically.     No current facility-administered medications for this visit.       REVIEW OF SYSTEMS:    GENERAL:  No weight loss, malaise or fevers.  HEENT:   No recent changes in vision or hearing  NECK:  Negative for lumps, no difficulty with swallowing.  RESPIRATORY:  Negative for cough, wheezing or shortness of breath, patient denies any recent URI.  CARDIOVASCULAR:  Negative for chest pain, leg swelling or palpitations.  GI:  Negative for abdominal discomfort, blood in stools or black stools or change in bowel habits.  MUSCULOSKELETAL:  See HPI. + right shoulder/back pain  SKIN:  Negative for lesions, rash, and itching.  PSYCH:  No mood disorder or recent psychosocial stressors.  Patients sleep is not disturbed secondary to pain.  HEMATOLOGY/LYMPHOLOGY:  Negative for prolonged bleeding, bruising easily or swollen nodes.  Patient is not currently taking any anti-coagulants  NEURO:   No history of headaches, syncope, paralysis, seizures or tremors.  All other reviewed and negative other than HPI.    OBJECTIVE:  GENERAL: Well appearing, in no acute distress, alert and oriented x3.  PSYCH:  Mood and affect context appropriate.  SKIN: Skin color, texture, turgor normal, no rashes or lesions.  HEENT:  Normocephalic, atraumatic. Cranial nerves grossly intact.  NECK: No pain to palpation over the cervical paraspinous muscles. No pain to palpation over facets. Pain reproduced with palpation of right rhomboids, middle and inferior trapezius. No pain with neck flexion, extension, or lateral flexion.   PULM: Regular respiratory rate without accessory muscle use and symmetric chest excursion  GI:  Non-distended  BACK: Normal range of motion. No pain to palpation over the spinous processes. No pain to palpation over facet joints. There is no pain with  palpation over the sacroiliac joints bilaterally.   EXTREMITIES: No deformities, edema, or skin discoloration.   MUSCULOSKELETAL: Shoulder, hip, and knee provocative maneuvers are negative. No atrophy is noted. Foot pain is reproduced with palpation of L>R plantar fascia (just distal to calcaneous.)  NEURO: Sensation is equal and appropriate bilaterally. Bilateral upper and lower extremity strength is normal and symmetric. Bilateral upper and lower extremity coordination and muscle stretch reflexes are physiologic and symmetric. Plantar response are downgoing. Straight leg raising in the supine position is negative to radicular pain.   GAIT: normal.      ASSESSMENT: 48 y.o. year old male with right shoulder/back pain, consistent with     1. Plantar fasciitis  Ambulatory referral/consult to Physical/Occupational Therapy      2. 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 He comes to us mainly for his right rhomboid pain and bilateral foot pain. It seems to run in his family. He was seeing podiatry and was diagnosed with plantar fasciitis. Our exam was consistent with this as well. He gets a flare up of pain approximately monthly and a severe flare up approximately 1-2X/year. They had previously prescribed opioids for this, but he found he could manage most with Naproxen.         PLAN:   1) Continue flexeril 5mg QHS  2) Continue Voltaren gel PRN  3) Naproxen PRN for flare ups  4) Ambulatory referral to Physical Therapy for stretching and HEP program for both his plantar fasciitis and his rhomboid myofascial pain.  5) Continue daily heat therapy on right back/shoulder pain  6) RTC 6-8 weeks    The above plan and management options were discussed at length with patient. Patient is in agreement with the above and verbalized understanding. It will be communicated with the referring physician via  electronic record, fax, or mail.    Lamonte Leon MD  [facility] Pain Fellow  10/16/2023

## 2023-11-01 ENCOUNTER — PATIENT MESSAGE (OUTPATIENT)
Dept: PSYCHIATRY | Facility: CLINIC | Age: 48
End: 2023-11-01
Payer: COMMERCIAL

## 2023-11-08 ENCOUNTER — OFFICE VISIT (OUTPATIENT)
Dept: PSYCHIATRY | Facility: CLINIC | Age: 48
End: 2023-11-08
Payer: COMMERCIAL

## 2023-11-08 DIAGNOSIS — F33.1 MDD (MAJOR DEPRESSIVE DISORDER), RECURRENT EPISODE, MODERATE: ICD-10-CM

## 2023-11-08 DIAGNOSIS — F41.1 GAD (GENERALIZED ANXIETY DISORDER): Primary | ICD-10-CM

## 2023-11-08 PROCEDURE — 90837 PR PSYCHOTHERAPY W/PATIENT, 60 MIN: ICD-10-PCS | Mod: 95,,, | Performed by: SOCIAL WORKER

## 2023-11-08 PROCEDURE — 3044F HG A1C LEVEL LT 7.0%: CPT | Mod: CPTII,95,, | Performed by: SOCIAL WORKER

## 2023-11-08 PROCEDURE — 3044F PR MOST RECENT HEMOGLOBIN A1C LEVEL <7.0%: ICD-10-PCS | Mod: CPTII,95,, | Performed by: SOCIAL WORKER

## 2023-11-08 PROCEDURE — 90837 PSYTX W PT 60 MINUTES: CPT | Mod: 95,,, | Performed by: SOCIAL WORKER

## 2023-11-08 NOTE — PROGRESS NOTES
"Individual Psychotherapy (PhD/LCSW)    11/8/2023    Site:  Telemed         Therapeutic Intervention: Met with patient.  Outpatient - Supportive psychotherapy 60 min - CPT Code 35707    Chief complaint/reason for encounter: depression, anger, and anxiety     Interval history and content of current session: States that he and his wife have recently had two neck surgery and states that they have been arguing quite a bit- states that mom recently had a knee replacement and had allergies to a medication, states that she required constant caring for.   States that his wife is in counseling and has a lot of PTSD from a previous relationship; states that he does feel supported by her.   This is the second marriage for both he and his wife. States that they need to work on compromising.     States that his wife is possibly going to be out of a job soon and they are needing health insurance, he endorses some socio-economic stressors.  Processes having trust issues after learning that his ex wife had been cheating on him with his best friend. States that his ex wife has sued him for past credit card debts that he had with her. States that he is ready to be done with his ex wife, "it constantly brings up old feelings." States that theses issues with his ex wife causes issues with his current wife,     Treatment plan:  Target symptoms: depression, distractability, lack of focus, recurrent depression, anxiety , mood swings  Why chosen therapy is appropriate versus another modality: relevant to diagnosis, patient responds to this modality, evidence based practice  Outcome monitoring methods: self-report, observation  Therapeutic intervention type: insight oriented psychotherapy, behavior modifying psychotherapy, supportive psychotherapy, interactive psychotherapy    Risk parameters:  Patient reports no suicidal ideation  Patient reports no homicidal ideation  Patient reports no self-injurious behavior  Patient reports no violent " behavior    Verbal deficits: None    Patient's response to intervention:  The patient's response to intervention is accepting.    Progress toward goals and other mental status changes:  The patient's progress toward goals is fair .    Diagnosis:     ICD-10-CM ICD-9-CM   1. WAYNE (generalized anxiety disorder)  F41.1 300.02   2. MDD (major depressive disorder), recurrent episode, moderate  F33.1 296.32       Plan:  individual psychotherapy and family psychotherapy    Return to clinic: 3 weeks    Length of Service (minutes): 60    The patient location is: [place], [place]  The chief complaint leading to consultation is: Anger/Anxiety    Visit type: audiovisual    Face to Face time with patient: 53  60   minutes of total time spent on the encounter, which includes face to face time and non-face to face time preparing to see the patient (eg, review of tests), Obtaining and/or reviewing separately obtained history, Documenting clinical information in the electronic or other health record, Independently interpreting results (not separately reported) and communicating results to the patient/family/caregiver, or Care coordination (not separately reported).   Each patient to whom he or she provides medical services by telemedicine is:  (1) informed of the relationship between the physician and patient and the respective role of any other health care provider with respect to management of the patient; and (2) notified that he or she may decline to receive medical services by telemedicine and may withdraw from such care at any time.    Notes:

## 2023-11-13 ENCOUNTER — PATIENT MESSAGE (OUTPATIENT)
Dept: ADMINISTRATIVE | Facility: HOSPITAL | Age: 48
End: 2023-11-13
Payer: COMMERCIAL

## 2023-11-20 ENCOUNTER — OFFICE VISIT (OUTPATIENT)
Dept: INTERNAL MEDICINE | Facility: CLINIC | Age: 48
End: 2023-11-20
Payer: COMMERCIAL

## 2023-11-20 ENCOUNTER — OFFICE VISIT (OUTPATIENT)
Dept: PODIATRY | Facility: CLINIC | Age: 48
End: 2023-11-20
Payer: COMMERCIAL

## 2023-11-20 VITALS
BODY MASS INDEX: 42.66 KG/M2 | HEIGHT: 72 IN | WEIGHT: 315 LBS | HEART RATE: 65 BPM | SYSTOLIC BLOOD PRESSURE: 137 MMHG | DIASTOLIC BLOOD PRESSURE: 81 MMHG

## 2023-11-20 VITALS
HEIGHT: 72 IN | OXYGEN SATURATION: 97 % | DIASTOLIC BLOOD PRESSURE: 70 MMHG | BODY MASS INDEX: 42.66 KG/M2 | SYSTOLIC BLOOD PRESSURE: 122 MMHG | WEIGHT: 315 LBS | TEMPERATURE: 98 F | HEART RATE: 76 BPM

## 2023-11-20 DIAGNOSIS — B35.3 TINEA PEDIS OF BOTH FEET: ICD-10-CM

## 2023-11-20 DIAGNOSIS — I87.2 VENOUS INSUFFICIENCY OF BOTH LOWER EXTREMITIES: ICD-10-CM

## 2023-11-20 DIAGNOSIS — F41.1 GAD (GENERALIZED ANXIETY DISORDER): Chronic | ICD-10-CM

## 2023-11-20 DIAGNOSIS — F33.1 MDD (MAJOR DEPRESSIVE DISORDER), RECURRENT EPISODE, MODERATE: Chronic | ICD-10-CM

## 2023-11-20 DIAGNOSIS — I89.0 LYMPHEDEMA OF BOTH LOWER EXTREMITIES: Primary | ICD-10-CM

## 2023-11-20 DIAGNOSIS — B35.3 TINEA PEDIS OF LEFT FOOT: Primary | ICD-10-CM

## 2023-11-20 DIAGNOSIS — I89.0 LYMPHEDEMA: ICD-10-CM

## 2023-11-20 PROCEDURE — 3079F PR MOST RECENT DIASTOLIC BLOOD PRESSURE 80-89 MM HG: ICD-10-PCS | Mod: CPTII,S$GLB,, | Performed by: PODIATRIST

## 2023-11-20 PROCEDURE — 99213 OFFICE O/P EST LOW 20 MIN: CPT | Mod: S$GLB,,, | Performed by: PHYSICIAN ASSISTANT

## 2023-11-20 PROCEDURE — 1160F RVW MEDS BY RX/DR IN RCRD: CPT | Mod: CPTII,S$GLB,, | Performed by: PHYSICIAN ASSISTANT

## 2023-11-20 PROCEDURE — 99999 PR PBB SHADOW E&M-EST. PATIENT-LVL III: CPT | Mod: PBBFAC,,, | Performed by: PODIATRIST

## 2023-11-20 PROCEDURE — 3075F PR MOST RECENT SYSTOLIC BLOOD PRESS GE 130-139MM HG: ICD-10-PCS | Mod: CPTII,S$GLB,, | Performed by: PODIATRIST

## 2023-11-20 PROCEDURE — 3078F PR MOST RECENT DIASTOLIC BLOOD PRESSURE < 80 MM HG: ICD-10-PCS | Mod: CPTII,S$GLB,, | Performed by: PHYSICIAN ASSISTANT

## 2023-11-20 PROCEDURE — 3008F BODY MASS INDEX DOCD: CPT | Mod: CPTII,S$GLB,, | Performed by: PODIATRIST

## 2023-11-20 PROCEDURE — 3075F SYST BP GE 130 - 139MM HG: CPT | Mod: CPTII,S$GLB,, | Performed by: PODIATRIST

## 2023-11-20 PROCEDURE — 99214 OFFICE O/P EST MOD 30 MIN: CPT | Mod: S$GLB,,, | Performed by: PODIATRIST

## 2023-11-20 PROCEDURE — 99213 PR OFFICE/OUTPT VISIT, EST, LEVL III, 20-29 MIN: ICD-10-PCS | Mod: S$GLB,,, | Performed by: PHYSICIAN ASSISTANT

## 2023-11-20 PROCEDURE — 3008F PR BODY MASS INDEX (BMI) DOCUMENTED: ICD-10-PCS | Mod: CPTII,S$GLB,, | Performed by: PHYSICIAN ASSISTANT

## 2023-11-20 PROCEDURE — 3044F HG A1C LEVEL LT 7.0%: CPT | Mod: CPTII,S$GLB,, | Performed by: PHYSICIAN ASSISTANT

## 2023-11-20 PROCEDURE — 3008F PR BODY MASS INDEX (BMI) DOCUMENTED: ICD-10-PCS | Mod: CPTII,S$GLB,, | Performed by: PODIATRIST

## 2023-11-20 PROCEDURE — 3044F PR MOST RECENT HEMOGLOBIN A1C LEVEL <7.0%: ICD-10-PCS | Mod: CPTII,S$GLB,, | Performed by: PODIATRIST

## 2023-11-20 PROCEDURE — 3079F DIAST BP 80-89 MM HG: CPT | Mod: CPTII,S$GLB,, | Performed by: PODIATRIST

## 2023-11-20 PROCEDURE — 1160F PR REVIEW ALL MEDS BY PRESCRIBER/CLIN PHARMACIST DOCUMENTED: ICD-10-PCS | Mod: CPTII,S$GLB,, | Performed by: PHYSICIAN ASSISTANT

## 2023-11-20 PROCEDURE — 99999 PR PBB SHADOW E&M-EST. PATIENT-LVL V: ICD-10-PCS | Mod: PBBFAC,,, | Performed by: PHYSICIAN ASSISTANT

## 2023-11-20 PROCEDURE — 1159F MED LIST DOCD IN RCRD: CPT | Mod: CPTII,S$GLB,, | Performed by: PHYSICIAN ASSISTANT

## 2023-11-20 PROCEDURE — 99999 PR PBB SHADOW E&M-EST. PATIENT-LVL III: ICD-10-PCS | Mod: PBBFAC,,, | Performed by: PODIATRIST

## 2023-11-20 PROCEDURE — 3078F DIAST BP <80 MM HG: CPT | Mod: CPTII,S$GLB,, | Performed by: PHYSICIAN ASSISTANT

## 2023-11-20 PROCEDURE — 3074F PR MOST RECENT SYSTOLIC BLOOD PRESSURE < 130 MM HG: ICD-10-PCS | Mod: CPTII,S$GLB,, | Performed by: PHYSICIAN ASSISTANT

## 2023-11-20 PROCEDURE — 99214 PR OFFICE/OUTPT VISIT, EST, LEVL IV, 30-39 MIN: ICD-10-PCS | Mod: S$GLB,,, | Performed by: PODIATRIST

## 2023-11-20 PROCEDURE — 3008F BODY MASS INDEX DOCD: CPT | Mod: CPTII,S$GLB,, | Performed by: PHYSICIAN ASSISTANT

## 2023-11-20 PROCEDURE — 3074F SYST BP LT 130 MM HG: CPT | Mod: CPTII,S$GLB,, | Performed by: PHYSICIAN ASSISTANT

## 2023-11-20 PROCEDURE — 3044F HG A1C LEVEL LT 7.0%: CPT | Mod: CPTII,S$GLB,, | Performed by: PODIATRIST

## 2023-11-20 PROCEDURE — 1159F PR MEDICATION LIST DOCUMENTED IN MEDICAL RECORD: ICD-10-PCS | Mod: CPTII,S$GLB,, | Performed by: PHYSICIAN ASSISTANT

## 2023-11-20 PROCEDURE — 99999 PR PBB SHADOW E&M-EST. PATIENT-LVL V: CPT | Mod: PBBFAC,,, | Performed by: PHYSICIAN ASSISTANT

## 2023-11-20 PROCEDURE — 3044F PR MOST RECENT HEMOGLOBIN A1C LEVEL <7.0%: ICD-10-PCS | Mod: CPTII,S$GLB,, | Performed by: PHYSICIAN ASSISTANT

## 2023-11-20 RX ORDER — HYDROCODONE BITARTRATE AND ACETAMINOPHEN 7.5; 325 MG/1; MG/1
1 TABLET ORAL EVERY 4 HOURS
COMMUNITY
Start: 2023-10-19 | End: 2024-01-11

## 2023-11-20 RX ORDER — DOXYCYCLINE 100 MG/1
CAPSULE ORAL
COMMUNITY
Start: 2023-09-21 | End: 2024-01-11

## 2023-11-20 RX ORDER — LAMOTRIGINE 100 MG/1
TABLET ORAL
COMMUNITY
Start: 2023-09-10

## 2023-11-20 RX ORDER — CYCLOBENZAPRINE HCL 5 MG
1 TABLET ORAL NIGHTLY
COMMUNITY
Start: 2023-07-28

## 2023-11-20 RX ORDER — DOXYCYCLINE HYCLATE 100 MG
TABLET ORAL
COMMUNITY
Start: 2023-08-01 | End: 2024-01-11

## 2023-11-20 RX ORDER — CLOTRIMAZOLE AND BETAMETHASONE DIPROPIONATE 10; .64 MG/G; MG/G
CREAM TOPICAL 2 TIMES DAILY
Qty: 45 G | Refills: 3 | Status: SHIPPED | OUTPATIENT
Start: 2023-11-20

## 2023-11-20 RX ORDER — SEMAGLUTIDE 0.5 MG/.5ML
0.5 INJECTION, SOLUTION SUBCUTANEOUS
COMMUNITY
Start: 2023-10-31 | End: 2023-11-22

## 2023-11-20 RX ORDER — SEMAGLUTIDE 1 MG/.5ML
INJECTION, SOLUTION SUBCUTANEOUS
COMMUNITY

## 2023-11-20 NOTE — PROGRESS NOTES
Subjective:      Patient ID: Jered Contreras is a 48 y.o. male.    Chief Complaint: Foot Problem (Bilateral foot problems. L foot ball of foot white skin. R heel discoloration. Pt stated he noticed heel discoloration on Thursday)    Pt present today c/o discoloration on both of his heels. Pt is also c/o some skin and tinea on the bottom of his left foot. Pt has lymphedema and is looking for referral to therapy/treatment.     Review of Systems   Constitutional: Negative for chills, fever and malaise/fatigue.   HENT:  Negative for hearing loss.    Cardiovascular:  Positive for leg swelling. Negative for claudication.   Respiratory:  Negative for shortness of breath.    Skin:  Positive for color change, dry skin, nail changes, rash and unusual hair distribution. Negative for flushing.   Musculoskeletal:  Negative for joint pain and myalgias.   Neurological:  Negative for loss of balance, numbness, paresthesias and sensory change.   Psychiatric/Behavioral:  Negative for altered mental status.            Objective:      Physical Exam  Vitals reviewed.   Cardiovascular:      Pulses:           Dorsalis pedis pulses are 2+ on the right side and 2+ on the left side.        Posterior tibial pulses are 2+ on the right side and 2+ on the left side.   Musculoskeletal:      Right lower leg: Edema present.      Left lower leg: Edema present.      Comments:        Feet:      Right foot:      Protective Sensation: 5 sites tested.  5 sites sensed.      Left foot:      Protective Sensation: 5 sites tested.  5 sites sensed.   Skin:     General: Skin is warm.      Capillary Refill: Capillary refill takes 2 to 3 seconds.      Comments: right heel, some dark green material noted, rubbed off with alcohol pad    Left plantar foot, some peeling, erythema, and flaking noted  Interspaces intact   Neurological:      Mental Status: He is alert.      Comments: Gross sensation intact b/L               Assessment:       Encounter  Diagnosis   Name Primary?    Tinea pedis of left foot Yes         Plan:       Jered was seen today for foot problem.    Diagnoses and all orders for this visit:    Tinea pedis of left foot    Other orders  -     clotrimazole-betamethasone 1-0.05% (LOTRISONE) cream; Apply topically 2 (two) times daily.      I counseled the patient on his conditions, their implications and medical management.      Medical records reviewed.   Referral sent to PT for lymphedema    Pt advised the discoloration on his heels seems to be some sort of dye or material    Rx lotrisone for tinea    Call or return to clinic prn if these symptoms worsen or fail to improve as anticipated.            .

## 2023-11-20 NOTE — PROGRESS NOTES
"Subjective:       Patient ID: Jered Contreras is a 48 y.o. male.        Chief Complaint: Recurrent Skin Infections    Jered Contreras is an established patient of Angela Dacosta MD here today for urgent care visit.    Heel of right foot and left foot with "spots" and calluses  Noticed last week, not painful, but with greenish discoloration that seems strange  Nothing green in his shoes  No redness or warmth  Also with flaking and peeling of feet  Tries to check his feet daily  No fever  He has chronic lymphedema, has seen lymphedema clinic at times in past, compression stockings are hard to get on, has pneumatic compression at home, limits sodium    He has had wounds in past and seen wound clinic           Review of Systems   Constitutional:  Negative for activity change, appetite change, chills, fatigue, fever and unexpected weight change.   HENT:  Negative for congestion, hearing loss, rhinorrhea, sore throat and trouble swallowing.    Eyes:  Negative for discharge and visual disturbance.   Respiratory:  Negative for cough, chest tightness, shortness of breath and wheezing.    Cardiovascular:  Negative for chest pain, palpitations and leg swelling.   Gastrointestinal:  Negative for abdominal pain, blood in stool, constipation, diarrhea, nausea and vomiting.   Endocrine: Negative for polydipsia and polyuria.   Genitourinary:  Negative for difficulty urinating, dysuria, frequency, hematuria and urgency.   Musculoskeletal:  Negative for arthralgias, back pain and neck pain.   Skin:  Positive for color change and wound. Negative for rash.   Neurological:  Negative for dizziness, syncope, weakness and headaches.   Psychiatric/Behavioral:  Negative for confusion, dysphoric mood and sleep disturbance. The patient is not nervous/anxious.        Objective:      Physical Exam  Constitutional:       General: He is not in acute distress.     Appearance: He is well-developed. He is not diaphoretic. " "  HENT:      Head: Normocephalic and atraumatic.      Left Ear: External ear normal.   Pulmonary:      Effort: Pulmonary effort is normal.   Abdominal:      Palpations: Abdomen is soft.   Musculoskeletal:      Cervical back: Neck supple.      Comments: Legs bilaterally with lymphedema   Skin:     General: Skin is warm and dry.   Neurological:      Mental Status: He is alert.                     Assessment:       1. Lymphedema of both lower extremities    2. Venous insufficiency of both lower extremities    3. MDD (major depressive disorder), recurrent episode, moderate    4. WAYNE (generalized anxiety disorder)    5. Tinea pedis of both feet        Plan:       Jered was seen today for recurrent skin infections.    Diagnoses and all orders for this visit:    Lymphedema of both lower extremities    Venous insufficiency of both lower extremities    MDD (major depressive disorder), recurrent episode, moderate - stable and controlled, continue current regimen    WAYNE (generalized anxiety disorder) - stable and controlled, continue current regimen    Tinea pedis of both feet - no open wounds  -     Ambulatory referral/consult to Podiatry; Future    Refer to podiatry    Pt has been given instructions populated from patient instructions database and has verbalized understanding of the after visit summary and information contained wherein.    Follow up with a primary care provider. May go to ER for acute shortness of breath, lightheadedness, fever, or any other emergent complaints or changes in condition.    "This note will be shared with the patient"    Future Appointments   Date Time Provider Department Center   11/22/2023 12:00 PM [facility] SOC [facility]   11/27/2023  3:00 PM Tanisha Reaves FNP [facility]   12/6/2023 12:00 PM Regis Hay LCSW [facility] SOCL [facility]   12/26/2023 10:00 AM Tanisha Chase NP [facility] UROLOGY [facility]   3/14/2024  3:00 PM Jimmy, " [facility]IA [facility]

## 2023-11-22 ENCOUNTER — PATIENT MESSAGE (OUTPATIENT)
Dept: PSYCHIATRY | Facility: CLINIC | Age: 48
End: 2023-11-22
Payer: COMMERCIAL

## 2023-12-08 ENCOUNTER — PATIENT MESSAGE (OUTPATIENT)
Dept: PSYCHIATRY | Facility: CLINIC | Age: 48
End: 2023-12-08
Payer: COMMERCIAL

## 2023-12-13 ENCOUNTER — TELEPHONE (OUTPATIENT)
Dept: WOUND CARE | Facility: CLINIC | Age: 48
End: 2023-12-13
Payer: COMMERCIAL

## 2023-12-13 NOTE — TELEPHONE ENCOUNTER
Called patient regarding message about open wound to [facility] - scheduled appointment 12/21/23 at 130PM.  Patient states he sees podiatry to foot wound.

## 2024-01-03 ENCOUNTER — OFFICE VISIT (OUTPATIENT)
Dept: PSYCHIATRY | Facility: CLINIC | Age: 49
End: 2024-01-03
Payer: COMMERCIAL

## 2024-01-03 DIAGNOSIS — F41.1 GAD (GENERALIZED ANXIETY DISORDER): Primary | ICD-10-CM

## 2024-01-03 PROCEDURE — 90834 PSYTX W PT 45 MINUTES: CPT | Mod: 95,,, | Performed by: SOCIAL WORKER

## 2024-01-04 NOTE — PROGRESS NOTES
Individual Psychotherapy (PhD/LCSW)    1/3/2024    Site:  Telemed         Therapeutic Intervention: Met with patient.  Outpatient - Supportive psychotherapy 60 min - CPT Code 84133    Chief complaint/reason for encounter: depression, anger, and anxiety     Interval history and content of current session:   The patient presents with a pleasant mood and an appropriate affect.   States that he has been concerned about his father who has been ill.   States that his wife has also had some health challenges and he wants to work on responding to her in a more appropriate fashion as to avoid interpersonal conflict.   Support and encouragement were offered and modalities highlighted, he was receptive to provided feedback.   Treatment plan:  Target symptoms: depression, distractability, lack of focus, recurrent depression, anxiety , mood swings  Why chosen therapy is appropriate versus another modality: relevant to diagnosis, patient responds to this modality, evidence based practice  Outcome monitoring methods: self-report, observation  Therapeutic intervention type: insight oriented psychotherapy, behavior modifying psychotherapy, supportive psychotherapy, interactive psychotherapy    Risk parameters:  Patient reports no suicidal ideation  Patient reports no homicidal ideation  Patient reports no self-injurious behavior  Patient reports no violent behavior    Verbal deficits: None    Patient's response to intervention:  The patient's response to intervention is accepting.    Progress toward goals and other mental status changes:  The patient's progress toward goals is fair .    Diagnosis:     ICD-10-CM ICD-9-CM   1. WAYNE (generalized anxiety disorder)  F41.1 300.02         Plan:  individual psychotherapy and family psychotherapy    Return to clinic: 3 weeks    Length of Service (minutes): 60    The patient location is: [place], [place]  The chief complaint leading to consultation is: Anger/Anxiety    Visit type:  audiovisual    Face to Face time with patient: 53  60   minutes of total time spent on the encounter, which includes face to face time and non-face to face time preparing to see the patient (eg, review of tests), Obtaining and/or reviewing separately obtained history, Documenting clinical information in the electronic or other health record, Independently interpreting results (not separately reported) and communicating results to the patient/family/caregiver, or Care coordination (not separately reported).   Each patient to whom he or she provides medical services by telemedicine is:  (1) informed of the relationship between the physician and patient and the respective role of any other health care provider with respect to management of the patient; and (2) notified that he or she may decline to receive medical services by telemedicine and may withdraw from such care at any time.    Notes:

## 2024-01-11 ENCOUNTER — OFFICE VISIT (OUTPATIENT)
Dept: INTERNAL MEDICINE | Facility: CLINIC | Age: 49
End: 2024-01-11
Payer: COMMERCIAL

## 2024-01-11 VITALS
BODY MASS INDEX: 42.66 KG/M2 | OXYGEN SATURATION: 96 % | TEMPERATURE: 98 F | HEIGHT: 72 IN | DIASTOLIC BLOOD PRESSURE: 80 MMHG | HEART RATE: 88 BPM | WEIGHT: 315 LBS | SYSTOLIC BLOOD PRESSURE: 132 MMHG

## 2024-01-11 DIAGNOSIS — J32.9 SINUSITIS, UNSPECIFIED CHRONICITY, UNSPECIFIED LOCATION: Primary | ICD-10-CM

## 2024-01-11 PROCEDURE — 3075F SYST BP GE 130 - 139MM HG: CPT | Mod: CPTII,S$GLB,, | Performed by: PHYSICIAN ASSISTANT

## 2024-01-11 PROCEDURE — 3008F BODY MASS INDEX DOCD: CPT | Mod: CPTII,S$GLB,, | Performed by: PHYSICIAN ASSISTANT

## 2024-01-11 PROCEDURE — 1159F MED LIST DOCD IN RCRD: CPT | Mod: CPTII,S$GLB,, | Performed by: PHYSICIAN ASSISTANT

## 2024-01-11 PROCEDURE — 99999 PR PBB SHADOW E&M-EST. PATIENT-LVL IV: CPT | Mod: PBBFAC,,, | Performed by: PHYSICIAN ASSISTANT

## 2024-01-11 PROCEDURE — 99213 OFFICE O/P EST LOW 20 MIN: CPT | Mod: S$GLB,,, | Performed by: PHYSICIAN ASSISTANT

## 2024-01-11 PROCEDURE — 1160F RVW MEDS BY RX/DR IN RCRD: CPT | Mod: CPTII,S$GLB,, | Performed by: PHYSICIAN ASSISTANT

## 2024-01-11 PROCEDURE — 3079F DIAST BP 80-89 MM HG: CPT | Mod: CPTII,S$GLB,, | Performed by: PHYSICIAN ASSISTANT

## 2024-01-11 RX ORDER — DOXYCYCLINE 100 MG/1
100 CAPSULE ORAL EVERY 12 HOURS
Qty: 20 CAPSULE | Refills: 0 | Status: SHIPPED | OUTPATIENT
Start: 2024-01-11

## 2024-01-11 NOTE — PROGRESS NOTES
Subjective:       Patient ID: Jered Contreras is a 48 y.o. male.        Chief Complaint: Cough and Nasal Congestion (X 7 days)    Jered Contreras is an established patient of Angela Dacosta MD here today for urgent care visit.    Sx started 2 weeks ago.  Congestion in nose and chest, runny nose.  Feels warm at night and takes tylenol.      No chest pain or shortness of breath.  No N/V/D/C.           Review of Systems   Constitutional:  Negative for appetite change, chills, fatigue and fever.   HENT:  Positive for congestion, sinus pressure and sinus pain. Negative for sore throat.    Eyes:  Negative for visual disturbance.   Respiratory:  Positive for cough. Negative for chest tightness and shortness of breath.    Cardiovascular:  Negative for chest pain, palpitations and leg swelling.   Gastrointestinal:  Negative for abdominal pain, blood in stool, constipation, diarrhea, nausea and vomiting.   Genitourinary:  Negative for dysuria, frequency, hematuria and urgency.   Musculoskeletal:  Negative for arthralgias and back pain.   Skin:  Negative for rash.   Neurological:  Negative for dizziness, syncope, weakness and headaches.   Psychiatric/Behavioral:  Negative for dysphoric mood and sleep disturbance. The patient is not nervous/anxious.        Objective:      Physical Exam  Vitals and nursing note reviewed.   Constitutional:       Appearance: He is well-developed.   HENT:      Head: Normocephalic.      Right Ear: Tympanic membrane and external ear normal.      Left Ear: Tympanic membrane and external ear normal.      Nose: Mucosal edema and rhinorrhea present.      Right Sinus: Maxillary sinus tenderness present. No frontal sinus tenderness.      Left Sinus: Maxillary sinus tenderness present. No frontal sinus tenderness.      Mouth/Throat:      Pharynx: Oropharynx is clear.   Eyes:      Pupils: Pupils are equal, round, and reactive to light.   Cardiovascular:      Rate and Rhythm: Normal  "rate and regular rhythm.      Heart sounds: Normal heart sounds. No murmur heard.     No friction rub. No gallop.   Pulmonary:      Effort: Pulmonary effort is normal. No respiratory distress.      Breath sounds: Normal breath sounds.   Abdominal:      Palpations: Abdomen is soft.      Tenderness: There is no abdominal tenderness.   Musculoskeletal:         General: No swelling.   Skin:     General: Skin is warm and dry.   Neurological:      General: No focal deficit present.      Mental Status: He is alert.   Psychiatric:         Mood and Affect: Mood normal.         Assessment:       1. Sinusitis, unspecified chronicity, unspecified location        Plan:       Jered was seen today for cough and nasal congestion.    Diagnoses and all orders for this visit:    Sinusitis, unspecified chronicity, unspecified location  -     doxycycline (VIBRAMYCIN) 100 MG Cap; Take 1 capsule (100 mg total) by mouth every 12 (twelve) hours.    Drink plenty of fluids, get lots of rest, and follow-up poor results.     Pt has been given instructions populated from patient instructions database and has verbalized understanding of the after visit summary and information contained wherein.    Follow up with a primary care provider. May go to ER for acute shortness of breath, lightheadedness, fever, or any other emergent complaints or changes in condition.    "This note will be shared with the patient"    Future Appointments   Date Time Provider Department Center   1/16/2024  9:00 AM Regis Hay [facility]   1/16/2024  2:20 PM Tanisha Chase NP [facility] UROLOGY [facility]   1/30/2024  9:00 AM [facility]   2/15/2024  9:00 AM Regis Hay, [facility] SOC [facility]   3/14/2024  3:00 PM Fredo Marquez MD PhD [facility]   4/1/2024  8:15 AM Shelby Bergeron OD [facility] OPTOMTY [place]                 "

## 2024-01-16 ENCOUNTER — OFFICE VISIT (OUTPATIENT)
Dept: UROLOGY | Facility: CLINIC | Age: 49
End: 2024-01-16
Payer: COMMERCIAL

## 2024-01-16 VITALS
HEIGHT: 72 IN | BODY MASS INDEX: 42.66 KG/M2 | SYSTOLIC BLOOD PRESSURE: 132 MMHG | DIASTOLIC BLOOD PRESSURE: 80 MMHG | WEIGHT: 315 LBS

## 2024-01-16 DIAGNOSIS — R97.20 ELEVATED PSA: Primary | ICD-10-CM

## 2024-01-16 DIAGNOSIS — R35.0 URINARY FREQUENCY: ICD-10-CM

## 2024-01-16 LAB
BILIRUB SERPL-MCNC: NORMAL MG/DL
BLOOD URINE, POC: NORMAL
CLARITY, POC UA: CLEAR
COLOR, POC UA: NORMAL
GLUCOSE UR QL STRIP: NORMAL
KETONES UR QL STRIP: NORMAL
LEUKOCYTE ESTERASE URINE, POC: NORMAL
NITRITE, POC UA: NORMAL
PH, POC UA: 5
POC RESIDUAL URINE VOLUME: 21 ML (ref 0–100)
PROTEIN, POC: NORMAL
SPECIFIC GRAVITY, POC UA: 1.01
UROBILINOGEN, POC UA: NORMAL

## 2024-01-16 PROCEDURE — 99999 PR PBB SHADOW E&M-EST. PATIENT-LVL IV: CPT | Mod: PBBFAC,,,

## 2024-01-16 PROCEDURE — 51798 US URINE CAPACITY MEASURE: CPT | Mod: S$GLB,,,

## 2024-01-16 PROCEDURE — 81002 URINALYSIS NONAUTO W/O SCOPE: CPT | Mod: S$GLB,,,

## 2024-01-16 PROCEDURE — 3075F SYST BP GE 130 - 139MM HG: CPT | Mod: CPTII,S$GLB,,

## 2024-01-16 PROCEDURE — 1160F RVW MEDS BY RX/DR IN RCRD: CPT | Mod: CPTII,S$GLB,,

## 2024-01-16 PROCEDURE — 1159F MED LIST DOCD IN RCRD: CPT | Mod: CPTII,S$GLB,,

## 2024-01-16 PROCEDURE — 3079F DIAST BP 80-89 MM HG: CPT | Mod: CPTII,S$GLB,,

## 2024-01-16 PROCEDURE — 99214 OFFICE O/P EST MOD 30 MIN: CPT | Mod: S$GLB,,,

## 2024-01-16 PROCEDURE — 3008F BODY MASS INDEX DOCD: CPT | Mod: CPTII,S$GLB,,

## 2024-01-16 RX ORDER — TAMSULOSIN HYDROCHLORIDE 0.4 MG/1
0.4 CAPSULE ORAL NIGHTLY
Qty: 90 CAPSULE | Refills: 3 | Status: SHIPPED | OUTPATIENT
Start: 2024-01-16 | End: 2025-01-15

## 2024-01-16 NOTE — PATIENT INSTRUCTIONS
Avoid Bladder Irritants: Tea, coffee, caffeine, alcohol, artificial sweeteners, citrus, spicy foods, acidic foods,chocolate, tomato-based foods, smoking.     Recommend 2 liters of fluid daily. 1 cup of coffee in the morning. 24 oz soda/day or less.     Night time urination:  Limit fluids 2 hours before bedtime.  Elevate legs 2 hours before bedtime.  No caffeine, cokes, teas after 3 pm in the afternoon.

## 2024-01-16 NOTE — PROGRESS NOTES
CHIEF COMPLAINT:  Urinary frequency       HISTORY OF PRESENTING ILLINESS:  Jered Contreras is a 47 y.o. male with a PMH of allergic rhinitis, obesity, MDD, who presents for urinary frequency.     Drinks 3 cups coffee day and 32 oz soda. Then a lot of water throughout the day. He has been experiencing urgency and urge incontinence with worsening urinary frequency over the past several months. Nocturia is x3-4, daytime frequency is every 30-45 min.      Established patient to urology department. Was seen in July 2023 due to dysuria and found to have urinary tract infection. PSA level drawn during infection, which can falsely elevate. No family history of prostate cancer. Urine cultures: E coli 3/27/23, same day PSA 6.1.        REVIEW OF SYSTEMS:  Review of Systems   Constitutional:  Negative for chills and fever.   HENT:  Negative for congestion and sore throat.    Respiratory:  Negative for cough and shortness of breath.    Cardiovascular:  Negative for chest pain and palpitations.   Gastrointestinal:  Negative for nausea and vomiting.   Genitourinary:  Positive for frequency and urgency. Negative for dysuria, flank pain and hematuria.   Neurological:  Negative for dizziness and headaches.         PATIENT HISTORY:    Past Medical History:   Diagnosis Date    Allergic rhinitis     Asthma     Corneal abrasion     Depression     Hx of psychiatric care     Morbid obesity     Obstructive sleep apnea treated with continuous positive airway pressure (CPAP) 07/10/2018    Psychiatric problem     Therapy        Past Surgical History:   Procedure Laterality Date    APPENDECTOMY      APPLICATION OF CARTILAGE GRAFT Bilateral 12/6/2021    Procedure: APPLICATION, CARTILAGE GRAFT;  Surgeon: JAN Arredondo MD;  Location: [facility];  Service: ENT;  Laterality: Bilateral;  from right ear    gastric sleeve  09/2019    [facility]    NASAL SEPTOPLASTY Bilateral 12/6/2021    Procedure: SEPTOPLASTY,  NOSE;  Surgeon: JAN Arredondo MD;  Location: [facility] OR;  Service: ENT;  Laterality: Bilateral;    NASAL STENOSIS REPAIR Bilateral 2021    Procedure: REPAIR, STENOSIS, NOSE, VESTIBULE;  Surgeon: JAN Arredondo MD;  Location: [facility] OR;  Service: ENT;  Laterality: Bilateral;    SINUS SURGERY      VASECTOMY  2017       Family History   Problem Relation Age of Onset    Allergies Mother     Rheum arthritis Mother     Lung cancer Mother         post lobectomy    Dementia Mother     Glaucoma Mother     Cataracts Mother     Diabetes Father     Hypertension Father     No Known Problems Maternal Aunt     No Known Problems Maternal Uncle     No Known Problems Paternal Aunt     No Known Problems Paternal Uncle     Allergies Maternal Grandmother     Hypertension Maternal Grandmother     Dementia Maternal Grandmother     Brain cancer Maternal Grandmother         in remission before she     Rheum arthritis Maternal Grandfather     Diabetes Paternal Grandmother     Alzheimer's disease Paternal Grandmother     Hypertension Paternal Grandfather     Obesity Paternal Grandfather     Cerebral aneurysm Paternal Grandfather     No Known Problems Other     Melanoma Neg Hx        Social History     Socioeconomic History    Marital status:    Occupational History    Occupation:      Employer: [employer]   Tobacco Use    Smoking status: Never     Passive exposure: Never    Smokeless tobacco: Never   Substance and Sexual Activity    Alcohol use: Yes     Alcohol/week: 1.0 standard drink of alcohol     Types: 1 Cans of beer per week     Comment: RARELY    Drug use: No    Sexual activity: Yes     Partners: Female     Social Determinants of Health     Financial Resource Strain: Patient Declined (2023)    Overall Financial Resource Strain (CARDIA)     Difficulty of Paying Living Expenses: Patient declined   Food Insecurity: No Food Insecurity (2023)    Hunger Vital Sign     Worried About Running  Out of Food in the Last Year: Never true     Ran Out of Food in the Last Year: Never true   Transportation Needs: No Transportation Needs (11/20/2023)    PRAPARE - Transportation     Lack of Transportation (Medical): No     Lack of Transportation (Non-Medical): No   Physical Activity: Insufficiently Active (11/20/2023)    Exercise Vital Sign     Days of Exercise per Week: 3 days     Minutes of Exercise per Session: 30 min   Stress: Stress Concern Present (11/20/2023)    [place] [employer] - Occupational Stress Questionnaire     Feeling of Stress : To some extent   Social Connections: Unknown (11/20/2023)    Social Connection and Isolation Panel [NHANES]     Frequency of Communication with Friends and Family: More than three times a week     Frequency of Social Gatherings with Friends and Family: Three times a week     Active Member of Clubs or Organizations: No     Attends Club or Organization Meetings: Never     Marital Status:    Housing Stability: Low Risk  (11/20/2023)    Housing Stability Vital Sign     Unable to Pay for Housing in the Last Year: No     Number of Places Lived in the Last Year: 1     Unstable Housing in the Last Year: No       Allergies:  Ceclor [cefaclor], Penicillins, and Calcium alginate    Medications:    Current Outpatient Medications:     ASCORBATE CALCIUM (VITAMIN C ORAL), Take by mouth., Disp: , Rfl:     azelastine (ASTELIN) 137 mcg (0.1 %) nasal spray, 2 sprays (274 mcg total) by Nasal route 2 (two) times daily as needed (Nasal congestion)., Disp: 30 mL, Rfl: 5    b complex vitamins (B COMPLEX-VITAMIN B12) tablet, Take 1 tablet by mouth once daily., Disp: , Rfl:     buPROPion (WELLBUTRIN XL) 300 MG 24 hr tablet, Take 1 tablet (300 mg total) by mouth once daily., Disp: 90 tablet, Rfl: 1    clotrimazole-betamethasone 1-0.05% (LOTRISONE) cream, Apply topically 2 (two) times daily., Disp: 45 g, Rfl: 3    cyclobenzaprine (FLEXERIL) 5 MG tablet, Take 1 tablet (5  mg total) by mouth nightly., Disp: 30 tablet, Rfl: 0    cyclobenzaprine (FLEXERIL) 5 MG tablet, Take 1 tablet by mouth every evening., Disp: , Rfl:     doxycycline (VIBRAMYCIN) 100 MG Cap, Take 1 capsule (100 mg total) by mouth every 12 (twelve) hours., Disp: 20 capsule, Rfl: 0    DULoxetine (CYMBALTA) 60 MG capsule, Take 1 capsule (60 mg total) by mouth once daily., Disp: 90 capsule, Rfl: 1    fluticasone propionate (FLONASE) 50 mcg/actuation nasal spray, One spray in each nostril twice daily after 1st using azelastine nasal spray, Disp: 18.2 mL, Rfl: 11    hydrOXYzine HCL (ATARAX) 25 MG tablet, TK 1 T PO  TID PRN ANXIETY, Disp: 30 tablet, Rfl: 6    ipratropium (ATROVENT) 21 mcg (0.03 %) nasal spray, 2 sprays by Nasal route 3 (three) times daily as needed for Rhinitis (runing nose). (Patient not taking: Reported on 1/11/2024), Disp: 30 mL, Rfl: 5    lamoTRIgine (LAMICTAL) 100 MG tablet, , Disp: , Rfl:     montelukast (SINGULAIR) 10 mg tablet, Take 1 tablet (10 mg total) by mouth every evening. Take during allergy season., Disp: 90 tablet, Rfl: 3    multivitamin capsule, Take 1 capsule by mouth once daily., Disp: , Rfl:     rOPINIRole (REQUIP) 0.5 MG tablet, Take 2 tablets (1 mg total) by mouth every evening., Disp: 30 tablet, Rfl: 6    semaglutide (OZEMPIC) 0.25 mg or 0.5 mg (2 mg/3 mL) pen injector, Inject 0.5 mg into the skin., Disp: , Rfl:     tamsulosin (FLOMAX) 0.4 mg Cap, Take 1 capsule (0.4 mg total) by mouth every evening., Disp: 90 capsule, Rfl: 3    urea (CARMOL) 40 % Crea, Apply topically., Disp: , Rfl:     WEGOVY 1 mg/0.5 mL PnIj, Inject into the skin., Disp: , Rfl:     PHYSICAL EXAMINATION:  Physical Exam  Constitutional:       Appearance: Normal appearance. He is obese.   HENT:      Head: Normocephalic and atraumatic.      Right Ear: External ear normal.      Left Ear: External ear normal.      Nose: Nose normal.      Mouth/Throat:      Mouth: Mucous membranes are moist.   Pulmonary:      Effort:  Pulmonary effort is normal. No respiratory distress.   Skin:     General: Skin is warm and dry.   Neurological:      General: No focal deficit present.      Mental Status: He is alert and oriented to person, place, and time.   Psychiatric:         Mood and Affect: Mood normal.         Behavior: Behavior normal.           LABS:  UA WNL  PVR 21 ml       Lab Results   Component Value Date    PSA 6.1 (H) 03/27/2023    PSADIAG 0.37 07/10/2023       Lab Results   Component Value Date    CREATININE 0.8 09/29/2023    EGFRNORACEVR >60.0 09/29/2023           IMPRESSION:  Encounter Diagnoses   Name Primary?    Elevated PSA Yes    Urinary frequency          Assessment:       1. Elevated PSA    2. Urinary frequency        Plan:   - Trial of Flomax.  Instructed patient to take 1st dose at night when in bed due to potential 1st dose syncope episode.  Patient was also informed and educated on side effects including retrograde ejaculation.   - Nocturia precautions provided.  - Bladder irritants discussed. Recommendations provided.    RTC 6 weeks to discuss Flomax efficacy    I spent 30 minutes with the patient of which more than half was spent in direct consultation with the patient in regards to our treatment and plan.  We addressed the office findings and recent labs.   Education and recommendations of today's plan of care including home remedies and needed follow up with PCP.   We discussed the chief complaint; reviewed the LUTS and the possible contributory factors.

## 2024-01-18 ENCOUNTER — PATIENT MESSAGE (OUTPATIENT)
Dept: PSYCHIATRY | Facility: CLINIC | Age: 49
End: 2024-01-18
Payer: COMMERCIAL

## 2024-02-07 ENCOUNTER — TELEPHONE (OUTPATIENT)
Dept: WOUND CARE | Facility: CLINIC | Age: 49
End: 2024-02-07
Payer: COMMERCIAL

## 2024-02-07 NOTE — TELEPHONE ENCOUNTER
Called and spoke with patient, asked if he needed to come into the wound care clinic since he missed his last scheduled appointment in December 2023.  Patient advised his wounds are all healed up therefore, he does not need to have wound care services.

## 2024-03-27 ENCOUNTER — HOSPITAL ENCOUNTER (OUTPATIENT)
Dept: RADIOLOGY | Facility: OTHER | Age: 49
Discharge: HOME | End: 2024-03-27
Payer: COMMERCIAL

## 2024-03-27 ENCOUNTER — OFFICE VISIT (OUTPATIENT)
Dept: PAIN MEDICINE | Facility: CLINIC | Age: 49
End: 2024-03-27
Payer: COMMERCIAL

## 2024-03-27 VITALS
SYSTOLIC BLOOD PRESSURE: 131 MMHG | HEART RATE: 79 BPM | BODY MASS INDEX: 32.61 KG/M2 | WEIGHT: 246.06 LBS | DIASTOLIC BLOOD PRESSURE: 73 MMHG | HEIGHT: 73 IN

## 2024-03-27 DIAGNOSIS — G89.29 CHRONIC PAIN OF LEFT KNEE: ICD-10-CM

## 2024-03-27 DIAGNOSIS — M25.562 CHRONIC PAIN OF LEFT KNEE: ICD-10-CM

## 2024-03-27 DIAGNOSIS — M17.32 POST-TRAUMATIC OSTEOARTHRITIS OF LEFT KNEE: Primary | ICD-10-CM

## 2024-03-27 PROCEDURE — 99214 OFFICE O/P EST MOD 30 MIN: CPT | Mod: S$GLB,,, | Performed by: ANESTHESIOLOGY

## 2024-03-27 PROCEDURE — 73562 X-RAY EXAM OF KNEE 3: CPT | Mod: TC,FY,LT

## 2024-03-27 PROCEDURE — 3078F DIAST BP <80 MM HG: CPT | Mod: CPTII,S$GLB,, | Performed by: ANESTHESIOLOGY

## 2024-03-27 PROCEDURE — 1159F MED LIST DOCD IN RCRD: CPT | Mod: CPTII,S$GLB,, | Performed by: ANESTHESIOLOGY

## 2024-03-27 PROCEDURE — 73562 X-RAY EXAM OF KNEE 3: CPT | Mod: 26,LT,, | Performed by: RADIOLOGY

## 2024-03-27 PROCEDURE — 3008F BODY MASS INDEX DOCD: CPT | Mod: CPTII,S$GLB,, | Performed by: ANESTHESIOLOGY

## 2024-03-27 PROCEDURE — 99999 PR PBB SHADOW E&M-EST. PATIENT-LVL IV: CPT | Mod: PBBFAC,,, | Performed by: ANESTHESIOLOGY

## 2024-03-27 PROCEDURE — 3075F SYST BP GE 130 - 139MM HG: CPT | Mod: CPTII,S$GLB,, | Performed by: ANESTHESIOLOGY

## 2024-03-27 NOTE — PROGRESS NOTES
Chronic Pain Established Patient Note (Follow up visit)      SUBJECTIVE:  Interval History 3/27/24:  Patient presents for follow up with complaints of left knee pain. He states that he has had this pain for years, but he occasionally gets flare ups. He reports a basketball related injury when he was younger, which he believes is the cause of his pain; however, he denies any new injuries. He states that his pain is an ache, and he struggles to walk up stairs due to his pain. He has tried voltaren gel, tylenol, ibuprofen, and ice with some relief. He has not done PT, and his pain today is a 6/10.     Interval History 10/16/23:  Notes his plantar fasciitis has returned. He has not done PT, icing, stretching or used a night splint. He notes his wife has had 2 cervical spine surgeries in the last year so he has not had the capacity to take it easy on his plantar fasciitis.   His primary back pain on the right side between is scapula and his spinous processes. 7/10 currently. 10/10 at worst.  Sharp attacks with dull/aching tail, not every day. Worse if picking something up wrong. Has not done massage, has not used a TENS unit. Used voltaren, which helps to an extent. Peters not have a stretching routine. Denies weakness, numbness, tingling, changes in bowel or bladder function. Has had significant weight loss over prior years from bariatric surgery. Is having gall bladder surgery this coming Thursday.     Interval History 8/23/23:  Jered Lyle Contreras presents tele-medicine appointment for a follow-up appointment for right shoulder pain. We have seen him in the past for his plantar fasciitis but his main complaint today was his right shoulder pain Since the last visit, Jered Lyle Diane states the pain has been worsening. Current pain intensity is 6/10. He describes the pain being worse when he wakes up in the morning but also when he rotates his right shoulder. He also noticed the pain flares up when he  bends over to  items with his right arm.      Initital HPI 7/28/23: Jered Contreras presents to the clinic for the evaluation of the above pain. The pain started 2 years ago after no particular event. He blames it on his weight.      Original Pain Description:  The pain is located in the L>R plantar fascia and does not radiate. The pain is described as stabbing. Exacerbating factors: Walking, rain. Mitigating factors rest. Symptoms interfere with daily activity and work. The patient feels like symptoms have been unchanged.      Original PAIN SCORES:  Best: Pain is 2  Worst: Pain is 9  Current: Pain is 3    Pain Medications:  Flexeril 5mg QHS     6 weeks of Conservative therapy:  PT: None (Must include dates)  Chiro: No  HEP: HEP prescribed by podiatry which he does every morning         Treatments / Medications: (Ice/Heat/NSAIDS/APAP/etc):  Ice  Heat  Ibuprofen  Aleve  Biofreeze        Interventional Pain Procedures: (Previous injections)  None    Imaging:  XR FOOT COMPLETE 3 VIEW LEFT     CLINICAL HISTORY:  5th met head pain;.  Pain in left foot     TECHNIQUE:  AP, lateral and oblique views of the left foot were performed.     COMPARISON:  None     FINDINGS:  No acute fracture or dislocation seen.  Small plantar calcaneal spur.  No significant soft tissue edema or radiopaque retained foreign body.     Impression:     No acute osseous abnormality seen.        Electronically signed by: Ale Sanchez  Date:                                            10/24/2022  Time:                                           15:32     MRI of the LEFT ankle     CLINICAL HISTORY:  Foot pain evaluate for stress fracture     TECHNIQUE:  Multiplanar multisequence MRI examination of the LEFT ankle.  Postcontrast images after 10 cc Gadavist     COMPARISON:  Radiograph of 10/24/2022     FINDINGS:  **MEDIAL COMPARTMENT     Posterior tibial tendon: Intact.No tendinosis.No tenosynovitis.     Flexor digitorum longus tendon:  Normal.     Superficial Deltoid: Intact.     Deep Deltoid: Intact.     Tibiospring/ SM Calcaneonavicular (Spring)/Inferoplantar Complex: Intact     **LATERAL COMPARTMENT     Peroneus longus: Intact.     Peroneus brevis: Intact.     Superior peroneal retinaculum: Intact     Ligaments:     Interosseous (syndesmosis): Intact.     Anterior inferior tibiofibular (syndesmosis): Intact.     Posterior inferior tibiofibular (syndesmosis): Intact.     Anterior talofibular ligament: Intact.     Calcaneofibular ligament: Intact.     Posterior talofibular ligament: Intact.     **POSTERIOR COMPARTMENT     Flexor hallucis longus: Normal.     Posterior talus: Normal.     Achilles tendon: Normal.     Plantar fascia: There is minimal edema of the soft tissues plantar to the plantar fascia.  There is  plantar fascial thickening measuring greater than 4 mm.. There is mild osseous edema at the calcaneal insertion of the plantar fascia and calcaneal spur formation. There is no rupture of the fascia. There is no evidence of plantar fibromatosis. Findings are nonspecific yet can be seen with with plantar fasciitis..Lateral cord of plantar fascia demonstrates normal appearance and insertion upon the base of the 5th MT.     **ANTERIOR COMPARTMENT     Tendons:     Anterior tibial tendon: Normal.  Insertion intact.     Extensor hallucis longus: Normal.     Extensor digitorum longus: Normal.     Ligaments:     Dorsal talonavicular ligament: Intact.     **ARTICULATIONS:     Tibiotalar joint: Normal.  No evidence for anterior osseous spurs.     Subtalar joint: Normal.     Talonavicular joint: Normal.     Calcaneocuboid joint: Normal     Talus and calcaneus: Intact lateral process of talus and anterior process of calcaneus without fracture.     **GENERAL FINDINGS     Osseous Structures: No evidence of fracture     Muscles: Normal.     Nerves and tarsal tunnel: Normal.     Sinus tarsi: Normal.     Diffuse subcutaneous edema.     Impression:      Diffuse subcutaneous edema about the ankle.     Plantar fascial thickening, with minimal edema of calcaneal spur nonspecific finding, can be seen in plantar fasciitis in the appropriate setting        Electronically signed by: Quinten Kelly MD  Date:                                            03/13/2023  Time:                                           11:49     Past Medical History:   Diagnosis Date    Allergic rhinitis     Asthma     Corneal abrasion     Depression     Hx of psychiatric care     Morbid obesity     Obstructive sleep apnea treated with continuous positive airway pressure (CPAP) 07/10/2018    Psychiatric problem     Therapy      Past Surgical History:   Procedure Laterality Date    APPENDECTOMY      APPLICATION OF CARTILAGE GRAFT Bilateral 12/6/2021    Procedure: APPLICATION, CARTILAGE GRAFT;  Surgeon: JAN Arredondo MD;  Location: [facility];  Service: ENT;  Laterality: Bilateral;  from right ear    gastric sleeve  09/2019    [facility]    NASAL SEPTOPLASTY Bilateral 12/6/2021    Procedure: SEPTOPLASTY, NOSE;  Surgeon: JAN Arredondo MD;  Location: [facility];  Service: ENT;  Laterality: Bilateral;    NASAL STENOSIS REPAIR Bilateral 12/6/2021    Procedure: REPAIR, STENOSIS, NOSE, VESTIBULE;  Surgeon: JAN Arredondo MD;  Location: [facility];  Service: ENT;  Laterality: Bilateral;    SINUS SURGERY      VASECTOMY  2017     Social History     Socioeconomic History    Marital status:    Occupational History    Occupation: Information Tech     Employer: [employer]   Tobacco Use    Smoking status: Never     Passive exposure: Never    Smokeless tobacco: Never   Substance and Sexual Activity    Alcohol use: Yes     Alcohol/week: 1.0 standard drink of alcohol     Types: 1 Cans of beer per week     Comment: RARELY    Drug use: No    Sexual activity: Yes     Partners: Female     Social Determinants of Health     Financial Resource Strain: Patient Declined (11/20/2023)     Overall Financial Resource Strain (CARDIA)     Difficulty of Paying Living Expenses: Patient declined   Food Insecurity: No Food Insecurity (11/20/2023)    Hunger Vital Sign     Worried About Running Out of Food in the Last Year: Never true     Ran Out of Food in the Last Year: Never true   Transportation Needs: No Transportation Needs (11/20/2023)    PRAPARE - Transportation     Lack of Transportation (Medical): No     Lack of Transportation (Non-Medical): No   Physical Activity: Insufficiently Active (11/20/2023)    Exercise Vital Sign     Days of Exercise per Week: 3 days     Minutes of Exercise per Session: 30 min   Stress: Stress Concern Present (11/20/2023)    [employer] [employer] - Occupational Stress Questionnaire     Feeling of Stress : To some extent   Social Connections: Unknown (11/20/2023)    Social Connection and Isolation Panel [NHANES]     Frequency of Communication with Friends and Family: More than three times a week     Frequency of Social Gatherings with Friends and Family: Three times a week     Active Member of Clubs or Organizations: No     Attends Club or Organization Meetings: Never     Marital Status:    Housing Stability: Low Risk  (11/20/2023)    Housing Stability Vital Sign     Unable to Pay for Housing in the Last Year: No     Number of Places Lived in the Last Year: 1     Unstable Housing in the Last Year: No     Family History   Problem Relation Age of Onset    Allergies Mother     Rheum arthritis Mother     Lung cancer Mother         post lobectomy    Dementia Mother     Glaucoma Mother     Cataracts Mother     Diabetes Father     Hypertension Father     No Known Problems Maternal Aunt     No Known Problems Maternal Uncle     No Known Problems Paternal Aunt     No Known Problems Paternal Uncle     Allergies Maternal Grandmother     Hypertension Maternal Grandmother     Dementia Maternal Grandmother     Brain cancer Maternal Grandmother         in  remission before she     Rheum arthritis Maternal Grandfather     Diabetes Paternal Grandmother     Alzheimer's disease Paternal Grandmother     Hypertension Paternal Grandfather     Obesity Paternal Grandfather     Cerebral aneurysm Paternal Grandfather     No Known Problems Other     Melanoma Neg Hx        Review of patient's allergies indicates:   Allergen Reactions    Ceclor [cefaclor] Anaphylaxis    Penicillins Hives     Elevated blood pressure, temp, hives    Calcium alginate Other (See Comments)     Headache       Current Outpatient Medications   Medication Sig    ASCORBATE CALCIUM (VITAMIN C ORAL) Take by mouth.    azelastine (ASTELIN) 137 mcg (0.1 %) nasal spray 2 sprays (274 mcg total) by Nasal route 2 (two) times daily as needed (Nasal congestion).    b complex vitamins (B COMPLEX-VITAMIN B12) tablet Take 1 tablet by mouth once daily.    clotrimazole-betamethasone 1-0.05% (LOTRISONE) cream Apply topically 2 (two) times daily.    cyclobenzaprine (FLEXERIL) 5 MG tablet Take 1 tablet (5 mg total) by mouth nightly.    cyclobenzaprine (FLEXERIL) 5 MG tablet Take 1 tablet by mouth every evening.    doxycycline (VIBRAMYCIN) 100 MG Cap Take 1 capsule (100 mg total) by mouth every 12 (twelve) hours.    fluticasone propionate (FLONASE) 50 mcg/actuation nasal spray One spray in each nostril twice daily after 1st using azelastine nasal spray    hydrOXYzine HCL (ATARAX) 25 MG tablet TK 1 T PO  TID PRN ANXIETY    lamoTRIgine (LAMICTAL) 100 MG tablet     montelukast (SINGULAIR) 10 mg tablet Take 1 tablet (10 mg total) by mouth every evening. Take during allergy season.    multivitamin capsule Take 1 capsule by mouth once daily.    rOPINIRole (REQUIP) 0.5 MG tablet Take 2 tablets (1 mg total) by mouth every evening.    semaglutide (OZEMPIC) 0.25 mg or 0.5 mg (2 mg/3 mL) pen injector Inject 0.5 mg into the skin.    tamsulosin (FLOMAX) 0.4 mg Cap Take 1 capsule (0.4 mg total) by mouth every evening.    urea (CARMOL) 40  % Crea Apply topically.    WEGOVY 1 mg/0.5 mL PnIj Inject into the skin.    buPROPion (WELLBUTRIN XL) 300 MG 24 hr tablet Take 1 tablet (300 mg total) by mouth once daily.    DULoxetine (CYMBALTA) 60 MG capsule Take 1 capsule (60 mg total) by mouth once daily.    ipratropium (ATROVENT) 21 mcg (0.03 %) nasal spray 2 sprays by Nasal route 3 (three) times daily as needed for Rhinitis (runing nose).     No current facility-administered medications for this visit.       REVIEW OF SYSTEMS:    GENERAL:  No weight loss, malaise or fevers.  HEENT:   No recent changes in vision or hearing  NECK:  Negative for lumps, no difficulty with swallowing.  RESPIRATORY:  Negative for cough, wheezing or shortness of breath, patient denies any recent URI.  CARDIOVASCULAR:  Negative for chest pain, leg swelling or palpitations.  GI:  Negative for abdominal discomfort, blood in stools or black stools or change in bowel habits.  MUSCULOSKELETAL:  See HPI. + right shoulder/back pain  SKIN:  Negative for lesions, rash, and itching.  PSYCH:  No mood disorder or recent psychosocial stressors.  Patients sleep is not disturbed secondary to pain.  HEMATOLOGY/LYMPHOLOGY:  Negative for prolonged bleeding, bruising easily or swollen nodes.  Patient is not currently taking any anti-coagulants  NEURO:   No history of headaches, syncope, paralysis, seizures or tremors.  All other reviewed and negative other than HPI.    OBJECTIVE:  GENERAL: Well appearing, in no acute distress, alert and oriented x3.  PSYCH:  Mood and affect context appropriate.  SKIN: Skin color, texture, turgor normal, no rashes or lesions.  HEENT:  Normocephalic, atraumatic. Cranial nerves grossly intact.  NECK: No pain to palpation over the cervical paraspinous muscles. No pain to palpation over facets. Pain reproduced with palpation of right rhomboids, middle and inferior trapezius. No pain with neck flexion, extension, or lateral flexion.   PULM: Regular respiratory rate without  accessory muscle use and symmetric chest excursion  GI:  Non-distended  BACK: Normal range of motion. No pain to palpation over the spinous processes. No pain to palpation over facet joints. There is no pain with palpation over the sacroiliac joints bilaterally.   EXTREMITIES: No deformities, edema, or skin discoloration.   MUSCULOSKELETAL: Shoulder, hip  provocative maneuvers are negative. No atrophy is noted. TTP of lateral and medial joint lines of left knee. ROM intact  NEURO: Sensation is equal and appropriate bilaterally. Bilateral upper and lower extremity strength is normal and symmetric. Bilateral upper and lower extremity coordination and muscle stretch reflexes are physiologic and symmetric. Plantar response are downgoing. Straight leg raising in the supine position is negative to radicular pain.   GAIT: normal.      ASSESSMENT: 48 y.o. year old male with right shoulder/back pain, consistent with     1. Chronic pain of left knee  X-Ray Knee 3 View Left    Ambulatory referral/consult to Physical/Occupational Therapy          DISCUSSION: Mr. Contreras is a nice christine who grew up playing basketball and was a  for a while. He now does IT. He comes to us today mainly for left knee pain which began with an injury while playing basketball in his youth. He has occasional flare-ups which are self limiting. More recently they are not abating with conservative treatment.         PLAN:   1) Reviewed and discussed prior imaging with patient  2) Continue Voltaren gel PRN  3) Recommended Tylenol 1000mg every 8 hours  4) Ambulatory referral to Physical Therapy for stretching and HEP program for his knee pain  5) Advised daily heat therapy   6) Xray left knee ordered  7) RTC 4-6 weeks virtually  9 Can consider left knee MRI and injections if symptoms do not improve    The above plan and management options were discussed at length with patient. Patient is in agreement with the above and verbalized  understanding. It will be communicated with the referring physician via electronic record, fax, or mail.    Alexi Regalado MD  [facility]  03/27/2024

## 2024-04-04 ENCOUNTER — OFFICE VISIT (OUTPATIENT)
Dept: INTERNAL MEDICINE | Facility: CLINIC | Age: 49
End: 2024-04-04
Payer: COMMERCIAL

## 2024-04-04 VITALS
BODY MASS INDEX: 42.66 KG/M2 | DIASTOLIC BLOOD PRESSURE: 80 MMHG | HEART RATE: 99 BPM | OXYGEN SATURATION: 97 % | HEIGHT: 72 IN | WEIGHT: 315 LBS | SYSTOLIC BLOOD PRESSURE: 156 MMHG

## 2024-04-04 DIAGNOSIS — J06.9 UPPER RESPIRATORY TRACT INFECTION, UNSPECIFIED TYPE: Primary | ICD-10-CM

## 2024-04-04 PROCEDURE — 99213 OFFICE O/P EST LOW 20 MIN: CPT | Mod: S$GLB,,, | Performed by: INTERNAL MEDICINE

## 2024-04-04 PROCEDURE — 99999 PR PBB SHADOW E&M-EST. PATIENT-LVL IV: CPT | Mod: PBBFAC,,, | Performed by: INTERNAL MEDICINE

## 2024-04-04 PROCEDURE — 1159F MED LIST DOCD IN RCRD: CPT | Mod: CPTII,S$GLB,, | Performed by: INTERNAL MEDICINE

## 2024-04-04 PROCEDURE — 1160F RVW MEDS BY RX/DR IN RCRD: CPT | Mod: CPTII,S$GLB,, | Performed by: INTERNAL MEDICINE

## 2024-04-04 PROCEDURE — 3008F BODY MASS INDEX DOCD: CPT | Mod: CPTII,S$GLB,, | Performed by: INTERNAL MEDICINE

## 2024-04-04 PROCEDURE — 3077F SYST BP >= 140 MM HG: CPT | Mod: CPTII,S$GLB,, | Performed by: INTERNAL MEDICINE

## 2024-04-04 PROCEDURE — 3079F DIAST BP 80-89 MM HG: CPT | Mod: CPTII,S$GLB,, | Performed by: INTERNAL MEDICINE

## 2024-04-04 NOTE — PROGRESS NOTES
Subjective:       Patient ID: Jered Contreras is a 48 y.o. male.    Chief Complaint:   Cough, Nasal Congestion, and Sinus Problem    Mr. Jreed Contreras is a 47 yo M of PMHx of obesity, GERD, TAYO, WAYNE, MDD, nasal septal deviation, seasonal allergies who presents today for chest congestion and sinus issues.     Sx started 2 weeks ago.  Congestion in nose and chest, runny nose.  Feels warm at night and takes tylenol.  Cough productive of green-brown sputum.  Subjective fever, but hasn't taken temp.  Using sudafed plus mucinex for relief; doesn't really help.  Some use of claritin as well.  Wife got abx yesterday for similar symptoms.  He is not a smoker.  No chest pain or shortness of breath.  No N/V/D/C.        Review of Systems   Constitutional:  Positive for fatigue. Negative for fever.   HENT:  Positive for congestion, postnasal drip and rhinorrhea.    Respiratory:  Positive for cough. Negative for shortness of breath.    Cardiovascular:  Negative for chest pain.   Gastrointestinal:  Negative for abdominal pain.   Genitourinary:  Negative for difficulty urinating.   Musculoskeletal:  Negative for arthralgias.   Skin:  Negative for rash.   Neurological:  Positive for headaches.   Psychiatric/Behavioral:  Negative for sleep disturbance.        Objective:      Physical Exam  Constitutional:       General: He is not in acute distress.     Appearance: He is well-developed. He is obese. He is not diaphoretic.   HENT:      Head: Normocephalic and atraumatic.      Mouth/Throat:      Mouth: Mucous membranes are moist.      Pharynx: No oropharyngeal exudate or posterior oropharyngeal erythema.   Cardiovascular:      Rate and Rhythm: Normal rate and regular rhythm.      Heart sounds: Normal heart sounds. No murmur heard.     No friction rub. No gallop.   Pulmonary:      Effort: No respiratory distress.      Breath sounds: No wheezing or rales.   Chest:      Chest wall: No tenderness.   Skin:     General: Skin  is warm.      Findings: No erythema.   Neurological:      Mental Status: He is alert and oriented to person, place, and time.   Psychiatric:         Thought Content: Thought content normal.         Assessment:       1. Upper respiratory tract infection, unspecified type        Plan:       Jered was seen today for cough, nasal congestion and sinus problem.    Diagnoses and all orders for this visit:    Upper respiratory tract infection, unspecified type -  some cough syrup.  Stay warm, dry.  Fluids, tylenol for aches and pains.  Rest, stay off work until recovered.    Rtc prn    GONZALO Lozano MD MPH  Staff Internist

## 2024-04-07 ENCOUNTER — PATIENT MESSAGE (OUTPATIENT)
Dept: PSYCHIATRY | Facility: CLINIC | Age: 49
End: 2024-04-07
Payer: COMMERCIAL

## 2024-04-08 RX ORDER — LAMOTRIGINE 25 MG/1
TABLET ORAL
Qty: 30 TABLET | Refills: 2 | Status: SHIPPED | OUTPATIENT
Start: 2024-04-08 | End: 2024-05-01 | Stop reason: SDUPTHER

## 2024-04-08 RX ORDER — LAMOTRIGINE 100 MG/1
TABLET ORAL
Qty: 30 TABLET | Refills: 2 | Status: SHIPPED | OUTPATIENT
Start: 2024-04-08 | End: 2024-05-01 | Stop reason: SDUPTHER

## 2024-04-09 ENCOUNTER — CLINICAL SUPPORT (OUTPATIENT)
Dept: REHABILITATION | Facility: HOSPITAL | Age: 49
End: 2024-04-09
Payer: COMMERCIAL

## 2024-04-09 DIAGNOSIS — M25.562 CHRONIC PAIN OF LEFT KNEE: Primary | ICD-10-CM

## 2024-04-09 DIAGNOSIS — G89.29 CHRONIC PAIN OF LEFT KNEE: Primary | ICD-10-CM

## 2024-04-09 PROCEDURE — 97530 THERAPEUTIC ACTIVITIES: CPT

## 2024-04-09 PROCEDURE — 97110 THERAPEUTIC EXERCISES: CPT

## 2024-04-09 PROCEDURE — 97161 PT EVAL LOW COMPLEX 20 MIN: CPT

## 2024-04-10 NOTE — PLAN OF CARE
"[facility]   Physical Therapy Initial Evaluation      Name: Jered Contreras  Clinic Number: [id number]    Therapy Diagnosis:   Encounter Diagnosis   Name Primary?    Chronic pain of left knee Yes        Physician: Alexi Regalado MD    Physician Orders: PT Eval and Treat  Medical Diagnosis from Referral: M25.562,G89.29 (ICD-10-CM) - Chronic pain of left knee   Evaluation Date: 4/9/2024  Authorization Period Expiration: 3/27/2025  Plan of Care Expiration: 7/2/2024  Visit # / Visits authorized: 1/ 1   FOTO #2: 1 / 5   FOTO: #3: 0 / 0    Precautions: Standard and Psych    Time In: 1600  Time Out: 1700  Total Appointment Time (timed & untimed codes): 60 minutes    SUBJECTIVE   Date of onset: Approximately 20+ years ago    History of current condition - Jered reports: chronic left knee pain beginning in high school from a basketball injury.  Recent exacerbation about 2 months ago[place] is worse and no issues going up. Uses Voltarin gel and Tylenol. Bariatric revision in 2022. Exacerbated when twisting / pivoting or coming down stairs, especially when cutting the grass. Left knee has buckled on him twice. Occasional non-painful mechanical symptoms. No numbness or tingling in the Left lower extremity. Unable to notice if his knee swells. Describes pain as aching and throbbing.    Falls: None    Imaging, x-ray (3/27/2024): "No evidence of an acute fracture. The joint spaces appear relatively well maintained. No large joint effusion. No radiopaque foreign body."    Prior Therapy: None  Social History:  3 stairs; lives with their spouse  Occupation:   Prior Level of Function: no pain or difficulties with recreation and activities of daily living.  Current Level of Function: mild to moderate pain and difficulties with recreation and activities of daily living.    Pain:  Current 1/10, worst 4/10, best 0/10   Location: Left retropatellar region " and inferior joint line  Description: Aching, Dull, and Throbbing  Aggravating Factors: twisting, pivoting, flexion, down stair / hill walking  Easing Factors: rest, medication, ice    Patients goals: Return to pain-free walking and recreation     Medical History:   Past Medical History:   Diagnosis Date    Allergic rhinitis     Asthma     Corneal abrasion     Depression     Hx of psychiatric care     Morbid obesity     Obstructive sleep apnea treated with continuous positive airway pressure (CPAP) 07/10/2018    Psychiatric problem     Therapy      Surgical History:   Jered Contreras  has a past surgical history that includes Appendectomy; Sinus surgery; Vasectomy (2017); gastric sleeve (09/2019); Nasal septoplasty (Bilateral, 12/6/2021); Nasal stenosis repair (Bilateral, 12/6/2021); and Application of cartilage graft (Bilateral, 12/6/2021).    Medications:   Jered has a current medication list which includes the following prescription(s): ascorbic acid, azelastine, b complex vitamins, bupropion, clotrimazole-betamethasone 1-0.05%, duloxetine, fluticasone propionate, hydroxyzine hcl, lamotrigine, lamotrigine, montelukast, multivitamin, ropinirole, tamsulosin, urea, and wegovy.    Allergies:   Review of patient's allergies indicates:   Allergen Reactions    Ceclor [cefaclor] Anaphylaxis    Penicillins Hives     Elevated blood pressure, temp, hives    Calcium alginate Other (See Comments)     Headache        OBJECTIVE     Observations:  Cervical: forward head and rounded shoulders  Thoracic: decreased kyphosis  Lumbar: increased lordosis  Standing: Left foot pronation; bilateral knee valgus; bilateral lower extremity lymphedema      Gait:  Increased hip rotation secondary to bilateral lower extremity edema.  Non-antalgic      Active Range of Motion:  Knee   Action Left Right   Flexion 130 degrees 130 degrees   Extension +1 degrees +2 degrees       Passive Range of Motion:  Lower Extremity   Action Left  Right   Hip Flexion 105 degrees 110 degrees   Knee Flexion 140 degrees 140 degrees   Knee Extension +1 degrees +3 degrees       Manual Muscle Testing:  Lower Extremity   Action Left Right   Hip Flexion 4+ / 5 4+ / 5   Hip Extension 4 / 5 4+ / 5   Hip Abduction 4- / 5 4 / 5   Knee Flexion 46.2 kg 46.9 kg   Knee Extension 59.1 kg 56.4 kg       Special Tests:  Positive (+) Tests:  Eugeen (medial anterior horn)  Joint line tenderness  Knee Hyperflexion  Negative (-) Tests  Patellar grind  Varus / valgus stress testing      Balance:  Single Limb Stance:  Left / Eyes Open / Firm = 15 seconds; increased ankle strategy due to pronation  Right / Eyes Open / Firm = 20 seconds      Functional Movement & Mechanics:  Increased anterior tibial translation and forward trunk flexion with double leg squat      Intake Outcome Measure for FOTO Knee Survey    Therapist reviewed FOTO scores for Jered Contreras on 4/9/2024.   FOTO documents entered into [employer] - see Media section.    Intake Score: 31%         TREATMENT     Total Treatment time (time-based codes) separate from Evaluation: 20 minutes      Jered received the treatments listed below:      Therapeutic Exercises to develop strength, endurance, ROM, and flexibility for 10 minutes including:  Heel Raises  Sidelying Hip Abduction  Bridges  Sit to Stands ([place] Squat)      Therapeutic Activities to improve functional performance for 10 minutes, including:  Education (see below)  Questions and answers    PATIENT EDUCATION AND HOME EXERCISES     Education provided:   Anatomy and Pathology.  Symptom management and plan of care progressions.  Home Exercise Program.    Written Home Exercises Provided: yes. Exercises were reviewed and Jered was able to demonstrate them prior to the end of the session.  Jered demonstrated good  understanding of the education provided. See EMR under Patient Instructions for exercises provided during therapy sessions.    ASSESSMENT      Jered is a 48 y.o. male referred to outpatient Physical Therapy with a medical diagnosis of chronic left knee pain. Patient presents with decreased range of motion, decreased strength, gait deviations, lymphedema, decreased balance, and biomechanical faults with functional movements. Signs and symptoms are consistent with left knee medial meniscus pathology due to possible degenerative tear. He will benefit from skilled physical therapy addressing his joint mobility, hip strength, and proprioceptive retraining.    Patient prognosis is Good.   Patientt will benefit from skilled outpatient Physical Therapy to address the deficits stated above and in the chart below, provide patient /family education, and to maximize patientt's level of independence.     Plan of care discussed with patient: Yes  Patient's spiritual, cultural and educational needs considered and patient is agreeable to the plan of care and goals as stated below:     Anticipated Barriers for therapy: chronicity of symptoms    Medical Necessity is demonstrated by the following  History  Co-morbidities and personal factors that may impact the plan of care [] LOW: no personal factors / co-morbidities  [x] MODERATE: 1-2 personal factors / co-morbidities  [] HIGH: 3+ personal factors / co-morbidities    Moderate / High Support Documentation:   Co-morbidities affecting plan of care: lymphedema, and depression    Personal Factors:   no deficits     Examination  Body Structures and Functions, activity limitations and participation restrictions that may impact the plan of care [] LOW: addressing 1-2 elements  [] MODERATE: 3+ elements  [x] HIGH: 4+ elements (please support below)    Moderate / High Support Documentation: bending, squatting, lifting, twisting, pivoting, descending     Clinical Presentation [x] LOW: stable  [] MODERATE: Evolving  [] HIGH: Unstable     Decision Making/ Complexity Score: low     Goals:  Short Term Goals: 5 weeks   Patient will  have reduced pain complaints from 4/10 to less than or equal to 2/10. (Not Met - Progressing)  Patient will demonstrate pain-free squat mechanics to 90 degrees. (Not Met - Progressing)  Patient will demonstrate increased muscle strength of at least one-half grade or 2.5 kg as compared to the initial measurements. (Not Met - Progressing)    Long Term Goals: 10 weeks   Patient will have reduced pain complaints from 2/10 to less than or equal to 0/10. (Not Met - Progressing)  Patient will demonstrate stair ascent and descent for 2 flights of stairs with no symptoms. (Not Met - Progressing)  Patient will demonstrate increased muscle strength of at least one grade or 5 kg as compared to the initial measurements. (Not Met - Progressing)  Patient will improve Knee FOTO Intake score from 31% to greater than or equal to 59%. (Not Met - Progressing)  Patient will be independent with their home exercise program. (Not Met - Progressing)    PLAN   Plan of care Certification: 4/9/2024 to 7/2/2024.    Outpatient Physical Therapy 1 times weekly for 10 weeks to include the following interventions: Gait Training, Manual Therapy, Neuromuscular Re-ed, Patient Education, Self Care, Therapeutic Activities, and Therapeutic Exercise.     Javy Snyder, PT, DPT, OCS      Physician's Signature: _________________________________________ Date: ________________

## 2024-04-15 ENCOUNTER — PATIENT MESSAGE (OUTPATIENT)
Dept: REHABILITATION | Facility: HOSPITAL | Age: 49
End: 2024-04-15
Payer: COMMERCIAL

## 2024-04-16 NOTE — PROGRESS NOTES
[facility]   Physical Therapy Treatment Note        Name: Jered Contreras  Clinic Number: [id number]    Therapy Diagnosis:   Encounter Diagnosis   Name Primary?    Chronic pain of left knee Yes     Physician: Alexi Regalado MD    Visit Date: 4/18/2024    Physician Orders: PT Eval and Treat  Medical Diagnosis from Referral: M25.562,G89.29 (ICD-10-CM) - Chronic pain of left knee   Evaluation Date: 4/9/2024  Authorization Period Expiration: 3/27/2025  Plan of Care Expiration: 7/2/2024  Visit # / Visits authorized: 1/ 20   FOTO #2: 2 / 3   FOTO: #3: 0 / 0    Time In: 1100  Time Out: 1200  Total Billable Time: 55 minutes    Precautions: Standard    SUBJECTIVE     Pt reports: he has noticed decreased symptoms with walking and on a regular basis. Increased right scapular pain.  He was compliant with home exercise program.  Response to previous treatment: Evaluation  Functional change: ongoing    Pain: 2/10  Location: Left knee    OBJECTIVE     Objective Measures updated at progress report unless specified.     TREATMENT     Jered received the treatments listed below:      Therapeutic Exercises to develop strength, endurance, ROM, and core stabilization for 15 minutes including:  Heel Raises with Trap bar - wood plates; 2x10  Sidelying Hip Abduction - 2x12 bilateral  Bridges - double leg; 30# DB; 3x12      Neuromuscular Re-education activities to improve: Balance, Proprioception, and Posture for 00 minutes. The following activities were included:      Therapeutic Activities to improve functional performance for 40 minutes, including:  Trap Bar Squats - wood plates; 2x10  Step ups - 6 inch box; 2x10 bilateral  Lunges (Setswana version) - black power band; 3x10  Resisted Lateral walking - green band; 10 step laps; x4      PATIENT EDUCATION AND HOME EXERCISES      Home Exercises Provided and Patient Education Provided     Education provided:   Anatomy and Pathology.  Symptom management  and plan of care progressions.  Home Exercise Program.    Written Home Exercises Provided: yes. Exercises were reviewed and Jered was able to demonstrate them prior to the end of the session.  Jered demonstrated good  understanding of the education provided. See EMR under Patient Instructions for exercises provided during therapy sessions    ASSESSMENT     Jered presents back to the clinic for his first follow-up appointment. Decreased knee symptoms as noted by improved gait pattern and negative symptoms with step ups. Progressed lower extremity functional strengthening program with appropriate challenge. Issued new exercises for complaints of right scapular pain.    From evaluation on 4/9/2024 - Jered is a 48 y.o. male referred to outpatient Physical Therapy with a medical diagnosis of chronic left knee pain. Patient presents with decreased range of motion, decreased strength, gait deviations, lymphedema, decreased balance, and biomechanical faults with functional movements. Signs and symptoms are consistent with left knee medial meniscus pathology due to possible degenerative tear. He will benefit from skilled physical therapy addressing his joint mobility, hip strength, and proprioceptive retraining.     Jered Is progressing well towards his goals.   Pt prognosis is Good.     Pt will continue to benefit from skilled outpatient physical therapy to address the deficits listed in the problem list box on initial evaluation, provide pt/family education and to maximize pt's level of independence in the home and community environment. Pt's spiritual, cultural and educational needs considered and pt agreeable to plan of care and goals.     Anticipated barriers to physical therapy: chronicity of symptoms, lymphedema    Goals:  Short Term Goals: 5 weeks   Patient will have reduced pain complaints from 4/10 to less than or equal to 2/10. (Not Met - Progressing)  Patient will demonstrate pain-free squat mechanics  to 90 degrees. (Not Met - Progressing)  Patient will demonstrate increased muscle strength of at least one-half grade or 2.5 kg as compared to the initial measurements. (Not Met - Progressing)     Long Term Goals: 10 weeks   Patient will have reduced pain complaints from 2/10 to less than or equal to 0/10. (Not Met - Progressing)  Patient will demonstrate stair ascent and descent for 2 flights of stairs with no symptoms. (Not Met - Progressing)  Patient will demonstrate increased muscle strength of at least one grade or 5 kg as compared to the initial measurements. (Not Met - Progressing)  Patient will improve Knee FOTO Intake score from 31% to greater than or equal to 59%. (Not Met - Progressing)  Patient will be independent with their home exercise program. (Not Met - Progressing)    PLAN     Glute strengthening  Lower extremity proprioception training.    Plan of care Certification: 4/9/2024 to 7/2/2024.  Outpatient Physical Therapy 1 times weekly for 10 weeks.    Javy Snyder, PT , DPT, OCS

## 2024-04-17 ENCOUNTER — TELEPHONE (OUTPATIENT)
Dept: PAIN MEDICINE | Facility: CLINIC | Age: 49
End: 2024-04-17

## 2024-04-17 ENCOUNTER — OFFICE VISIT (OUTPATIENT)
Dept: PAIN MEDICINE | Facility: CLINIC | Age: 49
End: 2024-04-17
Payer: COMMERCIAL

## 2024-04-17 DIAGNOSIS — G89.29 CHRONIC PAIN OF LEFT KNEE: Primary | ICD-10-CM

## 2024-04-17 DIAGNOSIS — E66.9 OBESITY, UNSPECIFIED CLASSIFICATION, UNSPECIFIED OBESITY TYPE, UNSPECIFIED WHETHER SERIOUS COMORBIDITY PRESENT: ICD-10-CM

## 2024-04-17 DIAGNOSIS — M25.562 CHRONIC PAIN OF LEFT KNEE: Primary | ICD-10-CM

## 2024-04-17 PROCEDURE — 99214 OFFICE O/P EST MOD 30 MIN: CPT | Mod: 95,,, | Performed by: ANESTHESIOLOGY

## 2024-04-17 NOTE — PROGRESS NOTES
Chronic Pain Established Patient Note (Follow up visit - Virtual)      SUBJECTIVE:  Interval History 4/17/24:   Patient presents for follow up of left knee pain. LCV he was referred to PT and recommended continued pain medication regimen with APAP 1000mg TID, voltaren gel PRN, and heat. XR of the knee was also ordered that revealed no acute fractures and well-maintained joint spaces. He reports participating in one session of PT that went well. He has been working to increase strength in surrounding knee musculature and working to improve lymphedema of the LEs with compression stockings. He reports mild improvement in his pain since LCV. He has continued attempting to lose weight with diet and exercise; he is also taking Wegovy resulting in reported 20lb weight loss since the beginning of the year. Denies recent falls or new symptoms. Today he rates his pain 4/10.     Interval History 3/27/24:  Patient presents for follow up with complaints of left knee pain. He states that he has had this pain for years, but he occasionally gets flare ups. He reports a basketball related injury when he was younger, which he believes is the cause of his pain; however, he denies any new injuries. He states that his pain is an ache, and he struggles to walk up stairs due to his pain. He has tried voltaren gel, tylenol, ibuprofen, and ice with some relief. He has not done PT, and his pain today is a 6/10.     Interval History 10/16/23:  Notes his plantar fasciitis has returned. He has not done PT, icing, stretching or used a night splint. He notes his wife has had 2 cervical spine surgeries in the last year so he has not had the capacity to take it easy on his plantar fasciitis.   His primary back pain on the right side between is scapula and his spinous processes. 7/10 currently. 10/10 at worst.  Sharp attacks with dull/aching tail, not every day. Worse if picking something up wrong. Has not done massage, has not used a TENS  unit. Used voltaren, which helps to an extent. Peters not have a stretching routine. Denies weakness, numbness, tingling, changes in bowel or bladder function. Has had significant weight loss over prior years from bariatric surgery. Is having gall bladder surgery this coming Thursday.     Interval History 8/23/23:  Jered Contreras presents tele-medicine appointment for a follow-up appointment for right shoulder pain. We have seen him in the past for his plantar fasciitis but his main complaint today was his right shoulder pain Since the last visit, Jered Contreras states the pain has been worsening. Current pain intensity is 6/10. He describes the pain being worse when he wakes up in the morning but also when he rotates his right shoulder. He also noticed the pain flares up when he bends over to  items with his right arm.      Initital HPI 7/28/23: Jered Contreras presents to the clinic for the evaluation of the above pain. The pain started 2 years ago after no particular event. He blames it on his weight.      Original Pain Description:  The pain is located in the L>R plantar fascia and does not radiate. The pain is described as stabbing. Exacerbating factors: Walking, rain. Mitigating factors rest. Symptoms interfere with daily activity and work. The patient feels like symptoms have been unchanged.      Original PAIN SCORES:  Best: Pain is 2  Worst: Pain is 9  Current: Pain is 4    Pain Medications:  Flexeril 5mg QHS     6 weeks of Conservative therapy:  PT: April -May, 2024  Chiro: No  HEP: HEP prescribed by podiatry which he does every morning         Treatments / Medications: (Ice/Heat/NSAIDS/APAP/etc):  Ice  Heat  Ibuprofen  Aleve  Biofreeze  Voltaren gel   APAP        Interventional Pain Procedures: (Previous injections)  None    Imaging:  XR FOOT COMPLETE 3 VIEW LEFT     CLINICAL HISTORY:  5th met head pain;.  Pain in left foot     TECHNIQUE:  AP, lateral and oblique views  of the left foot were performed.     COMPARISON:  None     FINDINGS:  No acute fracture or dislocation seen.  Small plantar calcaneal spur.  No significant soft tissue edema or radiopaque retained foreign body.     Impression:     No acute osseous abnormality seen.        Electronically signed by: Ale Sanchez  Date:                                            10/24/2022  Time:                                           15:32     MRI of the LEFT ankle     CLINICAL HISTORY:  Foot pain evaluate for stress fracture     TECHNIQUE:  Multiplanar multisequence MRI examination of the LEFT ankle.  Postcontrast images after 10 cc Gadavist     COMPARISON:  Radiograph of 10/24/2022     FINDINGS:  **MEDIAL COMPARTMENT     Posterior tibial tendon: Intact.No tendinosis.No tenosynovitis.     Flexor digitorum longus tendon: Normal.     Superficial Deltoid: Intact.     Deep Deltoid: Intact.     Tibiospring/ SM Calcaneonavicular (Spring)/Inferoplantar Complex: Intact     **LATERAL COMPARTMENT     Peroneus longus: Intact.     Peroneus brevis: Intact.     Superior peroneal retinaculum: Intact     Ligaments:     Interosseous (syndesmosis): Intact.     Anterior inferior tibiofibular (syndesmosis): Intact.     Posterior inferior tibiofibular (syndesmosis): Intact.     Anterior talofibular ligament: Intact.     Calcaneofibular ligament: Intact.     Posterior talofibular ligament: Intact.     **POSTERIOR COMPARTMENT     Flexor hallucis longus: Normal.     Posterior talus: Normal.     Achilles tendon: Normal.     Plantar fascia: There is minimal edema of the soft tissues plantar to the plantar fascia.  There is  plantar fascial thickening measuring greater than 4 mm.. There is mild osseous edema at the calcaneal insertion of the plantar fascia and calcaneal spur formation. There is no rupture of the fascia. There is no evidence of plantar fibromatosis. Findings are nonspecific yet can be seen with with plantar fasciitis..Lateral cord of  plantar fascia demonstrates normal appearance and insertion upon the base of the 5th MT.     **ANTERIOR COMPARTMENT     Tendons:     Anterior tibial tendon: Normal.  Insertion intact.     Extensor hallucis longus: Normal.     Extensor digitorum longus: Normal.     Ligaments:     Dorsal talonavicular ligament: Intact.     **ARTICULATIONS:     Tibiotalar joint: Normal.  No evidence for anterior osseous spurs.     Subtalar joint: Normal.     Talonavicular joint: Normal.     Calcaneocuboid joint: Normal     Talus and calcaneus: Intact lateral process of talus and anterior process of calcaneus without fracture.     **GENERAL FINDINGS     Osseous Structures: No evidence of fracture     Muscles: Normal.     Nerves and tarsal tunnel: Normal.     Sinus tarsi: Normal.     Diffuse subcutaneous edema.     Impression:     Diffuse subcutaneous edema about the ankle.     Plantar fascial thickening, with minimal edema of calcaneal spur nonspecific finding, can be seen in plantar fasciitis in the appropriate setting       XR KNEE 3/27/24:   EXAMINATION:  XR KNEE 3 VIEW LEFT     CLINICAL HISTORY:  Pain in left knee     TECHNIQUE:  AP, lateral, and Merchant views of the left knee were performed.     COMPARISON:  03/29/2022     FINDINGS:  No evidence of an acute fracture.  The joint spaces appear relatively well maintained.  No large joint effusion.  No radiopaque foreign body.     Impression:     No acute fracture or dislocation.        Electronically signed by:Jamil Brady MD  Date:                                            03/27/2024  Time:                                           15:07       Electronically signed by: Quinten Kelly MD  Date:                                            03/13/2023  Time:                                           11:49     Past Medical History:   Diagnosis Date    Allergic rhinitis     Asthma     Corneal abrasion     Depression     Hx of psychiatric care     Morbid obesity     Obstructive sleep  apnea treated with continuous positive airway pressure (CPAP) 07/10/2018    Psychiatric problem     Therapy      Past Surgical History:   Procedure Laterality Date    APPENDECTOMY      APPLICATION OF CARTILAGE GRAFT Bilateral 12/6/2021    Procedure: APPLICATION, CARTILAGE GRAFT;  Surgeon: JAN Arredondo MD;  Location: [facility];  Service: ENT;  Laterality: Bilateral;  from right ear    gastric sleeve  09/2019    [facility]    NASAL SEPTOPLASTY Bilateral 12/6/2021    Procedure: SEPTOPLASTY, NOSE;  Surgeon: JAN Arredondo MD;  Location: [facility] OR;  Service: ENT;  Laterality: Bilateral;    NASAL STENOSIS REPAIR Bilateral 12/6/2021    Procedure: REPAIR, STENOSIS, NOSE, VESTIBULE;  Surgeon: JAN Arredondo MD;  Location: [facility] OR;  Service: ENT;  Laterality: Bilateral;    SINUS SURGERY      VASECTOMY  2017     Social History     Socioeconomic History    Marital status:    Occupational History    Occupation: Information Tech     Employer: [employer]   Tobacco Use    Smoking status: Never     Passive exposure: Never    Smokeless tobacco: Never   Substance and Sexual Activity    Alcohol use: Yes     Alcohol/week: 1.0 standard drink of alcohol     Types: 1 Cans of beer per week     Comment: RARELY    Drug use: No    Sexual activity: Yes     Partners: Female     Social Determinants of Health     Financial Resource Strain: Patient Declined (11/20/2023)    Overall Financial Resource Strain (CARDIA)     Difficulty of Paying Living Expenses: Patient declined   Food Insecurity: No Food Insecurity (11/20/2023)    Hunger Vital Sign     Worried About Running Out of Food in the Last Year: Never true     Ran Out of Food in the Last Year: Never true   Transportation Needs: No Transportation Needs (11/20/2023)    PRAPARE - Transportation     Lack of Transportation (Medical): No     Lack of Transportation (Non-Medical): No   Physical Activity: Insufficiently Active (11/20/2023)    Exercise Vital  Sign     Days of Exercise per Week: 3 days     Minutes of Exercise per Session: 30 min   Stress: Stress Concern Present (2023)    [employer] - Occupational Stress Questionnaire     Feeling of Stress : To some extent   Social Connections: Unknown (2023)    Social Connection and Isolation Panel [NHANES]     Frequency of Communication with Friends and Family: More than three times a week     Frequency of Social Gatherings with Friends and Family: Three times a week     Active Member of Clubs or Organizations: No     Attends Club or Organization Meetings: Never     Marital Status:    Housing Stability: Low Risk  (2023)    Housing Stability Vital Sign     Unable to Pay for Housing in the Last Year: No     Number of Places Lived in the Last Year: 1     Unstable Housing in the Last Year: No     Family History   Problem Relation Name Age of Onset    Allergies Mother      Rheum arthritis Mother      Lung cancer Mother          post lobectomy    Dementia Mother      Glaucoma Mother      Cataracts Mother      Diabetes Father      Hypertension Father      No Known Problems Maternal Aunt      No Known Problems Maternal Uncle      No Known Problems Paternal Aunt      No Known Problems Paternal Uncle      Allergies Maternal Grandmother      Hypertension Maternal Grandmother      Dementia Maternal Grandmother      Brain cancer Maternal Grandmother          in remission before she     Rheum arthritis Maternal Grandfather      Diabetes Paternal Grandmother      Alzheimer's disease Paternal Grandmother      Hypertension Paternal Grandfather      Obesity Paternal Grandfather      Cerebral aneurysm Paternal Grandfather      No Known Problems Other      Melanoma Neg Hx         Review of patient's allergies indicates:   Allergen Reactions    Ceclor [cefaclor] Anaphylaxis    Penicillins Hives     Elevated blood pressure, temp, hives    Calcium alginate Other (See Comments)      Headache       Current Outpatient Medications   Medication Sig Dispense Refill    ASCORBATE CALCIUM (VITAMIN C ORAL) Take by mouth.      azelastine (ASTELIN) 137 mcg (0.1 %) nasal spray 2 sprays (274 mcg total) by Nasal route 2 (two) times daily as needed (Nasal congestion). 30 mL 5    b complex vitamins (B COMPLEX-VITAMIN B12) tablet Take 1 tablet by mouth once daily.      buPROPion (WELLBUTRIN XL) 300 MG 24 hr tablet Take 1 tablet (300 mg total) by mouth once daily. 90 tablet 1    clotrimazole-betamethasone 1-0.05% (LOTRISONE) cream Apply topically 2 (two) times daily. 45 g 3    DULoxetine (CYMBALTA) 60 MG capsule Take 1 capsule (60 mg total) by mouth once daily. 90 capsule 1    fluticasone propionate (FLONASE) 50 mcg/actuation nasal spray One spray in each nostril twice daily after 1st using azelastine nasal spray 18.2 mL 11    hydrOXYzine HCL (ATARAX) 25 MG tablet TK 1 T PO  TID PRN ANXIETY 30 tablet 6    lamoTRIgine (LAMICTAL) 100 MG tablet Take one tab of Lamictal 100 mg po daily, plus one tab of Lamictal 25 mg po daily, total lamictal 125 mg po daily. 30 tablet 2    lamoTRIgine (LAMICTAL) 25 MG tablet Take 1 tab Lamictal 25 mg p.o. daily plus 1 tab Lamictal 100 mg po daily, total Lamictal 125 mg p.o. daily 30 tablet 2    montelukast (SINGULAIR) 10 mg tablet Take 1 tablet (10 mg total) by mouth every evening. Take during allergy season. 90 tablet 3    multivitamin capsule Take 1 capsule by mouth once daily.      rOPINIRole (REQUIP) 0.5 MG tablet Take 2 tablets (1 mg total) by mouth every evening. 30 tablet 6    tamsulosin (FLOMAX) 0.4 mg Cap Take 1 capsule (0.4 mg total) by mouth every evening. 90 capsule 3    urea (CARMOL) 40 % Crea Apply topically.      WEGOVY 1 mg/0.5 mL PnIj Inject into the skin.       No current facility-administered medications for this visit.       REVIEW OF SYSTEMS:    GENERAL:  No weight loss, malaise or fevers.  HEENT:   No recent changes in vision or hearing  NECK:  Negative for  lumps, no difficulty with swallowing.  RESPIRATORY:  Negative for cough, wheezing or shortness of breath, patient denies any recent URI.  CARDIOVASCULAR:  Negative for chest pain, leg swelling or palpitations.  GI:  Negative for abdominal discomfort, blood in stools or black stools or change in bowel habits.  MUSCULOSKELETAL:  See HPI.   SKIN:  Negative for lesions, rash, and itching.  PSYCH:  No mood disorder or recent psychosocial stressors.  Patients sleep is not disturbed secondary to pain.  HEMATOLOGY/LYMPHOLOGY:  Negative for prolonged bleeding, bruising easily or swollen nodes.  Patient is not currently taking any anti-coagulants  NEURO:   No history of headaches, syncope, paralysis, seizures or tremors.  All other reviewed and negative other than HPI.    OBJECTIVE:  Physical Exam was limited due to the nature of virtual video visit.       ASSESSMENT: 48 y.o. year old male with right shoulder/back pain, consistent with     1. Chronic pain of left knee        2. Post-traumatic osteoarthritis of left knee              DISCUSSION: Mr. Contreras is a pleasant gentleman who came to us with left knee pain. Imaging shows well-maintained joint spaces without evidence of effusion of fracture. He began physical therapy since the last clinic visit with mild improvement in his pain. He is Mounjaro and seeing Bariatrics.        PLAN:   1) Reviewed and discussed imaging with patient  2) Continue Voltaren gel PRN  3) Continue Tylenol 1000mg every 8 hours  4) Continue physical therapy and home exercise program for strengthening and conditioning of the surrounding knee musculature.   5) Continue to follow with Bariatrics and to focus on weight loss.   6) RTC 2 months to assess response to medication management and physical therapy.   7) Can consider left knee MRI and injections if symptoms do not improve    The above plan and management options were discussed at length with patient. Patient is in agreement with the above  and verbalized understanding. It will be communicated with the referring physician via electronic record, fax, or mail.

## 2024-04-18 ENCOUNTER — CLINICAL SUPPORT (OUTPATIENT)
Dept: REHABILITATION | Facility: HOSPITAL | Age: 49
End: 2024-04-18
Payer: COMMERCIAL

## 2024-04-18 DIAGNOSIS — M25.562 CHRONIC PAIN OF LEFT KNEE: Primary | ICD-10-CM

## 2024-04-18 DIAGNOSIS — G89.29 CHRONIC PAIN OF LEFT KNEE: Primary | ICD-10-CM

## 2024-04-18 PROCEDURE — 97530 THERAPEUTIC ACTIVITIES: CPT

## 2024-04-18 PROCEDURE — 97110 THERAPEUTIC EXERCISES: CPT

## 2024-04-25 ENCOUNTER — PATIENT MESSAGE (OUTPATIENT)
Dept: REHABILITATION | Facility: HOSPITAL | Age: 49
End: 2024-04-25

## 2024-04-27 NOTE — PROGRESS NOTES
vSubjective:       Patient ID: Jered Contreras is a 48 y.o. male.    Chief Complaint: Follow-up      The patient is coming in for follow-up evaluation.  It has been 2 years since I last saw him.  There are multiple issues to discuss:  1. Nasal airway obstruction: the patient is breathing normally through both sides of his nose.   2. Allergic rhinitis: Nasal secretions are clear. He is using Zyrtec in the morning, montelukast at night and azelastine and fluticasone sprays twice daily routinely.  He uses ipratropium bromide sprays supplement when needed to control runny nose  3. Diminished sense of smell and taste:  The patient has noticed over the last month he is having progressive diminishment in his sense of smell more so than sense of taste.  Both are diminished but smell is affected much more.  4. Obstructive sleep apnea using CPAP:  He continues to use his CPAP          Review of Systems     Constitutional: Positive for fatigue.  Negative for appetite change, chills, fever and unexpected weight loss.      HENT: Positive for postnasal drip, runny nose, sinus pressure, sore throat and stuffy nose.  Negative for ear discharge, ear infection, ear pain, facial swelling, hearing loss, mouth sores, nosebleeds, ringing in the ears, sinus infection, tonsil infection, dental problems, trouble swallowing and voice change.      Eyes:  Negative for change in eyesight, eye drainage, eye itching and photophobia.     Respiratory:  Positive for cough and sleep apnea. Negative for shortness of breath, snoring and wheezing.      Cardiovascular:  Positive for foot swelling. Negative for chest pain, irregular heartbeat and swollen veins.     Gastrointestinal:  Negative for abdominal pain, acid reflux, constipation, diarrhea, heartburn and vomiting.     Genitourinary: Negative for difficulty urinating, sexual problems and frequent urination.     Musc: Negative for aching joints, aching muscles, back pain and neck pain.      Skin: Negative for rash.     Allergy: Positive for seasonal allergies. Negative for food allergies.     Endocrine: Negative for cold intolerance and heat intolerance.      Neurological: Positive for headaches. Negative for dizziness, light-headedness, seizures and tremors.      Hematologic: Negative for bruises/bleeds easily.      Psychiatric: Positive for depression and nervous/anxious. Negative for decreased concentration and sleep disturbance.          Bilateral rigid diagnostic nasal endoscopy:  Mild septal deviation appearance of polyps in the superior meatus on the right in the roof of the nose, diffuse mucosal hyperemia with no purulent exudate, mild prominence of adenoid pad /tissue     Right nasal passage pictures:                      Left nasal passage pictures:                      Nasopharynx picture:          Objective:      Physical Exam  Vitals and nursing note reviewed.   Constitutional:       General: He is awake.      Appearance: Normal appearance. He is well-developed and well-groomed. He is morbidly obese.   HENT:      Head: Normocephalic.      Jaw: There is normal jaw occlusion.      Salivary Glands: Right salivary gland is not diffusely enlarged or tender. Left salivary gland is not diffusely enlarged or tender.      Right Ear: Hearing, ear canal and external ear normal. Tympanic membrane is retracted.      Left Ear: Hearing, ear canal and external ear normal. Tympanic membrane is retracted.      Nose: Septal deviation (low nose to the right and more posteriorly to the left), mucosal edema (cyanotic, boggy inferior turbinates bilaterally) and rhinorrhea (clear mucus bilaterally) present. No nasal deformity. Rhinorrhea is clear.      Right Turbinates: Enlarged and pale.      Left Turbinates: Enlarged and pale.      Mouth/Throat:      Lips: No lesions.      Mouth: No oral lesions.      Dentition: No gum lesions.      Tongue: No lesions.      Palate: No mass and lesions.      Pharynx:  Oropharynx is clear. Uvula midline.   Eyes:      General: Lids are normal. Vision grossly intact.         Right eye: No discharge.         Left eye: No discharge.      Extraocular Movements: Extraocular movements intact.      Conjunctiva/sclera: Conjunctivae normal.      Pupils: Pupils are equal, round, and reactive to light.   Neck:      Thyroid: No thyroid mass or thyromegaly.      Trachea: Trachea normal. No tracheal deviation.   Cardiovascular:      Rate and Rhythm: Normal rate and regular rhythm.      Pulses: Normal pulses.      Heart sounds: Normal heart sounds.   Pulmonary:      Effort: Pulmonary effort is normal.      Breath sounds: Normal breath sounds. No stridor. No decreased breath sounds, wheezing, rhonchi or rales.   Abdominal:      General: Bowel sounds are normal.      Palpations: Abdomen is soft.      Tenderness: There is no abdominal tenderness.   Musculoskeletal:         General: Normal range of motion.      Cervical back: Normal range of motion. No muscular tenderness.   Lymphadenopathy:      Head:      Right side of head: No submental, submandibular, preauricular, posterior auricular or occipital adenopathy.      Left side of head: No submental, submandibular, preauricular, posterior auricular or occipital adenopathy.      Cervical: No cervical adenopathy.   Skin:     General: Skin is warm and dry.      Findings: No petechiae or rash.      Nails: There is no clubbing.   Neurological:      Mental Status: He is alert and oriented to person, place, and time.      Cranial Nerves: No cranial nerve deficit.      Sensory: No sensory deficit.      Gait: Gait normal.   Psychiatric:         Speech: Speech normal.         Behavior: Behavior normal. Behavior is cooperative.         Thought Content: Thought content normal.         Judgment: Judgment normal.               Assessment:       1. Non-seasonal allergic rhinitis, unspecified trigger    2. Smell or taste sensation disturbance    3. Nasal polyp    4.  Nasal septal deviation        Plan:         I am placing the patient on an oral prednisone taper with antibiotic coverage.  I will schedule him for a  [employer]tr[employer] CT scan of the sinuses without IV contrast.  He will continue with his normal allergy medications.  I will recheck him in 1 month.                  DISCLAIMER: This note was prepared with [employer] voice recognition transcription software. Garbled syntax, mangled pronouns, and other bizarre constructions may be attributed to that software system. While efforts were made to correct any mistakes made by this voice recognition program, some errors and/or omissions may remain in the note that were missed when the note was originally created.

## 2024-04-30 ENCOUNTER — OFFICE VISIT (OUTPATIENT)
Dept: OTOLARYNGOLOGY | Facility: CLINIC | Age: 49
End: 2024-04-30
Payer: COMMERCIAL

## 2024-04-30 ENCOUNTER — CLINICAL SUPPORT (OUTPATIENT)
Dept: REHABILITATION | Facility: HOSPITAL | Age: 49
End: 2024-04-30
Payer: COMMERCIAL

## 2024-04-30 VITALS
DIASTOLIC BLOOD PRESSURE: 74 MMHG | SYSTOLIC BLOOD PRESSURE: 142 MMHG | HEART RATE: 71 BPM | BODY MASS INDEX: 48.95 KG/M2 | OXYGEN SATURATION: 97 % | WEIGHT: 315 LBS

## 2024-04-30 DIAGNOSIS — G89.29 CHRONIC PAIN OF LEFT KNEE: Primary | ICD-10-CM

## 2024-04-30 DIAGNOSIS — R43.9 SMELL OR TASTE SENSATION DISTURBANCE: ICD-10-CM

## 2024-04-30 DIAGNOSIS — J34.2 NASAL SEPTAL DEVIATION: ICD-10-CM

## 2024-04-30 DIAGNOSIS — J30.89 NON-SEASONAL ALLERGIC RHINITIS, UNSPECIFIED TRIGGER: Primary | ICD-10-CM

## 2024-04-30 DIAGNOSIS — J33.9 NASAL POLYP: ICD-10-CM

## 2024-04-30 DIAGNOSIS — M25.562 CHRONIC PAIN OF LEFT KNEE: Primary | ICD-10-CM

## 2024-04-30 PROCEDURE — 97140 MANUAL THERAPY 1/> REGIONS: CPT

## 2024-04-30 PROCEDURE — 97110 THERAPEUTIC EXERCISES: CPT

## 2024-04-30 PROCEDURE — 97112 NEUROMUSCULAR REEDUCATION: CPT

## 2024-04-30 PROCEDURE — 1159F MED LIST DOCD IN RCRD: CPT | Mod: CPTII,S$GLB,, | Performed by: SPECIALIST

## 2024-04-30 PROCEDURE — 3077F SYST BP >= 140 MM HG: CPT | Mod: CPTII,S$GLB,, | Performed by: SPECIALIST

## 2024-04-30 PROCEDURE — 31231 NASAL ENDOSCOPY DX: CPT | Mod: S$GLB,,, | Performed by: SPECIALIST

## 2024-04-30 PROCEDURE — 3008F BODY MASS INDEX DOCD: CPT | Mod: CPTII,S$GLB,, | Performed by: SPECIALIST

## 2024-04-30 PROCEDURE — 99214 OFFICE O/P EST MOD 30 MIN: CPT | Mod: 25,S$GLB,, | Performed by: SPECIALIST

## 2024-04-30 PROCEDURE — 3078F DIAST BP <80 MM HG: CPT | Mod: CPTII,S$GLB,, | Performed by: SPECIALIST

## 2024-04-30 PROCEDURE — 99999 PR PBB SHADOW E&M-EST. PATIENT-LVL IV: CPT | Mod: PBBFAC,,, | Performed by: SPECIALIST

## 2024-04-30 PROCEDURE — 97530 THERAPEUTIC ACTIVITIES: CPT

## 2024-04-30 PROCEDURE — 1160F RVW MEDS BY RX/DR IN RCRD: CPT | Mod: CPTII,S$GLB,, | Performed by: SPECIALIST

## 2024-04-30 RX ORDER — PREDNISONE 5 MG/1
TABLET ORAL
Qty: 45 TABLET | Refills: 0 | Status: SHIPPED | OUTPATIENT
Start: 2024-04-30 | End: 2024-05-23 | Stop reason: CLARIF

## 2024-04-30 RX ORDER — DOXYCYCLINE HYCLATE 100 MG
100 TABLET ORAL 2 TIMES DAILY
Qty: 20 TABLET | Refills: 0 | Status: SHIPPED | OUTPATIENT
Start: 2024-04-30 | End: 2024-05-14

## 2024-04-30 NOTE — PROGRESS NOTES
[facility]   Physical Therapy Treatment Note        Name: Jered Contreras  Clinic Number: [id number]    Therapy Diagnosis:   Encounter Diagnosis   Name Primary?    Chronic pain of left knee Yes     Physician: Alexi Regalado MD    Visit Date: 4/30/2024    Physician Orders: PT Eval and Treat  Medical Diagnosis from Referral: M25.562,G89.29 (ICD-10-CM) - Chronic pain of left knee   Evaluation Date: 4/9/2024  Authorization Period Expiration: 3/27/2025  Plan of Care Expiration: 7/2/2024  Visit # / Visits authorized: 2/ 20   FOTO #2: 3 / 3   FOTO: #3: 0 / 0    Time In: 1600  Time Out: 1700  Total Billable Time: 53 minutes    Precautions: Standard    SUBJECTIVE     Pt reports: no longer having daily symptoms. Only notices his symptoms after heavy activity like yard work.  He was compliant with home exercise program.  Response to previous treatment: soreness  Functional change: able to complete yard work.    Pain: 0/10  Location: Left knee    OBJECTIVE     Objective Measures updated at progress report unless specified.     TREATMENT     Jered received the treatments listed below:      Therapeutic Exercises to develop strength, endurance, ROM, and core stabilization for 12 minutes including:  Heel Raises with Trap bar - 20#; 3x10  Sidelying Hip Abduction - 3#; 2x12 bilateral  Bridges - double leg; 30# DB; 3x12      Manual Therapy Techniques: Joint mobilizations and muscle energy technique were applied to the: left knee for 8 minutes, including:  Left knee lateral Tibial shift muscle energy technique - x3      Neuromuscular Re-education activities to improve: Balance, Proprioception, and Posture for 10 minutes. The following activities were included:  Step Ups - forward / lateral; 6 inch Rouge Box; 3x10 bilateral      Therapeutic Activities to improve functional performance for 23 minutes, including:  Trap Bar Squats - 20#; 3x10  Lunges - lateral glide with light orange power band;  3x10  Resisted Lateral walking - green band; 15 step laps; x4      PATIENT EDUCATION AND HOME EXERCISES      Home Exercises Provided and Patient Education Provided     Education provided:   Anatomy and Pathology.  Symptom management and plan of care progressions.  Home Exercise Program.    Written Home Exercises Provided: yes. Exercises were reviewed and Jered was able to demonstrate them prior to the end of the session.  Jered demonstrated good  understanding of the education provided. See EMR under Patient Instructions for exercises provided during therapy sessions    ASSESSMENT     Jered presents back to the clinic with negative daily symptoms. Progressed hip and knee strengthening program with increased cuing needed for hip abduction during single leg loaded activities which relieved symptoms. He will continue to benefit from skilled physical therapy addressing his Left lower extremity strength and proprioception for normal, pain-free activities.    From evaluation on 4/9/2024 - Jered is a 48 y.o. male referred to outpatient Physical Therapy with a medical diagnosis of chronic left knee pain. Patient presents with decreased range of motion, decreased strength, gait deviations, lymphedema, decreased balance, and biomechanical faults with functional movements. Signs and symptoms are consistent with left knee medial meniscus pathology due to possible degenerative tear. He will benefit from skilled physical therapy addressing his joint mobility, hip strength, and proprioceptive retraining.     Jered Is progressing well towards his goals.   Pt prognosis is Good.     Pt will continue to benefit from skilled outpatient physical therapy to address the deficits listed in the problem list box on initial evaluation, provide pt/family education and to maximize pt's level of independence in the home and community environment. Pt's spiritual, cultural and educational needs considered and pt agreeable to plan of care  and goals.     Anticipated barriers to physical therapy: chronicity of symptoms, lymphedema    Goals:  Short Term Goals: 5 weeks   Patient will have reduced pain complaints from 4/10 to less than or equal to 2/10. (Not Met - Progressing)  Patient will demonstrate pain-free squat mechanics to 90 degrees. (Not Met - Progressing)  Patient will demonstrate increased muscle strength of at least one-half grade or 2.5 kg as compared to the initial measurements. (Not Met - Progressing)     Long Term Goals: 10 weeks   Patient will have reduced pain complaints from 2/10 to less than or equal to 0/10. (Not Met - Progressing)  Patient will demonstrate stair ascent and descent for 2 flights of stairs with no symptoms. (Not Met - Progressing)  Patient will demonstrate increased muscle strength of at least one grade or 5 kg as compared to the initial measurements. (Not Met - Progressing)  Patient will improve Knee FOTO Intake score from 31% to greater than or equal to 59%. (Not Met - Progressing)  Patient will be independent with their home exercise program. (Not Met - Progressing)    PLAN     Glute strengthening  Lower extremity proprioception training.    Plan of care Certification: 4/9/2024 to 7/2/2024.  Outpatient Physical Therapy 1 times weekly for 10 weeks.    Javy Snyder, PT , DPT, OCS

## 2024-04-30 NOTE — PROCEDURES
"Nasal/sinus endoscopy    Date/Time: 4/30/2024 10:40 AM    Time out: Immediately prior to procedure a "time out" was called to verify the correct patient, procedure, equipment, support staff and site/side marked as required.    Performed by: JAN Arredondo MD  Authorized by: JAN Arredondo MD    Consent Done?:  Yes (Verbal)  Anesthesia:     Local anesthetic:  4% Xylocaine spray with Ethan-Synephrine    Location: Diagnostic rigid nasal endoscopy.    Patient tolerance:  Patient tolerated the procedure well with no immediate complications  Nose:     Procedure Performed:  Nasal Endoscopy  External:      No external nasal deformity  Intranasal:      Mucosa no polyps (Small polyps in the superior meatus on the right nasal passage in the roof of the nose)     Mucosa ulcers not present     No mucosa lesions present (diffuse mucosal hyperemia with no purulent secretions)     Enlarged turbinates (inferior turbinates enlarged bilaterally)     Septum gross deformity (I to the left and low to the right)  Nasopharynx:      No mucosa lesions     Adenoids present (mildly prominent adenoid tissue)     Posterior choanae patent     Eustachian tube patent     Diagnostic rigid nasal endoscopy- mild nasal septal deviation, diffuse mucosal hyperemia with no purulent exudates, small amount of polypoid material in the superior meatus on the right  in the roof of the nose, mildly prominent adenoid tissue that appears benign    "

## 2024-05-01 ENCOUNTER — PATIENT MESSAGE (OUTPATIENT)
Dept: PSYCHIATRY | Facility: CLINIC | Age: 49
End: 2024-05-01
Payer: COMMERCIAL

## 2024-05-01 RX ORDER — LAMOTRIGINE 25 MG/1
TABLET ORAL
Qty: 60 TABLET | Refills: 2 | Status: SHIPPED | OUTPATIENT
Start: 2024-05-01

## 2024-05-01 RX ORDER — DULOXETIN HYDROCHLORIDE 60 MG/1
60 CAPSULE, DELAYED RELEASE ORAL DAILY
Qty: 90 CAPSULE | Refills: 0 | Status: SHIPPED | OUTPATIENT
Start: 2024-05-01 | End: 2024-07-30

## 2024-05-01 RX ORDER — LAMOTRIGINE 100 MG/1
TABLET ORAL
Qty: 30 TABLET | Refills: 2 | Status: SHIPPED | OUTPATIENT
Start: 2024-05-01

## 2024-05-06 ENCOUNTER — HOSPITAL ENCOUNTER (OUTPATIENT)
Dept: RADIOLOGY | Facility: HOSPITAL | Age: 49
Discharge: HOME | End: 2024-05-06
Attending: SPECIALIST
Payer: COMMERCIAL

## 2024-05-06 DIAGNOSIS — J33.9 NASAL POLYP: ICD-10-CM

## 2024-05-06 DIAGNOSIS — R43.9 SMELL OR TASTE SENSATION DISTURBANCE: ICD-10-CM

## 2024-05-06 PROCEDURE — 70486 CT MAXILLOFACIAL W/O DYE: CPT | Mod: 26,,, | Performed by: RADIOLOGY

## 2024-05-06 PROCEDURE — 70486 CT MAXILLOFACIAL W/O DYE: CPT | Mod: TC

## 2024-05-07 ENCOUNTER — PATIENT MESSAGE (OUTPATIENT)
Dept: REHABILITATION | Facility: HOSPITAL | Age: 49
End: 2024-05-07
Payer: COMMERCIAL

## 2024-05-14 ENCOUNTER — PATIENT MESSAGE (OUTPATIENT)
Dept: CARDIOLOGY | Facility: HOSPITAL | Age: 49
End: 2024-05-14
Payer: COMMERCIAL

## 2024-05-14 ENCOUNTER — CLINICAL SUPPORT (OUTPATIENT)
Dept: REHABILITATION | Facility: HOSPITAL | Age: 49
End: 2024-05-14
Payer: COMMERCIAL

## 2024-05-14 DIAGNOSIS — M25.562 CHRONIC PAIN OF LEFT KNEE: Primary | ICD-10-CM

## 2024-05-14 DIAGNOSIS — G89.29 CHRONIC PAIN OF LEFT KNEE: Primary | ICD-10-CM

## 2024-05-14 PROCEDURE — 97110 THERAPEUTIC EXERCISES: CPT

## 2024-05-14 PROCEDURE — 97530 THERAPEUTIC ACTIVITIES: CPT

## 2024-05-14 PROCEDURE — 97112 NEUROMUSCULAR REEDUCATION: CPT

## 2024-05-14 NOTE — PROGRESS NOTES
[facility]   Physical Therapy Treatment Note        Name: Jered Contreras  Clinic Number: [id number]    Therapy Diagnosis:   Encounter Diagnosis   Name Primary?    Chronic pain of left knee Yes     Physician: Alexi Regalado MD    Visit Date: 5/14/2024    Physician Orders: PT Eval and Treat  Medical Diagnosis from Referral: M25.562,G89.29 (ICD-10-CM) - Chronic pain of left knee   Evaluation Date: 4/9/2024  Authorization Period Expiration: 3/27/2025  Plan of Care Expiration: 7/2/2024  Visit # / Visits authorized: 3/ 20   FOTO #2: 64% on 5/14/2024   FOTO: #3: 0 / 3    Time In: 1600  Time Out: 1700  Total Billable Time: 48 minutes    Precautions: Standard    SUBJECTIVE     Pt reports: only had symptoms during the rain yesterday.  He was compliant with home exercise program.  Response to previous treatment: soreness  Functional change: able to complete yard work.    Pain: 0/10  Location: Left knee    OBJECTIVE     Objective Measures updated at progress report unless specified.     TREATMENT     Jered received the treatments listed below:      Therapeutic Exercises to develop strength, endurance, ROM, and core stabilization for 12 minutes including:  Heel Raises with Trap bar - 20#; 3x10  Sidelying Hip Abduction - 3#; 3x10 bilateral  Bridges - double leg; 30# DB; 3x12      Manual Therapy Techniques: Joint mobilizations and muscle energy technique were applied to the: left knee for 0 minutes, including:  Left knee lateral Tibial shift muscle energy technique - x3      Neuromuscular Re-education activities to improve: Balance, Proprioception, and Posture for 13 minutes. The following activities were included:  Step Ups - forward / lateral; 6 inch Rouge Box + 10# KB; 2x10 bilateral      Therapeutic Activities to improve functional performance for 23 minutes, including:  Recumbent Bike - 8 minutes; 60 / 30 seconds; level 3  Trap Bar Squats - 20#; 3x10  Resisted Lateral walking - green  band; 15 step laps; x4    Not Today:  Lunges - lateral glide with light orange power band; 3x10      PATIENT EDUCATION AND HOME EXERCISES      Home Exercises Provided and Patient Education Provided     Education provided:   Anatomy and Pathology.  Symptom management and plan of care progressions.  Home Exercise Program.    Written Home Exercises Provided: yes. Exercises were reviewed and Jered was able to demonstrate them prior to the end of the session.  Jered demonstrated good  understanding of the education provided. See EMR under Patient Instructions for exercises provided during therapy sessions    ASSESSMENT     Jered returns to the clinic with negative symptoms.Continued hip and knee strengthening program with no cuing needed for hip abduction during single leg loaded activities which relieved symptoms. He will continue to benefit from skilled physical therapy addressing his Left lower extremity strength and proprioception for normal, pain-free activities.    From evaluation on 4/9/2024 - Jered is a 48 y.o. male referred to outpatient Physical Therapy with a medical diagnosis of chronic left knee pain. Patient presents with decreased range of motion, decreased strength, gait deviations, lymphedema, decreased balance, and biomechanical faults with functional movements. Signs and symptoms are consistent with left knee medial meniscus pathology due to possible degenerative tear. He will benefit from skilled physical therapy addressing his joint mobility, hip strength, and proprioceptive retraining.     Jered Is progressing well towards his goals.   Pt prognosis is Good.     Pt will continue to benefit from skilled outpatient physical therapy to address the deficits listed in the problem list box on initial evaluation, provide pt/family education and to maximize pt's level of independence in the home and community environment. Pt's spiritual, cultural and educational needs considered and pt agreeable to  plan of care and goals.     Anticipated barriers to physical therapy: chronicity of symptoms, lymphedema    Goals:  Short Term Goals: 5 weeks   Patient will have reduced pain complaints from 4/10 to less than or equal to 2/10. (Not Met - Progressing)  Patient will demonstrate pain-free squat mechanics to 90 degrees. (Not Met - Progressing)  Patient will demonstrate increased muscle strength of at least one-half grade or 2.5 kg as compared to the initial measurements. (Not Met - Progressing)     Long Term Goals: 10 weeks   Patient will have reduced pain complaints from 2/10 to less than or equal to 0/10. (Not Met - Progressing)  Patient will demonstrate stair ascent and descent for 2 flights of stairs with no symptoms. (Not Met - Progressing)  Patient will demonstrate increased muscle strength of at least one grade or 5 kg as compared to the initial measurements. (Not Met - Progressing)  Patient will improve Knee FOTO Intake score from 31% to greater than or equal to 59%. (Met - 5/14/2024)  Patient will be independent with their home exercise program. (Not Met - Progressing)    PLAN     Glute strengthening  Lower extremity proprioception training.    Plan of care Certification: 4/9/2024 to 7/2/2024.  Outpatient Physical Therapy 1 times weekly for 10 weeks.    Javy Snyder, PT , DPT, OCS

## 2024-05-14 NOTE — H&P (VIEW-ONLY)
[facility]   Physical Therapy Treatment Note        Name: eJred Contreras  Clinic Number: [id number]    Therapy Diagnosis:   Encounter Diagnosis   Name Primary?    Chronic pain of left knee Yes     Physician: Alexi Regalado MD    Visit Date: 5/14/2024    Physician Orders: PT Eval and Treat  Medical Diagnosis from Referral: M25.562,G89.29 (ICD-10-CM) - Chronic pain of left knee   Evaluation Date: 4/9/2024  Authorization Period Expiration: 3/27/2025  Plan of Care Expiration: 7/2/2024  Visit # / Visits authorized: 3/ 20   FOTO #2: 64% on 5/14/2024   FOTO: #3: 0 / 3    Time In: 1600  Time Out: 1700  Total Billable Time: 48 minutes    Precautions: Standard    SUBJECTIVE     Pt reports: only had symptoms during the rain yesterday.  He was compliant with home exercise program.  Response to previous treatment: soreness  Functional change: able to complete yard work.    Pain: 0/10  Location: Left knee    OBJECTIVE     Objective Measures updated at progress report unless specified.     TREATMENT     Jered received the treatments listed below:      Therapeutic Exercises to develop strength, endurance, ROM, and core stabilization for 12 minutes including:  Heel Raises with Trap bar - 20#; 3x10  Sidelying Hip Abduction - 3#; 3x10 bilateral  Bridges - double leg; 30# DB; 3x12      Manual Therapy Techniques: Joint mobilizations and muscle energy technique were applied to the: left knee for 0 minutes, including:  Left knee lateral Tibial shift muscle energy technique - x3      Neuromuscular Re-education activities to improve: Balance, Proprioception, and Posture for 13 minutes. The following activities were included:  Step Ups - forward / lateral; 6 inch Rouge Box + 10# KB; 2x10 bilateral      Therapeutic Activities to improve functional performance for 23 minutes, including:  Recumbent Bike - 8 minutes; 60 / 30 seconds; level 3  Trap Bar Squats - 20#; 3x10  Resisted Lateral walking - green  band; 15 step laps; x4    Not Today:  Lunges - lateral glide with light orange power band; 3x10      PATIENT EDUCATION AND HOME EXERCISES      Home Exercises Provided and Patient Education Provided     Education provided:   Anatomy and Pathology.  Symptom management and plan of care progressions.  Home Exercise Program.    Written Home Exercises Provided: yes. Exercises were reviewed and Jered was able to demonstrate them prior to the end of the session.  Jered demonstrated good  understanding of the education provided. See EMR under Patient Instructions for exercises provided during therapy sessions    ASSESSMENT     Jered returns to the clinic with negative symptoms.Continued hip and knee strengthening program with no cuing needed for hip abduction during single leg loaded activities which relieved symptoms. He will continue to benefit from skilled physical therapy addressing his Left lower extremity strength and proprioception for normal, pain-free activities.    From evaluation on 4/9/2024 - Jered is a 48 y.o. male referred to outpatient Physical Therapy with a medical diagnosis of chronic left knee pain. Patient presents with decreased range of motion, decreased strength, gait deviations, lymphedema, decreased balance, and biomechanical faults with functional movements. Signs and symptoms are consistent with left knee medial meniscus pathology due to possible degenerative tear. He will benefit from skilled physical therapy addressing his joint mobility, hip strength, and proprioceptive retraining.     Jered Is progressing well towards his goals.   Pt prognosis is Good.     Pt will continue to benefit from skilled outpatient physical therapy to address the deficits listed in the problem list box on initial evaluation, provide pt/family education and to maximize pt's level of independence in the home and community environment. Pt's spiritual, cultural and educational needs considered and pt agreeable to  plan of care and goals.     Anticipated barriers to physical therapy: chronicity of symptoms, lymphedema    Goals:  Short Term Goals: 5 weeks   Patient will have reduced pain complaints from 4/10 to less than or equal to 2/10. (Not Met - Progressing)  Patient will demonstrate pain-free squat mechanics to 90 degrees. (Not Met - Progressing)  Patient will demonstrate increased muscle strength of at least one-half grade or 2.5 kg as compared to the initial measurements. (Not Met - Progressing)     Long Term Goals: 10 weeks   Patient will have reduced pain complaints from 2/10 to less than or equal to 0/10. (Not Met - Progressing)  Patient will demonstrate stair ascent and descent for 2 flights of stairs with no symptoms. (Not Met - Progressing)  Patient will demonstrate increased muscle strength of at least one grade or 5 kg as compared to the initial measurements. (Not Met - Progressing)  Patient will improve Knee FOTO Intake score from 31% to greater than or equal to 59%. (Met - 5/14/2024)  Patient will be independent with their home exercise program. (Not Met - Progressing)    PLAN     Glute strengthening  Lower extremity proprioception training.    Plan of care Certification: 4/9/2024 to 7/2/2024.  Outpatient Physical Therapy 1 times weekly for 10 weeks.    Javy Snyder, PT , DPT, OCS

## 2024-05-15 ENCOUNTER — OFFICE VISIT (OUTPATIENT)
Dept: OTOLARYNGOLOGY | Facility: CLINIC | Age: 49
End: 2024-05-15
Payer: COMMERCIAL

## 2024-05-15 VITALS
OXYGEN SATURATION: 98 % | HEART RATE: 67 BPM | SYSTOLIC BLOOD PRESSURE: 140 MMHG | DIASTOLIC BLOOD PRESSURE: 76 MMHG | BODY MASS INDEX: 48.08 KG/M2 | WEIGHT: 315 LBS

## 2024-05-15 DIAGNOSIS — J33.8 NASAL SINUS POLYP: ICD-10-CM

## 2024-05-15 DIAGNOSIS — J33.9 NASAL POLYP: ICD-10-CM

## 2024-05-15 DIAGNOSIS — R43.9 SMELL OR TASTE SENSATION DISTURBANCE: ICD-10-CM

## 2024-05-15 DIAGNOSIS — J30.89 NON-SEASONAL ALLERGIC RHINITIS, UNSPECIFIED TRIGGER: ICD-10-CM

## 2024-05-15 DIAGNOSIS — J32.2 CHRONIC ETHMOIDAL SINUSITIS: ICD-10-CM

## 2024-05-15 DIAGNOSIS — J32.0 CHRONIC MAXILLARY SINUSITIS: Primary | ICD-10-CM

## 2024-05-15 PROCEDURE — 99214 OFFICE O/P EST MOD 30 MIN: CPT | Mod: S$GLB,,, | Performed by: SPECIALIST

## 2024-05-15 PROCEDURE — 3078F DIAST BP <80 MM HG: CPT | Mod: CPTII,S$GLB,, | Performed by: SPECIALIST

## 2024-05-15 PROCEDURE — 1159F MED LIST DOCD IN RCRD: CPT | Mod: CPTII,S$GLB,, | Performed by: SPECIALIST

## 2024-05-15 PROCEDURE — 1160F RVW MEDS BY RX/DR IN RCRD: CPT | Mod: CPTII,S$GLB,, | Performed by: SPECIALIST

## 2024-05-15 PROCEDURE — 3077F SYST BP >= 140 MM HG: CPT | Mod: CPTII,S$GLB,, | Performed by: SPECIALIST

## 2024-05-15 PROCEDURE — 99999 PR PBB SHADOW E&M-EST. PATIENT-LVL IV: CPT | Mod: PBBFAC,,, | Performed by: SPECIALIST

## 2024-05-15 PROCEDURE — 3008F BODY MASS INDEX DOCD: CPT | Mod: CPTII,S$GLB,, | Performed by: SPECIALIST

## 2024-05-15 NOTE — PROGRESS NOTES
vSubjective:       Patient ID: Jered Contreras is a 48 y.o. male.    Chief Complaint: No chief complaint on file.      The patient is coming in for follow-up evaluation.  It has been 2 years since I last saw him.  There are multiple issues to discuss:  1. Nasal airway obstruction: the patient is breathing normally through both sides of his nose.   2. Allergic rhinitis: Nasal secretions are clear.  The patient did notice improvement in his nasal airway when he was on the steroid taper and antibiotics.  He is using Zyrtec in the morning, montelukast at night and azelastine and fluticasone sprays twice daily routinely.  He uses ipratropium bromide sprays supplement when needed to control runny nose  3. Diminished sense of smell and taste:  The patient's sense of smell improved minimally while taking the antibiotics and oral steroid taper.  When he approaches a very strong odor closely he has a slight sense of smelling it.  4. Obstructive sleep apnea using CPAP:  He continues to use his CPAP          Review of Systems     Constitutional: Positive for fatigue.  Negative for appetite change, chills, fever and unexpected weight loss.      HENT: Positive for postnasal drip, runny nose, sinus pressure, sore throat and stuffy nose.  Negative for ear discharge, ear infection, ear pain, facial swelling, hearing loss, mouth sores, nosebleeds, ringing in the ears, sinus infection, tonsil infection, dental problems, trouble swallowing and voice change.      Eyes:  Negative for change in eyesight, eye drainage, eye itching and photophobia.     Respiratory:  Positive for cough and sleep apnea. Negative for shortness of breath, snoring and wheezing.      Cardiovascular:  Positive for foot swelling. Negative for chest pain, irregular heartbeat and swollen veins.     Gastrointestinal:  Negative for abdominal pain, acid reflux, constipation, diarrhea, heartburn and vomiting.     Genitourinary: Negative for difficulty  urinating, sexual problems and frequent urination.     Musc: Negative for aching joints, aching muscles, back pain and neck pain.     Skin: Negative for rash.     Allergy: Positive for seasonal allergies. Negative for food allergies.     Endocrine: Negative for cold intolerance and heat intolerance.      Neurological: Positive for headaches. Negative for dizziness, light-headedness, seizures and tremors.      Hematologic: Negative for bruises/bleeds easily and swollen glands.      Psychiatric: Positive for depression and nervous/anxious. Negative for decreased concentration and sleep disturbance.        2024        Objective:      Physical Exam  Vitals and nursing note reviewed.   Constitutional:       General: He is awake.      Appearance: Normal appearance. He is well-developed and well-groomed. He is morbidly obese.   HENT:      Head: Normocephalic.      Jaw: There is normal jaw occlusion.      Salivary Glands: Right salivary gland is not diffusely enlarged or tender. Left salivary gland is not diffusely enlarged or tender.      Right Ear: Hearing, ear canal and external ear normal. Tympanic membrane is retracted.      Left Ear: Hearing, ear canal and external ear normal. Tympanic membrane is retracted.      Nose: Septal deviation (low nose to the right and more posteriorly to the left), mucosal edema (cyanotic, boggy inferior turbinates bilaterally) and rhinorrhea (clear mucus bilaterally) present. No nasal deformity. Rhinorrhea is clear.      Right Turbinates: Enlarged and pale.      Left Turbinates: Enlarged and pale.      Mouth/Throat:      Lips: No lesions.      Mouth: No oral lesions.      Dentition: No gum lesions.      Tongue: No lesions.      Palate: No mass and lesions.      Pharynx: Oropharynx is clear. Uvula midline.   Eyes:      General: Lids are normal. Vision grossly intact.         Right eye: No discharge.         Left eye: No discharge.      Extraocular Movements: Extraocular movements intact.       Conjunctiva/sclera: Conjunctivae normal.      Pupils: Pupils are equal, round, and reactive to light.   Neck:      Thyroid: No thyroid mass or thyromegaly.      Trachea: Trachea normal. No tracheal deviation.   Cardiovascular:      Rate and Rhythm: Normal rate and regular rhythm.      Pulses: Normal pulses.      Heart sounds: Normal heart sounds.   Pulmonary:      Effort: Pulmonary effort is normal.      Breath sounds: Normal breath sounds. No stridor. No decreased breath sounds, wheezing, rhonchi or rales.   Abdominal:      General: Bowel sounds are normal.      Palpations: Abdomen is soft.      Tenderness: There is no abdominal tenderness.   Musculoskeletal:         General: Normal range of motion.      Cervical back: Normal range of motion. No muscular tenderness.   Lymphadenopathy:      Head:      Right side of head: No submental, submandibular, preauricular, posterior auricular or occipital adenopathy.      Left side of head: No submental, submandibular, preauricular, posterior auricular or occipital adenopathy.      Cervical: No cervical adenopathy.   Skin:     General: Skin is warm and dry.      Findings: No petechiae or rash.      Nails: There is no clubbing.   Neurological:      Mental Status: He is alert and oriented to person, place, and time.      Cranial Nerves: No cranial nerve deficit.      Sensory: No sensory deficit.      Gait: Gait normal.   Psychiatric:         Speech: Speech normal.         Behavior: Behavior normal. Behavior is cooperative.         Thought Content: Thought content normal.         Judgment: Judgment normal.         Review nasal endoscopy done at last visit below  Bilateral rigid diagnostic nasal endoscopy:  Mild septal deviation appearance of polyps in the superior meatus on the right in the roof of the nose, diffuse mucosal hyperemia with no purulent exudate, mild prominence of adenoid pad /tissue    [employer] CT scan of the sinuses without IV contrast performed  05/06/2024  report below:  CT MEDTRONIC SINUSES WITHOUT     CLINICAL HISTORY:  Unspecified disturbances of smell and taste     TECHNIQUE:  Axial CT images performed through the head/paranasal sinuses via Medtronic technique.  Axial, sagittal and coronal reconstructions performed.  Contrast was not administered.     COMPARISON:  CT Medtronic sinuses 07/26/2021     FINDINGS:  Lobular mucosal thickening measuring up to 5 mm with superimposed mucous retention cysts along the floor of maxillary sinuses bilaterally.  Opacification of the bilateral maxillary ostia and some secretions on the right.     Moderate mucosal thickening of the left anterior ethmoidal air cells extending into the inferior left frontal sinus with opacification of the frontoethmoidal recess.  Left anterior ethmoidal 6 mm osteoma.  Mild right anterior ethmoid air cell mucosal thickening.     Otherwise the frontal sinuses are essentially clear.  The sphenoid sinuses and sphenoethmoidal recesses are clear.     No osseous thickening or sclerosis to specifically indicate chronic sinus disease.     Mild complex nasal septal deviation.  Opacification in the nasal cavity bilaterally could reflect secretions or mild polyposis.  No large destructive mass.     Limited intracranial evaluation is unremarkable.     Extracranial soft tissue structures appear within normal limits.     Impression:     Patchy inflammatory changes of the paranasal sinuses, as further detailed above.     Electronically signed by resident: Eleazar Barriga  Date:                                            05/06/2024  Time:                                           09:31     Electronically signed by:Uli Hines MD  Date:                                            05/06/      Assessment:       1. Chronic maxillary sinusitis    2. Chronic ethmoidal sinusitis    3. Nasal sinus polyp    4. Nasal polyp    5. Smell or taste sensation disturbance    6. Non-seasonal allergic rhinitis, unspecified trigger           Plan:        I am recommending that the patient undergo endoscopic sinus surgery of the the ethmoid maxillary sinuses bilaterally.  We discussed the risks and benefits as outlined on the informed consent and I answered all his questions.  The operative consent was signed and witnessed.  I will have him get clearance from his PCP.  I will recheck him 1 week postoperatively.  Educational information on endoscopic sinus surgery was placed in his after visit summary.              DISCLAIMER: This note was prepared with [employer] voice recognition transcription software. Garbled syntax, mangled pronouns, and other bizarre constructions may be attributed to that software system. While efforts were made to correct any mistakes made by this voice recognition program, some errors and/or omissions may remain in the note that were missed when the note was originally created.

## 2024-05-16 ENCOUNTER — TELEPHONE (OUTPATIENT)
Dept: OTOLARYNGOLOGY | Facility: CLINIC | Age: 49
End: 2024-05-16
Payer: COMMERCIAL

## 2024-05-16 DIAGNOSIS — J32.2 SINUSITIS CHRONIC, ETHMOIDAL: Primary | ICD-10-CM

## 2024-05-16 DIAGNOSIS — J32.0 CHRONIC MAXILLARY SINUSITIS: ICD-10-CM

## 2024-05-17 ENCOUNTER — TELEPHONE (OUTPATIENT)
Dept: INTERNAL MEDICINE | Facility: CLINIC | Age: 49
End: 2024-05-17
Payer: COMMERCIAL

## 2024-05-17 DIAGNOSIS — Z01.818 PRE-OPERATIVE EXAMINATION FOR INTERNAL MEDICINE: Primary | ICD-10-CM

## 2024-05-17 NOTE — TELEPHONE ENCOUNTER
----- Message from JAN Arredondo MD sent at 5/16/2024  4:25 PM CDT -----  Dr. Dacosta,  I have scheduled Jered Manuel for endoscopic sinus surgery on May 27th.  This will be done under general anesthesia.  Can you please evaluate him for clearance for anesthesia and surgery?  Thank you,   JAN Arredondo M.D.

## 2024-05-20 ENCOUNTER — TELEPHONE (OUTPATIENT)
Dept: OPTOMETRY | Facility: CLINIC | Age: 49
End: 2024-05-20
Payer: COMMERCIAL

## 2024-05-20 NOTE — PROGRESS NOTES
[facility]   Physical Therapy Treatment Note        Name: Jered Contreras  Clinic Number: [id number]    Therapy Diagnosis:   Encounter Diagnosis   Name Primary?    Chronic pain of left knee Yes     Physician: Alexi Regalado MD    Visit Date: 5/21/2024    Physician Orders: PT Eval and Treat  Medical Diagnosis from Referral: M25.562,G89.29 (ICD-10-CM) - Chronic pain of left knee   Evaluation Date: 4/9/2024  Authorization Period Expiration: 3/27/2025  Plan of Care Expiration: 7/2/2024  Visit # / Visits authorized: 4/ 20   FOTO #2: 64% on 5/14/2024   FOTO: #3: 1 / 3    Time In: 1600  Time Out: 1700  Total Billable Time: 55 minutes    Precautions: Standard    SUBJECTIVE     Pt reports: no symptoms since the last visit.  He was compliant with home exercise program.  Response to previous treatment: soreness  Functional change: able to complete yard work.    Pain: 0/10  Location: Left knee    OBJECTIVE     Objective Measures updated at progress report unless specified.     TREATMENT     Jered received the treatments listed below:      Therapeutic Exercises to develop strength, endurance, ROM, and core stabilization for 9 minutes including:  Heel Raises with Trap bar - 40#; 3x10  Bridges - double leg; 30# DB; 3x12    Not Today:  Sidelying Hip Abduction - 3#; 3x10 bilateral      Manual Therapy Techniques: Joint mobilizations and muscle energy technique were applied to the: left knee for 0 minutes, including:  Left knee lateral Tibial shift muscle energy technique - x3      Neuromuscular Re-education activities to improve: Balance, Proprioception, and Posture for 13 minutes. The following activities were included:  Step Ups - forward / lateral; 6 inch Rouge Box + 15# KB; 2x10 bilateral      Therapeutic Activities to improve functional performance for 33 minutes, including:  Recumbent Bike - 8 minutes; 60 / 30 seconds; level 3  Trap Bar Squats - 40#; 3x10  Resisted Lateral walking - green  band + 5# DB; 15 step laps; x4  Sled Push / Pull - 90#; 3 turf laps    Not Today:  Lunges - lateral glide with light orange power band; 3x10      PATIENT EDUCATION AND HOME EXERCISES      Home Exercises Provided and Patient Education Provided     Education provided:   Anatomy and Pathology.  Symptom management and plan of care progressions.  Home Exercise Program.    Written Home Exercises Provided: yes. Exercises were reviewed and Jeerd was able to demonstrate them prior to the end of the session.  Jered demonstrated good  understanding of the education provided. See EMR under Patient Instructions for exercises provided during therapy sessions    ASSESSMENT     Jered returns to the clinic with negative symptoms. Progressive hip and knee strengthening activities with no cuing needed. He will continue to benefit from skilled physical therapy addressing his Left lower extremity strength and proprioception for normal, pain-free activities.    From evaluation on 4/9/2024 - Jered is a 48 y.o. male referred to outpatient Physical Therapy with a medical diagnosis of chronic left knee pain. Patient presents with decreased range of motion, decreased strength, gait deviations, lymphedema, decreased balance, and biomechanical faults with functional movements. Signs and symptoms are consistent with left knee medial meniscus pathology due to possible degenerative tear. He will benefit from skilled physical therapy addressing his joint mobility, hip strength, and proprioceptive retraining.     Jered Is progressing well towards his goals.   Pt prognosis is Good.     Pt will continue to benefit from skilled outpatient physical therapy to address the deficits listed in the problem list box on initial evaluation, provide pt/family education and to maximize pt's level of independence in the home and community environment. Pt's spiritual, cultural and educational needs considered and pt agreeable to plan of care and goals.      Anticipated barriers to physical therapy: chronicity of symptoms, lymphedema    Goals:  Short Term Goals: 5 weeks   Patient will have reduced pain complaints from 4/10 to less than or equal to 2/10. (Met - 5/21/2024)  Patient will demonstrate pain-free squat mechanics to 90 degrees. (Met - 5/21/2024)  Patient will demonstrate increased muscle strength of at least one-half grade or 2.5 kg as compared to the initial measurements. (Not Met - Progressing)     Long Term Goals: 10 weeks   Patient will have reduced pain complaints from 2/10 to less than or equal to 0/10. (Not Met - Progressing)  Patient will demonstrate stair ascent and descent for 2 flights of stairs with no symptoms. (Met - 5/21/2024)  Patient will demonstrate increased muscle strength of at least one grade or 5 kg as compared to the initial measurements. (Not Met - Progressing)  Patient will improve Knee FOTO Intake score from 31% to greater than or equal to 59%. (Met - 5/14/2024)  Patient will be independent with their home exercise program. (Not Met - Progressing)    PLAN     Glute strengthening  Lower extremity proprioception training.    Plan of care Certification: 4/9/2024 to 7/2/2024.  Outpatient Physical Therapy 1 times weekly for 10 weeks.    Javy Snyder, PT , DPT, OCS

## 2024-05-21 ENCOUNTER — CLINICAL SUPPORT (OUTPATIENT)
Dept: REHABILITATION | Facility: HOSPITAL | Age: 49
End: 2024-05-21
Payer: COMMERCIAL

## 2024-05-21 DIAGNOSIS — G89.29 CHRONIC PAIN OF LEFT KNEE: Primary | ICD-10-CM

## 2024-05-21 DIAGNOSIS — M25.562 CHRONIC PAIN OF LEFT KNEE: Primary | ICD-10-CM

## 2024-05-21 PROCEDURE — 97530 THERAPEUTIC ACTIVITIES: CPT

## 2024-05-21 PROCEDURE — 97112 NEUROMUSCULAR REEDUCATION: CPT

## 2024-05-21 PROCEDURE — 97110 THERAPEUTIC EXERCISES: CPT

## 2024-05-22 ENCOUNTER — TELEPHONE (OUTPATIENT)
Dept: PAIN MEDICINE | Facility: CLINIC | Age: 49
End: 2024-05-22
Payer: COMMERCIAL

## 2024-05-22 ENCOUNTER — OFFICE VISIT (OUTPATIENT)
Dept: OPTOMETRY | Facility: CLINIC | Age: 49
End: 2024-05-22
Payer: COMMERCIAL

## 2024-05-22 DIAGNOSIS — H52.7 REFRACTIVE ERROR: Primary | ICD-10-CM

## 2024-05-22 PROCEDURE — 99999 PR PBB SHADOW E&M-EST. PATIENT-LVL III: CPT | Mod: PBBFAC,,, | Performed by: OPTOMETRIST

## 2024-05-22 PROCEDURE — 1159F MED LIST DOCD IN RCRD: CPT | Mod: CPTII,S$GLB,, | Performed by: OPTOMETRIST

## 2024-05-22 PROCEDURE — 92014 COMPRE OPH EXAM EST PT 1/>: CPT | Mod: S$GLB,,, | Performed by: OPTOMETRIST

## 2024-05-22 NOTE — PROGRESS NOTES
HPI    Pt sts vision is getting worse. Having to wear glasses a lot more. No   flashes no floaters  Last edited by Hanh Yao on 5/22/2024 11:30 AM.            Assessment /Plan     For exam results, see Encounter Report.    Refractive error      MONITOR. ED PT ON ALL EXAM FINDINGS  CONTINUE WITH HABITUAL SPECS    OCULAR [facility]ALTH WNL OD, OS   RTC 1 YR//PRN FOR REE/DFE

## 2024-05-23 ENCOUNTER — ANESTHESIA EVENT (OUTPATIENT)
Dept: SURGERY | Facility: OTHER | Age: 49
End: 2024-05-23
Payer: COMMERCIAL

## 2024-05-23 ENCOUNTER — HOSPITAL ENCOUNTER (OUTPATIENT)
Dept: PREADMISSION TESTING | Facility: OTHER | Age: 49
Discharge: HOME | End: 2024-05-23
Attending: SPECIALIST
Payer: COMMERCIAL

## 2024-05-23 VITALS
WEIGHT: 315 LBS | BODY MASS INDEX: 42.66 KG/M2 | OXYGEN SATURATION: 98 % | DIASTOLIC BLOOD PRESSURE: 76 MMHG | RESPIRATION RATE: 18 BRPM | SYSTOLIC BLOOD PRESSURE: 145 MMHG | HEART RATE: 95 BPM | TEMPERATURE: 98 F | HEIGHT: 72 IN

## 2024-05-23 DIAGNOSIS — Z01.818 PRE-OPERATIVE EXAMINATION FOR INTERNAL MEDICINE: ICD-10-CM

## 2024-05-23 LAB
ALBUMIN SERPL BCP-MCNC: 3.9 G/DL (ref 3.5–5.2)
ALP SERPL-CCNC: 95 U/L (ref 55–135)
ALT SERPL W/O P-5'-P-CCNC: 27 U/L (ref 10–44)
ANION GAP SERPL CALC-SCNC: 8 MMOL/L (ref 8–16)
APTT PPP: 27.3 SEC (ref 21–32)
AST SERPL-CCNC: 21 U/L (ref 10–40)
BASOPHILS # BLD AUTO: 0.07 K/UL (ref 0–0.2)
BASOPHILS NFR BLD: 1.1 % (ref 0–1.9)
BILIRUB SERPL-MCNC: 0.5 MG/DL (ref 0.1–1)
BUN SERPL-MCNC: 5 MG/DL (ref 6–20)
CALCIUM SERPL-MCNC: 9.2 MG/DL (ref 8.7–10.5)
CHLORIDE SERPL-SCNC: 110 MMOL/L (ref 95–110)
CO2 SERPL-SCNC: 25 MMOL/L (ref 23–29)
CREAT SERPL-MCNC: 0.9 MG/DL (ref 0.5–1.4)
DIFFERENTIAL METHOD BLD: ABNORMAL
EOSINOPHIL # BLD AUTO: 0.2 K/UL (ref 0–0.5)
EOSINOPHIL NFR BLD: 3.3 % (ref 0–8)
ERYTHROCYTE [DISTWIDTH] IN BLOOD BY AUTOMATED COUNT: 13.6 % (ref 11.5–14.5)
EST. GFR  (NO RACE VARIABLE): >60 ML/MIN/1.73 M^2
GLUCOSE SERPL-MCNC: 67 MG/DL (ref 70–110)
HCT VFR BLD AUTO: 38.3 % (ref 40–54)
HGB BLD-MCNC: 12.3 G/DL (ref 14–18)
IMM GRANULOCYTES # BLD AUTO: 0.01 K/UL (ref 0–0.04)
IMM GRANULOCYTES NFR BLD AUTO: 0.2 % (ref 0–0.5)
INR PPP: 1 (ref 0.8–1.2)
LYMPHOCYTES # BLD AUTO: 2 K/UL (ref 1–4.8)
LYMPHOCYTES NFR BLD: 33.1 % (ref 18–48)
MCH RBC QN AUTO: 30.3 PG (ref 27–31)
MCHC RBC AUTO-ENTMCNC: 32.1 G/DL (ref 32–36)
MCV RBC AUTO: 94 FL (ref 82–98)
MONOCYTES # BLD AUTO: 0.6 K/UL (ref 0.3–1)
MONOCYTES NFR BLD: 9.1 % (ref 4–15)
NEUTROPHILS # BLD AUTO: 3.3 K/UL (ref 1.8–7.7)
NEUTROPHILS NFR BLD: 53.2 % (ref 38–73)
NRBC BLD-RTO: 0 /100 WBC
PLATELET # BLD AUTO: 308 K/UL (ref 150–450)
PMV BLD AUTO: 9 FL (ref 9.2–12.9)
POTASSIUM SERPL-SCNC: 3.6 MMOL/L (ref 3.5–5.1)
PROT SERPL-MCNC: 6.7 G/DL (ref 6–8.4)
PROTHROMBIN TIME: 10.9 SEC (ref 9–12.5)
RBC # BLD AUTO: 4.06 M/UL (ref 4.6–6.2)
SODIUM SERPL-SCNC: 143 MMOL/L (ref 136–145)
WBC # BLD AUTO: 6.14 K/UL (ref 3.9–12.7)

## 2024-05-23 PROCEDURE — 85730 THROMBOPLASTIN TIME PARTIAL: CPT | Performed by: INTERNAL MEDICINE

## 2024-05-23 PROCEDURE — 85025 COMPLETE CBC W/AUTO DIFF WBC: CPT | Performed by: INTERNAL MEDICINE

## 2024-05-23 PROCEDURE — 80053 COMPREHEN METABOLIC PANEL: CPT | Performed by: INTERNAL MEDICINE

## 2024-05-23 PROCEDURE — 36415 COLL VENOUS BLD VENIPUNCTURE: CPT | Performed by: INTERNAL MEDICINE

## 2024-05-23 PROCEDURE — 85610 PROTHROMBIN TIME: CPT | Performed by: INTERNAL MEDICINE

## 2024-05-23 RX ORDER — LIDOCAINE HYDROCHLORIDE 10 MG/ML
0.5 INJECTION, SOLUTION EPIDURAL; INFILTRATION; INTRACAUDAL; PERINEURAL ONCE
Status: CANCELLED | OUTPATIENT
Start: 2024-05-23 | End: 2024-05-23

## 2024-05-23 RX ORDER — SODIUM CHLORIDE, SODIUM LACTATE, POTASSIUM CHLORIDE, CALCIUM CHLORIDE 600; 310; 30; 20 MG/100ML; MG/100ML; MG/100ML; MG/100ML
INJECTION, SOLUTION INTRAVENOUS CONTINUOUS
Status: CANCELLED | OUTPATIENT
Start: 2024-05-23

## 2024-05-23 RX ORDER — ACETAMINOPHEN 500 MG
1000 TABLET ORAL
Status: CANCELLED | OUTPATIENT
Start: 2024-05-23 | End: 2024-05-23

## 2024-05-23 RX ORDER — ALBUTEROL SULFATE 2.5 MG/.5ML
2.5 SOLUTION RESPIRATORY (INHALATION)
Status: CANCELLED | OUTPATIENT
Start: 2024-05-23 | End: 2024-05-23

## 2024-05-23 NOTE — DISCHARGE INSTRUCTIONS
Information to Prepare you for your Surgery    PRE-ADMIT TESTING -  [phone]    [address]  [facility]          Your surgery has been scheduled at [facility]. We are pleased to have the opportunity to serve you. For Further Information please call [phone].    On the day of surgery please report to the Information Desk on the 1st floor.    CONTACT YOUR PHYSICIAN'S OFFICE THE DAY PRIOR TO YOUR SURGERY TO OBTAIN YOUR ARRIVAL TIME.     The evening before surgery do not eat anything after 9 p.m. ( this includes hard candy, chewing gum and mints).  You may only have GATORADE, POWERADE AND WATER  from 9 p.m. until you leave your home.   DO NOT DRINK ANY LIQUIDS ON THE WAY TO THE HOSPITAL.      Why does your anesthesiologist allow you to drink Gatorade/Powerade before surgery?  Gatorade/Powerade helps to increase your comfort before surgery and to decrease your nausea after surgery. The carbohydrates in Gatorade/Powerade help reduce your body's stress response to surgery.  If you are a diabetic-drink only water prior to surgery.    Outpatient Surgery- May allow 2 adult (18 and older) Support Persons (1 being the designated ) for all surgical/procedural patients. A breastfeeding mother will be allowed her infant and 2 adult Support Persons. No one under the age of 18 will be allowed in the building. No swapping out of visitors in the [facility].      SPECIAL MEDICATION INSTRUCTIONS: TAKE medications checked off by the Anesthesiologist on your Medication List.    Angiogram Patients: Take medications as instructed by your physician, including aspirin.     Surgery Patients:    If you take ASPIRIN - Your PHYSICIAN/SURGEON will need to inform you IF/OR when you need to stop taking aspirin prior to your surgery.     The week prior to surgery do not ot take any medications containing IBUPROFEN or NSAIDS ( Advil, Motrin, Goodys, BC, Aleve, Naproxen etc)  If you are not sure if you should take a medicine please call your surgeon's office.  Ok to take Tylenol    Do Not Wear any make-up (especially eye make-up) to surgery. Please remove any false eyelashes or eyelash extensions. If you arrive the day of surgery with makeup/eyelashes on you will be required to remove prior to surgery. (There is a risk of corneal abrasions if eye makeup/eyelash extensions are not removed)      Leave all valuables at home.   Do Not wear any jewelry or watches, including any metal in body piercings. Jewelry must be removed prior to coming to the hospital.  There is a possibility that rings that are unable to be removed may be cut off if they are on the surgical extremity.    Please remove all hair extensions, wigs, clips and any other metal accessories/ ornaments from your hair.  These items may pose a flammable/fire risk in Surgery and must be removed.    Do not shave your surgical area at least 5 days prior to your surgery. The surgical prep will be performed at the hospital according to Infection Control regulations.    Contact Lens must be removed before surgery. Either do not wear the contact lens or bring a case and solution for storage.  Please bring a container for eyeglasses or dentures as required.  Bring any paperwork your physician has provided, such as consent forms,  history and physicals, doctor's orders, etc.   Bring comfortable clothes that are loose fitting to wear upon discharge. Take into consideration the type of surgery being performed.  Maintain your diet as advised per your physician the day prior to surgery.      Adequate rest the night before surgery is advised.   Park in the Parking lot behind the hospital or in the [facility] across the street from the parking lot. Parking is complimentary.  If you will be discharged the same day as your procedure, please arrange for a responsible adult to drive you home or to accompany you if traveling by taxi.    YOU WILL NOT BE PERMITTED TO DRIVE OR TO LEAVE THE HOSPITAL ALONE AFTER SURGERY.   If you are being discharged the same day, it is strongly recommended that you arrange for someone to remain with you for the first 24 hrs following your surgery.    The Surgeon will speak to your family/visitor after your surgery regarding the outcome of your surgery and post op care.  The Surgeon may speak to you after your surgery, but there is a possibility you may not remember the details.  Please check with your family members regarding the conversation with the Surgeon.    We strongly recommend whoever is bringing you home be present for discharge instructions.  This will ensure a thorough understanding for your post op home care.          Thank you for your cooperation.  The Staff of [facility].            Bathing Instructions with Hibiclens    Shower the evening before and morning of your procedure with Chlorhexidine (Hibiclens)  do not use Chlorhexidine on your face or genitals. Do not get in your eyes.  Wash your face with water and your regular face wash/soap  Use your regular shampoo  Apply Chlorhexidine (Hibiclens) directly on your skin or on a wet washcloth and wash gently. When showering: Move away from the shower stream when applying Chlorhexidine (Hibiclens) to avoid rinsing off too soon.  Rinse thoroughly with warm water  Do not dilute Chlorhexidine (Hibiclens)   Dry off as usual, do not use any deodorant, powder, body lotions, perfume, after shave or cologne.

## 2024-05-23 NOTE — ANESTHESIA PREPROCEDURE EVALUATION
05/23/2024  Jered Contreras is a 48 y.o., male.    Anesthesia Evaluation    I have reviewed the Patient Summary Reports.     I have reviewed the Nursing Notes. I have reviewed the NPO Status.   I have reviewed the Medications.     Review of Systems  Anesthesia Hx:  No problems with previous Anesthesia             Denies Family Hx of Anesthesia complications.    Denies Personal Hx of Anesthesia complications.                    Social:  Social Alcohol Use, Non-Smoker       Hematology/Oncology:    Oncology Normal    -- Anemia:                                  EENT/Dental:  chronic allergic rhinitis           Cardiovascular:  Cardiovascular Normal Exercise tolerance: good                                           Pulmonary:    Asthma asymptomatic   Sleep Apnea, CPAP Childhood asthma.               Renal/:    BPH              Hepatic/GI:     GERD   Gastric sleeve on protonix          Musculoskeletal:  Musculoskeletal Normal                Neurological:    Neuromuscular Disease, (restless leg syndrome)                                   Endocrine:        Morbid Obesity / BMI > 40  Psych:  Psychiatric History anxiety depression              Physical Exam  General:  Morbid Obesity       Airway/Jaw/Neck:  Airway Findings: Mouth Opening: Normal     Tongue: Normal      General Airway Assessment: Adult      Mallampati: I   TM Distance: Normal, at least 6 cm   Jaw/Neck Findings:      Neck Findings:        Dental:  Dental Findings: In tact           Mental Status:  Mental Status Findings:  Cooperative, Alert and Oriented         Anesthesia Plan  Type of Anesthesia, risks & benefits discussed:  Anesthesia Type:  general    Patient's Preference:   Plan Factors:          Intra-op Monitoring Plan: standard ASA monitors  Intra-op Monitoring Plan Comments:   Post Op Pain Control Plan: per primary service following  discharge from PACU and multimodal analgesia  Post Op Pain Control Plan Comments:     Induction:   IV  Beta Blocker:         Informed Consent: Informed consent signed with the Patient and all parties understand the risks and agree with anesthesia plan.  All questions answered.  Anesthesia consent signed with patient.  ASA Score: 3     Day of Surgery Review of History & Physical:        Anesthesia Plan Notes: CBC  Last dose of wegovy 2 months ago    Addendum: medical clearance in [facility]        Ready For Surgery From Anesthesia Perspective.           Physical Exam  General: Morbid Obesity    Airway:  Mallampati: I   Mouth Opening: Normal  TM Distance: Normal, at least 6 cm  Tongue: Normal    Dental:  In tact      Anesthesia Plan  Type of Anesthesia, risks & benefits discussed:    Anesthesia Type: general  Intra-op Monitoring Plan: standard ASA monitors  Post Op Pain Control Plan: per primary service following discharge from PACU and multimodal analgesia  Induction:  IV  Informed Consent: Informed consent signed with the Patient and all parties understand the risks and agree with anesthesia plan.  All questions answered.   ASA Score: 3  Anesthesia Plan Notes: CBC  Last dose of wegovy 2 months ago    Addendum: medical clearance in [facility]    Ready For Surgery From Anesthesia Perspective.     .

## 2024-05-24 ENCOUNTER — OFFICE VISIT (OUTPATIENT)
Dept: INTERNAL MEDICINE | Facility: CLINIC | Age: 49
End: 2024-05-24
Payer: COMMERCIAL

## 2024-05-24 ENCOUNTER — OFFICE VISIT (OUTPATIENT)
Dept: CARDIOLOGY | Facility: CLINIC | Age: 49
End: 2024-05-24
Payer: COMMERCIAL

## 2024-05-24 VITALS
HEART RATE: 71 BPM | SYSTOLIC BLOOD PRESSURE: 128 MMHG | WEIGHT: 315 LBS | HEIGHT: 72 IN | DIASTOLIC BLOOD PRESSURE: 77 MMHG | BODY MASS INDEX: 42.66 KG/M2

## 2024-05-24 VITALS
HEIGHT: 72 IN | WEIGHT: 315 LBS | BODY MASS INDEX: 42.66 KG/M2 | SYSTOLIC BLOOD PRESSURE: 118 MMHG | DIASTOLIC BLOOD PRESSURE: 72 MMHG | HEART RATE: 70 BPM | OXYGEN SATURATION: 98 %

## 2024-05-24 DIAGNOSIS — I83.11 VARICOSE VEINS OF BOTH LOWER EXTREMITIES WITH INFLAMMATION: ICD-10-CM

## 2024-05-24 DIAGNOSIS — I87.2 VENOUS INSUFFICIENCY OF BOTH LOWER EXTREMITIES: Primary | ICD-10-CM

## 2024-05-24 DIAGNOSIS — L97.929 VENOUS STASIS ULCERS OF BOTH LOWER EXTREMITIES: ICD-10-CM

## 2024-05-24 DIAGNOSIS — G47.33 OBSTRUCTIVE SLEEP APNEA TREATED WITH CONTINUOUS POSITIVE AIRWAY PRESSURE (CPAP): ICD-10-CM

## 2024-05-24 DIAGNOSIS — I83.019 VENOUS STASIS ULCERS OF BOTH LOWER EXTREMITIES: ICD-10-CM

## 2024-05-24 DIAGNOSIS — E66.01 MORBID OBESITY: ICD-10-CM

## 2024-05-24 DIAGNOSIS — Z12.12 ENCOUNTER FOR COLORECTAL CANCER SCREENING: ICD-10-CM

## 2024-05-24 DIAGNOSIS — Z01.818 PRE-OPERATIVE EXAMINATION FOR INTERNAL MEDICINE: Primary | ICD-10-CM

## 2024-05-24 DIAGNOSIS — R60.0 EDEMA OF BOTH LOWER EXTREMITIES: ICD-10-CM

## 2024-05-24 DIAGNOSIS — I83.12 VARICOSE VEINS OF BOTH LOWER EXTREMITIES WITH INFLAMMATION: ICD-10-CM

## 2024-05-24 DIAGNOSIS — Z12.11 ENCOUNTER FOR COLORECTAL CANCER SCREENING: ICD-10-CM

## 2024-05-24 DIAGNOSIS — I83.029 VENOUS STASIS ULCERS OF BOTH LOWER EXTREMITIES: ICD-10-CM

## 2024-05-24 DIAGNOSIS — L97.919 VENOUS STASIS ULCERS OF BOTH LOWER EXTREMITIES: ICD-10-CM

## 2024-05-24 DIAGNOSIS — Z98.84 HISTORY OF BARIATRIC SURGERY: Chronic | ICD-10-CM

## 2024-05-24 PROCEDURE — 3074F SYST BP LT 130 MM HG: CPT | Mod: CPTII,S$GLB,, | Performed by: INTERNAL MEDICINE

## 2024-05-24 PROCEDURE — 3008F BODY MASS INDEX DOCD: CPT | Mod: CPTII,S$GLB,, | Performed by: INTERNAL MEDICINE

## 2024-05-24 PROCEDURE — 1159F MED LIST DOCD IN RCRD: CPT | Mod: CPTII,S$GLB,, | Performed by: INTERNAL MEDICINE

## 2024-05-24 PROCEDURE — 99214 OFFICE O/P EST MOD 30 MIN: CPT | Mod: S$GLB,,, | Performed by: INTERNAL MEDICINE

## 2024-05-24 PROCEDURE — 99999 PR PBB SHADOW E&M-EST. PATIENT-LVL IV: CPT | Mod: PBBFAC,,, | Performed by: INTERNAL MEDICINE

## 2024-05-24 PROCEDURE — 3078F DIAST BP <80 MM HG: CPT | Mod: CPTII,S$GLB,, | Performed by: INTERNAL MEDICINE

## 2024-05-24 PROCEDURE — 1160F RVW MEDS BY RX/DR IN RCRD: CPT | Mod: CPTII,S$GLB,, | Performed by: INTERNAL MEDICINE

## 2024-05-24 PROCEDURE — 99215 OFFICE O/P EST HI 40 MIN: CPT | Mod: S$GLB,,, | Performed by: INTERNAL MEDICINE

## 2024-05-24 RX ORDER — BUMETANIDE 0.5 MG/1
0.5 TABLET ORAL 2 TIMES DAILY
Qty: 180 TABLET | Refills: 3 | Status: SHIPPED | OUTPATIENT
Start: 2024-05-24 | End: 2025-05-24

## 2024-05-24 NOTE — PROGRESS NOTES
Subjective:       Patient ID: Jered Contreras is a 48 y.o. male.    Chief Complaint: Pre-op Exam    HPI  Pt presents for a pre-operative clearance prior to surgery.  Pt denies any personal or family history of bleeding disorders, hypercoaguable disorders, blood clots, pulmonary embolisms, difficulty with anesthesia or sudden death.  Patient is able to walk up a flight of stairs without difficulty breathing.  Denies orthopnea, pnd, CP and SOB.    Plan for endoscopic sinus surgery.   Labs prior nl except6 for glucose 67; stable anemia hgb 12  Previously on wegovy, no longer taking; last March 2024.     Review of Systems   Constitutional:  Negative for fever.   Respiratory:  Negative for shortness of breath.    Cardiovascular:  Negative for chest pain.   Musculoskeletal: Negative.    Skin: Negative.        Objective:   /72 (BP Location: Left arm, Patient Position: Sitting, BP Method: Large (Manual))   Pulse 70   Ht 6' (1.829 m)   Wt (!) 161.6 kg (356 lb 4.2 oz)   SpO2 98%   BMI 48.32 kg/m²      Physical Exam  Constitutional:       General: He is not in acute distress.     Appearance: He is well-developed. He is not diaphoretic.   HENT:      Head: Normocephalic and atraumatic.   Cardiovascular:      Rate and Rhythm: Normal rate and regular rhythm.      Heart sounds: No murmur heard.  Pulmonary:      Effort: Pulmonary effort is normal. No respiratory distress.      Breath sounds: No wheezing or rales.   Skin:     General: Skin is warm and dry.   Neurological:      Mental Status: He is alert and oriented to person, place, and time.   Psychiatric:         Behavior: Behavior normal.         Assessment:       1. Pre-operative examination for internal medicine    2. Encounter for colorectal cancer screening        Plan:       Pre-operative examination for internal medicine  Medically optimized to proceed with surgery from my standpoint.     Encounter for colorectal cancer screening  -     Ambulatory  referral/consult to Endo Procedure ; Future; Expected date: 05/25/2024          Health Maintenance Due   Topic    Colorectal Cancer Screening

## 2024-05-24 NOTE — PROGRESS NOTES
[facility]      CC: Venous Insufficiency      Patient ID: Jered Contreras is a 48 y.o. male with BLE lymphedema, venous insufficiency, morbid obesity s/p gastric sleeve, TAYO (on CPAP), who presents for a follow up appointment.  Pertinent history/events are as follows:     -Pt kindly referred by Dr. Dacosta for evaluation of venous insufficiency.     -At our initial clinic visit on 8/5/2022, Mr. Contreras reported leg swelling starting in 6/2022.  States he subsequently developed ulcer at the bilateral lower legs, which are in different stages of healing.  He has no claudication or rest pain.  BLE Venous Reflux Study on 8/2/2022 revealed hemodynamically significant venous reflux (reflux lasting greater than 500ms) in the bilateral greater saphenous veins.  Plan:    BLE Lymphedema and Venous insufficiency with Venous Stasis Ulcerations- Discontinue lasix and start bumex 0.5 mg bid.  Check cmp in 1 week.  Refer to wound care.  Check ARIANA study.  After ulcers heal, refer to lymphedema clinic.  Pt to limit sodium intake to 2,000 mg daily.  Limit volume intake to 1.5 liters daily.  Elevate legs when resting.   Morbid Obesity s/p Gastric Sleeve- Encourage diet, exercise and weight loss.  Follow up with Bariatric Surgery.  TAYO- Continue CPAP.    -At follow up visit on 9/22/2022, Mr. Contreras reported improvement in BLE edema and wounds.  ARIANA Study on 8/31/2022 revealed normal resting ARIANA's. Reduced exercise ARIANA's are consistent with mild PAD.  Plan:   BLE Lymphedema and Venous insufficiency with Venous Stasis Ulcerations- Improving.  Continue bumex 0.5 mg bid.  Continue wound care.  After ulcers heal, refer to lymphedema clinic.  Pt to limit sodium intake to 2,000 mg daily.  Limit volume intake to 1.5 liters daily.  Elevate legs when resting.   Morbid Obesity s/p Gastric Sleeve- Encourage diet, exercise and weight loss.  Follow up with Bariatric Surgery.  TAYO- Continue CPAP.    -2/14/2023 clinic  visit: Mr. Contreras reports worsening leg swelling starting in 12/2022.  He has no overt ulcers, no claudication or rest pain.  States he stopped taking bumex 0.5 mg bid after having bariatric surgery in 9/2022, but restarted bumex at 0.5 mg daily last week.   Plan:   BLE Lymphedema and Venous insufficiency/BLE Lymphedema- Refer to lymphedema clinic.  Restart bumex 0.5 mg bid.  Check cmp in 1 week.  Pt to limit sodium intake to 2,000 mg daily.  Limit volume intake to 1.5 liters daily.  Elevate legs when resting.   Morbid Obesity s/p Gastric Sleeve- Encourage diet, exercise and weight loss.    TAYO- Continue CPAP.    HPI:  Mr. Contreras reports significant improvement in leg swelling since clinic visit on 2/14/2023.  States he uses lymphedema pump every other day, and wears compression hose daily.      Past Medical History:   Diagnosis Date    Allergic rhinitis     Asthma     last flare up at 19 y/o    Corneal abrasion     Depression     Hx of psychiatric care     Morbid obesity     Obstructive sleep apnea treated with continuous positive airway pressure (CPAP) 07/10/2018    Psychiatric problem     Therapy      Past Surgical History:   Procedure Laterality Date    APPENDECTOMY      APPLICATION OF CARTILAGE GRAFT Bilateral 12/06/2021    Procedure: APPLICATION, CARTILAGE GRAFT;  Surgeon: JAN Arredondo MD;  Location: [facility];  Service: ENT;  Laterality: Bilateral;  from right ear    GASTRIC BYPASS  2020    gastric sleeve  09/2019    [facility] - Dr. Martin    NASAL SEPTOPLASTY Bilateral 12/06/2021    Procedure: SEPTOPLASTY, NOSE;  Surgeon: JAN Arredondo MD;  Location: [facility] OR;  Service: ENT;  Laterality: Bilateral;    NASAL STENOSIS REPAIR Bilateral 12/06/2021    Procedure: REPAIR, STENOSIS, NOSE, VESTIBULE;  Surgeon: JAN Arredondo MD;  Location: [facility] OR;  Service: ENT;  Laterality: Bilateral;    SINUS SURGERY      VASECTOMY  2017     Social History     Socioeconomic History    Marital  status:    Occupational History    Occupation: Information Tech     Employer: [employer]   Tobacco Use    Smoking status: Never     Passive exposure: Never    Smokeless tobacco: Never   Substance and Sexual Activity    Alcohol use: Yes     Alcohol/week: 1.0 standard drink of alcohol     Types: 1 Cans of beer per week     Comment: RARELY    Drug use: No    Sexual activity: Yes     Partners: Female     Social Determinants of Health     Financial Resource Strain: Patient Declined (11/20/2023)    Overall Financial Resource Strain (CARDIA)     Difficulty of Paying Living Expenses: Patient declined   Food Insecurity: No Food Insecurity (11/20/2023)    Hunger Vital Sign     Worried About Running Out of Food in the Last Year: Never true     Ran Out of Food in the Last Year: Never true   Transportation Needs: No Transportation Needs (11/20/2023)    PRAPARE - Transportation     Lack of Transportation (Medical): No     Lack of Transportation (Non-Medical): No   Physical Activity: Insufficiently Active (11/20/2023)    Exercise Vital Sign     Days of Exercise per Week: 3 days     Minutes of Exercise per Session: 30 min   Stress: Stress Concern Present (11/20/2023)    [employer] [place] [employer] - Occupational Stress Questionnaire     Feeling of Stress : To some extent   Housing Stability: Low Risk  (11/20/2023)    Housing Stability Vital Sign     Unable to Pay for Housing in the Last Year: No     Number of Places Lived in the Last Year: 1     Unstable Housing in the Last Year: No     Family History   Problem Relation Name Age of Onset    Allergies Mother      Rheum arthritis Mother      Lung cancer Mother          post lobectomy    Dementia Mother      Glaucoma Mother      Cataracts Mother      Diabetes Father      Hypertension Father      No Known Problems Maternal Aunt      No Known Problems Maternal Uncle      No Known Problems Paternal Aunt      No Known Problems Paternal Uncle      Allergies  Maternal Grandmother      Hypertension Maternal Grandmother      Dementia Maternal Grandmother      Brain cancer Maternal Grandmother          in remission before she     Rheum arthritis Maternal Grandfather      Diabetes Paternal Grandmother      Alzheimer's disease Paternal Grandmother      Hypertension Paternal Grandfather      Obesity Paternal Grandfather      Cerebral aneurysm Paternal Grandfather      No Known Problems Other      Melanoma Neg Hx         Review of patient's allergies indicates:   Allergen Reactions    Ceclor [cefaclor] Anaphylaxis    Penicillins Anaphylaxis     Elevated blood pressure, temp, hives    Calcium alginate Itching     Headache       Medication List with Changes/Refills   Current Medications    ASCORBATE CALCIUM (VITAMIN C ORAL)    Take by mouth once daily.    AZELASTINE (ASTELIN) 137 MCG (0.1 %) NASAL SPRAY    2 sprays (274 mcg total) by Nasal route 2 (two) times daily as needed (Nasal congestion).    B COMPLEX VITAMINS (B COMPLEX-VITAMIN B12) TABLET    Take 1 tablet by mouth once daily.    BUPROPION (WELLBUTRIN XL) 300 MG 24 HR TABLET    Take 1 tablet (300 mg total) by mouth once daily.    CLOTRIMAZOLE-BETAMETHASONE 1-0.05% (LOTRISONE) CREAM    Apply topically 2 (two) times daily.    DULOXETINE (CYMBALTA) 60 MG CAPSULE    Take 1 capsule (60 mg total) by mouth once daily.    FLUTICASONE PROPIONATE (FLONASE) 50 MCG/ACTUATION NASAL SPRAY    One spray in each nostril twice daily after 1st using azelastine nasal spray    HYDROXYZINE HCL (ATARAX) 25 MG TABLET    TK 1 T PO  TID PRN ANXIETY    LAMOTRIGINE (LAMICTAL) 100 MG TABLET    Take one tab of Lamictal 100 mg po daily, plus two tab of Lamictal 25 mg po daily, total lamictal 125 mg po daily.    LAMOTRIGINE (LAMICTAL) 25 MG TABLET    Take 2 tab Lamictal 25 mg p.o. daily plus 1 tab Lamictal 100 mg po daily, total Lamictal 150 mg p.o. daily    MONTELUKAST (SINGULAIR) 10 MG TABLET    Take 1 tablet (10 mg total) by mouth every evening.  Take during allergy season.    MULTIVITAMIN CAPSULE    Take 1 capsule by mouth once daily.    ROPINIROLE (REQUIP) 0.5 MG TABLET    Take 2 tablets (1 mg total) by mouth every evening.    TAMSULOSIN (FLOMAX) 0.4 MG CAP    Take 1 capsule (0.4 mg total) by mouth every evening.    WEGOVY 1 MG/0.5 ML PNIJ    Inject into the skin.       Review of Systems  Constitution: Denies chills, fever, and sweats.  HENT: Denies headaches or blurry vision.  Cardiovascular: Denies chest pain or irregular heart beat.  Respiratory: Denies cough or shortness of breath.  Gastrointestinal: Denies abdominal pain, nausea, or vomiting.  Musculoskeletal: Positive for leg swelling with ulcers.  Neurological: Denies dizziness or focal weakness.  Psychiatric/Behavioral: Normal mental status.  Hematologic/Lymphatic: Denies bleeding problem or easy bruising/bleeding.  Skin: Denies rash or suspicious lesions    Physical Examination  /77   Pulse 71   Ht 6' (1.829 m)   Wt (!) 162.6 kg (358 lb 7.5 oz)   BMI 48.62 kg/m²     Constitutional: No acute distress, conversant  HEENT: Sclera anicteric, Pupils equal, round and reactive to light, extraocular motions intact, Oropharynx clear  Neck: No JVD, no carotid bruits  Cardiovascular: regular rate and rhythm, no murmur, rubs or gallops, normal S1/S2  Pulmonary: Clear to auscultation bilaterally  Abdominal: Abdomen soft, nontender, nondistended, positive bowel sounds  Extremities: BLE's with 2+ pitting edema, prominent varicose veins, and changes consistent with lymphedema    Pulses:  Carotid pulses are 2+ on the right side, and 2+ on the left side.  Radial pulses are 2+ on the right side, and 2+ on the left side.   Femoral pulses are 2+ on the right side, and 2+ on the left side.  Popliteal pulses are 2+ on the right side, and 2+ on the left side.   Dorsalis pedis pulses are 2+ on the right side, and 2+ on the left side.   Posterior tibial pulses are 2+ on the right side, and 2+ on the left side.     Skin: No ecchymosis, erythema, or ulcers  Psych: Alert and oriented x 3, appropriate affect  Neuro: CNII-XII intact, no focal deficits    Labs:  Most Recent Data  CBC:   Lab Results   Component Value Date    WBC 6.14 05/23/2024    HGB 12.3 (L) 05/23/2024    HCT 38.3 (L) 05/23/2024     05/23/2024    MCV 94 05/23/2024    RDW 13.6 05/23/2024     BMP:   Lab Results   Component Value Date     05/23/2024    K 3.6 05/23/2024     05/23/2024    CO2 25 05/23/2024    BUN 5 (L) 05/23/2024    CREATININE 0.9 05/23/2024    GLU 67 (L) 05/23/2024    CALCIUM 9.2 05/23/2024    MG 1.9 11/11/2020    PHOS 3.0 10/31/2017     LFTS;   Lab Results   Component Value Date    PROT 6.7 05/23/2024    ALBUMIN 3.9 05/23/2024    BILITOT 0.5 05/23/2024    AST 21 05/23/2024    ALKPHOS 95 05/23/2024    ALT 27 05/23/2024     COAGS:   Lab Results   Component Value Date    INR 1.0 05/23/2024     FLP:   Lab Results   Component Value Date    CHOL 152 09/29/2023    HDL 38 (L) 09/29/2023    LDLCALC 105.2 09/29/2023    TRIG 44 09/29/2023    CHOLHDL 25.0 09/29/2023     CARDIAC:   Lab Results   Component Value Date    BNP <10 08/02/2022     Imaging:    ARIANA Study 8/31/2022:  Normal resting ARIANA's.  Reduced exercise ARIANA's are consistent with mild PAD.     BLE Venous Reflux Study 8/2/2022:  Hemodynamically significant venous reflux (reflux lasting greater than 500ms) in the bilateral greater saphenous veins.    Assessment/Plan:  Jered Contreras is a 48 y.o. male BLE lymphedema, with venous insufficiency, morbid obesity s/p gastric sleeve, TAYO (on CPAP), who presents for a follow up appointment.      1. BLE Lymphedema and Venous insufficiency/BLE Lymphedema- Stable.  Continue bumex 0.5 mg bid.  Pt to limit sodium intake to 2,000 mg daily.  Limit volume intake to 1.5 liters daily.  Elevate legs when resting.     2. Morbid Obesity s/p Gastric Sleeve- Encourage diet, exercise and weight loss.      3. TAYO- Continue CPAP.    Follow up in 6  months    Total duration of face to face visit time 30 minutes.  Total time spent counseling greater than fifty percent of total visit time.  Counseling included discussion regarding imaging findings, diagnosis, possibilities, treatment options, risks and benefits.  The patient had many questions regarding the options and long-term effects.    Fredo Marquez MD, PhD  Interventional Cardiology

## 2024-05-24 NOTE — PATIENT INSTRUCTIONS
Assessment/Plan:  Jered Contreras is a 48 y.o. male BLE lymphedema, with venous insufficiency, morbid obesity s/p gastric sleeve, TAYO (on CPAP), who presents for a follow up appointment.      1. BLE Lymphedema and Venous insufficiency/BLE Lymphedema- Stable.  Continue bumex 0.5 mg bid.  Pt to limit sodium intake to 2,000 mg daily.  Limit volume intake to 1.5 liters daily.  Elevate legs when resting.     2. Morbid Obesity s/p Gastric Sleeve- Encourage diet, exercise and weight loss.      3. TAYO- Continue CPAP.    Follow up in 6 months

## 2024-05-25 RX ORDER — LIDOCAINE HYDROCHLORIDE 10 MG/ML
1 INJECTION, SOLUTION EPIDURAL; INFILTRATION; INTRACAUDAL; PERINEURAL ONCE
Status: CANCELLED | OUTPATIENT
Start: 2024-05-25 | End: 2024-05-25

## 2024-05-25 RX ORDER — CLINDAMYCIN PHOSPHATE 900 MG/50ML
900 INJECTION, SOLUTION INTRAVENOUS
Status: CANCELLED | OUTPATIENT
Start: 2024-05-25

## 2024-05-25 RX ORDER — DEXAMETHASONE SODIUM PHOSPHATE 4 MG/ML
12 INJECTION, SOLUTION INTRA-ARTICULAR; INTRALESIONAL; INTRAMUSCULAR; INTRAVENOUS; SOFT TISSUE
Status: CANCELLED | OUTPATIENT
Start: 2024-05-25

## 2024-05-26 ENCOUNTER — NURSE TRIAGE (OUTPATIENT)
Dept: ADMINISTRATIVE | Facility: CLINIC | Age: 49
End: 2024-05-26
Payer: COMMERCIAL

## 2024-05-26 NOTE — TELEPHONE ENCOUNTER
Pt calling for information on procedure time tomorrow. I was able to assist him based off of the schedule and notified him of a 0730 start time. He verbalizes understanding.     Reason for Disposition   Health Information question, no triage required and triager able to answer question    Protocols used: Information Only Call-A-AH

## 2024-05-27 ENCOUNTER — ANESTHESIA (OUTPATIENT)
Dept: SURGERY | Facility: OTHER | Age: 49
End: 2024-05-27
Payer: COMMERCIAL

## 2024-05-27 ENCOUNTER — HOSPITAL ENCOUNTER (OUTPATIENT)
Facility: OTHER | Age: 49
Discharge: HOME | End: 2024-05-28
Attending: SPECIALIST | Admitting: SPECIALIST
Payer: COMMERCIAL

## 2024-05-27 DIAGNOSIS — J32.2 CHRONIC ETHMOIDAL SINUSITIS: ICD-10-CM

## 2024-05-27 DIAGNOSIS — G47.33 OBSTRUCTIVE SLEEP APNEA TREATED WITH CONTINUOUS POSITIVE AIRWAY PRESSURE (CPAP): Primary | ICD-10-CM

## 2024-05-27 DIAGNOSIS — J32.0 CHRONIC MAXILLARY SINUSITIS: ICD-10-CM

## 2024-05-27 DIAGNOSIS — J32.2 SINUSITIS CHRONIC, ETHMOIDAL: ICD-10-CM

## 2024-05-27 PROBLEM — J34.2 NASAL SEPTAL DEVIATION: Status: RESOLVED | Noted: 2021-07-23 | Resolved: 2024-05-27

## 2024-05-27 PROBLEM — R60.0 EDEMA OF BOTH LOWER EXTREMITIES: Chronic | Status: ACTIVE | Noted: 2022-08-05

## 2024-05-27 PROBLEM — I87.2 VENOUS INSUFFICIENCY OF BOTH LOWER EXTREMITIES: Chronic | Status: ACTIVE | Noted: 2022-08-05

## 2024-05-27 PROCEDURE — 25000003 PHARM REV CODE 250: Performed by: ANESTHESIOLOGY

## 2024-05-27 PROCEDURE — 25000003 PHARM REV CODE 250: Performed by: SPECIALIST

## 2024-05-27 PROCEDURE — D9220A PRA ANESTHESIA: Mod: ANES,,, | Performed by: ANESTHESIOLOGY

## 2024-05-27 PROCEDURE — 25000003 PHARM REV CODE 250: Performed by: NURSE ANESTHETIST, CERTIFIED REGISTERED

## 2024-05-27 PROCEDURE — 71000033 HC RECOVERY, INTIAL HOUR: Performed by: SPECIALIST

## 2024-05-27 PROCEDURE — 31267 ENDOSCOPY MAXILLARY SINUS: CPT | Mod: 50,51,, | Performed by: SPECIALIST

## 2024-05-27 PROCEDURE — 36000709 HC OR TIME LEV III EA ADD 15 MIN: Performed by: SPECIALIST

## 2024-05-27 PROCEDURE — 94761 N-INVAS EAR/PLS OXIMETRY MLT: CPT

## 2024-05-27 PROCEDURE — 37000009 HC ANESTHESIA EA ADD 15 MINS: Performed by: SPECIALIST

## 2024-05-27 PROCEDURE — 31255 NSL/SINS NDSC W/TOT ETHMDCT: CPT | Mod: 50,,, | Performed by: SPECIALIST

## 2024-05-27 PROCEDURE — 27201423 OPTIME MED/SURG SUP & DEVICES STERILE SUPPLY: Performed by: SPECIALIST

## 2024-05-27 PROCEDURE — D9220A PRA ANESTHESIA: Mod: CRNA,,, | Performed by: NURSE ANESTHETIST, CERTIFIED REGISTERED

## 2024-05-27 PROCEDURE — 94640 AIRWAY INHALATION TREATMENT: CPT

## 2024-05-27 PROCEDURE — 88305 TISSUE EXAM BY PATHOLOGIST: CPT | Mod: 26,,, | Performed by: PATHOLOGY

## 2024-05-27 PROCEDURE — 63600175 PHARM REV CODE 636 W HCPCS: Performed by: NURSE ANESTHETIST, CERTIFIED REGISTERED

## 2024-05-27 PROCEDURE — 63600175 PHARM REV CODE 636 W HCPCS: Performed by: SPECIALIST

## 2024-05-27 PROCEDURE — 25000242 PHARM REV CODE 250 ALT 637 W/ HCPCS: Performed by: ANESTHESIOLOGY

## 2024-05-27 PROCEDURE — 37000008 HC ANESTHESIA 1ST 15 MINUTES: Performed by: SPECIALIST

## 2024-05-27 PROCEDURE — 61782 SCAN PROC CRANIAL EXTRA: CPT | Mod: ,,, | Performed by: SPECIALIST

## 2024-05-27 PROCEDURE — 63600175 PHARM REV CODE 636 W HCPCS: Mod: JZ,JG | Performed by: SPECIALIST

## 2024-05-27 PROCEDURE — 63600175 PHARM REV CODE 636 W HCPCS: Performed by: ANESTHESIOLOGY

## 2024-05-27 PROCEDURE — 36000711: Performed by: SPECIALIST

## 2024-05-27 PROCEDURE — 88305 TISSUE EXAM BY PATHOLOGIST: CPT | Mod: 59 | Performed by: PATHOLOGY

## 2024-05-27 PROCEDURE — 63600175 PHARM REV CODE 636 W HCPCS: Performed by: INTERNAL MEDICINE

## 2024-05-27 PROCEDURE — 36000708 HC OR TIME LEV III 1ST 15 MIN: Performed by: SPECIALIST

## 2024-05-27 PROCEDURE — 71000039 HC RECOVERY, EACH ADD'L HOUR: Performed by: SPECIALIST

## 2024-05-27 PROCEDURE — 36000710: Performed by: SPECIALIST

## 2024-05-27 RX ORDER — SODIUM CHLORIDE 0.9 % (FLUSH) 0.9 %
10 SYRINGE (ML) INJECTION
Status: DISCONTINUED | OUTPATIENT
Start: 2024-05-27 | End: 2024-05-28 | Stop reason: HOSPADM

## 2024-05-27 RX ORDER — LAMOTRIGINE 100 MG/1
100 TABLET ORAL DAILY
Status: DISCONTINUED | OUTPATIENT
Start: 2024-05-27 | End: 2024-05-28 | Stop reason: HOSPADM

## 2024-05-27 RX ORDER — PROCHLORPERAZINE EDISYLATE 5 MG/ML
5 INJECTION INTRAMUSCULAR; INTRAVENOUS EVERY 30 MIN PRN
Status: DISCONTINUED | OUTPATIENT
Start: 2024-05-27 | End: 2024-05-27 | Stop reason: HOSPADM

## 2024-05-27 RX ORDER — TAMSULOSIN HYDROCHLORIDE 0.4 MG/1
0.4 CAPSULE ORAL NIGHTLY
Status: DISCONTINUED | OUTPATIENT
Start: 2024-05-27 | End: 2024-05-28 | Stop reason: HOSPADM

## 2024-05-27 RX ORDER — MIDAZOLAM HYDROCHLORIDE 1 MG/ML
INJECTION INTRAMUSCULAR; INTRAVENOUS
Status: DISCONTINUED | OUTPATIENT
Start: 2024-05-27 | End: 2024-05-27

## 2024-05-27 RX ORDER — HYDROMORPHONE HYDROCHLORIDE 2 MG/ML
0.4 INJECTION, SOLUTION INTRAMUSCULAR; INTRAVENOUS; SUBCUTANEOUS EVERY 5 MIN PRN
Status: DISCONTINUED | OUTPATIENT
Start: 2024-05-27 | End: 2024-05-27 | Stop reason: HOSPADM

## 2024-05-27 RX ORDER — EPINEPHRINE 1 MG/ML
INJECTION, SOLUTION, CONCENTRATE INTRAVENOUS
Status: DISCONTINUED | OUTPATIENT
Start: 2024-05-27 | End: 2024-05-27 | Stop reason: HOSPADM

## 2024-05-27 RX ORDER — DULOXETIN HYDROCHLORIDE 30 MG/1
60 CAPSULE, DELAYED RELEASE ORAL DAILY
Status: DISCONTINUED | OUTPATIENT
Start: 2024-05-27 | End: 2024-05-28 | Stop reason: HOSPADM

## 2024-05-27 RX ORDER — SODIUM CHLORIDE 0.9 % (FLUSH) 0.9 %
3 SYRINGE (ML) INJECTION
Status: DISCONTINUED | OUTPATIENT
Start: 2024-05-27 | End: 2024-05-27 | Stop reason: HOSPADM

## 2024-05-27 RX ORDER — MONTELUKAST SODIUM 10 MG/1
10 TABLET ORAL NIGHTLY
Status: DISCONTINUED | OUTPATIENT
Start: 2024-05-27 | End: 2024-05-28 | Stop reason: HOSPADM

## 2024-05-27 RX ORDER — DIPHENHYDRAMINE HYDROCHLORIDE 50 MG/ML
12.5 INJECTION INTRAMUSCULAR; INTRAVENOUS EVERY 30 MIN PRN
Status: DISCONTINUED | OUTPATIENT
Start: 2024-05-27 | End: 2024-05-27 | Stop reason: HOSPADM

## 2024-05-27 RX ORDER — ACETAMINOPHEN 325 MG/1
650 TABLET ORAL EVERY 4 HOURS PRN
Status: DISCONTINUED | OUTPATIENT
Start: 2024-05-27 | End: 2024-05-28 | Stop reason: HOSPADM

## 2024-05-27 RX ORDER — ROCURONIUM BROMIDE 10 MG/ML
INJECTION, SOLUTION INTRAVENOUS
Status: DISCONTINUED | OUTPATIENT
Start: 2024-05-27 | End: 2024-05-27

## 2024-05-27 RX ORDER — ALBUTEROL SULFATE 2.5 MG/.5ML
2.5 SOLUTION RESPIRATORY (INHALATION)
Status: COMPLETED | OUTPATIENT
Start: 2024-05-27 | End: 2024-05-27

## 2024-05-27 RX ORDER — ONDANSETRON 8 MG/1
8 TABLET, ORALLY DISINTEGRATING ORAL EVERY 6 HOURS PRN
Status: DISCONTINUED | OUTPATIENT
Start: 2024-05-27 | End: 2024-05-28 | Stop reason: HOSPADM

## 2024-05-27 RX ORDER — SODIUM CHLORIDE, SODIUM LACTATE, POTASSIUM CHLORIDE, CALCIUM CHLORIDE 600; 310; 30; 20 MG/100ML; MG/100ML; MG/100ML; MG/100ML
INJECTION, SOLUTION INTRAVENOUS CONTINUOUS
Status: DISCONTINUED | OUTPATIENT
Start: 2024-05-27 | End: 2024-05-27

## 2024-05-27 RX ORDER — DEXAMETHASONE SODIUM PHOSPHATE 4 MG/ML
INJECTION, SOLUTION INTRA-ARTICULAR; INTRALESIONAL; INTRAMUSCULAR; INTRAVENOUS; SOFT TISSUE
Status: DISCONTINUED | OUTPATIENT
Start: 2024-05-27 | End: 2024-05-27

## 2024-05-27 RX ORDER — MEPERIDINE HYDROCHLORIDE 25 MG/ML
12.5 INJECTION INTRAMUSCULAR; INTRAVENOUS; SUBCUTANEOUS ONCE AS NEEDED
Status: DISCONTINUED | OUTPATIENT
Start: 2024-05-27 | End: 2024-05-27 | Stop reason: HOSPADM

## 2024-05-27 RX ORDER — SODIUM CHLORIDE, SODIUM LACTATE, POTASSIUM CHLORIDE, CALCIUM CHLORIDE 600; 310; 30; 20 MG/100ML; MG/100ML; MG/100ML; MG/100ML
INJECTION, SOLUTION INTRAVENOUS CONTINUOUS
Status: DISCONTINUED | OUTPATIENT
Start: 2024-05-27 | End: 2024-05-28

## 2024-05-27 RX ORDER — FENTANYL CITRATE 50 UG/ML
INJECTION, SOLUTION INTRAMUSCULAR; INTRAVENOUS
Status: DISCONTINUED | OUTPATIENT
Start: 2024-05-27 | End: 2024-05-27

## 2024-05-27 RX ORDER — BUPROPION HYDROCHLORIDE 150 MG/1
300 TABLET ORAL DAILY
Status: DISCONTINUED | OUTPATIENT
Start: 2024-05-27 | End: 2024-05-28 | Stop reason: HOSPADM

## 2024-05-27 RX ORDER — HYDROCODONE BITARTRATE AND ACETAMINOPHEN 5; 325 MG/1; MG/1
1 TABLET ORAL EVERY 4 HOURS PRN
Status: DISCONTINUED | OUTPATIENT
Start: 2024-05-27 | End: 2024-05-28

## 2024-05-27 RX ORDER — PHENYLEPHRINE HYDROCHLORIDE 10 MG/ML
INJECTION INTRAVENOUS
Status: DISCONTINUED | OUTPATIENT
Start: 2024-05-27 | End: 2024-05-27

## 2024-05-27 RX ORDER — ROPINIROLE 1 MG/1
1 TABLET, FILM COATED ORAL NIGHTLY
Status: DISCONTINUED | OUTPATIENT
Start: 2024-05-27 | End: 2024-05-28 | Stop reason: HOSPADM

## 2024-05-27 RX ORDER — CLINDAMYCIN PHOSPHATE 900 MG/50ML
900 INJECTION, SOLUTION INTRAVENOUS
Status: COMPLETED | OUTPATIENT
Start: 2024-05-27 | End: 2024-05-27

## 2024-05-27 RX ORDER — LIDOCAINE HYDROCHLORIDE 10 MG/ML
0.5 INJECTION, SOLUTION EPIDURAL; INFILTRATION; INTRACAUDAL; PERINEURAL ONCE
Status: DISCONTINUED | OUTPATIENT
Start: 2024-05-27 | End: 2024-05-27 | Stop reason: HOSPADM

## 2024-05-27 RX ORDER — ONDANSETRON HYDROCHLORIDE 2 MG/ML
INJECTION, SOLUTION INTRAMUSCULAR; INTRAVENOUS
Status: DISCONTINUED | OUTPATIENT
Start: 2024-05-27 | End: 2024-05-27

## 2024-05-27 RX ORDER — HYDROXYZINE HYDROCHLORIDE 25 MG/1
25 TABLET, FILM COATED ORAL 4 TIMES DAILY PRN
Status: DISCONTINUED | OUTPATIENT
Start: 2024-05-27 | End: 2024-05-28 | Stop reason: HOSPADM

## 2024-05-27 RX ORDER — TALC
6 POWDER (GRAM) TOPICAL NIGHTLY PRN
Status: DISCONTINUED | OUTPATIENT
Start: 2024-05-27 | End: 2024-05-28 | Stop reason: HOSPADM

## 2024-05-27 RX ORDER — PROPOFOL 10 MG/ML
VIAL (ML) INTRAVENOUS
Status: DISCONTINUED | OUTPATIENT
Start: 2024-05-27 | End: 2024-05-27

## 2024-05-27 RX ORDER — LAMOTRIGINE 25 MG/1
25 TABLET ORAL DAILY
Status: DISCONTINUED | OUTPATIENT
Start: 2024-05-27 | End: 2024-05-28 | Stop reason: HOSPADM

## 2024-05-27 RX ORDER — DEXAMETHASONE SODIUM PHOSPHATE 4 MG/ML
12 INJECTION, SOLUTION INTRA-ARTICULAR; INTRALESIONAL; INTRAMUSCULAR; INTRAVENOUS; SOFT TISSUE
Status: DISCONTINUED | OUTPATIENT
Start: 2024-05-27 | End: 2024-05-27 | Stop reason: HOSPADM

## 2024-05-27 RX ORDER — LIDOCAINE HYDROCHLORIDE 20 MG/ML
INJECTION INTRAVENOUS
Status: DISCONTINUED | OUTPATIENT
Start: 2024-05-27 | End: 2024-05-27

## 2024-05-27 RX ORDER — LIDOCAINE HYDROCHLORIDE AND EPINEPHRINE 10; 10 MG/ML; UG/ML
INJECTION, SOLUTION INFILTRATION; PERINEURAL
Status: DISCONTINUED | OUTPATIENT
Start: 2024-05-27 | End: 2024-05-27 | Stop reason: HOSPADM

## 2024-05-27 RX ORDER — BUPROPION HYDROCHLORIDE 300 MG/1
300 TABLET ORAL DAILY
Qty: 90 TABLET | Refills: 1 | Status: SHIPPED | OUTPATIENT
Start: 2024-05-27 | End: 2024-08-25

## 2024-05-27 RX ORDER — SUCCINYLCHOLINE CHLORIDE 20 MG/ML
INJECTION INTRAMUSCULAR; INTRAVENOUS
Status: DISCONTINUED | OUTPATIENT
Start: 2024-05-27 | End: 2024-05-27

## 2024-05-27 RX ORDER — ACETAMINOPHEN 500 MG
1000 TABLET ORAL
Status: COMPLETED | OUTPATIENT
Start: 2024-05-27 | End: 2024-05-27

## 2024-05-27 RX ORDER — PROPOFOL 10 MG/ML
VIAL (ML) INTRAVENOUS CONTINUOUS PRN
Status: DISCONTINUED | OUTPATIENT
Start: 2024-05-27 | End: 2024-05-27

## 2024-05-27 RX ORDER — BUMETANIDE 0.5 MG/1
0.5 TABLET ORAL 2 TIMES DAILY
Status: DISCONTINUED | OUTPATIENT
Start: 2024-05-27 | End: 2024-05-28 | Stop reason: HOSPADM

## 2024-05-27 RX ORDER — HYDROCODONE BITARTRATE AND ACETAMINOPHEN 10; 325 MG/1; MG/1
1 TABLET ORAL EVERY 4 HOURS PRN
Status: DISCONTINUED | OUTPATIENT
Start: 2024-05-27 | End: 2024-05-28

## 2024-05-27 RX ORDER — LIDOCAINE HYDROCHLORIDE 10 MG/ML
1 INJECTION, SOLUTION EPIDURAL; INFILTRATION; INTRACAUDAL; PERINEURAL ONCE
Status: DISCONTINUED | OUTPATIENT
Start: 2024-05-27 | End: 2024-05-27 | Stop reason: HOSPADM

## 2024-05-27 RX ORDER — OXYCODONE HYDROCHLORIDE 5 MG/1
5 TABLET ORAL EVERY 4 HOURS PRN
Status: DISCONTINUED | OUTPATIENT
Start: 2024-05-27 | End: 2024-05-27 | Stop reason: HOSPADM

## 2024-05-27 RX ADMIN — PHENYLEPHRINE HYDROCHLORIDE 100 MCG: 10 INJECTION INTRAVENOUS at 10:05

## 2024-05-27 RX ADMIN — ROCURONIUM BROMIDE 10 MG: 10 INJECTION INTRAVENOUS at 09:05

## 2024-05-27 RX ADMIN — SUCCINYLCHOLINE CHLORIDE 180 MG: 20 INJECTION, SOLUTION INTRAMUSCULAR; INTRAVENOUS at 09:05

## 2024-05-27 RX ADMIN — SODIUM CHLORIDE, POTASSIUM CHLORIDE, SODIUM LACTATE AND CALCIUM CHLORIDE: 600; 310; 30; 20 INJECTION, SOLUTION INTRAVENOUS at 01:05

## 2024-05-27 RX ADMIN — SUGAMMADEX 400 MG: 100 INJECTION, SOLUTION INTRAVENOUS at 10:05

## 2024-05-27 RX ADMIN — PHENYLEPHRINE HYDROCHLORIDE 100 MCG: 10 INJECTION INTRAVENOUS at 09:05

## 2024-05-27 RX ADMIN — PHENYLEPHRINE HYDROCHLORIDE 200 MCG: 10 INJECTION INTRAVENOUS at 09:05

## 2024-05-27 RX ADMIN — ALBUTEROL SULFATE 2.5 MG: 2.5 SOLUTION RESPIRATORY (INHALATION) at 08:05

## 2024-05-27 RX ADMIN — PROPOFOL 300 MG: 10 INJECTION, EMULSION INTRAVENOUS at 09:05

## 2024-05-27 RX ADMIN — SODIUM CHLORIDE, SODIUM LACTATE, POTASSIUM CHLORIDE, AND CALCIUM CHLORIDE: 600; 310; 30; 20 INJECTION, SOLUTION INTRAVENOUS at 08:05

## 2024-05-27 RX ADMIN — ONDANSETRON 4 MG: 2 INJECTION INTRAMUSCULAR; INTRAVENOUS at 10:05

## 2024-05-27 RX ADMIN — MIDAZOLAM HYDROCHLORIDE 2 MG: 1 INJECTION, SOLUTION INTRAMUSCULAR; INTRAVENOUS at 09:05

## 2024-05-27 RX ADMIN — HYDROCODONE BITARTRATE AND ACETAMINOPHEN 1 TABLET: 10; 325 TABLET ORAL at 06:05

## 2024-05-27 RX ADMIN — ACETAMINOPHEN 650 MG: 325 TABLET, FILM COATED ORAL at 09:05

## 2024-05-27 RX ADMIN — OXYCODONE HYDROCHLORIDE 5 MG: 5 TABLET ORAL at 11:05

## 2024-05-27 RX ADMIN — FENTANYL CITRATE 50 MCG: 50 INJECTION, SOLUTION INTRAMUSCULAR; INTRAVENOUS at 09:05

## 2024-05-27 RX ADMIN — PROPOFOL 50 MCG/KG/MIN: 10 INJECTION, EMULSION INTRAVENOUS at 09:05

## 2024-05-27 RX ADMIN — CLINDAMYCIN PHOSPHATE 900 MG: 18 INJECTION, SOLUTION INTRAVENOUS at 09:05

## 2024-05-27 RX ADMIN — FENTANYL CITRATE 100 MCG: 50 INJECTION, SOLUTION INTRAMUSCULAR; INTRAVENOUS at 09:05

## 2024-05-27 RX ADMIN — PROPOFOL 40 MG: 10 INJECTION, EMULSION INTRAVENOUS at 09:05

## 2024-05-27 RX ADMIN — LIDOCAINE HYDROCHLORIDE 50 MG: 20 INJECTION, SOLUTION INTRAVENOUS at 09:05

## 2024-05-27 RX ADMIN — PROPOFOL 40 MG: 10 INJECTION, EMULSION INTRAVENOUS at 10:05

## 2024-05-27 RX ADMIN — MONTELUKAST 10 MG: 10 TABLET, FILM COATED ORAL at 09:05

## 2024-05-27 RX ADMIN — ACETAMINOPHEN 1000 MG: 500 TABLET ORAL at 08:05

## 2024-05-27 RX ADMIN — ROCURONIUM BROMIDE 10 MG: 10 INJECTION INTRAVENOUS at 10:05

## 2024-05-27 RX ADMIN — ROCURONIUM BROMIDE 20 MG: 10 INJECTION INTRAVENOUS at 09:05

## 2024-05-27 RX ADMIN — DEXAMETHASONE SODIUM PHOSPHATE 12 MG: 4 INJECTION, SOLUTION INTRAMUSCULAR; INTRAVENOUS at 09:05

## 2024-05-27 RX ADMIN — ACETAMINOPHEN 650 MG: 325 TABLET, FILM COATED ORAL at 02:05

## 2024-05-27 RX ADMIN — ROCURONIUM BROMIDE 40 MG: 10 INJECTION INTRAVENOUS at 09:05

## 2024-05-27 NOTE — BRIEF OP NOTE
[facility] Operative Note    Surgery Date: 5/27/2024     Surgeons and Role:     * JAN Arredondo MD - Primary    Assisting Surgeon: None    Pre-op Diagnosis:  Sinusitis chronic, ethmoidal [J32.2]  Chronic maxillary sinusitis [J32.0]    Post-op Diagnosis:  Post-Op Diagnosis Codes:     * Sinusitis chronic, ethmoidal [J32.2]     * Chronic maxillary sinusitis [J32.0]    Procedure(s) (LRB):  ENDOSCOPY, NOSE OR PARANASAL SINUS, WITH MAXILLARY SINUS TISSUE REMOVAL (Bilateral)  ETHMOIDECTOMY, TOTAL, ENDOSCOPIC (Bilateral)  FESS (FUNCTIONAL ENDOSCOPIC SINUS SURGERY) (Bilateral)  EXCISION, NASAL TURBINATE (Bilateral)    Anesthesia: General    Operative Findings:  The patient was placed on the operating table in spine position adequate general endotracheal anesthesia was administered.  The face was prepped findings and the in the nasal passages packed gauze trips motion with 1:1000 epinephrine.  A verification strip for the navigation system was placed on the central forehead and the patient was sterilely draped.  The navigation system was verified.  Nasal packs were removed and xylocaine 1% with 1:[id number] epinephrine was infiltrated lateral nasal wall and middle turbinate anteriorly on both sides.  Neurosurgical cottonoid packs motion with 1:1000 epinephrine were placed medial and lateral middle turbinates bilaterally.  Attention was placed to the left side initially and then to the right with identical procedures being performed on both sides.  The uncinate mucosa was removed using an osteotome back biting up on a Umberto-Cut forceps.  The middle turbinate was medialized and the bullous ethmoidalis was widely opened.  Ethmoid mucosa were exonerated from anteriorly posteriorly to the basal lamella, which was opened.  Thickened mucosa from the ethmoid sinus was removed bilateral until the anterior and posterior ethmoid was marsupialized.  The maxillary sinus ostium was probed with a sinus seeker and then  enlarged with 1st an up-biting Umberto-Cut forceps and then subsequently with backbiting ostium forceps been up and straight biting Umberto-Cut forceps.  These maneuvers open the sinus cavities Rojas.  A neurosurgical cottonoid was placed in the cavity on the left and an identical procedure was performed on the right side.  The neurosurgical cottonoid was removed from the left and the anterior/inferior end of the middle turbinate was reduced with straight and curved scissors.  This was to allow adequate access to the antrostomy and ethmoid cavity.  A Carmen pack was placed and the ethmoidectomy cavity overlying the antrostomy and free cut edge of the turbinate, 1st on the left and then on the right.  The nose resection of blood and secretions procedure terminated.  Estimated blood loss is 75 mL.  The patient was allowed to awaken in the operating suite where was extubated.  An orogastric tube was placed into the stomach prior to extubation.  The stomach was suctioned of secretions.  He was transferred to the recovery room in stable and good condition.       Estimated Blood Loss: 75 mL         Specimens:   Specimen (24h ago, onward)       Start     Ordered    05/27/24 1048  Specimen to Pathology, Surgery ENT  Once        Comments: Pre-op Diagnosis: Sinusitis chronic, ethmoidal [J32.2]Chronic maxillary sinusitis [J32.0]Procedure(s):ENDOSCOPY, NOSE OR PARANASAL SINUS, WITH MAXILLARY SINUS TISSUE REMOVALETHMOIDECTOMY, TOTAL, ENDOSCOPICFESS (FUNCTIONAL ENDOSCOPIC SINUS SURGERY) Number of specimens: 6Name of specimens: 1. Left ethmoid2. Left maxillary3. Right ethmoid4. Right maxillary5. Left middle turbinate remnant6. Right middle turbinate remnant     References:    Click here for ordering Quick Tip   Question Answer Comment   Procedure Type: ENT    Specimen Class: Routine/Screening    Release to patient Immediate        05/27/24 1047                      Discharge Note    OUTCOME: Patient tolerated treatment/procedure well  without complication and is now ready for discharge.    DISPOSITION: Home or Self Care    FINAL DIAGNOSIS:  Chronic maxillary sinusitis    FOLLOWUP: In clinic    DISCHARGE INSTRUCTIONS:  No discharge procedures on file.

## 2024-05-27 NOTE — PLAN OF CARE
Problem: Adult Inpatient Plan of Care  Goal: Plan of Care Review  Outcome: Progressing     Problem: Bariatric Environmental Safety  Goal: Safety Maintained with Care  Outcome: Progressing     Problem: Wound  Goal: Optimal Coping  Outcome: Progressing     Problem: Pain Acute  Goal: Optimal Pain Control and Function  Outcome: Progressing

## 2024-05-27 NOTE — CONSULTS
[facility] [address] Medicine  Consult Note    Patient Name: Jered Contreras  MRN: [id number]  Admission Date: 5/27/2024  Hospital Length of Stay: 0 days  Attending Physician: JAN Arredondo MD   Primary Care Provider: Angela Dacosta MD           Patient information was obtained from patient, past medical records, and primary team.     Inpatient consult to Hospital Medicine  Consult performed by: ARSALAN Thornton MD  Consult ordered by: JAN Arredondo MD        Subjective:     Principal Problem: Chronic maxillary sinusitis    Chief Complaint: No chief complaint on file.     HPI: Mr. Contreras is a 48/M with PMH morbid obesity, depression, anxiety, h/o gastric bypass, TAYO who presented to [facility] 05/27 for planned sinus endoscopy with ethmoidectomy/FESS and turbinate excision with Dr. Arredondo. He reports he was in his usual state of health prior to presentation, and postoperatively felt well. Hospital medicine was consulted for medical comanagement assistance.    Past Medical History:   Diagnosis Date    Allergic rhinitis     Asthma     last flare up at 21 y/o    Corneal abrasion     Depression     Hx of psychiatric care     Morbid obesity     Obstructive sleep apnea treated with continuous positive airway pressure (CPAP) 07/10/2018    Psychiatric problem     Therapy        Past Surgical History:   Procedure Laterality Date    APPENDECTOMY      APPLICATION OF CARTILAGE GRAFT Bilateral 12/06/2021    Procedure: APPLICATION, CARTILAGE GRAFT;  Surgeon: JAN Arredondo MD;  [facility];  Service: ENT;  Laterality: Bilateral;  from right ear    GASTRIC BYPASS  2020    gastric sleeve  09/2019    [facility]    NASAL SEPTOPLASTY Bilateral 12/06/2021    Procedure: SEPTOPLASTY, NOSE;  Surgeon: JAN Arredondo MD;  Location: [facility];  Service: ENT;  Laterality: Bilateral;    NASAL STENOSIS REPAIR Bilateral 12/06/2021    Procedure: REPAIR,  STENOSIS, NOSE, VESTIBULE;  Surgeon: JAN Arredondo MD;  Location: [facility];  Service: ENT;  Laterality: Bilateral;    SINUS SURGERY      VASECTOMY  2017       Review of patient's allergies indicates:   Allergen Reactions    Ceclor [cefaclor] Anaphylaxis    Penicillins Anaphylaxis     Elevated blood pressure, temp, hives    Calcium alginate Itching     Headache       No current facility-administered medications on file prior to encounter.     Current Outpatient Medications on File Prior to Encounter   Medication Sig    ASCORBATE CALCIUM (VITAMIN C ORAL) Take by mouth once daily.    azelastine (ASTELIN) 137 mcg (0.1 %) nasal spray 2 sprays (274 mcg total) by Nasal route 2 (two) times daily as needed (Nasal congestion).    b complex vitamins (B COMPLEX-VITAMIN B12) tablet Take 1 tablet by mouth once daily.    DULoxetine (CYMBALTA) 60 MG capsule Take 1 capsule (60 mg total) by mouth once daily.    fluticasone propionate (FLONASE) 50 mcg/actuation nasal spray One spray in each nostril twice daily after 1st using azelastine nasal spray    hydrOXYzine HCL (ATARAX) 25 MG tablet TK 1 T PO  TID PRN ANXIETY    lamoTRIgine (LAMICTAL) 100 MG tablet Take one tab of Lamictal 100 mg po daily, plus two tab of Lamictal 25 mg po daily, total lamictal 125 mg po daily.    lamoTRIgine (LAMICTAL) 25 MG tablet Take 2 tab Lamictal 25 mg p.o. daily plus 1 tab Lamictal 100 mg po daily, total Lamictal 150 mg p.o. daily    montelukast (SINGULAIR) 10 mg tablet Take 1 tablet (10 mg total) by mouth every evening. Take during allergy season.    multivitamin capsule Take 1 capsule by mouth once daily.    tamsulosin (FLOMAX) 0.4 mg Cap Take 1 capsule (0.4 mg total) by mouth every evening.    clotrimazole-betamethasone 1-0.05% (LOTRISONE) cream Apply topically 2 (two) times daily.    rOPINIRole (REQUIP) 0.5 MG tablet Take 2 tablets (1 mg total) by mouth every evening.    WEGOVY 1 mg/0.5 mL PnIj Inject into the skin. (Patient not taking: Reported on  5/24/2024)     Family History       Problem Relation (Age of Onset)    Allergies Mother, Maternal Grandmother    Alzheimer's disease Paternal Grandmother    Brain cancer Maternal Grandmother    Cataracts Mother    Cerebral aneurysm Paternal Grandfather    Dementia Mother, Maternal Grandmother    Diabetes Father, Paternal Grandmother    Glaucoma Mother    Hypertension Father, Maternal Grandmother, Paternal Grandfather    Lung cancer Mother    No Known Problems Maternal Aunt, Maternal Uncle, Paternal Aunt, Paternal Uncle, Other    Obesity Paternal Grandfather    Rheum arthritis Mother, Maternal Grandfather          Tobacco Use    Smoking status: Never     Passive exposure: Never    Smokeless tobacco: Never   Substance and Sexual Activity    Alcohol use: Yes     Alcohol/week: 1.0 standard drink of alcohol     Types: 1 Cans of beer per week     Comment: RARELY    Drug use: No    Sexual activity: Yes     Partners: Female     Review of Systems   Constitutional:  Negative for chills and fever.   HENT:  Negative for sore throat and trouble swallowing.    Eyes:  Negative for pain and visual disturbance.   Respiratory:  Negative for cough and shortness of breath.    Cardiovascular:  Negative for chest pain and palpitations.   Gastrointestinal:  Negative for abdominal pain, nausea and vomiting.   Genitourinary:  Negative for difficulty urinating and dysuria.   Musculoskeletal:  Negative for arthralgias and joint swelling.   Skin:  Negative for rash and wound.   Neurological:  Negative for weakness and numbness.     Objective:     Vital Signs (Most Recent):  Temp: 97.5 °F (36.4 °C) (05/27/24 1515)  Pulse: 89 (05/27/24 1515)  Resp: 18 (05/27/24 1515)  BP: 139/69 (05/27/24 1515)  SpO2: 95 % (05/27/24 1515) Vital Signs (24h Range):  Temp:  [97.5 °F (36.4 °C)-98.2 °F (36.8 °C)] 97.5 °F (36.4 °C)  Pulse:  [] 89  Resp:  [16-18] 18  SpO2:  [95 %-100 %] 95 %  BP: (132-145)/(68-80) 139/69     Weight: (!) 158.8 kg (350 lb)  Body  mass index is 47.47 kg/m².     Physical Exam  Vitals and nursing note reviewed.   Constitutional:       General: He is not in acute distress.     Appearance: He is well-developed. He is obese.   HENT:      Head: Normocephalic and atraumatic.      Nose:      Comments: s/p procedure with moustache dressing in place.  Eyes:      General:         Right eye: No discharge.         Left eye: No discharge.      Conjunctiva/sclera: Conjunctivae normal.   Cardiovascular:      Rate and Rhythm: Normal rate.      Pulses: Normal pulses.   Pulmonary:      Effort: Pulmonary effort is normal. No respiratory distress.   Abdominal:      Palpations: Abdomen is soft.      Tenderness: There is no abdominal tenderness.   Musculoskeletal:         General: Normal range of motion.      Right lower leg: No edema.      Left lower leg: No edema.   Skin:     General: Skin is warm and dry.   Neurological:      Mental Status: He is alert and oriented to person, place, and time.           Significant Labs:   CBC:  Recent Labs   Lab 05/23/24  0854   WBC 6.14   HGB 12.3*   HCT 38.3*      GRAN 53.2  3.3   LYMPH 33.1  2.0   MONO 9.1  0.6   EOS 0.2   BASO 0.07     CMP:  Recent Labs   Lab 05/23/24  0854      K 3.6      CO2 25   BUN 5*   CREATININE 0.9   GLU 67*   CALCIUM 9.2   ALKPHOS 95   AST 21   ALT 27   BILITOT 0.5   PROT 6.7   ALBUMIN 3.9   ANIONGAP 8       Significant Imaging: I have reviewed and interpreted all pertinent imaging results/findings within the past 24 hours.    Assessment/Plan:     * Chronic maxillary sinusitis  Sinusitis, TAYO  - s/p sinus endoscopy with ethmoidectomy/FESS and turbinate excision with Dr. Arredondo 05/27.  - Primary management, pain control as per ENT.  - PRN Oxygen via face tent to maintain saturations > 90%.    Venous insufficiency of both lower extremities  BLE edema  - Continue bumetanide 0.5mg PO BID.  - When advancing diet, advance to low-sodium; limit post-op IVFs to 125mL/hr, discontinue when  tolerating PO.    MDD (major depressive disorder), recurrent episode, moderate  WAYNE  - Continue bupropion 300mg PO daily, duloxetine 60mg PO daily, lamotrigine 125mg PO daily.  - Continue hydroxyzine 25mg PO TID PRN, ropinirole 1mg PO qHS PRN.      VTE Risk Mitigation (From admission, onward)           Ordered     IP VTE HIGH RISK PATIENT  Once         05/27/24 1302     Place PAN hose  Until discontinued         05/27/24 0813                  Thank you for your consult. We will continue to follow with you. Please do not hesitate to contact me with any questions or concerns.    DOUG Thornton MD  Department of Hospital Medicine   [facility] ([address])

## 2024-05-27 NOTE — ANESTHESIA PROCEDURE NOTES
Intubation    Date/Time: 5/27/2024 9:08 AM    Performed by: Summer Arnold CRNA  Authorized by: Saroj Gamez MD    Intubation:     Induction:  Intravenous    Intubated:  Postinduction    Mask Ventilation:  Not attempted    Attempts:  1    Attempted By:  CRNA    Method of Intubation:  Video laryngoscopy    Blade:  Morrow 4    Laryngeal View Grade: Grade I - full view of cords      Difficult Airway Encountered?: No      Complications:  None    Airway Device:  Oral any    Airway Device Size:  8.0    Style/Cuff Inflation:  Cuffed    Inflation Amount (mL):  6    Tube secured:  24    Secured at:  The lips    Placement Verified By:  Capnometry    Complicating Factors:  None    Findings Post-Intubation:  BS equal bilateral and atraumatic/condition of teeth unchanged

## 2024-05-27 NOTE — HPI
Mr. Contreras is a 48/M with PMH morbid obesity, depression, anxiety, h/o gastric bypass, TAYO who presented to [facility] 05/27 for planned sinus endoscopy with ethmoidectomy/FESS and turbinate excision with Dr. Arredondo. He reports he was in his usual state of health prior to presentation, and postoperatively felt well. Hospital medicine was consulted for medical comanagement assistance.

## 2024-05-27 NOTE — SUBJECTIVE & OBJECTIVE
Past Medical History:   Diagnosis Date    Allergic rhinitis     Asthma     last flare up at 19 y/o    Corneal abrasion     Depression     Hx of psychiatric care     Morbid obesity     Obstructive sleep apnea treated with continuous positive airway pressure (CPAP) 07/10/2018    Psychiatric problem     Therapy        Past Surgical History:   Procedure Laterality Date    APPENDECTOMY      APPLICATION OF CARTILAGE GRAFT Bilateral 12/06/2021    Procedure: APPLICATION, CARTILAGE GRAFT;  Surgeon: JAN Arredondo MD;  Location: [facility];  Service: ENT;  Laterality: Bilateral;  from right ear    GASTRIC BYPASS  2020    gastric sleeve  09/2019    [facility]    NASAL SEPTOPLASTY Bilateral 12/06/2021    Procedure: SEPTOPLASTY, NOSE;  Surgeon: JAN Arredondo MD;  Location: [facility] OR;  Service: ENT;  Laterality: Bilateral;    NASAL STENOSIS REPAIR Bilateral 12/06/2021    Procedure: REPAIR, STENOSIS, NOSE, VESTIBULE;  Surgeon: JAN Arredondo MD;  Location: [facility] OR;  Service: ENT;  Laterality: Bilateral;    SINUS SURGERY      VASECTOMY  2017       Review of patient's allergies indicates:   Allergen Reactions    Ceclor [cefaclor] Anaphylaxis    Penicillins Anaphylaxis     Elevated blood pressure, temp, hives    Calcium alginate Itching     Headache       No current facility-administered medications on file prior to encounter.     Current Outpatient Medications on File Prior to Encounter   Medication Sig    ASCORBATE CALCIUM (VITAMIN C ORAL) Take by mouth once daily.    azelastine (ASTELIN) 137 mcg (0.1 %) nasal spray 2 sprays (274 mcg total) by Nasal route 2 (two) times daily as needed (Nasal congestion).    b complex vitamins (B COMPLEX-VITAMIN B12) tablet Take 1 tablet by mouth once daily.    DULoxetine (CYMBALTA) 60 MG capsule Take 1 capsule (60 mg total) by mouth once daily.    fluticasone propionate (FLONASE) 50 mcg/actuation nasal spray One spray in each nostril twice daily after 1st using  azelastine nasal spray    hydrOXYzine HCL (ATARAX) 25 MG tablet TK 1 T PO  TID PRN ANXIETY    lamoTRIgine (LAMICTAL) 100 MG tablet Take one tab of Lamictal 100 mg po daily, plus two tab of Lamictal 25 mg po daily, total lamictal 125 mg po daily.    lamoTRIgine (LAMICTAL) 25 MG tablet Take 2 tab Lamictal 25 mg p.o. daily plus 1 tab Lamictal 100 mg po daily, total Lamictal 150 mg p.o. daily    montelukast (SINGULAIR) 10 mg tablet Take 1 tablet (10 mg total) by mouth every evening. Take during allergy season.    multivitamin capsule Take 1 capsule by mouth once daily.    tamsulosin (FLOMAX) 0.4 mg Cap Take 1 capsule (0.4 mg total) by mouth every evening.    clotrimazole-betamethasone 1-0.05% (LOTRISONE) cream Apply topically 2 (two) times daily.    rOPINIRole (REQUIP) 0.5 MG tablet Take 2 tablets (1 mg total) by mouth every evening.    WEGOVY 1 mg/0.5 mL PnIj Inject into the skin. (Patient not taking: Reported on 5/24/2024)     Family History       Problem Relation (Age of Onset)    Allergies Mother, Maternal Grandmother    Alzheimer's disease Paternal Grandmother    Brain cancer Maternal Grandmother    Cataracts Mother    Cerebral aneurysm Paternal Grandfather    Dementia Mother, Maternal Grandmother    Diabetes Father, Paternal Grandmother    Glaucoma Mother    Hypertension Father, Maternal Grandmother, Paternal Grandfather    Lung cancer Mother    No Known Problems Maternal Aunt, Maternal Uncle, Paternal Aunt, Paternal Uncle, Other    Obesity Paternal Grandfather    Rheum arthritis Mother, Maternal Grandfather          Tobacco Use    Smoking status: Never     Passive exposure: Never    Smokeless tobacco: Never   Substance and Sexual Activity    Alcohol use: Yes     Alcohol/week: 1.0 standard drink of alcohol     Types: 1 Cans of beer per week     Comment: RARELY    Drug use: No    Sexual activity: Yes     Partners: Female     Review of Systems   Constitutional:  Negative for chills and fever.   HENT:  Negative for  sore throat and trouble swallowing.    Eyes:  Negative for pain and visual disturbance.   Respiratory:  Negative for cough and shortness of breath.    Cardiovascular:  Negative for chest pain and palpitations.   Gastrointestinal:  Negative for abdominal pain, nausea and vomiting.   Genitourinary:  Negative for difficulty urinating and dysuria.   Musculoskeletal:  Negative for arthralgias and joint swelling.   Skin:  Negative for rash and wound.   Neurological:  Negative for weakness and numbness.     Objective:     Vital Signs (Most Recent):  Temp: 97.5 °F (36.4 °C) (05/27/24 1515)  Pulse: 89 (05/27/24 1515)  Resp: 18 (05/27/24 1515)  BP: 139/69 (05/27/24 1515)  SpO2: 95 % (05/27/24 1515) Vital Signs (24h Range):  Temp:  [97.5 °F (36.4 °C)-98.2 °F (36.8 °C)] 97.5 °F (36.4 °C)  Pulse:  [] 89  Resp:  [16-18] 18  SpO2:  [95 %-100 %] 95 %  BP: (132-145)/(68-80) 139/69     Weight: (!) 158.8 kg (350 lb)  Body mass index is 47.47 kg/m².     Physical Exam  Vitals and nursing note reviewed.   Constitutional:       General: He is not in acute distress.     Appearance: He is well-developed. He is obese.   HENT:      Head: Normocephalic and atraumatic.      Nose:      Comments: s/p procedure with moustache dressing in place.  Eyes:      General:         Right eye: No discharge.         Left eye: No discharge.      Conjunctiva/sclera: Conjunctivae normal.   Cardiovascular:      Rate and Rhythm: Normal rate.      Pulses: Normal pulses.   Pulmonary:      Effort: Pulmonary effort is normal. No respiratory distress.   Abdominal:      Palpations: Abdomen is soft.      Tenderness: There is no abdominal tenderness.   Musculoskeletal:         General: Normal range of motion.      Right lower leg: No edema.      Left lower leg: No edema.   Skin:     General: Skin is warm and dry.   Neurological:      Mental Status: He is alert and oriented to person, place, and time.           Significant Labs:   CBC:  Recent Labs   Lab  05/23/24  0854   WBC 6.14   HGB 12.3*   HCT 38.3*      GRAN 53.2  3.3   LYMPH 33.1  2.0   MONO 9.1  0.6   EOS 0.2   BASO 0.07     CMP:  Recent Labs   Lab 05/23/24  0854      K 3.6      CO2 25   BUN 5*   CREATININE 0.9   GLU 67*   CALCIUM 9.2   ALKPHOS 95   AST 21   ALT 27   BILITOT 0.5   PROT 6.7   ALBUMIN 3.9   ANIONGAP 8       Significant Imaging: I have reviewed and interpreted all pertinent imaging results/findings within the past 24 hours.

## 2024-05-27 NOTE — NURSING
Patient transferred from PACU via stretcher, Patient arousable to voice, oriented x4. Family member at beside. Mustache dressing in place, clean dry and intact. VSS on room air. Plan of care reviewed with family member and patient, both verbalized understanding. Patient oriented to room and use of call bell. Bed in lowest locked position, call bell in reach, side rails up x2. SCDs in place. Will continue to monitor.

## 2024-05-27 NOTE — ASSESSMENT & PLAN NOTE
BLE edema  - Continue bumetanide 0.5mg PO BID.  - When advancing diet, advance to low-sodium; limit post-op IVFs to 125mL/hr, discontinue when tolerating PO.

## 2024-05-27 NOTE — ANESTHESIA POSTPROCEDURE EVALUATION
Anesthesia Post Evaluation    Patient: Jered Contreras    Procedure(s) Performed: Procedure(s) (LRB):  ENDOSCOPY, NOSE OR PARANASAL SINUS, WITH MAXILLARY SINUS TISSUE REMOVAL (Bilateral)  ETHMOIDECTOMY, TOTAL, ENDOSCOPIC (Bilateral)  FESS (FUNCTIONAL ENDOSCOPIC SINUS SURGERY) (Bilateral)  EXCISION, NASAL TURBINATE (Bilateral)    Final Anesthesia Type: general      Patient location during evaluation: PACU  Patient participation: Yes- Able to Participate  Level of consciousness: awake and alert  Post-procedure vital signs: reviewed and stable  Pain management: adequate  Airway patency: patent    PONV status at discharge: No PONV  Anesthetic complications: no      Cardiovascular status: blood pressure returned to baseline  Respiratory status: unassisted, spontaneous ventilation and room air  Hydration status: euvolemic  Follow-up not needed.          Vitals Value Taken Time   /74 05/27/24 1207   Temp 36.6 °C (97.8 °F) 05/27/24 1207   Pulse 104 05/27/24 1207   Resp 16 05/27/24 1207   SpO2 96 % 05/27/24 1207         Event Time   Out of Recovery 11:57:00         Pain/Bridgette Score: Pain Rating Prior to Med Admin: 4 (5/27/2024 11:17 AM)  Bridgette Score: 9 (5/27/2024 11:28 AM)

## 2024-05-27 NOTE — ASSESSMENT & PLAN NOTE
Sinusitis, TAYO  - s/p sinus endoscopy with ethmoidectomy/FESS and turbinate excision with Dr. Arredondo 05/27.  - Primary management, pain control as per ENT.  - PRN Oxygen via face tent to maintain saturations > 90%.

## 2024-05-27 NOTE — TRANSFER OF CARE
Anesthesia Transfer of Care Note    Patient: Jered Contreras    Procedure(s) Performed: Procedure(s) (LRB):  ENDOSCOPY, NOSE OR PARANASAL SINUS, WITH MAXILLARY SINUS TISSUE REMOVAL (Bilateral)  ETHMOIDECTOMY, TOTAL, ENDOSCOPIC (Bilateral)  FESS (FUNCTIONAL ENDOSCOPIC SINUS SURGERY) (Bilateral)  EXCISION, NASAL TURBINATE (Bilateral)    Patient location: PACU    Anesthesia Type: general    Transport from OR: Transported from OR on 6-10 L/min O2 by face mask with adequate spontaneous ventilation    Post pain: adequate analgesia    Post assessment: no apparent anesthetic complications    Post vital signs: stable    Level of consciousness: awake and sedated    Nausea/Vomiting: no nausea/vomiting    Complications: none    Transfer of care protocol was followed      Last vitals: Visit Vitals  BP (!) 145/80   Pulse 74   Temp 36.8 °C (98.2 °F) (Oral)   Resp 18   Ht 6' (1.829 m)   Wt (!) 158.8 kg (350 lb)   SpO2 99%   BMI 47.47 kg/m²

## 2024-05-27 NOTE — ASSESSMENT & PLAN NOTE
WAYNE  - Continue bupropion 300mg PO daily, duloxetine 60mg PO daily, lamotrigine 125mg PO daily.  - Continue hydroxyzine 25mg PO TID PRN, ropinirole 1mg PO qHS PRN.

## 2024-05-28 VITALS
TEMPERATURE: 98 F | RESPIRATION RATE: 12 BRPM | HEART RATE: 61 BPM | HEIGHT: 72 IN | BODY MASS INDEX: 42.66 KG/M2 | OXYGEN SATURATION: 98 % | WEIGHT: 315 LBS | SYSTOLIC BLOOD PRESSURE: 132 MMHG | DIASTOLIC BLOOD PRESSURE: 73 MMHG

## 2024-05-28 LAB
ANION GAP SERPL CALC-SCNC: 9 MMOL/L (ref 8–16)
BASOPHILS # BLD AUTO: 0.03 K/UL (ref 0–0.2)
BASOPHILS NFR BLD: 0.3 % (ref 0–1.9)
BUN SERPL-MCNC: 9 MG/DL (ref 6–20)
CALCIUM SERPL-MCNC: 8.9 MG/DL (ref 8.7–10.5)
CHLORIDE SERPL-SCNC: 107 MMOL/L (ref 95–110)
CO2 SERPL-SCNC: 24 MMOL/L (ref 23–29)
CREAT SERPL-MCNC: 0.7 MG/DL (ref 0.5–1.4)
DIFFERENTIAL METHOD BLD: ABNORMAL
EOSINOPHIL # BLD AUTO: 0 K/UL (ref 0–0.5)
EOSINOPHIL NFR BLD: 0.1 % (ref 0–8)
ERYTHROCYTE [DISTWIDTH] IN BLOOD BY AUTOMATED COUNT: 13.3 % (ref 11.5–14.5)
EST. GFR  (NO RACE VARIABLE): >60 ML/MIN/1.73 M^2
GLUCOSE SERPL-MCNC: 99 MG/DL (ref 70–110)
HCT VFR BLD AUTO: 33.8 % (ref 40–54)
HGB BLD-MCNC: 11 G/DL (ref 14–18)
IMM GRANULOCYTES # BLD AUTO: 0.06 K/UL (ref 0–0.04)
IMM GRANULOCYTES NFR BLD AUTO: 0.5 % (ref 0–0.5)
LYMPHOCYTES # BLD AUTO: 2.1 K/UL (ref 1–4.8)
LYMPHOCYTES NFR BLD: 17.8 % (ref 18–48)
MAGNESIUM SERPL-MCNC: 1.8 MG/DL (ref 1.6–2.6)
MCH RBC QN AUTO: 30.5 PG (ref 27–31)
MCHC RBC AUTO-ENTMCNC: 32.5 G/DL (ref 32–36)
MCV RBC AUTO: 94 FL (ref 82–98)
MONOCYTES # BLD AUTO: 0.9 K/UL (ref 0.3–1)
MONOCYTES NFR BLD: 7.1 % (ref 4–15)
NEUTROPHILS # BLD AUTO: 8.9 K/UL (ref 1.8–7.7)
NEUTROPHILS NFR BLD: 74.2 % (ref 38–73)
NRBC BLD-RTO: 0 /100 WBC
PHOSPHATE SERPL-MCNC: 3.1 MG/DL (ref 2.7–4.5)
PLATELET # BLD AUTO: 290 K/UL (ref 150–450)
PMV BLD AUTO: 9.1 FL (ref 9.2–12.9)
POTASSIUM SERPL-SCNC: 3.8 MMOL/L (ref 3.5–5.1)
RBC # BLD AUTO: 3.61 M/UL (ref 4.6–6.2)
SODIUM SERPL-SCNC: 140 MMOL/L (ref 136–145)
WBC # BLD AUTO: 11.99 K/UL (ref 3.9–12.7)

## 2024-05-28 PROCEDURE — 25000003 PHARM REV CODE 250: Performed by: SPECIALIST

## 2024-05-28 PROCEDURE — 83735 ASSAY OF MAGNESIUM: CPT | Performed by: INTERNAL MEDICINE

## 2024-05-28 PROCEDURE — 85025 COMPLETE CBC W/AUTO DIFF WBC: CPT | Performed by: INTERNAL MEDICINE

## 2024-05-28 PROCEDURE — 80048 BASIC METABOLIC PNL TOTAL CA: CPT | Performed by: INTERNAL MEDICINE

## 2024-05-28 PROCEDURE — 84100 ASSAY OF PHOSPHORUS: CPT | Performed by: INTERNAL MEDICINE

## 2024-05-28 PROCEDURE — 36415 COLL VENOUS BLD VENIPUNCTURE: CPT | Performed by: INTERNAL MEDICINE

## 2024-05-28 RX ORDER — HYDROCODONE BITARTRATE AND ACETAMINOPHEN 5; 325 MG/1; MG/1
1 TABLET ORAL EVERY 4 HOURS PRN
Status: DISCONTINUED | OUTPATIENT
Start: 2024-05-28 | End: 2024-05-28 | Stop reason: HOSPADM

## 2024-05-28 RX ORDER — HYDROCODONE BITARTRATE AND ACETAMINOPHEN 5; 325 MG/1; MG/1
1 TABLET ORAL EVERY 4 HOURS PRN
Qty: 24 TABLET | Refills: 0 | Status: SHIPPED | OUTPATIENT
Start: 2024-05-28 | End: 2024-06-10 | Stop reason: ALTCHOICE

## 2024-05-28 RX ORDER — HYDROCODONE BITARTRATE AND ACETAMINOPHEN 5; 325 MG/1; MG/1
1 TABLET ORAL EVERY 4 HOURS PRN
Qty: 24 TABLET | Refills: 0 | Status: SHIPPED | OUTPATIENT
Start: 2024-05-28 | End: 2024-06-10 | Stop reason: SDUPTHER

## 2024-05-28 RX ORDER — CLARITHROMYCIN 500 MG/1
500 TABLET, FILM COATED ORAL EVERY 12 HOURS
Qty: 20 TABLET | Refills: 0 | Status: SHIPPED | OUTPATIENT
Start: 2024-05-28 | End: 2024-05-28 | Stop reason: SDUPTHER

## 2024-05-28 RX ORDER — HYDROCODONE BITARTRATE AND ACETAMINOPHEN 5; 325 MG/1; MG/1
1 TABLET ORAL EVERY 6 HOURS PRN
Qty: 24 TABLET | Refills: 0 | Status: SHIPPED | OUTPATIENT
Start: 2024-05-28 | End: 2024-06-10 | Stop reason: ALTCHOICE

## 2024-05-28 RX ORDER — CLARITHROMYCIN 500 MG/1
500 TABLET, FILM COATED ORAL EVERY 12 HOURS
Qty: 20 TABLET | Refills: 0 | Status: SHIPPED | OUTPATIENT
Start: 2024-05-28

## 2024-05-28 RX ADMIN — BUPROPION HYDROCHLORIDE 300 MG: 150 TABLET, FILM COATED, EXTENDED RELEASE ORAL at 08:05

## 2024-05-28 RX ADMIN — BUMETANIDE 0.5 MG: 0.5 TABLET ORAL at 08:05

## 2024-05-28 RX ADMIN — LAMOTRIGINE 25 MG: 25 TABLET ORAL at 08:05

## 2024-05-28 RX ADMIN — DULOXETINE HYDROCHLORIDE 60 MG: 30 CAPSULE, DELAYED RELEASE ORAL at 08:05

## 2024-05-28 RX ADMIN — LAMOTRIGINE 100 MG: 100 TABLET ORAL at 08:05

## 2024-05-28 RX ADMIN — HYDROCODONE BITARTRATE AND ACETAMINOPHEN 1 TABLET: 10; 325 TABLET ORAL at 06:05

## 2024-05-28 RX ADMIN — HYDROCODONE BITARTRATE AND ACETAMINOPHEN 1 TABLET: 5; 325 TABLET ORAL at 11:05

## 2024-05-28 NOTE — PROGRESS NOTES
Progress note:   Subjective-patient had quiet night.  There is minimal nasal discharge.  He complains of mild headaches.  He is taking only acetaminophen for analgesia.    [id number] dressing and placed-minimal discharge, BALDOMERO, EOMI, alert,x 3    Assessment:  1. Stable postop endoscopic sinus surgery  2. Obstructive sleep apnea:  3. Patient ready for discharge when cleared by hospitalist service    Plan:   1. Clarithromycin 5 mg p.o. b.i.d. times 10 days postop-prescription to be filled prior to discharge  2. Roslyn 5/320 5-1 p.o. q.4 hours p.r.n. pain-prescription to be filled prior to discharge  3. Saline nasal spray minimally 4 times daily in each nostril followed by sniffing and expect duration.    4. No blowing nose 2 weeks.  5. Follow-up visit in my office in 2 weeks.  Patient call if having problems prior to that.    6. Diet-regular  7. Activity-no lifting heavier than 10 lb, no water in the nose, ambulate freely about the house, cough or sneeze with open mouth    8. Notify office if temperature greater than 100.4° F, severe headache, nasal bleeding requiring change of mustache dressing more often than every 15 min

## 2024-05-28 NOTE — PROGRESS NOTES
[facility] [address] Medicine  Progress Note    Patient Name: Jered Contreras  MRN: [id number]  Patient Class: OP- Outpatient Recovery   Admission Date: 5/27/2024  Length of Stay: 0 days  Attending Physician: JAN Arredondo MD  Primary Care Provider: Angela Dacosta MD        Subjective:     Principal Problem:Chronic maxillary sinusitis        HPI:  Mr. Contreras is a 48/M with PMH morbid obesity, depression, anxiety, h/o gastric bypass, TAYO who presented to [facility] 05/27 for planned sinus endoscopy with ethmoidectomy/FESS and turbinate excision with Dr. Arredondo. He reports he was in his usual state of health prior to presentation, and postoperatively felt well. Hospital medicine was consulted for medical comanagement assistance.        Interval History:  No acute events, no bleeding and no difficulty breathing.  Sitting on the couch with his wife at the bedside, asking when he can go home.    Review of Systems   Constitutional:  Negative for chills and fever.   Respiratory:  Negative for shortness of breath.    Cardiovascular:  Negative for chest pain.     Objective:     Vital Signs (Most Recent):  Temp: 97.3 °F (36.3 °C) (05/28/24 0730)  Pulse: (!) 53 (05/28/24 0730)  Resp: 18 (05/28/24 1107)  BP: 122/65 (05/28/24 0730)  SpO2: 99 % (05/28/24 0730) Vital Signs (24h Range):  Temp:  [97.3 °F (36.3 °C)-98 °F (36.7 °C)] 97.3 °F (36.3 °C)  Pulse:  [] 53  Resp:  [16-20] 18  SpO2:  [95 %-99 %] 99 %  BP: (119-144)/(64-74) 122/65     Weight: (!) 158.8 kg (350 lb)  Body mass index is 47.47 kg/m².    Intake/Output Summary (Last 24 hours) at 5/28/2024 1144  Last data filed at 5/27/2024 1852  Gross per 24 hour   Intake 358 ml   Output --   Net 358 ml         Physical Exam  Vitals and nursing note reviewed.   Constitutional:       General: He is not in acute distress.     Appearance: He is well-developed. He is obese.   HENT:      Head: Normocephalic and atraumatic.      Nose:       Comments: s/p procedure with moustache dressing in place.  Eyes:      General:         Right eye: No discharge.         Left eye: No discharge.      Conjunctiva/sclera: Conjunctivae normal.   Cardiovascular:      Rate and Rhythm: Normal rate.      Pulses: Normal pulses.   Pulmonary:      Effort: Pulmonary effort is normal. No respiratory distress.   Abdominal:      Palpations: Abdomen is soft.      Tenderness: There is no abdominal tenderness.   Musculoskeletal:         General: Normal range of motion.      Right lower leg: No edema.      Left lower leg: No edema.   Skin:     General: Skin is warm and dry.   Neurological:      Mental Status: He is alert and oriented to person, place, and time.             Significant Labs: All pertinent labs within the past 24 hours have been reviewed.    Significant Imaging: I have reviewed all pertinent imaging results/findings within the past 24 hours.    Assessment/Plan:      * Chronic maxillary sinusitis  Sinusitis, TAYO  - s/p sinus endoscopy with ethmoidectomy/FESS and turbinate excision with Dr. Arredondo 05/27.  - Primary management, pain control as per ENT.  - PRN Oxygen via face tent to maintain saturations > 90%.  - Patient stable on room air, with no barriers to discharge   - As per primary    Venous insufficiency of both lower extremities  BLE edema  - Continue bumetanide 0.5mg PO BID.  - Tolerating diet without difficulty    MDD (major depressive disorder), recurrent episode, moderate  WAYNE  - Continue bupropion 300mg PO daily, duloxetine 60mg PO daily, lamotrigine 125mg PO daily.  - Continue hydroxyzine 25mg PO TID PRN, ropinirole 1mg PO qHS PRN.      VTE Risk Mitigation (From admission, onward)           Ordered     IP VTE HIGH RISK PATIENT  Once         05/27/24 1302     Place PAN hose  Until discontinued         05/27/24 0813                        Rabia Rao MD  Department of Hospital Medicine   [facility] ([address])

## 2024-05-28 NOTE — DISCHARGE SUMMARY
Discharge Note    SUMMARY     Admit Date: 5/27/2024    Discharge Date and Time:  05/28/2024    Attending Physician: JAN Arredondo MD     Discharge Provider: RUBY Arredondo    Final Diagnosis: Post-Op Diagnosis Codes:     * Sinusitis chronic, ethmoidal [J32.2]     * Chronic maxillary sinusitis [J32.0]       Obstructive sleep apnea    Disposition: Home or Self Care    Follow Up/Patient Instructions:     Medications:  Reconciled Home Medications:   Current Discharge Medication List        START taking these medications    Details   buPROPion (WELLBUTRIN XL) 300 MG 24 hr tablet Take 1 tablet (300 mg total) by mouth once daily.  Qty: 90 tablet, Refills: 1           CONTINUE these medications which have NOT CHANGED    Details   ASCORBATE CALCIUM (VITAMIN C ORAL) Take by mouth once daily.      azelastine (ASTELIN) 137 mcg (0.1 %) nasal spray 2 sprays (274 mcg total) by Nasal route 2 (two) times daily as needed (Nasal congestion).  Qty: 30 mL, Refills: 5    Associated Diagnoses: Chronic allergic rhinitis      b complex vitamins (B COMPLEX-VITAMIN B12) tablet Take 1 tablet by mouth once daily.    Comments: 2500mcg daily  Associated Diagnoses: B12 deficiency      DULoxetine (CYMBALTA) 60 MG capsule Take 1 capsule (60 mg total) by mouth once daily.  Qty: 90 capsule, Refills: 0    Comments: Please give two separate bottles: one for duloxetine 30 mg po daily and one for duloxetine 60 mg po daily, total 90 mg daily      fluticasone propionate (FLONASE) 50 mcg/actuation nasal spray One spray in each nostril twice daily after 1st using azelastine nasal spray  Qty: 18.2 mL, Refills: 11    Associated Diagnoses: Acute non-recurrent pansinusitis; Postnasal drip      hydrOXYzine HCL (ATARAX) 25 MG tablet TK 1 T PO  TID PRN ANXIETY  Qty: 30 tablet, Refills: 6    Associated Diagnoses: Anxiety      !! lamoTRIgine (LAMICTAL) 100 MG tablet Take one tab of Lamictal 100 mg po daily, plus two tab of Lamictal 25 mg po daily, total lamictal  125 mg po daily.  Qty: 30 tablet, Refills: 2      !! lamoTRIgine (LAMICTAL) 25 MG tablet Take 2 tab Lamictal 25 mg p.o. daily plus 1 tab Lamictal 100 mg po daily, total Lamictal 150 mg p.o. daily  Qty: 60 tablet, Refills: 2      montelukast (SINGULAIR) 10 mg tablet Take 1 tablet (10 mg total) by mouth every evening. Take during allergy season.  Qty: 90 tablet, Refills: 3    Associated Diagnoses: Chronic allergic rhinitis      multivitamin capsule Take 1 capsule by mouth once daily.      tamsulosin (FLOMAX) 0.4 mg Cap Take 1 capsule (0.4 mg total) by mouth every evening.  Qty: 90 capsule, Refills: 3    Associated Diagnoses: Urinary frequency      bumetanide (BUMEX) 0.5 MG Tab Take 1 tablet (0.5 mg total) by mouth 2 (two) times daily.  Qty: 180 tablet, Refills: 3      clotrimazole-betamethasone 1-0.05% (LOTRISONE) cream Apply topically 2 (two) times daily.  Qty: 45 g, Refills: 3      rOPINIRole (REQUIP) 0.5 MG tablet Take 2 tablets (1 mg total) by mouth every evening.  Qty: 30 tablet, Refills: 6    Associated Diagnoses: Restless leg      WEGOVY 1 mg/0.5 mL PnIj Inject into the skin.       !! - Potential duplicate medications found. Please discuss with provider.        Patient being discharged.  He will elevate his head sleeping.  He is to changes mustache dressing an as-needed basis.  He will use saline spray followed by a sniffing expectoration to clear his nose tele recheck in my office in 2 weeks.  If there are problems he will call the office before that time.

## 2024-05-28 NOTE — SUBJECTIVE & OBJECTIVE
Interval History:  No acute events, no bleeding and no difficulty breathing.  Sitting on the couch with his wife at the bedside, asking when he can go home.    Review of Systems   Constitutional:  Negative for chills and fever.   Respiratory:  Negative for shortness of breath.    Cardiovascular:  Negative for chest pain.     Objective:     Vital Signs (Most Recent):  Temp: 97.3 °F (36.3 °C) (05/28/24 0730)  Pulse: (!) 53 (05/28/24 0730)  Resp: 18 (05/28/24 1107)  BP: 122/65 (05/28/24 0730)  SpO2: 99 % (05/28/24 0730) Vital Signs (24h Range):  Temp:  [97.3 °F (36.3 °C)-98 °F (36.7 °C)] 97.3 °F (36.3 °C)  Pulse:  [] 53  Resp:  [16-20] 18  SpO2:  [95 %-99 %] 99 %  BP: (119-144)/(64-74) 122/65     Weight: (!) 158.8 kg (350 lb)  Body mass index is 47.47 kg/m².    Intake/Output Summary (Last 24 hours) at 5/28/2024 1144  Last data filed at 5/27/2024 1852  Gross per 24 hour   Intake 358 ml   Output --   Net 358 ml         Physical Exam  Vitals and nursing note reviewed.   Constitutional:       General: He is not in acute distress.     Appearance: He is well-developed. He is obese.   HENT:      Head: Normocephalic and atraumatic.      Nose:      Comments: s/p procedure with moustache dressing in place.  Eyes:      General:         Right eye: No discharge.         Left eye: No discharge.      Conjunctiva/sclera: Conjunctivae normal.   Cardiovascular:      Rate and Rhythm: Normal rate.      Pulses: Normal pulses.   Pulmonary:      Effort: Pulmonary effort is normal. No respiratory distress.   Abdominal:      Palpations: Abdomen is soft.      Tenderness: There is no abdominal tenderness.   Musculoskeletal:         General: Normal range of motion.      Right lower leg: No edema.      Left lower leg: No edema.   Skin:     General: Skin is warm and dry.   Neurological:      Mental Status: He is alert and oriented to person, place, and time.             Significant Labs: All pertinent labs within the past 24 hours have been  reviewed.    Significant Imaging: I have reviewed all pertinent imaging results/findings within the past 24 hours.

## 2024-05-28 NOTE — PLAN OF CARE
In basket message sent to MD office for two week hospital follow up appointment. Family to provide transportation home.   05/28/24 0932   Final Note   Assessment Type Final Discharge Note   Anticipated Discharge Disposition Home   Hospital Resources/Appts/Education Provided Provided patient/caregiver with written discharge plan information   Post-Acute Status   Discharge Delays None known at this time     [facility] ([address])  Discharge Final Note    Primary Care Provider: Angela Dacosta MD    Expected Discharge Date: 5/28/2024    Final Discharge Note (most recent)       Final Note - 05/28/24 0932          Final Note    Assessment Type Final Discharge Note (P)      Anticipated Discharge Disposition Home or Self Care (P)      Hospital Resources/Appts/Education Provided Provided patient/caregiver with written discharge plan information (P)         Post-Acute Status    Discharge Delays None known at this time (P)                      Important Message from Medicare             Contact Info       JAN Arredondo MD   Specialty: Otolaryngology    [address]  [address]  [facility] [address]   Phone: [phone]       Next Steps: Follow up in 14 day(s)

## 2024-05-28 NOTE — PLAN OF CARE
Problem: Adult Inpatient Plan of Care  Goal: Plan of Care Review  Outcome: Progressing  Goal: Patient-Specific Goal (Individualized)  Outcome: Progressing     Problem: Bariatric Environmental Safety  Goal: Safety Maintained with Care  Outcome: Progressing     Problem: Pain Acute  Goal: Optimal Pain Control and Function  Outcome: Progressing

## 2024-05-28 NOTE — ASSESSMENT & PLAN NOTE
Sinusitis, TAYO  - s/p sinus endoscopy with ethmoidectomy/FESS and turbinate excision with Dr. Arredondo 05/27.  - Primary management, pain control as per ENT.  - PRN Oxygen via face tent to maintain saturations > 90%.  - Patient stable on room air, with no barriers to discharge   - As per primary

## 2024-05-29 ENCOUNTER — PATIENT MESSAGE (OUTPATIENT)
Dept: OTOLARYNGOLOGY | Facility: CLINIC | Age: 49
End: 2024-05-29
Payer: COMMERCIAL

## 2024-05-29 LAB
FINAL PATHOLOGIC DIAGNOSIS: NORMAL
GROSS: NORMAL
Lab: NORMAL

## 2024-06-05 ENCOUNTER — PATIENT MESSAGE (OUTPATIENT)
Dept: REHABILITATION | Facility: HOSPITAL | Age: 49
End: 2024-06-05

## 2024-06-08 ENCOUNTER — PATIENT MESSAGE (OUTPATIENT)
Dept: OTOLARYNGOLOGY | Facility: CLINIC | Age: 49
End: 2024-06-08
Payer: COMMERCIAL

## 2024-06-09 NOTE — PROGRESS NOTES
Subjective:       Patient ID: Jered Contreras is a 48 y.o. male.    Chief Complaint: No chief complaint on file.    2 weeks postoperative      Review of Systems   Constitutional:  Positive for fatigue.   HENT:  Positive for congestion, postnasal drip, sinus pressure and sinus pain.    Neurological:  Positive for headaches.       Objective:      Physical Exam  Constitutional:       Appearance: Normal appearance. He is obese.   HENT:      Head: Normocephalic.      Right Ear: Tympanic membrane, ear canal and external ear normal.      Left Ear: Tympanic membrane, ear canal and external ear normal.      Mouth/Throat:      Mouth: Mucous membranes are moist.   Eyes:      Extraocular Movements: Extraocular movements intact.      Conjunctiva/sclera: Conjunctivae normal.      Pupils: Pupils are equal, round, and reactive to light.   Musculoskeletal:      Cervical back: Normal range of motion and neck supple. No rigidity or tenderness.   Neurological:      Mental Status: He is alert.         Final pathology-see below    omponent 2 wk ago   Final Pathologic Diagnosis 1. SPECIMEN FROM LEFT ETHMOID SINUS:  MINIMAL NONSPECIFIC SUBMUCOSAL CHRONIC INFLAMMATION    2. SPECIMEN FROM LEFT MAXILLARY SINUS:  NO SIGNIFICANT HISTOLOGIC ALTERATION    3. SPECIMEN FROM RIGHT ETHMOID SINUS:  MILD NONSPECIFIC SUBMUCOSAL CHRONIC INFLAMMATION    4. SPECIMEN FROM RIGHT MAXILLARY SINUS:  MILD NONSPECIFIC SUBMUCOSAL CHRONIC INFLAMMATION    5. LEFT MIDDLE TURBINATE REMNANT:  MODEST NONSPECIFIC SUBMUCOSAL CHRONIC INFLAMMATION    6. RIGHT MIDDLE TURBINATE REMNANT:    MODERATE NONSPECIFIC SUBMUCOSAL CHRONIC INFLAMMATION   Comment: Interp By Ashok Gillespie M.D., Signed on 05/29/2024 at 08:33     Bilateral Rigid Nasal Endoscopy with Debridement:  Pre procedure right nasal passage              Pre procedure left nasal passage:              Post procedure right and left nasal passages  :            Assessment:                1. Post-operative  state    2. Chronic ethmoidal sinusitis    3. Chronic maxillary sinusitis    4. Smell or taste sensation disturbance    5. Non-seasonal allergic rhinitis, unspecified trigger    6. Nasal obstruction without choanal atresia    7. Nonintractable episodic headache, unspecified headache type    8. Nasal crusting        Plan:       I will have the patient begin to blow his nose gently after 1st using saline spray.  I will start him on nasal irrigations with budesonide and mupirocin.  I will recheck him in 2 weeks.  I am refilling his pain medications because of the persisting headaches.            DISCLAIMER: This note was prepared with [employer] voice recognition transcription software. Garbled syntax, mangled pronouns, and other bizarre constructions may be attributed to that software system. While efforts were made to correct any mistakes made by this voice recognition program, some errors and/or omissions may remain in the note that were missed when the note was originally created.

## 2024-06-10 ENCOUNTER — PATIENT MESSAGE (OUTPATIENT)
Dept: INTERNAL MEDICINE | Facility: CLINIC | Age: 49
End: 2024-06-10
Payer: COMMERCIAL

## 2024-06-10 ENCOUNTER — OFFICE VISIT (OUTPATIENT)
Dept: OTOLARYNGOLOGY | Facility: CLINIC | Age: 49
End: 2024-06-10
Payer: COMMERCIAL

## 2024-06-10 VITALS
SYSTOLIC BLOOD PRESSURE: 128 MMHG | WEIGHT: 315 LBS | OXYGEN SATURATION: 97 % | BODY MASS INDEX: 46.27 KG/M2 | HEART RATE: 88 BPM | DIASTOLIC BLOOD PRESSURE: 70 MMHG

## 2024-06-10 DIAGNOSIS — R51.9 NONINTRACTABLE EPISODIC HEADACHE, UNSPECIFIED HEADACHE TYPE: ICD-10-CM

## 2024-06-10 DIAGNOSIS — J34.89 NASAL CRUSTING: ICD-10-CM

## 2024-06-10 DIAGNOSIS — J32.0 CHRONIC MAXILLARY SINUSITIS: ICD-10-CM

## 2024-06-10 DIAGNOSIS — J34.89 NASAL OBSTRUCTION WITHOUT CHOANAL ATRESIA: ICD-10-CM

## 2024-06-10 DIAGNOSIS — Z98.890 POST-OPERATIVE STATE: Primary | ICD-10-CM

## 2024-06-10 DIAGNOSIS — J30.89 NON-SEASONAL ALLERGIC RHINITIS, UNSPECIFIED TRIGGER: ICD-10-CM

## 2024-06-10 DIAGNOSIS — J32.2 CHRONIC ETHMOIDAL SINUSITIS: ICD-10-CM

## 2024-06-10 DIAGNOSIS — R43.9 SMELL OR TASTE SENSATION DISTURBANCE: ICD-10-CM

## 2024-06-10 PROCEDURE — 31237 NSL/SINS NDSC SURG BX POLYPC: CPT | Mod: 50,S$GLB,, | Performed by: SPECIALIST

## 2024-06-10 PROCEDURE — 3074F SYST BP LT 130 MM HG: CPT | Mod: CPTII,S$GLB,, | Performed by: SPECIALIST

## 2024-06-10 PROCEDURE — 99213 OFFICE O/P EST LOW 20 MIN: CPT | Mod: 25,S$GLB,, | Performed by: SPECIALIST

## 2024-06-10 PROCEDURE — 1160F RVW MEDS BY RX/DR IN RCRD: CPT | Mod: CPTII,S$GLB,, | Performed by: SPECIALIST

## 2024-06-10 PROCEDURE — 3078F DIAST BP <80 MM HG: CPT | Mod: CPTII,S$GLB,, | Performed by: SPECIALIST

## 2024-06-10 PROCEDURE — 1159F MED LIST DOCD IN RCRD: CPT | Mod: CPTII,S$GLB,, | Performed by: SPECIALIST

## 2024-06-10 PROCEDURE — 3008F BODY MASS INDEX DOCD: CPT | Mod: CPTII,S$GLB,, | Performed by: SPECIALIST

## 2024-06-10 PROCEDURE — 99999 PR PBB SHADOW E&M-EST. PATIENT-LVL IV: CPT | Mod: PBBFAC,,, | Performed by: SPECIALIST

## 2024-06-10 RX ORDER — HYDROCODONE BITARTRATE AND ACETAMINOPHEN 5; 325 MG/1; MG/1
1 TABLET ORAL EVERY 4 HOURS PRN
Qty: 24 TABLET | Refills: 0 | Status: SHIPPED | OUTPATIENT
Start: 2024-06-10

## 2024-06-10 NOTE — PROCEDURES
"Nasal/sinus endoscopy    Date/Time: 6/10/2024 2:00 PM    Time out: Immediately prior to procedure a "time out" was called to verify the correct patient, procedure, equipment, support staff and site/side marked as required.    Performed by: JAN Arredondo MD  Authorized by: JAN Arredondo MD    Consent Done?:  Yes (Verbal)  Anesthesia:     Local anesthetic:  4% Xylocaine spray with Ethan-Synephrine    Location: Nasal endoscopy with debridement bilaterally.    Endoscope type: 30 degree, 3 mm rigid nasal telescope.    Patient tolerance:  Patient tolerated the procedure well with no immediate complications  Nose:     Procedure Performed:  Removal of Debridement  External:      No external nasal deformity  Intranasal:      Mucosa no polyps     Mucosa ulcers not present     Mucosa lesions present (crusting and debris in nasal passages middle meatus and antrostomy sites bilaterally)     Turbinates not enlarged     No septum gross deformity  Nasopharynx:      No mucosa lesions     Adenoids not present     Posterior choanae patent     Eustachian tube patent     Rigid nasal endoscopy with debridement-crusting and debris is removed from the middle meatus and antrostomy sites bilaterally with 10 Lebanese suction under endoscopic guidance using 30 degree, 3 mm rigid nasal telescope    "

## 2024-06-11 ENCOUNTER — CLINICAL SUPPORT (OUTPATIENT)
Dept: REHABILITATION | Facility: HOSPITAL | Age: 49
End: 2024-06-11
Payer: COMMERCIAL

## 2024-06-11 DIAGNOSIS — M25.562 CHRONIC PAIN OF LEFT KNEE: Primary | ICD-10-CM

## 2024-06-11 DIAGNOSIS — G89.29 CHRONIC PAIN OF LEFT KNEE: Primary | ICD-10-CM

## 2024-06-11 PROCEDURE — 97110 THERAPEUTIC EXERCISES: CPT

## 2024-06-11 PROCEDURE — 97530 THERAPEUTIC ACTIVITIES: CPT

## 2024-06-11 PROCEDURE — 97112 NEUROMUSCULAR REEDUCATION: CPT

## 2024-06-11 NOTE — PROGRESS NOTES
[facility]   Physical Therapy Treatment Note        Name: Jered Contreras  Clinic Number: [id number]    Therapy Diagnosis:   Encounter Diagnosis   Name Primary?    Chronic pain of left knee Yes     Physician: Alexi Regalado MD    Visit Date: 6/11/2024    Physician Orders: PT Eval and Treat  Medical Diagnosis from Referral: M25.562,G89.29 (ICD-10-CM) - Chronic pain of left knee   Evaluation Date: 4/9/2024  Authorization Period Expiration: 3/27/2025  Plan of Care Expiration: 7/2/2024  Visit # / Visits authorized: 5/ 20   FOTO #2: 64% on 5/14/2024   FOTO: #3: 74% on 6/11/2024    Time In: 1700  Time Out: 1800  Total Billable Time: 55 minutes    Precautions: Standard    SUBJECTIVE     Pt reports: no symptoms since the last visit.  He was compliant with home exercise program.  Response to previous treatment: soreness  Functional change: able to complete yard work.    Pain: 0/10  Location: Left knee    OBJECTIVE     Objective Measures updated at progress report unless specified.   Manual Muscle Testing:  Lower Extremity   Action Left Right   Hip Flexion 4+ / 5 4+ / 5   Hip Extension 5 / 5 4+ / 5   Hip Abduction 4+ / 5 4+ / 5   Knee Flexion 49.4 kg 46.9 kg   Knee Extension 59.1 kg 56.4 kg            TREATMENT     Jered received the treatments listed below:      Therapeutic Exercises to develop strength, endurance, ROM, and core stabilization for 9 minutes including:  Heel Raises with Trap bar - 40#; 3x10  Shuttle hip extension - 2 bands; 3x10 bilateral    Not Today:  Sidelying Hip Abduction - 3#; 3x10 bilateral  Bridges - double leg; 30# DB; 3x12      Manual Therapy Techniques: Joint mobilizations and muscle energy technique were applied to the: left knee for 0 minutes, including:  Left knee lateral Tibial shift muscle energy technique - x3      Neuromuscular Re-education activities to improve: Balance, Proprioception, and Posture for 13 minutes. The following activities were  included:  Step Ups - forward / lateral; 6 inch Rouge Box + 15# KB; 2x10 bilateral  3-way lunges - x15 bilateral      Therapeutic Activities to improve functional performance for 33 minutes, including:  Assessment as above  Dead lift - 35# KB  Recumbent Bike - 8 minutes; 60 / 30 seconds; level 3  Trap Bar Squats - 40#; 3x10    Not Today:  Lunges - lateral glide with light orange power band; 3x10  Resisted Lateral walking - green band + 5# DB; 15 step laps; x4  Sled Push / Pull - 90#; 3 turf laps      PATIENT EDUCATION AND HOME EXERCISES      Home Exercises Provided and Patient Education Provided     Education provided:   Anatomy and Pathology.  Symptom management and plan of care progressions.  Home Exercise Program.    Written Home Exercises Provided: yes. Exercises were reviewed and Jered was able to demonstrate them prior to the end of the session.  Jered demonstrated good  understanding of the education provided. See EMR under Patient Instructions for exercises provided during therapy sessions    ASSESSMENT     Jered has met 100% of his goals as of the 6th visit and is independent with his home exercise program; therefore, he is discharged from skilled physical therapy. Reviewed proper squatting and lifting mechanics as well as stair negotiation mechanics to help prevent future exacerbations. He demonstrated good understanding of all instructions and education. Patient is discharged.    From evaluation on 4/9/2024 - Jered is a 48 y.o. male referred to outpatient Physical Therapy with a medical diagnosis of chronic left knee pain. Patient presents with decreased range of motion, decreased strength, gait deviations, lymphedema, decreased balance, and biomechanical faults with functional movements. Signs and symptoms are consistent with left knee medial meniscus pathology due to possible degenerative tear. He will benefit from skilled physical therapy addressing his joint mobility, hip strength, and  proprioceptive retraining.     Jered Is progressing well towards his goals.   Pt prognosis is Good.     Pt will continue to benefit from skilled outpatient physical therapy to address the deficits listed in the problem list box on initial evaluation, provide pt/family education and to maximize pt's level of independence in the home and community environment. Pt's spiritual, cultural and educational needs considered and pt agreeable to plan of care and goals.     Anticipated barriers to physical therapy: chronicity of symptoms, lymphedema    Goals:  Short Term Goals: 5 weeks   Patient will have reduced pain complaints from 4/10 to less than or equal to 2/10. (Met - 5/21/2024)  Patient will demonstrate pain-free squat mechanics to 90 degrees. (Met - 5/21/2024)  Patient will demonstrate increased muscle strength of at least one-half grade or 2.5 kg as compared to the initial measurements. (Met - 6/11/2024)     Long Term Goals: 10 weeks   Patient will have reduced pain complaints from 2/10 to less than or equal to 0/10. (Met - 6/11/2024)  Patient will demonstrate stair ascent and descent for 2 flights of stairs with no symptoms. (Met - 5/21/2024)  Patient will demonstrate increased muscle strength of at least one grade or 5 kg as compared to the initial measurements. (Met - 6/11/2024)  Patient will improve Knee FOTO Intake score from 31% to greater than or equal to 59%. (Met - 5/14/2024)  Patient will be independent with their home exercise program. (Met - 6/11/2024)    PLAN     Patient is discharged.    Plan of care Certification: 4/9/2024 to 7/2/2024.  Outpatient Physical Therapy 1 times weekly for 10 weeks.    Javy Snyder, PT , DPT, OCS

## 2024-06-13 ENCOUNTER — TELEPHONE (OUTPATIENT)
Dept: OTOLARYNGOLOGY | Facility: CLINIC | Age: 49
End: 2024-06-13
Payer: COMMERCIAL

## 2024-06-13 NOTE — TELEPHONE ENCOUNTER
----- Message from Sofiebryce Richardson sent at 6/13/2024 12:56 PM CDT -----  Contact: Ms. Mckeon Phone# [phone]  Ms. Mckeon a Pharmacist at [facility] said that you have placed an order for HYDROcodone-acetaminophen (NORCO) 5-325 mg per tablet for the patient, she said that you have listed headache on the order and she would like to know what do you mean by listing headache?

## 2024-06-25 ENCOUNTER — OFFICE VISIT (OUTPATIENT)
Dept: OTOLARYNGOLOGY | Facility: CLINIC | Age: 49
End: 2024-06-25
Payer: COMMERCIAL

## 2024-06-25 VITALS
HEART RATE: 109 BPM | DIASTOLIC BLOOD PRESSURE: 73 MMHG | SYSTOLIC BLOOD PRESSURE: 149 MMHG | BODY MASS INDEX: 47.29 KG/M2 | WEIGHT: 315 LBS | OXYGEN SATURATION: 98 %

## 2024-06-25 DIAGNOSIS — J32.0 CHRONIC MAXILLARY SINUSITIS: Primary | ICD-10-CM

## 2024-06-25 DIAGNOSIS — R43.9 SMELL OR TASTE SENSATION DISTURBANCE: ICD-10-CM

## 2024-06-25 DIAGNOSIS — J30.89 NON-SEASONAL ALLERGIC RHINITIS, UNSPECIFIED TRIGGER: ICD-10-CM

## 2024-06-25 DIAGNOSIS — Z98.890 POST-OPERATIVE STATE: ICD-10-CM

## 2024-06-25 DIAGNOSIS — J32.2 CHRONIC ETHMOIDAL SINUSITIS: ICD-10-CM

## 2024-06-25 PROCEDURE — 1159F MED LIST DOCD IN RCRD: CPT | Mod: CPTII,S$GLB,, | Performed by: SPECIALIST

## 2024-06-25 PROCEDURE — 3008F BODY MASS INDEX DOCD: CPT | Mod: CPTII,S$GLB,, | Performed by: SPECIALIST

## 2024-06-25 PROCEDURE — 3077F SYST BP >= 140 MM HG: CPT | Mod: CPTII,S$GLB,, | Performed by: SPECIALIST

## 2024-06-25 PROCEDURE — 3078F DIAST BP <80 MM HG: CPT | Mod: CPTII,S$GLB,, | Performed by: SPECIALIST

## 2024-06-25 PROCEDURE — 1160F RVW MEDS BY RX/DR IN RCRD: CPT | Mod: CPTII,S$GLB,, | Performed by: SPECIALIST

## 2024-06-25 PROCEDURE — 99213 OFFICE O/P EST LOW 20 MIN: CPT | Mod: S$GLB,,, | Performed by: SPECIALIST

## 2024-06-25 PROCEDURE — 99999 PR PBB SHADOW E&M-EST. PATIENT-LVL III: CPT | Mod: PBBFAC,,, | Performed by: SPECIALIST

## 2024-06-25 RX ORDER — PREDNISONE 5 MG/1
TABLET ORAL
Qty: 45 TABLET | Refills: 0 | Status: SHIPPED | OUTPATIENT
Start: 2024-06-25

## 2024-06-25 RX ORDER — BUTALBITAL, ACETAMINOPHEN AND CAFFEINE 50; 325; 40 MG/1; MG/1; MG/1
1 TABLET ORAL EVERY 6 HOURS PRN
Qty: 28 TABLET | Refills: 0 | Status: SHIPPED | OUTPATIENT
Start: 2024-06-25 | End: 2024-07-25

## 2024-06-25 NOTE — PROGRESS NOTES
Subjective:       Patient ID: Jered Contreras is a 48 y.o. male.    Chief Complaint: No chief complaint on file.    Four weeks postop endoscopic sinus surgery :    The patient is doing well overall.  His nasal breathing is good.  He has started doing nasal irrigations.  Initially the secretions were old blood and mucus.  The irrigation fluid post nasal rinse is now clear.  Is having headaches in the temporal and behind the eyes bilaterally.  These typically occur every 2 days.  Most are mild in her relieved by Tylenol.  Some more severe and not relieved by Tylenol.  He has noted that his sense of smell has improved significantly since his surgery.      Review of Systems   Constitutional:  Negative for fatigue.   HENT:  Positive for sinus pressure and sinus pain. Negative for ear pain, nosebleeds, postnasal drip, rhinorrhea, sneezing and sore throat.    Eyes:  Positive for pain. Negative for photophobia, discharge, redness, itching and visual disturbance.   Respiratory:  Negative for cough and shortness of breath.    Neurological:  Positive for headaches.       Objective:      Physical Exam  Vitals and nursing note reviewed.   Constitutional:       General: He is not in acute distress.     Appearance: Normal appearance. He is obese. He is not ill-appearing.   HENT:      Head: Normocephalic.      Right Ear: Tympanic membrane, ear canal and external ear normal.      Left Ear: Tympanic membrane, ear canal and external ear normal.      Nose:      Comments: Nose-septum mildly deviated to the right, nasal mucosa free of crusting, cyanotic turbinates, no pus on either side     Mouth/Throat:      Comments: Mouth/throat-moist, no pus from the nasopharynx  Eyes:      Extraocular Movements: Extraocular movements intact.      Conjunctiva/sclera: Conjunctivae normal.      Pupils: Pupils are equal, round, and reactive to light.   Musculoskeletal:      Cervical back: Normal range of motion and neck supple.   Neurological:       Mental Status: He is alert.         Final Pathologic Diagnosis  Specimen to Pathology, Surgery ENT  Collected: 05/27/24 1047   Result status: Final   Resulting lab: [facility]   Value: 1. SPECIMEN FROM LEFT ETHMOID SINUS:  MINIMAL NONSPECIFIC SUBMUCOSAL CHRONIC INFLAMMATION    2. SPECIMEN FROM LEFT MAXILLARY SINUS:  NO SIGNIFICANT HISTOLOGIC ALTERATION    3. SPECIMEN FROM RIGHT ETHMOID SINUS:  MILD NONSPECIFIC SUBMUCOSAL CHRONIC INFLAMMATION    4. SPECIMEN FROM RIGHT MAXILLARY SINUS:  MILD NONSPECIFIC SUBMUCOSAL CHRONIC INFLAMMATION    5. LEFT MIDDLE TURBINATE REMNANT:  MODEST NONSPECIFIC SUBMUCOSAL CHRONIC INFLAMMATION    6. RIGHT MIDDLE TURBINATE REMNANT:    MODERATE NONSPECIFIC SUBMUCOSAL CHRONIC INFLAMMATION   Comment: Interp By Ashok Gillespie M.D., Signed on 05/29/2024 at 08:33         Assessment:       1. Chronic maxillary sinusitis    2. Chronic ethmoidal sinusitis    3. Post-operative state    4. Smell or taste sensation disturbance    5. Non-seasonal allergic rhinitis, unspecified trigger        Plan:       I am placing the patient on an oral prednisone taper.  I am giving him a small prescription for Fioricet to use for the more severe headaches.  I will recheck him in 4 weeks, or sooner if the headaches do not subside

## 2024-07-27 ENCOUNTER — PATIENT MESSAGE (OUTPATIENT)
Dept: PSYCHIATRY | Facility: CLINIC | Age: 49
End: 2024-07-27
Payer: COMMERCIAL

## 2024-07-29 ENCOUNTER — OFFICE VISIT (OUTPATIENT)
Dept: PSYCHIATRY | Facility: CLINIC | Age: 49
End: 2024-07-29
Payer: COMMERCIAL

## 2024-07-29 DIAGNOSIS — G47.00 INSOMNIA, UNSPECIFIED TYPE: ICD-10-CM

## 2024-07-29 DIAGNOSIS — F41.1 GAD (GENERALIZED ANXIETY DISORDER): Primary | Chronic | ICD-10-CM

## 2024-07-29 DIAGNOSIS — F33.41 RECURRENT MAJOR DEPRESSIVE DISORDER, IN PARTIAL REMISSION: ICD-10-CM

## 2024-07-29 PROCEDURE — 99214 OFFICE O/P EST MOD 30 MIN: CPT | Mod: 95,,, | Performed by: NURSE PRACTITIONER

## 2024-07-29 RX ORDER — DULOXETIN HYDROCHLORIDE 60 MG/1
60 CAPSULE, DELAYED RELEASE ORAL DAILY
Qty: 90 CAPSULE | Refills: 1 | Status: SHIPPED | OUTPATIENT
Start: 2024-07-29 | End: 2024-10-27

## 2024-07-29 RX ORDER — LAMOTRIGINE 25 MG/1
TABLET ORAL
Qty: 90 TABLET | Refills: 1 | Status: SHIPPED | OUTPATIENT
Start: 2024-07-29

## 2024-07-29 RX ORDER — BUPROPION HYDROCHLORIDE 300 MG/1
300 TABLET ORAL DAILY
Qty: 90 TABLET | Refills: 1 | Status: SHIPPED | OUTPATIENT
Start: 2024-07-29 | End: 2024-10-27

## 2024-07-29 RX ORDER — LAMOTRIGINE 100 MG/1
TABLET ORAL
Qty: 90 TABLET | Refills: 1 | Status: SHIPPED | OUTPATIENT
Start: 2024-07-29

## 2024-07-29 RX ORDER — DULOXETIN HYDROCHLORIDE 30 MG/1
CAPSULE, DELAYED RELEASE ORAL
Qty: 90 CAPSULE | Refills: 1 | Status: SHIPPED | OUTPATIENT
Start: 2024-07-29

## 2024-07-29 NOTE — PROGRESS NOTES
"Outpatient Psychiatry Follow-Up Visit (MD/NP)    7/29/2024   The patient location is: [place]  The chief complaint leading to consultation is:  Depression, anxiety and history of panic attacks    Visit type: audiovisual    Face to Face time with patient: 25 minutes  30 minutes of total time spent on the encounter, which includes face to face time and non-face to face time preparing to see the patient (eg, review of tests), Obtaining and/or reviewing separately obtained history, Documenting clinical information in the electronic or other health record, Independently interpreting results (not separately reported) and communicating results to the patient/family/caregiver, or Care coordination (not separately reported).         Each patient to whom he or she provides medical services by telemedicine is:  (1) informed of the relationship between the physician and patient and the respective role of any other health care provider with respect to management of the patient; and (2) notified that he or she may decline to receive medical services by telemedicine and may withdraw from such care at any time.    Notes:       Clinical Status of Patient:  Outpatient (Ambulatory)    Chief Complaint:  Jered Contreras is a 49 y.o. male who presents today for follow-up of depression, anxiety and  history of panic attacks .  Met with patient.      Interval History and Content of Current Session:  Interim Events/Subjective Report/Content of Current Session:   Reviewed messages from communication with patient since his last visit      Patient described his mood as "fine" and affect was appropriate with full range.   He stated he lost his job in April 2024, and jist started a temporary job, and hopes to get a permanent full time job. He shared his feelings and his concerns about finances. He stated he had nose surgery, had a bulb removed, it was benign, and is doing well.    He stated his wife is doing better of her 2 cervical " neck fusion, finished restorative program, and is recovering well.     He stated he is no therapy at this time but plans to restart therapy.    He reported managed anxiety and depression current medication regimen.   He stated overall duloxetine to 90 mg daily has been helpful in improving his depression and anxiety. He stated he is worried about finances and processed his feelings during the session. He stated he is learning how to be mindful of his responses and stated irritability is managed. He reported feeling optimistic about his future. He stated his depression fluctuates with situational stressors but managed at this time. He didn't report any panic attacks. He stated he is utilizing effective communication and coping skills.     He stated he is taking Lamictal 125 mg po daily, Cymbalta 90 mg po daily. and Wellbutrin xl 300 mg po daily and finds his medications to be very effective in managing his anxiety, mood, and depression. He reported okay sleep and gets about 6 sleep per night at this time (has sleep apnea and uses CPAP). He takes melatonin as needed but is not longer as effective as it used to be. He is not taking vistaril. We discussed the importance of utilizing effective sleep hygiene skills and melatonin as needed. Provided supportive therapy and encouraged utilization of CBT skills. Patient denied SI/HI/AH/VH and no delusion noted or reported.  Patient denied suicidal ideation and denied suicide plan or intent.  Patient denied symptoms of stuart or hypomania. He denied alcohol abuse or any type of illicit drug use.    He stated he had a revision to his gastric surgery and it went well and no physical complaints. He reported normal appetite and weight loss of 25 lbs. He stated his BP is controlled at this time and stated he is following up with PCP regarding his BP, elevated HR, and medical condition and he agreed.       Psychiatric Review Of Systems - Is patient experiencing or having changes  in:  sleep: better. He gets 6 hours of sleep per night. He takes melatonin as needed  appetite: normal   Weight: yes. Lost 25 lbs due to problems with gallbladder  energy/anergy: normal  interest/pleasure/anhedonia: no  somatic symptoms: no  anxiety/panic: yes but managed  guilty/hopelessness: no  concentration: yes  S.I.B.s/risky behavior: no  Irritability: improved   Racing thoughts: no  Impulsive behaviors: no  Paranoia:no  AVH:no  Suicidal thoughts/plan/intent: no  Se: no  ETOH: no  Drugs: no      Psychotropic medication review  Previous Trials-  -lexapro (worked for a while but stopped working and PCP switched him to Zoloft)  -Zoloft 200 mg po daily  -vistaril 25 mg po prn daily    Current meds-  -Wellbutrin xl 300mg po daily  -Cymbalta 90 mg po daily  -Lamictal 125 mg po daily       Psychotherapy:  Target symptoms: depression, anxiety   Why chosen therapy is appropriate versus another modality: relevant to diagnosis, patient responds to this modality, evidence based practice  Outcome monitoring methods: self-report, observation  Therapeutic intervention type: insight oriented psychotherapy, behavior modifying psychotherapy, supportive psychotherapy  Topics discussed/themes: building skills sets for symptom management, symptom recognition  The patient's response to the intervention is motivated. The patient's progress toward treatment goals is fair.   Duration of intervention: 10 minutes.    Review of Systems   PSYCHIATRIC: Pertinant items are noted in the narrative.  CONSTITUTIONAL: No weight gain or loss.   MUSCULOSKELETAL: No pain or stiffness of the joints.  NEUROLOGIC: No weakness, sensory changes, seizures, confusion, memory loss, tremor or other abnormal movements.  ENDOCRINE: No polydipsia or polyuria.  INTEGUMENTARY: No rashes or lacerations.  EYES: No exophthalmos, jaundice or blindness.  ENT: No dizziness, tinnitus or hearing loss.  RESPIRATORY: No shortness of breath.  CARDIOVASCULAR: No tachycardia  "or chest pain.  GASTROINTESTINAL: No nausea, vomiting, pain, constipation or diarrhea.  GENITOURINARY: No frequency, dysuria or sexual dysfunction.  HEMATOLOGIC/LYMPHATIC: No excessive bleeding, prolonged or excessive bleeding after dental extraction/injury.  ALLERGIC/IMMUNOLOGIC: No allergic response to materials, foods or animals at this time.    Past Medical, Family and Social History: The patient's past medical, family and social history have been reviewed and updated as appropriate within the electronic medical record - see encounter notes.    Compliance: yes    Side effects: None    Risk Parameters:  Patient reports no suicidal ideation  Patient reports no homicidal ideation  Patient reports no self-injurious behavior  Patient reports no violent behavior    Exam (detailed: at least 9 elements; comprehensive: all 15 elements)   Constitutional  Vitals:  Most recent vital signs, dated greater than 90 days prior to this appointment, were reviewed.   There were no vitals filed for this visit.     General:  unremarkable, age appropriate     Musculoskeletal  Muscle Strength/Tone:  no dystonia, no tremor, no tic   Gait & Station:  non-ataxic     Psychiatric  Speech:  no latency; no press   Mood & Affect:  "Fine'  appropriate with full range of emotions you ointment for a call   Thought Process:  normal and logical   Associations:  intact   Thought Content:  normal, no suicidality, no homicidality, delusions, or paranoia   Insight:  intact, has awareness of illness   Judgement: behavior is adequate to circumstances   Orientation:  grossly intact   Memory: intact for content of interview, grossly intact   Language: grossly intact, able to name, able to repeat   Attention Span & Concentration:  able to focus   Fund of Knowledge:  intact and appropriate to age and level of education, familiar with aspects of current personal life     Assessment and Diagnosis   Status/Progress: Based on the examination today, the patient's " problem(s) is/are improved.  New problems have not been presented today.   Co-morbidities, Diagnostic uncertainty and Lack of compliance are not complicating management of the primary condition.    General Impression: Doing okay and stable. Patient wants to continue with the same dosages of his medications due to their effectiveness in treating his symptoms .      ICD-10-CM ICD-9-CM   1. Moderate episode of recurrent major depressive disorder  F33.1 296.32   2. WAYNE (generalized anxiety disorder)  F41.1 300.02   3. panic attacks          Intervention/Counseling/Treatment Plan   Medication Management: Continue current medications. The risks and benefits of medication were discussed with the patient.  Medication Management:   Reviewed communication after his last visit  Encouraged patient to get his vitals today and encouraged follow up with his PCP and he agreed.   Continue Wellbutrin xl 300 mg po daily for MDD and WAYNE  Continue Cymbalta 90 mg po daily or WAYNE and MDD  Continue Lamictal 125 mg po daily for mood stabilization. Warned against SJS and all the associated side effects and he agreed to take it.   Recommended psychotherapy to learn effective coping skills   Continue utilization of effective CBT skills, positive self-talk, cognitive reframing, meditation, mindfulness, deep breathing, and relaxations skills.  Encouraged effective sleep hygiene and continue melatonin 3-5 mg po qhs prn for insomnia      Labs: reviewed most recent labs and encouraged follow up with his PCP regarding CBC and lipids and he agreed.   The treatment plan and follow up plan were reviewed with the patient.  Discussed with patient informed consent, risks vs. benefits, alternative treatments, side effect profile and the inherent unpredictability of individual responses to these treatments. The patient expresses understanding of the above and displays the capacity to agree with this current plan and had no other questions.  Encouraged  Patient to keep future appointments.   Take medications as prescribed   In the event of an emergency patient was advised to go to the emergency room.      Return to Clinic: 2 months, 3 months or earlier as needed    YI Kwon-BC

## 2024-08-16 ENCOUNTER — OFFICE VISIT (OUTPATIENT)
Dept: INTERNAL MEDICINE | Facility: CLINIC | Age: 49
End: 2024-08-16
Payer: COMMERCIAL

## 2024-08-16 VITALS
HEART RATE: 89 BPM | DIASTOLIC BLOOD PRESSURE: 72 MMHG | WEIGHT: 315 LBS | BODY MASS INDEX: 42.66 KG/M2 | HEIGHT: 72 IN | OXYGEN SATURATION: 96 % | SYSTOLIC BLOOD PRESSURE: 122 MMHG

## 2024-08-16 DIAGNOSIS — U07.1 COVID-19: Primary | ICD-10-CM

## 2024-08-16 DIAGNOSIS — J30.9 ALLERGIC RHINITIS, UNSPECIFIED SEASONALITY, UNSPECIFIED TRIGGER: ICD-10-CM

## 2024-08-16 DIAGNOSIS — J32.0 CHRONIC MAXILLARY SINUSITIS: ICD-10-CM

## 2024-08-16 LAB
CTP QC/QA: YES
SARS-COV-2 RDRP RESP QL NAA+PROBE: POSITIVE

## 2024-08-16 PROCEDURE — 99999 PR PBB SHADOW E&M-EST. PATIENT-LVL IV: CPT | Mod: PBBFAC,,, | Performed by: NURSE PRACTITIONER

## 2024-08-16 RX ORDER — PROMETHAZINE HYDROCHLORIDE AND DEXTROMETHORPHAN HYDROBROMIDE 6.25; 15 MG/5ML; MG/5ML
5 SYRUP ORAL NIGHTLY PRN
Qty: 118 ML | Refills: 0 | Status: SHIPPED | OUTPATIENT
Start: 2024-08-16 | End: 2024-08-26

## 2024-08-16 RX ORDER — NIRMATRELVIR AND RITONAVIR 300-100 MG
KIT ORAL
Qty: 30 TABLET | Refills: 0 | Status: SHIPPED | OUTPATIENT
Start: 2024-08-16 | End: 2024-08-21

## 2024-08-16 NOTE — PROGRESS NOTES
INTERNAL MEDICINE PROGRESS/URGENT CARE NOTE    CHIEF COMPLAINT     Chief Complaint   Patient presents with    Sinus Problem       HPI     Jered Contreras is a 49 y.o. male who presents for an urgent visit today.    PCP: Dr. Dacosta    Pt with hx of chronic sinusitis s/p sinus endoscopy and surgery 5/27.   C.o post nasal drip, nasal congestion, sneezing, cough x 3 days. Occurred after he was trying to fix an old computer and a large amount of dust flew in his face.   Taking sudafed, daily zyrtec, singulair. He hasn't been doing his nasal rinses as much as his wife has been in the hospital.   No fevers, facial pain, headaches, purulent rhinorrhea or sputum, body aches, chills, cp, sob.       Patient Active Problem List   Diagnosis    AR (allergic rhinitis)    Heart palpitations    Chronic pain of left knee    Morbid obesity    WAYNE (generalized anxiety disorder)    History of panic attacks    MDD (major depressive disorder), recurrent episode, moderate    Meralgia paresthetica of right side    Nasal valve collapse    Hypertrophy of both inferior nasal turbinates    Obstructive sleep apnea treated with continuous positive airway pressure (CPAP)    S/P laparoscopic sleeve gastrectomy    Disorders of smell    GERD (gastroesophageal reflux disease)    Nasal valve stenosis    Lymphedema of both lower extremities    Venous insufficiency of both lower extremities    Venous stasis ulcers of both lower extremities    Edema of both lower extremities    Varicose veins of both lower extremities with inflammation    Refractive error    Sinusitis chronic, ethmoidal    Chronic maxillary sinusitis        Past Medical History:  Past Medical History:   Diagnosis Date    Allergic rhinitis     Asthma     last flare up at 21 y/o    Corneal abrasion     Depression     Hx of psychiatric care     Morbid obesity     Obstructive sleep apnea treated with continuous positive airway pressure (CPAP) 07/10/2018    Psychiatric problem      Therapy         Past Surgical History:  Past Surgical History:   Procedure Laterality Date    APPENDECTOMY      APPLICATION OF CARTILAGE GRAFT Bilateral 12/06/2021    Procedure: APPLICATION, CARTILAGE GRAFT;  Surgeon: JAN Arredondo MD;  Location: [facility];  Service: ENT;  Laterality: Bilateral;  from right ear    ENDOSCOPY, NOSE OR PARANASAL SINUS, WITH MAXILLARY SINUS TISSUE REMOVAL Bilateral 5/27/2024    Procedure: ENDOSCOPY, NOSE OR PARANASAL SINUS, WITH MAXILLARY SINUS TISSUE REMOVAL;  Surgeon: JAN Arredondo MD;  Location: [facility] OR;  Service: ENT;  Laterality: Bilateral;    ETHMOIDECTOMY, TOTAL, ENDOSCOPIC Bilateral 5/27/2024    Procedure: ETHMOIDECTOMY, TOTAL, ENDOSCOPIC;  Surgeon: JAN Arredondo MD;  Location: [facility] OR;  Service: ENT;  Laterality: Bilateral;    FESS, WITH IMAGING GUIDANCE Bilateral 5/27/2024    Procedure: FESS, WITH IMAGING GUIDANCE;  Surgeon: JAN Arredondo MD;  Location: [facility] OR;  Service: ENT;  Laterality: Bilateral;    GASTRIC BYPASS  2020    gastric sleeve  09/2019    [facility]    NASAL SEPTOPLASTY Bilateral 12/06/2021    Procedure: SEPTOPLASTY, NOSE;  Surgeon: JAN Arredondo MD;  Location: [facility] OR;  Service: ENT;  Laterality: Bilateral;    NASAL STENOSIS REPAIR Bilateral 12/06/2021    Procedure: REPAIR, STENOSIS, NOSE, VESTIBULE;  Surgeon: JAN Arredondo MD;  Location: [facility] OR;  Service: ENT;  Laterality: Bilateral;    SINUS SURGERY      SURGICAL REMOVAL OF NASAL TURBINATE Bilateral 5/27/2024    Procedure: EXCISION, NASAL TURBINATE;  Surgeon: JAN Arredondo MD;  Location: [facility];  Service: ENT;  Laterality: Bilateral;    VASECTOMY  2017        Allergies:  Review of patient's allergies indicates:   Allergen Reactions    Ceclor [cefaclor] Anaphylaxis    Penicillins Anaphylaxis     Elevated blood pressure, temp, hives    Calcium alginate Itching     Headache       Home Medications:    Current Outpatient Medications:     ASCORBATE CALCIUM  (VITAMIN C ORAL), Take by mouth once daily., Disp: , Rfl:     b complex vitamins (B COMPLEX-VITAMIN B12) tablet, Take 1 tablet by mouth once daily., Disp: , Rfl:     bumetanide (BUMEX) 0.5 MG Tab, Take 1 tablet (0.5 mg total) by mouth 2 (two) times daily., Disp: 180 tablet, Rfl: 3    buPROPion (WELLBUTRIN XL) 300 MG 24 hr tablet, Take 1 tablet (300 mg total) by mouth once daily., Disp: 90 tablet, Rfl: 1    clotrimazole-betamethasone 1-0.05% (LOTRISONE) cream, Apply topically 2 (two) times daily., Disp: 45 g, Rfl: 3    DULoxetine (CYMBALTA) 30 MG capsule, Please give two separate bottles: one for duloxetine 30 mg po daily and one for duloxetine 60 mg po daily, total 90 mg daily, Disp: 90 capsule, Rfl: 1    DULoxetine (CYMBALTA) 60 MG capsule, Take 1 capsule (60 mg total) by mouth once daily., Disp: 90 capsule, Rfl: 1    HYDROcodone-acetaminophen (NORCO) 5-325 mg per tablet, Take 1 tablet by mouth every 4 (four) hours as needed for Pain., Disp: 24 tablet, Rfl: 0    hydrOXYzine HCL (ATARAX) 25 MG tablet, TK 1 T PO  TID PRN ANXIETY, Disp: 30 tablet, Rfl: 6    lamoTRIgine (LAMICTAL) 100 MG tablet, Take one tab of Lamictal 100 mg po daily, plus two tab of Lamictal 25 mg po daily, total lamictal 125 mg po daily., Disp: 90 tablet, Rfl: 1    lamoTRIgine (LAMICTAL) 25 MG tablet, Take 2 tab Lamictal 25 mg p.o. daily plus 1 tab Lamictal 100 mg po daily, total Lamictal 150 mg p.o. daily, Disp: 90 tablet, Rfl: 1    montelukast (SINGULAIR) 10 mg tablet, Take 1 tablet (10 mg total) by mouth every evening. Take during allergy season., Disp: 90 tablet, Rfl: 3    multivitamin capsule, Take 1 capsule by mouth once daily., Disp: , Rfl:     rOPINIRole (REQUIP) 0.5 MG tablet, Take 2 tablets (1 mg total) by mouth every evening., Disp: 30 tablet, Rfl: 6    tamsulosin (FLOMAX) 0.4 mg Cap, Take 1 capsule (0.4 mg total) by mouth every evening., Disp: 90 capsule, Rfl: 3    WEGOVY 1 mg/0.5 mL PnIj, Inject into the skin., Disp: , Rfl:      Review  of Systems:  Review of Systems   Constitutional:  Negative for fever.   HENT:  Positive for congestion, postnasal drip and sneezing. Negative for facial swelling, nosebleeds, rhinorrhea, sinus pain and sore throat.    Respiratory:  Positive for cough. Negative for shortness of breath and wheezing.    Cardiovascular:  Negative for chest pain.   Neurological:  Negative for weakness and headaches.         PHYSICAL EXAM     Vitals:    08/16/24 1412   BP: 122/72   Pulse: 89   SpO2: 96%   Weight: (!) 159.8 kg (352 lb 4.7 oz)   Height: 6' (1.829 m)      Body mass index is 47.78 kg/m².     Physical Exam  Vitals reviewed.   Constitutional:       Appearance: Normal appearance.   HENT:      Head: Normocephalic.      Right Ear: Tympanic membrane normal.      Left Ear: Tympanic membrane normal.      Nose: Congestion present. No rhinorrhea.      Right Sinus: No maxillary sinus tenderness or frontal sinus tenderness.      Left Sinus: No maxillary sinus tenderness or frontal sinus tenderness.      Mouth/Throat:      Mouth: Mucous membranes are moist.      Pharynx: Oropharynx is clear. Uvula midline. No posterior oropharyngeal erythema or uvula swelling.   Eyes:      Conjunctiva/sclera: Conjunctivae normal.      Pupils: Pupils are equal, round, and reactive to light.   Cardiovascular:      Rate and Rhythm: Normal rate and regular rhythm.   Pulmonary:      Effort: Pulmonary effort is normal.      Breath sounds: Normal breath sounds.   Skin:     General: Skin is warm and dry.   Neurological:      General: No focal deficit present.      Mental Status: He is alert.   Psychiatric:         Mood and Affect: Mood normal.         Behavior: Behavior normal.         LABS     Lab Results   Component Value Date    HGBA1C 5.0 09/29/2023     CMP  Sodium   Date Value Ref Range Status   05/28/2024 140 136 - 145 mmol/L Final     Potassium   Date Value Ref Range Status   05/28/2024 3.8 3.5 - 5.1 mmol/L Final     Chloride   Date Value Ref Range Status    05/28/2024 107 95 - 110 mmol/L Final     CO2   Date Value Ref Range Status   05/28/2024 24 23 - 29 mmol/L Final     Glucose   Date Value Ref Range Status   05/28/2024 99 70 - 110 mg/dL Final     BUN   Date Value Ref Range Status   05/28/2024 9 6 - 20 mg/dL Final     Creatinine   Date Value Ref Range Status   05/28/2024 0.7 0.5 - 1.4 mg/dL Final     Calcium   Date Value Ref Range Status   05/28/2024 8.9 8.7 - 10.5 mg/dL Final     Total Protein   Date Value Ref Range Status   05/23/2024 6.7 6.0 - 8.4 g/dL Final     Albumin   Date Value Ref Range Status   05/23/2024 3.9 3.5 - 5.2 g/dL Final     Total Bilirubin   Date Value Ref Range Status   05/23/2024 0.5 0.1 - 1.0 mg/dL Final     Comment:     For infants and newborns, interpretation of results should be based  on gestational age, weight and in agreement with clinical  observations.    Premature Infant recommended reference ranges:  Up to 24 hours.............<8.0 mg/dL  Up to 48 hours............<12.0 mg/dL  3-5 days..................<15.0 mg/dL  6-29 days.................<15.0 mg/dL       Alkaline Phosphatase   Date Value Ref Range Status   05/23/2024 95 55 - 135 U/L Final     AST   Date Value Ref Range Status   05/23/2024 21 10 - 40 U/L Final     ALT   Date Value Ref Range Status   05/23/2024 27 10 - 44 U/L Final     Anion Gap   Date Value Ref Range Status   05/28/2024 9 8 - 16 mmol/L Final     eGFR if    Date Value Ref Range Status   06/21/2022 >60.0 >60 mL/min/1.73 m^2 Final     eGFR if non    Date Value Ref Range Status   06/21/2022 >60.0 >60 mL/min/1.73 m^2 Final     Comment:     Calculation used to obtain the estimated glomerular filtration  rate (eGFR) is the CKD-EPI equation.        Lab Results   Component Value Date    WBC 11.99 05/28/2024    HGB 11.0 (L) 05/28/2024    HCT 33.8 (L) 05/28/2024    MCV 94 05/28/2024     05/28/2024     Lab Results   Component Value Date    CHOL 152 09/29/2023    CHOL 185 10/29/2021     CHOL 159 03/16/2021     Lab Results   Component Value Date    HDL 38 (L) 09/29/2023    HDL 48 10/29/2021    HDL 33 (L) 03/16/2021     Lab Results   Component Value Date    LDLCALC 105.2 09/29/2023    LDLCALC 123.8 10/29/2021    LDLCALC 110.8 03/16/2021     Lab Results   Component Value Date    TRIG 44 09/29/2023    TRIG 66 10/29/2021    TRIG 76 03/16/2021     Lab Results   Component Value Date    CHOLHDL 25.0 09/29/2023    CHOLHDL 25.9 10/29/2021    CHOLHDL 20.8 03/16/2021     Lab Results   Component Value Date    TSH 3.715 09/29/2023       ASSESSMENT     1. COVID-19    2. Chronic maxillary sinusitis    3. Allergic rhinitis, unspecified seasonality, unspecified trigger           PLAN  2   Positive for covid. Symptoms are very mild though. We discussed paxlovid. Wants to take. Sent to pharmacy.   Continue current supportive otc tx. Cough medication sent.   Discussed current CDC isolation guidelines.  Suggest can resume normal activities once you are fever free for at least 24 hours without fever reducing medications and overall have had symptom improvement. Wear mask until after day 10.    1. Chronic maxillary sinusitis  - POCT COVID-19 Rapid Screening    2. Allergic rhinitis, unspecified seasonality, unspecified trigger    3. COVID-19       Follow up with PCP     Patient's plan/treatment was discussed including medications and possible side effects. Verbalized understanding of all instructions.     This note was partly generated with [employer] voice recognition software. I apologize for any possible typographical errors.          MEG Fermin  Department of Internal Medicine - [facility]  08/16/2024

## 2024-08-28 NOTE — PROGRESS NOTES
Subjective:       Patient ID: Jered Contreras is a 49 y.o. male.    Chief Complaint: Follow-up    Three-month postop:    The patient has been doing well over all.  Shortly after the last visit his wife fell in injured her cervical spinal cord.  She has been in the hospital since.  He is staying at night with her in the hospital in a daytime when he can.  The patient notices that his breathing has improved significantly.  His sense of smell is still mildly diminished but has improved.  His sense of taste seems to be back to normal.  He has had 1 episode of sinusitis.  He is taking Zyrtec everyday.      Review of Systems   Constitutional: Negative.  Negative for chills and fever.   HENT:  Positive for congestion, postnasal drip, rhinorrhea, sinus pressure and sneezing.    Eyes: Negative.  Negative for pain, redness and itching.   Respiratory:  Positive for apnea. Negative for cough and choking.         Snoring   Cardiovascular: Negative.    Gastrointestinal: Negative.    Endocrine: Negative.    Genitourinary: Negative.    Musculoskeletal: Negative.    Skin: Negative.    Allergic/Immunologic: Positive for environmental allergies (seasonal flares).   Neurological: Negative.    Hematological: Negative.    Psychiatric/Behavioral:  The patient is nervous/anxious.         Depression       Objective:      Physical Exam  Constitutional:       General: He is not in acute distress.     Appearance: Normal appearance. He is obese.   HENT:      Head: Normocephalic.      Right Ear: Tympanic membrane, ear canal and external ear normal.      Left Ear: Tympanic membrane, ear canal and external ear normal.      Nose:      Comments: Nose-midline septum, cyanotic turbinates, clear mucus in both sides nose  Eyes:      Extraocular Movements: Extraocular movements intact.      Conjunctiva/sclera: Conjunctivae normal.      Pupils: Pupils are equal, round, and reactive to light.   Musculoskeletal:      Cervical back: Normal range of  motion and neck supple.   Neurological:      Mental Status: He is alert.         Assessment:       1. Non-seasonal allergic rhinitis, unspecified trigger    2. Smell or taste sensation disturbance        Plan:       I will have the patient start taking his nighttime dose of montelukast in addition to the morning dose of cetirizine.  I will recheck him in 6 weeks.                    DISCLAIMER: This note was prepared with [employer] voice recognition transcription software. Garbled syntax, mangled pronouns, and other bizarre constructions may be attributed to that software system. While efforts were made to correct any mistakes made by this voice recognition program, some errors and/or omissions may remain in the note that were missed when the note was originally created.

## 2024-08-29 ENCOUNTER — OFFICE VISIT (OUTPATIENT)
Dept: OTOLARYNGOLOGY | Facility: CLINIC | Age: 49
End: 2024-08-29
Payer: COMMERCIAL

## 2024-08-29 VITALS
HEIGHT: 72 IN | HEART RATE: 52 BPM | WEIGHT: 315 LBS | SYSTOLIC BLOOD PRESSURE: 137 MMHG | DIASTOLIC BLOOD PRESSURE: 72 MMHG | BODY MASS INDEX: 42.66 KG/M2

## 2024-08-29 DIAGNOSIS — R43.9 SMELL OR TASTE SENSATION DISTURBANCE: ICD-10-CM

## 2024-08-29 DIAGNOSIS — J30.89 NON-SEASONAL ALLERGIC RHINITIS, UNSPECIFIED TRIGGER: Primary | ICD-10-CM

## 2024-08-29 PROCEDURE — 99999 PR PBB SHADOW E&M-EST. PATIENT-LVL IV: CPT | Mod: PBBFAC,,, | Performed by: SPECIALIST

## 2024-08-29 PROCEDURE — 99213 OFFICE O/P EST LOW 20 MIN: CPT | Mod: S$GLB,,, | Performed by: SPECIALIST

## 2024-08-29 PROCEDURE — 1159F MED LIST DOCD IN RCRD: CPT | Mod: CPTII,S$GLB,, | Performed by: SPECIALIST

## 2024-08-29 PROCEDURE — 3078F DIAST BP <80 MM HG: CPT | Mod: CPTII,S$GLB,, | Performed by: SPECIALIST

## 2024-08-29 PROCEDURE — 3075F SYST BP GE 130 - 139MM HG: CPT | Mod: CPTII,S$GLB,, | Performed by: SPECIALIST

## 2024-08-29 PROCEDURE — 1160F RVW MEDS BY RX/DR IN RCRD: CPT | Mod: CPTII,S$GLB,, | Performed by: SPECIALIST

## 2024-08-29 PROCEDURE — 3008F BODY MASS INDEX DOCD: CPT | Mod: CPTII,S$GLB,, | Performed by: SPECIALIST

## 2024-09-23 ENCOUNTER — PATIENT MESSAGE (OUTPATIENT)
Dept: PSYCHIATRY | Facility: CLINIC | Age: 49
End: 2024-09-23
Payer: COMMERCIAL

## 2024-09-23 DIAGNOSIS — F33.1 MODERATE EPISODE OF RECURRENT MAJOR DEPRESSIVE DISORDER: Primary | ICD-10-CM

## 2024-10-11 ENCOUNTER — OFFICE VISIT (OUTPATIENT)
Dept: OTOLARYNGOLOGY | Facility: CLINIC | Age: 49
End: 2024-10-11
Payer: COMMERCIAL

## 2024-10-11 VITALS
HEART RATE: 79 BPM | BODY MASS INDEX: 47.08 KG/M2 | SYSTOLIC BLOOD PRESSURE: 130 MMHG | WEIGHT: 315 LBS | DIASTOLIC BLOOD PRESSURE: 78 MMHG | OXYGEN SATURATION: 98 %

## 2024-10-11 DIAGNOSIS — J30.89 NON-SEASONAL ALLERGIC RHINITIS, UNSPECIFIED TRIGGER: Primary | ICD-10-CM

## 2024-10-11 DIAGNOSIS — J35.01 TONSILLITIS, CHRONIC: ICD-10-CM

## 2024-10-11 DIAGNOSIS — K13.79 UVULAR SWELLING: ICD-10-CM

## 2024-10-11 DIAGNOSIS — J03.91 ACUTE RECURRENT TONSILLITIS, UNSPECIFIED: ICD-10-CM

## 2024-10-11 DIAGNOSIS — R09.82 PND (POST-NASAL DRIP): ICD-10-CM

## 2024-10-11 DIAGNOSIS — J35.8 TONSILLITH: ICD-10-CM

## 2024-10-11 DIAGNOSIS — R43.9 SMELL OR TASTE SENSATION DISTURBANCE: ICD-10-CM

## 2024-10-11 PROCEDURE — 99999 PR PBB SHADOW E&M-EST. PATIENT-LVL III: CPT | Mod: PBBFAC,,, | Performed by: SPECIALIST

## 2024-10-11 PROCEDURE — 3075F SYST BP GE 130 - 139MM HG: CPT | Mod: CPTII,S$GLB,, | Performed by: SPECIALIST

## 2024-10-11 PROCEDURE — 1159F MED LIST DOCD IN RCRD: CPT | Mod: CPTII,S$GLB,, | Performed by: SPECIALIST

## 2024-10-11 PROCEDURE — 3008F BODY MASS INDEX DOCD: CPT | Mod: CPTII,S$GLB,, | Performed by: SPECIALIST

## 2024-10-11 PROCEDURE — 1160F RVW MEDS BY RX/DR IN RCRD: CPT | Mod: CPTII,S$GLB,, | Performed by: SPECIALIST

## 2024-10-11 PROCEDURE — 3078F DIAST BP <80 MM HG: CPT | Mod: CPTII,S$GLB,, | Performed by: SPECIALIST

## 2024-10-11 PROCEDURE — 99213 OFFICE O/P EST LOW 20 MIN: CPT | Mod: S$GLB,,, | Performed by: SPECIALIST

## 2024-10-11 RX ORDER — IPRATROPIUM BROMIDE 21 UG/1
2 SPRAY, METERED NASAL 2 TIMES DAILY
Qty: 30 ML | Refills: 11 | Status: SHIPPED | OUTPATIENT
Start: 2024-10-11

## 2024-10-11 NOTE — PROGRESS NOTES
vSubjective:       Patient ID: Jered Contreras is a 49 y.o. male.    Chief Complaint: No chief complaint on file.      The patient is coming in for follow-up evaluation.  It has been 6 since I last saw him.  It has been 4-1/2 months since his endoscopic sinus surgery.  There are multiple issues to discuss:  1. Postop endoscopic sinus surgery:  The patient is breathing normally through both sides of his nose.  He is not having any nasal discharge  2. Allergic rhinitis: Nasal secretions are clear.  He has no significant sinonasal allergy issues.  He is using Zyrtec in the morning, montelukast at night and azelastine and fluticasone sprays twice daily routinely.  He uses ipratropium bromide sprays supplement when needed to control runny nose  3. Diminished sense of smell and taste:  His sense of taste has returned to normal.  His sense of smell is continuing to improve but is not yet back to normal.    4. Obstructive sleep apnea using CPAP:  He continues to use his CPAP  5. Recurring strep throat/chronic tonsillith formation.  The patient requires treatment for acute tonsillitis 3 times or more per year.  He continues to produce chronic tonsil debris that results in halitosis.            Review of Systems     Constitutional: Positive for fatigue.  Negative for appetite change, chills, fever and unexpected weight loss.      HENT: Positive for postnasal drip, runny nose, sinus pressure, sore throat and stuffy nose.  Negative for ear discharge, ear infection, ear pain, facial swelling, hearing loss, mouth sores, nosebleeds, ringing in the ears, sinus infection, tonsil infection, dental problems, trouble swallowing and voice change.  Chronic tonsillith formation      Eyes:  Negative for change in eyesight, eye drainage, eye itching and photophobia.     Respiratory:  Positive for cough and sleep apnea. Negative for shortness of breath, snoring and wheezing.      Cardiovascular:  Positive for foot swelling.  Negative for chest pain, irregular heartbeat and swollen veins.     Gastrointestinal:  Negative for abdominal pain, acid reflux, constipation, diarrhea, heartburn and vomiting.     Genitourinary: Negative for difficulty urinating, sexual problems and frequent urination.     Musc: Negative for aching joints, aching muscles, back pain and neck pain.     Skin: Negative for rash.     Allergy: Positive for seasonal allergies. Negative for food allergies.     Endocrine: Negative for cold intolerance and heat intolerance.      Neurological: Positive for headaches. Negative for dizziness, light-headedness, seizures and tremors.      Hematologic: Negative for bruises/bleeds easily and swollen glands.      Psychiatric: Positive for depression and nervous/anxious. Negative for decreased concentration and sleep disturbance.                Objective:      Physical Exam  Vitals and nursing note reviewed.   Constitutional:       General: He is awake.      Appearance: Normal appearance. He is well-developed and well-groomed. He is morbidly obese.   HENT:      Head: Normocephalic.      Jaw: There is normal jaw occlusion.      Salivary Glands: Right salivary gland is not diffusely enlarged or tender. Left salivary gland is not diffusely enlarged or tender.      Right Ear: Hearing, ear canal and external ear normal. Tympanic membrane is retracted.      Left Ear: Hearing, ear canal and external ear normal. Tympanic membrane is retracted.      Nose: Septal deviation (low nose to the right and more posteriorly to the left), mucosal edema (cyanotic, boggy inferior turbinates bilaterally) and rhinorrhea (clear mucus bilaterally) present. No nasal deformity. Rhinorrhea is clear.      Right Turbinates: Enlarged and pale.      Left Turbinates: Enlarged and pale.      Mouth/Throat:      Lips: No lesions.      Mouth: No oral lesions.      Dentition: No gum lesions.      Tongue: No lesions.      Palate: No mass and lesions.      Pharynx:  Oropharynx is clear. Uvula midline.   Eyes:      General: Lids are normal. Vision grossly intact.         Right eye: No discharge.         Left eye: No discharge.      Extraocular Movements: Extraocular movements intact.      Conjunctiva/sclera: Conjunctivae normal.      Pupils: Pupils are equal, round, and reactive to light.   Neck:      Thyroid: No thyroid mass or thyromegaly.      Trachea: Trachea normal. No tracheal deviation.   Cardiovascular:      Rate and Rhythm: Normal rate and regular rhythm.      Pulses: Normal pulses.      Heart sounds: Normal heart sounds.   Pulmonary:      Effort: Pulmonary effort is normal.      Breath sounds: Normal breath sounds. No stridor. No decreased breath sounds, wheezing, rhonchi or rales.   Abdominal:      General: Bowel sounds are normal.      Palpations: Abdomen is soft.      Tenderness: There is no abdominal tenderness.   Musculoskeletal:         General: Normal range of motion.      Cervical back: Normal range of motion. No muscular tenderness.   Lymphadenopathy:      Head:      Right side of head: No submental, submandibular, preauricular, posterior auricular or occipital adenopathy.      Left side of head: No submental, submandibular, preauricular, posterior auricular or occipital adenopathy.      Cervical: No cervical adenopathy.   Skin:     General: Skin is warm and dry.      Findings: No petechiae or rash.      Nails: There is no clubbing.   Neurological:      Mental Status: He is alert and oriented to person, place, and time.      Cranial Nerves: No cranial nerve deficit.      Sensory: No sensory deficit.      Gait: Gait normal.   Psychiatric:         Speech: Speech normal.         Behavior: Behavior normal. Behavior is cooperative.         Thought Content: Thought content normal.         Judgment: Judgment normal.                Assessment:       1. Non-seasonal allergic rhinitis, unspecified trigger    2. PND (post-nasal drip)    3. Smell or taste sensation  disturbance    4. Uvular swelling          Plan:        I will have the patient use ipratropium bromide nasal spray on an as-needed basis to control rhinorrhea or postnasal drip.  Regarding his tonsils I am advising that he gargle with saline 6 oz 1 mouth full at a time once or twice per week to control the tonsillith formation.  Ultimately, he needs to be scheduled for tonsillectomy.  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  I have explained to him that it would require 2 full weeks of him being absent from her care to remove his tonsils.  We will consider that down the road.  I will recheck him 3 months.                DISCLAIMER: This note was prepared with [employer] voice recognition transcription software. Garbled syntax, mangled pronouns, and other bizarre constructions may be attributed to that software system. While efforts were made to correct any mistakes made by this voice recognition program, some errors and/or omissions may remain in the note that were missed when the note was originally created.

## 2024-10-21 ENCOUNTER — TELEPHONE (OUTPATIENT)
Dept: OTOLARYNGOLOGY | Facility: CLINIC | Age: 49
End: 2024-10-21
Payer: COMMERCIAL

## 2024-10-21 ENCOUNTER — PATIENT MESSAGE (OUTPATIENT)
Dept: OTOLARYNGOLOGY | Facility: CLINIC | Age: 49
End: 2024-10-21
Payer: COMMERCIAL

## 2024-10-23 ENCOUNTER — OFFICE VISIT (OUTPATIENT)
Dept: INTERNAL MEDICINE | Facility: CLINIC | Age: 49
End: 2024-10-23
Payer: COMMERCIAL

## 2024-10-23 VITALS — DIASTOLIC BLOOD PRESSURE: 65 MMHG | SYSTOLIC BLOOD PRESSURE: 125 MMHG

## 2024-10-23 DIAGNOSIS — J02.9 PHARYNGITIS, UNSPECIFIED ETIOLOGY: Primary | ICD-10-CM

## 2024-10-23 DIAGNOSIS — J35.8 TONSIL STONE: ICD-10-CM

## 2024-10-23 LAB
CTP QC/QA: YES
CTP QC/QA: YES
S PYO RRNA THROAT QL PROBE: NEGATIVE
SARS-COV-2 RDRP RESP QL NAA+PROBE: NEGATIVE

## 2024-10-23 PROCEDURE — 87635 SARS-COV-2 COVID-19 AMP PRB: CPT | Mod: QW,S$GLB,, | Performed by: NURSE PRACTITIONER

## 2024-10-23 PROCEDURE — 99999 PR PBB SHADOW E&M-EST. PATIENT-LVL III: CPT | Mod: PBBFAC,,, | Performed by: NURSE PRACTITIONER

## 2024-10-23 PROCEDURE — 1159F MED LIST DOCD IN RCRD: CPT | Mod: CPTII,S$GLB,, | Performed by: NURSE PRACTITIONER

## 2024-10-23 PROCEDURE — 3074F SYST BP LT 130 MM HG: CPT | Mod: CPTII,S$GLB,, | Performed by: NURSE PRACTITIONER

## 2024-10-23 PROCEDURE — 87070 CULTURE OTHR SPECIMN AEROBIC: CPT | Performed by: NURSE PRACTITIONER

## 2024-10-23 PROCEDURE — 96372 THER/PROPH/DIAG INJ SC/IM: CPT | Mod: S$GLB,,, | Performed by: NURSE PRACTITIONER

## 2024-10-23 PROCEDURE — 87880 STREP A ASSAY W/OPTIC: CPT | Mod: QW,S$GLB,, | Performed by: NURSE PRACTITIONER

## 2024-10-23 PROCEDURE — 99213 OFFICE O/P EST LOW 20 MIN: CPT | Mod: 25,S$GLB,, | Performed by: NURSE PRACTITIONER

## 2024-10-23 PROCEDURE — 3078F DIAST BP <80 MM HG: CPT | Mod: CPTII,S$GLB,, | Performed by: NURSE PRACTITIONER

## 2024-10-23 RX ORDER — DEXAMETHASONE SODIUM PHOSPHATE 4 MG/ML
4 INJECTION, SOLUTION INTRA-ARTICULAR; INTRALESIONAL; INTRAMUSCULAR; INTRAVENOUS; SOFT TISSUE
Status: COMPLETED | OUTPATIENT
Start: 2024-10-23 | End: 2024-10-23

## 2024-10-23 RX ADMIN — DEXAMETHASONE SODIUM PHOSPHATE 4 MG: 4 INJECTION, SOLUTION INTRA-ARTICULAR; INTRALESIONAL; INTRAMUSCULAR; INTRAVENOUS; SOFT TISSUE at 04:10

## 2024-10-23 NOTE — PROGRESS NOTES
dexAMETHasone injection 4 mg  IM Injection given. Bandage applied. Tolerated well. Patient instructed to wait in lobby for 15 min before leaving.Information on medication given. Verbalized understanding.

## 2024-10-23 NOTE — PROGRESS NOTES
INTERNAL MEDICINE PROGRESS/URGENT CARE NOTE    CHIEF COMPLAINT     Chief Complaint   Patient presents with    URI    Sore Throat       HPI     Jered Contreras is a 49 y.o. male who presents for an urgent visit today.    Started with sore throat about 5 days ago. No other associated symptoms. Of note, he went to the ER 1 month ago for uvular swelling and peritonsillar abscess and tonsilliths. Saw ENT 10/11/24 and had relief of symptoms. Taking meds as prescribed. No fevers, trouble swallowing, rash, cough. Has mild nasal congestion but this is chronic and no worse than normal.       Patient Active Problem List   Diagnosis    AR (allergic rhinitis)    Heart palpitations    Chronic pain of left knee    Morbid obesity    WAYNE (generalized anxiety disorder)    History of panic attacks    MDD (major depressive disorder), recurrent episode, moderate    Meralgia paresthetica of right side    Nasal valve collapse    Hypertrophy of both inferior nasal turbinates    Obstructive sleep apnea treated with continuous positive airway pressure (CPAP)    S/P laparoscopic sleeve gastrectomy    Disorders of smell    GERD (gastroesophageal reflux disease)    Nasal valve stenosis    Lymphedema of both lower extremities    Venous insufficiency of both lower extremities    Venous stasis ulcers of both lower extremities    Edema of both lower extremities    Varicose veins of both lower extremities with inflammation    Refractive error    Sinusitis chronic, ethmoidal    Chronic maxillary sinusitis        Past Medical History:  Past Medical History:   Diagnosis Date    Allergic rhinitis     Asthma     last flare up at 19 y/o    Corneal abrasion     Depression     Hx of psychiatric care     Morbid obesity     Obstructive sleep apnea treated with continuous positive airway pressure (CPAP) 07/10/2018    Psychiatric problem     Therapy         Past Surgical History:  Past Surgical History:   Procedure Laterality Date    APPENDECTOMY       APPLICATION OF CARTILAGE GRAFT Bilateral 12/06/2021    Procedure: APPLICATION, CARTILAGE GRAFT;  Surgeon: JAN Arredondo MD;  Location: [facility] OR;  Service: ENT;  Laterality: Bilateral;  from right ear    ENDOSCOPY, NOSE OR PARANASAL SINUS, WITH MAXILLARY SINUS TISSUE REMOVAL Bilateral 5/27/2024    Procedure: ENDOSCOPY, NOSE OR PARANASAL SINUS, WITH MAXILLARY SINUS TISSUE REMOVAL;  Surgeon: JAN Arredondo MD;  Location: [facility] OR;  Service: ENT;  Laterality: Bilateral;    ETHMOIDECTOMY, TOTAL, ENDOSCOPIC Bilateral 5/27/2024    Procedure: ETHMOIDECTOMY, TOTAL, ENDOSCOPIC;  Surgeon: JAN Arredondo MD;  Location: [facility] OR;  Service: ENT;  Laterality: Bilateral;    FESS, WITH IMAGING GUIDANCE Bilateral 5/27/2024    Procedure: FESS, WITH IMAGING GUIDANCE;  Surgeon: JAN Arredondo MD;  Location: [facility] OR;  Service: ENT;  Laterality: Bilateral;    GASTRIC BYPASS  2020    gastric sleeve  09/2019    [facility]    NASAL SEPTOPLASTY Bilateral 12/06/2021    Procedure: SEPTOPLASTY, NOSE;  Surgeon: JAN Arredondo MD;  Location: [facility] OR;  Service: ENT;  Laterality: Bilateral;    NASAL STENOSIS REPAIR Bilateral 12/06/2021    Procedure: REPAIR, STENOSIS, NOSE, VESTIBULE;  Surgeon: JAN Arredondo MD;  Location: [facility] OR;  Service: ENT;  Laterality: Bilateral;    SINUS SURGERY      SURGICAL REMOVAL OF NASAL TURBINATE Bilateral 5/27/2024    Procedure: EXCISION, NASAL TURBINATE;  Surgeon: JAN Arredondo MD;  Location: [facility] OR;  Service: ENT;  Laterality: Bilateral;    VASECTOMY  2017        Allergies:  Review of patient's allergies indicates:   Allergen Reactions    Ceclor [cefaclor] Anaphylaxis    Penicillins Anaphylaxis     Elevated blood pressure, temp, hives    Calcium alginate Itching     Headache       Home Medications:    Current Outpatient Medications:     ASCORBATE CALCIUM (VITAMIN C ORAL), Take by mouth once daily., Disp: , Rfl:     b complex vitamins (B COMPLEX-VITAMIN B12) tablet, Take  1 tablet by mouth once daily., Disp: , Rfl:     bumetanide (BUMEX) 0.5 MG Tab, Take 1 tablet (0.5 mg total) by mouth 2 (two) times daily., Disp: 180 tablet, Rfl: 3    buPROPion (WELLBUTRIN XL) 300 MG 24 hr tablet, Take 1 tablet (300 mg total) by mouth once daily., Disp: 90 tablet, Rfl: 1    DULoxetine (CYMBALTA) 30 MG capsule, Please give two separate bottles: one for duloxetine 30 mg po daily and one for duloxetine 60 mg po daily, total 90 mg daily, Disp: 90 capsule, Rfl: 1    DULoxetine (CYMBALTA) 60 MG capsule, Take 1 capsule (60 mg total) by mouth once daily., Disp: 90 capsule, Rfl: 1    hydrOXYzine HCL (ATARAX) 25 MG tablet, TK 1 T PO  TID PRN ANXIETY, Disp: 30 tablet, Rfl: 6    ipratropium (ATROVENT) 21 mcg (0.03 %) nasal spray, 2 sprays by Each Nostril route 2 (two) times daily. Use as needed to control runny nose or postnasal drip. May be use more often if needed, Disp: 30 mL, Rfl: 11    lamoTRIgine (LAMICTAL) 100 MG tablet, Take one tab of Lamictal 100 mg po daily, plus two tab of Lamictal 25 mg po daily, total lamictal 125 mg po daily., Disp: 90 tablet, Rfl: 1    lamoTRIgine (LAMICTAL) 25 MG tablet, Take 2 tab Lamictal 25 mg p.o. daily plus 1 tab Lamictal 100 mg po daily, total Lamictal 150 mg p.o. daily, Disp: 90 tablet, Rfl: 1    montelukast (SINGULAIR) 10 mg tablet, Take 1 tablet (10 mg total) by mouth every evening. Take during allergy season., Disp: 90 tablet, Rfl: 3    multivitamin capsule, Take 1 capsule by mouth once daily., Disp: , Rfl:     rOPINIRole (REQUIP) 0.5 MG tablet, Take 2 tablets (1 mg total) by mouth every evening., Disp: 30 tablet, Rfl: 6    tamsulosin (FLOMAX) 0.4 mg Cap, Take 1 capsule (0.4 mg total) by mouth every evening., Disp: 90 capsule, Rfl: 3  No current facility-administered medications for this visit.     Review of Systems:  Review of Systems   Constitutional:  Negative for fever.   HENT:  Positive for congestion and sore throat. Negative for drooling, ear discharge, ear  pain, postnasal drip, rhinorrhea, trouble swallowing and voice change.    Respiratory:  Negative for cough, shortness of breath and stridor.    Gastrointestinal:  Negative for abdominal pain, diarrhea and vomiting.   Musculoskeletal:  Negative for neck pain.   Skin:  Negative for rash.   Neurological:  Negative for headaches.         PHYSICAL EXAM     Vitals:    10/23/24 1552   BP: 125/65      There is no height or weight on file to calculate BMI.     Physical Exam  Vitals reviewed.   Constitutional:       Appearance: Normal appearance.   HENT:      Head: Normocephalic.      Right Ear: Tympanic membrane normal.      Left Ear: Tympanic membrane normal.      Mouth/Throat:      Mouth: Mucous membranes are moist.      Pharynx: Oropharynx is clear. Uvula midline. No pharyngeal swelling, uvula swelling or postnasal drip.      Tonsils: No tonsillar abscesses.      Comments: Tonsils are equal and symmetrical. Small tonsil stone noted to L tonsil.   Mild erythema to pharynx  Eyes:      Conjunctiva/sclera: Conjunctivae normal.      Pupils: Pupils are equal, round, and reactive to light.   Cardiovascular:      Rate and Rhythm: Normal rate and regular rhythm.   Pulmonary:      Effort: Pulmonary effort is normal.      Breath sounds: Normal breath sounds.   Musculoskeletal:      Cervical back: Normal range of motion. No tenderness.   Lymphadenopathy:      Cervical: No cervical adenopathy.   Skin:     General: Skin is warm and dry.   Neurological:      General: No focal deficit present.      Mental Status: He is alert.   Psychiatric:         Mood and Affect: Mood normal.         Behavior: Behavior normal.         LABS     Lab Results   Component Value Date    HGBA1C 5.0 09/29/2023     CMP  Sodium   Date Value Ref Range Status   05/28/2024 140 136 - 145 mmol/L Final     Potassium   Date Value Ref Range Status   05/28/2024 3.8 3.5 - 5.1 mmol/L Final     Chloride   Date Value Ref Range Status   05/28/2024 107 95 - 110 mmol/L Final      CO2   Date Value Ref Range Status   05/28/2024 24 23 - 29 mmol/L Final     Glucose   Date Value Ref Range Status   05/28/2024 99 70 - 110 mg/dL Final     BUN   Date Value Ref Range Status   05/28/2024 9 6 - 20 mg/dL Final     Creatinine   Date Value Ref Range Status   05/28/2024 0.7 0.5 - 1.4 mg/dL Final     Calcium   Date Value Ref Range Status   05/28/2024 8.9 8.7 - 10.5 mg/dL Final     Total Protein   Date Value Ref Range Status   05/23/2024 6.7 6.0 - 8.4 g/dL Final     Albumin   Date Value Ref Range Status   05/23/2024 3.9 3.5 - 5.2 g/dL Final     Total Bilirubin   Date Value Ref Range Status   05/23/2024 0.5 0.1 - 1.0 mg/dL Final     Comment:     For infants and newborns, interpretation of results should be based  on gestational age, weight and in agreement with clinical  observations.    Premature Infant recommended reference ranges:  Up to 24 hours.............<8.0 mg/dL  Up to 48 hours............<12.0 mg/dL  3-5 days..................<15.0 mg/dL  6-29 days.................<15.0 mg/dL       Alkaline Phosphatase   Date Value Ref Range Status   05/23/2024 95 55 - 135 U/L Final     AST   Date Value Ref Range Status   05/23/2024 21 10 - 40 U/L Final     ALT   Date Value Ref Range Status   05/23/2024 27 10 - 44 U/L Final     Anion Gap   Date Value Ref Range Status   05/28/2024 9 8 - 16 mmol/L Final     eGFR if    Date Value Ref Range Status   06/21/2022 >60.0 >60 mL/min/1.73 m^2 Final     eGFR if non    Date Value Ref Range Status   06/21/2022 >60.0 >60 mL/min/1.73 m^2 Final     Comment:     Calculation used to obtain the estimated glomerular filtration  rate (eGFR) is the CKD-EPI equation.        Lab Results   Component Value Date    WBC 11.99 05/28/2024    HGB 11.0 (L) 05/28/2024    HCT 33.8 (L) 05/28/2024    MCV 94 05/28/2024     05/28/2024     Lab Results   Component Value Date    CHOL 152 09/29/2023    CHOL 185 10/29/2021    CHOL 159 03/16/2021     Lab Results    Component Value Date    HDL 38 (L) 09/29/2023    HDL 48 10/29/2021    HDL 33 (L) 03/16/2021     Lab Results   Component Value Date    LDLCALC 105.2 09/29/2023    LDLCALC 123.8 10/29/2021    LDLCALC 110.8 03/16/2021     Lab Results   Component Value Date    TRIG 44 09/29/2023    TRIG 66 10/29/2021    TRIG 76 03/16/2021     Lab Results   Component Value Date    CHOLHDL 25.0 09/29/2023    CHOLHDL 25.9 10/29/2021    CHOLHDL 20.8 03/16/2021     Lab Results   Component Value Date    TSH 3.715 09/29/2023       ASSESSMENT     1. Pharyngitis, unspecified etiology    2. Tonsil stone         PLAN  Strep negative. Throat culture obtained.  No other concerning symptoms. Does have pain though and given recent hx, will give IM decadron and have him f/u with ENT. Continue current meds and warm salt water gargles.     1. Pharyngitis, unspecified etiology  - dexAMETHasone injection 4 mg  - POCT rapid strep A  - POCT COVID-19 Rapid Screening  - CULTURE, RESPIRATORY  - THROAT    2. Tonsil stone       Follow up with PCP     Patient was counseled on when to seek emergent care. Patient's plan/treatment was discussed including medications and possible side effects. Verbalized understanding of all instructions.     This note was partly generated with [employer] voice recognition software. I apologize for any possible typographical errors.          MEG Fermin  Department of Internal Medicine - [facility]  10/24/2024

## 2024-10-26 LAB — BACTERIA THROAT CULT: NORMAL

## 2024-11-04 ENCOUNTER — OFFICE VISIT (OUTPATIENT)
Dept: OTOLARYNGOLOGY | Facility: CLINIC | Age: 49
End: 2024-11-04
Payer: COMMERCIAL

## 2024-11-04 VITALS
DIASTOLIC BLOOD PRESSURE: 72 MMHG | SYSTOLIC BLOOD PRESSURE: 130 MMHG | OXYGEN SATURATION: 98 % | BODY MASS INDEX: 47.27 KG/M2 | HEART RATE: 71 BPM | WEIGHT: 315 LBS

## 2024-11-04 DIAGNOSIS — R09.82 PND (POST-NASAL DRIP): ICD-10-CM

## 2024-11-04 DIAGNOSIS — J30.89 NON-SEASONAL ALLERGIC RHINITIS, UNSPECIFIED TRIGGER: Primary | ICD-10-CM

## 2024-11-04 DIAGNOSIS — J30.9 CHRONIC ALLERGIC RHINITIS: ICD-10-CM

## 2024-11-04 DIAGNOSIS — R43.9 SMELL OR TASTE SENSATION DISTURBANCE: ICD-10-CM

## 2024-11-04 PROCEDURE — 1160F RVW MEDS BY RX/DR IN RCRD: CPT | Mod: CPTII,S$GLB,, | Performed by: SPECIALIST

## 2024-11-04 PROCEDURE — 1159F MED LIST DOCD IN RCRD: CPT | Mod: CPTII,S$GLB,, | Performed by: SPECIALIST

## 2024-11-04 PROCEDURE — 99213 OFFICE O/P EST LOW 20 MIN: CPT | Mod: S$GLB,,, | Performed by: SPECIALIST

## 2024-11-04 PROCEDURE — 3078F DIAST BP <80 MM HG: CPT | Mod: CPTII,S$GLB,, | Performed by: SPECIALIST

## 2024-11-04 PROCEDURE — 99999 PR PBB SHADOW E&M-EST. PATIENT-LVL III: CPT | Mod: PBBFAC,,, | Performed by: SPECIALIST

## 2024-11-04 PROCEDURE — 3075F SYST BP GE 130 - 139MM HG: CPT | Mod: CPTII,S$GLB,, | Performed by: SPECIALIST

## 2024-11-04 PROCEDURE — 3008F BODY MASS INDEX DOCD: CPT | Mod: CPTII,S$GLB,, | Performed by: SPECIALIST

## 2024-11-04 RX ORDER — FLUTICASONE PROPIONATE 50 MCG
2 SPRAY, SUSPENSION (ML) NASAL DAILY
Qty: 18.2 ML | Refills: 11 | Status: SHIPPED | OUTPATIENT
Start: 2024-11-04

## 2024-11-04 RX ORDER — MONTELUKAST SODIUM 10 MG/1
10 TABLET ORAL NIGHTLY
Qty: 30 TABLET | Refills: 11 | Status: SHIPPED | OUTPATIENT
Start: 2024-11-04

## 2024-11-04 NOTE — PROGRESS NOTES
vSubjective:       Patient ID: Jered Contreras is a 49 y.o. male.    Chief Complaint: No chief complaint on file.      The patient is coming in for follow-up evaluation.  It has been four weeks since I last saw him.  It has been I have months since his endoscopic sinus surgery.  There are multiple issues to discuss:  1. Postop endoscopic sinus surgery:  The patient continues to breathe normally through both sides of his nose.  He is not having any nasal discharge  2. Allergic rhinitis: Nasal secretions are clear.  He has no significant sinonasal allergy issues.  He did experience episode acute flare of his allergies when he was exposed to a large amount of dust.  Symptoms subsided within 48 hours  He is using Zyrtec in the morning, montelukast at night and azelastine and fluticasone sprays twice daily routinely.  He uses ipratropium bromide sprays supplement when needed to control runny nose  3. Diminished sense of smell and taste:  His sense of taste has returned to normal.  His sense of smell continues to improve but is not yet back to normal.    4. Obstructive sleep apnea using CPAP:  He continues to use his CPAP  5. Recurring strep throat/chronic tonsillith formation.  The patient requires treatment for acute tonsillitis 3 times or more per year.  He continues to produce chronic tonsil debris that results in halitosis.            Review of Systems     Constitutional: Positive for fatigue.  Negative for appetite change, chills, fever and unexpected weight loss.      HENT: Positive for postnasal drip, runny nose, sinus pressure, sore throat, stuffy nose and tonsil infection.  Negative for ear discharge, ear infection, ear pain, facial swelling, hearing loss, mouth sores, nosebleeds, ringing in the ears, sinus infection, dental problems, trouble swallowing and voice change.  Chronic tonsillith formation      Eyes:  Negative for change in eyesight, eye drainage, eye itching and photophobia.      Respiratory:  Positive for cough and sleep apnea. Negative for shortness of breath, snoring and wheezing.      Cardiovascular:  Positive for foot swelling. Negative for chest pain, irregular heartbeat and swollen veins.     Gastrointestinal:  Negative for abdominal pain, acid reflux, constipation, diarrhea, heartburn and vomiting.     Genitourinary: Negative for difficulty urinating, sexual problems and frequent urination.     Musc: Negative for aching joints, aching muscles, back pain and neck pain.     Skin: Negative for rash.     Allergy: Positive for seasonal allergies. Negative for food allergies.     Endocrine: Negative for cold intolerance and heat intolerance.      Neurological: Positive for headaches. Negative for dizziness, light-headedness, seizures and tremors.      Hematologic: Negative for bruises/bleeds easily and swollen glands.      Psychiatric: Positive for depression and nervous/anxious. Negative for decreased concentration and sleep disturbance.                Objective:      Physical Exam  Vitals and nursing note reviewed.   Constitutional:       General: He is awake.      Appearance: Normal appearance. He is well-developed and well-groomed. He is morbidly obese.   HENT:      Head: Normocephalic.      Jaw: There is normal jaw occlusion.      Salivary Glands: Right salivary gland is not diffusely enlarged or tender. Left salivary gland is not diffusely enlarged or tender.      Right Ear: Hearing, ear canal and external ear normal. Tympanic membrane is retracted.      Left Ear: Hearing, ear canal and external ear normal. Tympanic membrane is retracted.      Nose: Septal deviation (low nose to the right and more posteriorly to the left), mucosal edema (cyanotic, boggy inferior turbinates bilaterally) and rhinorrhea (clear mucus bilaterally) present. No nasal deformity. Rhinorrhea is clear.      Right Turbinates: Enlarged and pale.      Left Turbinates: Enlarged and pale.      Mouth/Throat:       Lips: No lesions.      Mouth: No oral lesions.      Dentition: No gum lesions.      Tongue: No lesions.      Palate: No mass and lesions.      Pharynx: Oropharynx is clear. Uvula midline.   Eyes:      General: Lids are normal. Vision grossly intact.         Right eye: No discharge.         Left eye: No discharge.      Extraocular Movements: Extraocular movements intact.      Conjunctiva/sclera: Conjunctivae normal.      Pupils: Pupils are equal, round, and reactive to light.   Neck:      Thyroid: No thyroid mass or thyromegaly.      Trachea: Trachea normal. No tracheal deviation.   Cardiovascular:      Rate and Rhythm: Normal rate and regular rhythm.      Pulses: Normal pulses.      Heart sounds: Normal heart sounds.   Pulmonary:      Effort: Pulmonary effort is normal.      Breath sounds: Normal breath sounds. No stridor. No decreased breath sounds, wheezing, rhonchi or rales.   Abdominal:      General: Bowel sounds are normal.      Palpations: Abdomen is soft.      Tenderness: There is no abdominal tenderness.   Musculoskeletal:         General: Normal range of motion.      Cervical back: Normal range of motion. No muscular tenderness.   Lymphadenopathy:      Head:      Right side of head: No submental, submandibular, preauricular, posterior auricular or occipital adenopathy.      Left side of head: No submental, submandibular, preauricular, posterior auricular or occipital adenopathy.      Cervical: No cervical adenopathy.   Skin:     General: Skin is warm and dry.      Findings: No petechiae or rash.      Nails: There is no clubbing.   Neurological:      Mental Status: He is alert and oriented to person, place, and time.      Cranial Nerves: No cranial nerve deficit.      Sensory: No sensory deficit.      Gait: Gait normal.   Psychiatric:         Speech: Speech normal.         Behavior: Behavior normal. Behavior is cooperative.         Thought Content: Thought content normal.         Judgment: Judgment normal.                 Assessment:       1. Non-seasonal allergic rhinitis, unspecified trigger    2. PND (post-nasal drip)    3. Smell or taste sensation disturbance          Plan:        I  will have the patient continue with his present drug regimen.  I will recheck him in 3 months, or sooner on an as-needed basis.  We have discussed am undergoing a tonsillectomy.  His wife is currently in the hospital and will be in a neurological rehab facility for some time.  He wants to wait to proceed without until her condition is better.              DISCLAIMER: This note was prepared with [email or web address] voice recognition transcription software. Garbled syntax, mangled pronouns, and other bizarre constructions may be attributed to that software system. While efforts were made to correct any mistakes made by this voice recognition program, some errors and/or omissions may remain in the note that were missed when the note was originally created.

## 2024-11-17 ENCOUNTER — PATIENT MESSAGE (OUTPATIENT)
Dept: INTERNAL MEDICINE | Facility: CLINIC | Age: 49
End: 2024-11-17
Payer: COMMERCIAL

## 2024-12-06 ENCOUNTER — OFFICE VISIT (OUTPATIENT)
Dept: CARDIOLOGY | Facility: CLINIC | Age: 49
End: 2024-12-06
Payer: COMMERCIAL

## 2024-12-06 VITALS
WEIGHT: 315 LBS | HEART RATE: 77 BPM | HEIGHT: 72 IN | BODY MASS INDEX: 42.66 KG/M2 | DIASTOLIC BLOOD PRESSURE: 70 MMHG | SYSTOLIC BLOOD PRESSURE: 132 MMHG

## 2024-12-06 DIAGNOSIS — G47.33 OBSTRUCTIVE SLEEP APNEA TREATED WITH CONTINUOUS POSITIVE AIRWAY PRESSURE (CPAP): ICD-10-CM

## 2024-12-06 DIAGNOSIS — R60.0 EDEMA OF BOTH LOWER EXTREMITIES: Chronic | ICD-10-CM

## 2024-12-06 DIAGNOSIS — E66.01 MORBID OBESITY: Chronic | ICD-10-CM

## 2024-12-06 DIAGNOSIS — L97.919 VENOUS STASIS ULCERS OF BOTH LOWER EXTREMITIES: ICD-10-CM

## 2024-12-06 DIAGNOSIS — I83.029 VENOUS STASIS ULCERS OF BOTH LOWER EXTREMITIES: ICD-10-CM

## 2024-12-06 DIAGNOSIS — G89.29 CHRONIC PAIN OF LEFT KNEE: ICD-10-CM

## 2024-12-06 DIAGNOSIS — I83.11 VARICOSE VEINS OF BOTH LOWER EXTREMITIES WITH INFLAMMATION: Primary | ICD-10-CM

## 2024-12-06 DIAGNOSIS — I83.019 VENOUS STASIS ULCERS OF BOTH LOWER EXTREMITIES: ICD-10-CM

## 2024-12-06 DIAGNOSIS — I87.2 VENOUS INSUFFICIENCY OF BOTH LOWER EXTREMITIES: Chronic | ICD-10-CM

## 2024-12-06 DIAGNOSIS — I89.0 LYMPHEDEMA OF BOTH LOWER EXTREMITIES: ICD-10-CM

## 2024-12-06 DIAGNOSIS — L97.929 VENOUS STASIS ULCERS OF BOTH LOWER EXTREMITIES: ICD-10-CM

## 2024-12-06 DIAGNOSIS — M25.562 CHRONIC PAIN OF LEFT KNEE: ICD-10-CM

## 2024-12-06 DIAGNOSIS — I83.12 VARICOSE VEINS OF BOTH LOWER EXTREMITIES WITH INFLAMMATION: Primary | ICD-10-CM

## 2024-12-06 PROCEDURE — 99999 PR PBB SHADOW E&M-EST. PATIENT-LVL IV: CPT | Mod: PBBFAC,,, | Performed by: INTERNAL MEDICINE

## 2024-12-06 NOTE — PROGRESS NOTES
[facility]      CC: Venous Insufficiency      Patient ID: Jered Contreras is a 48 y.o. male with BLE lymphedema, venous insufficiency, morbid obesity s/p gastric sleeve, TAYO (on CPAP), who presents for a follow up appointment.  Pertinent history/events are as follows:     -Pt kindly referred by Dr. Dacosta for evaluation of venous insufficiency.     -At our initial clinic visit on 8/5/2022, Mr. Contreras reported leg swelling starting in 6/2022.  States he subsequently developed ulcer at the bilateral lower legs, which are in different stages of healing.  He has no claudication or rest pain.  BLE Venous Reflux Study on 8/2/2022 revealed hemodynamically significant venous reflux (reflux lasting greater than 500ms) in the bilateral greater saphenous veins.  Plan:    BLE Lymphedema and Venous insufficiency with Venous Stasis Ulcerations- Discontinue lasix and start bumex 0.5 mg bid.  Check cmp in 1 week.  Refer to wound care.  Check ARIANA study.  After ulcers heal, refer to lymphedema clinic.  Pt to limit sodium intake to 2,000 mg daily.  Limit volume intake to 1.5 liters daily.  Elevate legs when resting.   Morbid Obesity s/p Gastric Sleeve- Encourage diet, exercise and weight loss.  Follow up with Bariatric Surgery.  TAYO- Continue CPAP.    -At follow up visit on 9/22/2022, Mr. Contreras reported improvement in BLE edema and wounds.  ARIANA Study on 8/31/2022 revealed normal resting ARIANA's. Reduced exercise ARIANA's are consistent with mild PAD.  Plan:   BLE Lymphedema and Venous insufficiency with Venous Stasis Ulcerations- Improving.  Continue bumex 0.5 mg bid.  Continue wound care.  After ulcers heal, refer to lymphedema clinic.  Pt to limit sodium intake to 2,000 mg daily.  Limit volume intake to 1.5 liters daily.  Elevate legs when resting.   Morbid Obesity s/p Gastric Sleeve- Encourage diet, exercise and weight loss.  Follow up with Bariatric Surgery.  TAYO- Continue CPAP.    -2/14/2023 clinic  visit: Mr. Contreras reports worsening leg swelling starting in 12/2022.  He has no overt ulcers, no claudication or rest pain.  States he stopped taking bumex 0.5 mg bid after having bariatric surgery in 9/2022, but restarted bumex at 0.5 mg daily last week.   Plan:   BLE Lymphedema and Venous insufficiency/BLE Lymphedema- Refer to lymphedema clinic.  Restart bumex 0.5 mg bid.  Check cmp in 1 week.  Pt to limit sodium intake to 2,000 mg daily.  Limit volume intake to 1.5 liters daily.  Elevate legs when resting.   Morbid Obesity s/p Gastric Sleeve- Encourage diet, exercise and weight loss.    TAYO- Continue CPAP.    On 05/24/2024, Mr. Contreras reports significant improvement in leg swelling since clinic visit on 2/14/2023.  States he uses lymphedema pump every other day, and wears compression hose daily.    BLE Lymphedema and Venous insufficiency/BLE Lymphedema- Stable.  Continue bumex 0.5 mg bid.  Pt to limit sodium intake to 2,000 mg daily.  Limit volume intake to 1.5 liters daily.  Elevate legs when resting.   Morbid Obesity s/p Gastric Sleeve- Encourage diet, exercise and weight loss.    TAYO- Continue CPAP.    HPI:  Mr. Contreras reports not using the compression as well. Lost a lot of weight. Mild worsening Swelling    Past Medical History:   Diagnosis Date    Allergic rhinitis     Asthma     last flare up at 21 y/o    Corneal abrasion     Depression     Hx of psychiatric care     Morbid obesity     Obstructive sleep apnea treated with continuous positive airway pressure (CPAP) 07/10/2018    Psychiatric problem     Therapy      Past Surgical History:   Procedure Laterality Date    APPENDECTOMY      APPLICATION OF CARTILAGE GRAFT Bilateral 12/06/2021    Procedure: APPLICATION, CARTILAGE GRAFT;  Surgeon: JAN Arredondo MD;  Location: [facility];  Service: ENT;  Laterality: Bilateral;  from right ear    ENDOSCOPY, NOSE OR PARANASAL SINUS, WITH MAXILLARY SINUS TISSUE REMOVAL Bilateral 5/27/2024    Procedure:  ENDOSCOPY, NOSE OR PARANASAL SINUS, WITH MAXILLARY SINUS TISSUE REMOVAL;  Surgeon: JAN Arredondo MD;  Location: [facility], [place], operated by [facility] OR;  Service: ENT;  Laterality: Bilateral;    ETHMOIDECTOMY, TOTAL, ENDOSCOPIC Bilateral 5/27/2024    Procedure: ETHMOIDECTOMY, TOTAL, ENDOSCOPIC;  Surgeon: JAN Arredondo MD;  Location: [facility], [place], operated by [facility] OR;  Service: ENT;  Laterality: Bilateral;    FESS, WITH IMAGING GUIDANCE Bilateral 5/27/2024    Procedure: FESS, WITH IMAGING GUIDANCE;  Surgeon: JAN Arredondo MD;  Location: [facility], [place], operated by [facility] OR;  Service: ENT;  Laterality: Bilateral;    GASTRIC BYPASS  2020    gastric sleeve  09/2019    [facility]    NASAL SEPTOPLASTY Bilateral 12/06/2021    Procedure: SEPTOPLASTY, NOSE;  Surgeon: JAN Arredondo MD;  Location: [facility], [place], operated by [facility] OR;  Service: ENT;  Laterality: Bilateral;    NASAL STENOSIS REPAIR Bilateral 12/06/2021    Procedure: REPAIR, STENOSIS, NOSE, VESTIBULE;  Surgeon: JAN Arrednodo MD;  Location: [facility], [place], operated by [facility] OR;  Service: ENT;  Laterality: Bilateral;    SINUS SURGERY      SURGICAL REMOVAL OF NASAL TURBINATE Bilateral 5/27/2024    Procedure: EXCISION, NASAL TURBINATE;  Surgeon: JAN Arredondo MD;  Location: [facility], [place], operated by [facility] OR;  Service: ENT;  Laterality: Bilateral;    VASECTOMY  2017     Social History     Socioeconomic History    Marital status:    Occupational History    Occupation: Information Tech     Employer: [employer]   Tobacco Use    Smoking status: Never     Passive exposure: Never    Smokeless tobacco: Never   Substance and Sexual Activity    Alcohol use: Yes     Alcohol/week: 1.0 standard drink of alcohol     Types: 1 Cans of beer per week     Comment: RARELY    Drug use: No    Sexual activity: Yes     Partners: Female     Social Drivers of Health     Financial Resource Strain: Patient Declined (11/20/2023)    Overall Financial Resource Strain (CARDIA)     Difficulty of Paying Living Expenses: Patient declined   Food Insecurity: No Food Insecurity (11/20/2023)    Hunger Vital  Sign     Worried About Running Out of Food in the Last Year: Never true     Ran Out of Food in the Last Year: Never true   Transportation Needs: No Transportation Needs (2023)    PRAPARE - Transportation     Lack of Transportation (Medical): No     Lack of Transportation (Non-Medical): No   Physical Activity: Insufficiently Active (2023)    Exercise Vital Sign     Days of Exercise per Week: 3 days     Minutes of Exercise per Session: 30 min   Stress: Stress Concern Present (2023)    [facility] - Occupational Stress Questionnaire     Feeling of Stress : To some extent   Housing Stability: Low Risk  (2023)    Housing Stability Vital Sign     Unable to Pay for Housing in the Last Year: No     Number of Places Lived in the Last Year: 1     Unstable Housing in the Last Year: No     Family History   Problem Relation Name Age of Onset    Allergies Mother      Rheum arthritis Mother      Lung cancer Mother          post lobectomy    Dementia Mother      Glaucoma Mother      Cataracts Mother      Diabetes Father      Hypertension Father      No Known Problems Maternal Aunt      No Known Problems Maternal Uncle      No Known Problems Paternal Aunt      No Known Problems Paternal Uncle      Allergies Maternal Grandmother      Hypertension Maternal Grandmother      Dementia Maternal Grandmother      Brain cancer Maternal Grandmother          in remission before she     Rheum arthritis Maternal Grandfather      Diabetes Paternal Grandmother      Alzheimer's disease Paternal Grandmother      Hypertension Paternal Grandfather      Obesity Paternal Grandfather      Cerebral aneurysm Paternal Grandfather      No Known Problems Other      Melanoma Neg Hx         Review of patient's allergies indicates:   Allergen Reactions    Ceclor [cefaclor] Anaphylaxis    Penicillins Anaphylaxis     Elevated blood pressure, temp, hives    Calcium alginate Itching     Headache        Medication List with Changes/Refills   Current Medications    ASCORBATE CALCIUM (VITAMIN C ORAL)    Take by mouth once daily.    B COMPLEX VITAMINS (B COMPLEX-VITAMIN B12) TABLET    Take 1 tablet by mouth once daily.    BUMETANIDE (BUMEX) 0.5 MG TAB    Take 1 tablet (0.5 mg total) by mouth 2 (two) times daily.    BUPROPION (WELLBUTRIN XL) 300 MG 24 HR TABLET    Take 1 tablet (300 mg total) by mouth once daily.    DULOXETINE (CYMBALTA) 30 MG CAPSULE    Please give two separate bottles: one for duloxetine 30 mg po daily and one for duloxetine 60 mg po daily, total 90 mg daily    FLUTICASONE PROPIONATE (FLONASE) 50 MCG/ACTUATION NASAL SPRAY    2 sprays (100 mcg total) by Each Nostril route once daily.    HYDROXYZINE HCL (ATARAX) 25 MG TABLET    TK 1 T PO  TID PRN ANXIETY    IPRATROPIUM (ATROVENT) 21 MCG (0.03 %) NASAL SPRAY    2 sprays by Each Nostril route 2 (two) times daily. Use as needed to control runny nose or postnasal drip. May be use more often if needed    LAMOTRIGINE (LAMICTAL) 100 MG TABLET    Take one tab of Lamictal 100 mg po daily, plus two tab of Lamictal 25 mg po daily, total lamictal 125 mg po daily.    LAMOTRIGINE (LAMICTAL) 25 MG TABLET    Take 2 tab Lamictal 25 mg p.o. daily plus 1 tab Lamictal 100 mg po daily, total Lamictal 150 mg p.o. daily    MONTELUKAST (SINGULAIR) 10 MG TABLET    Take 1 tablet (10 mg total) by mouth every evening. Take during allergy season.    MULTIVITAMIN CAPSULE    Take 1 capsule by mouth once daily.    ROPINIROLE (REQUIP) 0.5 MG TABLET    Take 2 tablets (1 mg total) by mouth every evening.    TAMSULOSIN (FLOMAX) 0.4 MG CAP    Take 1 capsule (0.4 mg total) by mouth every evening.       Review of Systems  Constitution: Denies chills, fever, and sweats.  HENT: Denies headaches or blurry vision.  Cardiovascular: Denies chest pain or irregular heart beat.  Respiratory: Denies cough or shortness of breath.  Gastrointestinal: Denies abdominal pain, nausea, or  vomiting.  Musculoskeletal: Positive for leg swelling with ulcers.  Neurological: Denies dizziness or focal weakness.  Psychiatric/Behavioral: Normal mental status.  Hematologic/Lymphatic: Denies bleeding problem or easy bruising/bleeding.  Skin: Denies rash or suspicious lesions    Physical Examination  There were no vitals taken for this visit.    Constitutional: No acute distress, conversant  HEENT: Sclera anicteric, Pupils equal, round and reactive to light, extraocular motions intact, Oropharynx clear  Neck: No JVD, no carotid bruits  Cardiovascular: regular rate and rhythm, no murmur, rubs or gallops, normal S1/S2  Pulmonary: Clear to auscultation bilaterally  Abdominal: Abdomen soft, nontender, nondistended, positive bowel sounds  Extremities: BLE's with 2+ pitting edema, prominent varicose veins, and changes consistent with lymphedema    Pulses:  Carotid pulses are 2+ on the right side, and 2+ on the left side.  Radial pulses are 2+ on the right side, and 2+ on the left side.   Femoral pulses are 2+ on the right side, and 2+ on the left side.  Popliteal pulses are 2+ on the right side, and 2+ on the left side.   Dorsalis pedis pulses are 2+ on the right side, and 2+ on the left side.   Posterior tibial pulses are 2+ on the right side, and 2+ on the left side.    Skin: No ecchymosis, erythema, or ulcers  Psych: Alert and oriented x 3, appropriate affect  Neuro: CNII-XII intact, no focal deficits    Labs:  Most Recent Data  CBC:   Lab Results   Component Value Date    WBC 11.99 05/28/2024    HGB 11.0 (L) 05/28/2024    HCT 33.8 (L) 05/28/2024     05/28/2024    MCV 94 05/28/2024    RDW 13.3 05/28/2024     BMP:   Lab Results   Component Value Date     05/28/2024    K 3.8 05/28/2024     05/28/2024    CO2 24 05/28/2024    BUN 9 05/28/2024    CREATININE 0.7 05/28/2024    GLU 99 05/28/2024    CALCIUM 8.9 05/28/2024    MG 1.8 05/28/2024    PHOS 3.1 05/28/2024     LFTS;   Lab Results   Component  Value Date    PROT 6.7 05/23/2024    ALBUMIN 3.9 05/23/2024    BILITOT 0.5 05/23/2024    AST 21 05/23/2024    ALKPHOS 95 05/23/2024    ALT 27 05/23/2024     COAGS:   Lab Results   Component Value Date    INR 1.0 05/23/2024     FLP:   Lab Results   Component Value Date    CHOL 152 09/29/2023    HDL 38 (L) 09/29/2023    LDLCALC 105.2 09/29/2023    TRIG 44 09/29/2023    CHOLHDL 25.0 09/29/2023     CARDIAC:   Lab Results   Component Value Date    BNP <10 08/02/2022     Imaging:    ARIANA Study 8/31/2022:  Normal resting ARIANA's.  Reduced exercise ARIANA's are consistent with mild PAD.     BLE Venous Reflux Study 8/2/2022:  Hemodynamically significant venous reflux (reflux lasting greater than 500ms) in the bilateral greater saphenous veins.    Assessment/Plan:  Jered Contreras is a 48 y.o. male BLE lymphedema, with venous insufficiency, morbid obesity s/p gastric sleeve, TAYO (on CPAP), who presents for a follow up appointment.      1. BLE Lymphedema and Venous insufficiency/BLE Lymphedema- Stable.  Continue bumex 0.5 mg bid.  Pt to limit sodium intake to 2,000 mg daily.  Limit volume intake to 1.5 liters daily.  Elevate legs when resting. Positive for leg swelling with ulcers??     2. Morbid Obesity s/p Gastric Sleeve- Encourage diet, exercise and weight loss.      3. TAYO- Continue CPAP.      Follow up in 6 months    Total duration of face to face visit time 30 minutes.  Total time spent counseling greater than fifty percent of total visit time.  Counseling included discussion regarding imaging findings, diagnosis, possibilities, treatment options, risks and benefits.  The patient had many questions regarding the options and long-term effects.    Patient seen and discussed with Dr. Marquez. Further recommendations per the attending addendum.    Kamille Rascon MD  PGY IV - Vascular Medicine Fellow   [facility]

## 2024-12-19 DIAGNOSIS — F41.9 ANXIETY: ICD-10-CM

## 2024-12-19 DIAGNOSIS — G25.81 RESTLESS LEG: ICD-10-CM

## 2024-12-19 RX ORDER — ROPINIROLE 0.5 MG/1
1 TABLET, FILM COATED ORAL NIGHTLY
Qty: 30 TABLET | Refills: 6 | Status: SHIPPED | OUTPATIENT
Start: 2024-12-19

## 2024-12-19 RX ORDER — HYDROXYZINE HYDROCHLORIDE 25 MG/1
TABLET, FILM COATED ORAL
Qty: 30 TABLET | Refills: 6 | Status: SHIPPED | OUTPATIENT
Start: 2024-12-19

## 2024-12-19 NOTE — TELEPHONE ENCOUNTER
No care due was identified.  [facility] Embedded Care Due Messages. Reference number: [id number].   12/19/2024 12:50:49 AM CST

## 2025-01-17 ENCOUNTER — OFFICE VISIT (OUTPATIENT)
Dept: OTOLARYNGOLOGY | Facility: CLINIC | Age: 50
End: 2025-01-17
Payer: COMMERCIAL

## 2025-01-17 VITALS
SYSTOLIC BLOOD PRESSURE: 141 MMHG | BODY MASS INDEX: 46.85 KG/M2 | OXYGEN SATURATION: 97 % | WEIGHT: 315 LBS | HEART RATE: 112 BPM | DIASTOLIC BLOOD PRESSURE: 72 MMHG

## 2025-01-17 DIAGNOSIS — R51.9 NONINTRACTABLE EPISODIC HEADACHE, UNSPECIFIED HEADACHE TYPE: ICD-10-CM

## 2025-01-17 DIAGNOSIS — J30.89 NON-SEASONAL ALLERGIC RHINITIS, UNSPECIFIED TRIGGER: Primary | ICD-10-CM

## 2025-01-17 DIAGNOSIS — R09.82 PND (POST-NASAL DRIP): ICD-10-CM

## 2025-01-17 DIAGNOSIS — R43.9 SMELL OR TASTE SENSATION DISTURBANCE: ICD-10-CM

## 2025-01-17 PROCEDURE — 3078F DIAST BP <80 MM HG: CPT | Mod: CPTII,S$GLB,, | Performed by: SPECIALIST

## 2025-01-17 PROCEDURE — 1159F MED LIST DOCD IN RCRD: CPT | Mod: CPTII,S$GLB,, | Performed by: SPECIALIST

## 2025-01-17 PROCEDURE — 99999 PR PBB SHADOW E&M-EST. PATIENT-LVL III: CPT | Mod: PBBFAC,,, | Performed by: SPECIALIST

## 2025-01-17 PROCEDURE — 1160F RVW MEDS BY RX/DR IN RCRD: CPT | Mod: CPTII,S$GLB,, | Performed by: SPECIALIST

## 2025-01-17 PROCEDURE — 99213 OFFICE O/P EST LOW 20 MIN: CPT | Mod: S$GLB,,, | Performed by: SPECIALIST

## 2025-01-17 PROCEDURE — 3077F SYST BP >= 140 MM HG: CPT | Mod: CPTII,S$GLB,, | Performed by: SPECIALIST

## 2025-01-17 PROCEDURE — 3008F BODY MASS INDEX DOCD: CPT | Mod: CPTII,S$GLB,, | Performed by: SPECIALIST

## 2025-01-17 RX ORDER — AZELASTINE 1 MG/ML
SPRAY, METERED NASAL
Qty: 30 ML | Refills: 11 | Status: SHIPPED | OUTPATIENT
Start: 2025-01-17

## 2025-01-17 NOTE — PROGRESS NOTES
"vSubjective:       Patient ID: Jered Contreras is a 49 y.o. male.    Chief Complaint: No chief complaint on file.      The patient is coming in for follow-up evaluation.  It has been 2 months since I last saw him.  It has been I have 8 months since his endoscopic sinus surgery.  There are multiple issues to discuss:  1. Postop endoscopic sinus surgery:  The patient continues to breathe normally through both sides of his nose.  He is not having any nasal discharge  2. Allergic rhinitis: Nasal secretions are clear.  He has no significant sinonasal allergy issues.  He did experience episode acute flare of his allergies when he was exposed to a large amount of dust.  Symptoms subsided within 48 hours  He is using Zyrtec in the morning, montelukast at night and azelastine and fluticasone sprays twice daily routinely.  He uses ipratropium bromide sprays supplement when needed to control runny nose  3. Diminished sense of smell and taste:  His sense of taste is still decreased  he is able to smell only "very strong scents." 4. Obstructive sleep apnea using CPAP:  He continues to use his CPAP  5. Recurring strep throat/chronic tonsillith formation.  The patient has had 3 episodes acute tonsillitis in 2024.  He has not had 1 since his last visit.  6. Headaches: His headaches have improved significantly the          Review of Systems     Constitutional: Positive for fatigue.  Negative for appetite change, chills, fever and unexpected weight loss.      HENT: Positive for postnasal drip, runny nose, sinus pressure, sore throat, stuffy nose and tonsil infection.  Negative for ear discharge, ear infection, ear pain, facial swelling, hearing loss, mouth sores, nosebleeds, ringing in the ears, sinus infection, dental problems, trouble swallowing and voice change.  Chronic tonsillith formation      Eyes:  Negative for change in eyesight, eye drainage, eye itching and photophobia.     Respiratory:  Positive for cough and " sleep apnea. Negative for shortness of breath, snoring and wheezing.      Cardiovascular:  Positive for foot swelling. Negative for chest pain, irregular heartbeat and swollen veins.     Gastrointestinal:  Negative for abdominal pain, acid reflux, constipation, diarrhea, heartburn and vomiting.     Genitourinary: Negative for difficulty urinating, sexual problems and frequent urination.     Musc: Negative for aching joints, aching muscles, back pain and neck pain.     Skin: Negative for rash.     Allergy: Positive for seasonal allergies. Negative for food allergies.     Endocrine: Negative for cold intolerance and heat intolerance.      Neurological: Positive for headaches. Negative for dizziness, light-headedness, seizures and tremors.      Hematologic: Negative for bruises/bleeds easily and swollen glands.      Psychiatric: Positive for depression and nervous/anxious. Negative for decreased concentration and sleep disturbance.                Objective:      Physical Exam  Vitals and nursing note reviewed.   Constitutional:       General: He is awake.      Appearance: Normal appearance. He is well-developed and well-groomed. He is morbidly obese.   HENT:      Head: Normocephalic.      Jaw: There is normal jaw occlusion.      Salivary Glands: Right salivary gland is not diffusely enlarged or tender. Left salivary gland is not diffusely enlarged or tender.      Right Ear: Hearing, ear canal and external ear normal. Tympanic membrane is retracted.      Left Ear: Hearing, ear canal and external ear normal. Tympanic membrane is retracted.      Nose: Septal deviation (low nose to the right and more posteriorly to the left), mucosal edema (cyanotic, boggy inferior turbinates bilaterally) and rhinorrhea (clear mucus bilaterally) present. No nasal deformity. Rhinorrhea is clear.      Right Turbinates: Enlarged and pale.      Left Turbinates: Enlarged and pale.      Mouth/Throat:      Lips: No lesions.      Mouth: No oral  lesions.      Dentition: No gum lesions.      Tongue: No lesions.      Palate: No mass and lesions.      Pharynx: Oropharynx is clear. Uvula midline.   Eyes:      General: Lids are normal. Vision grossly intact.         Right eye: No discharge.         Left eye: No discharge.      Extraocular Movements: Extraocular movements intact.      Conjunctiva/sclera: Conjunctivae normal.      Pupils: Pupils are equal, round, and reactive to light.   Neck:      Thyroid: No thyroid mass or thyromegaly.      Trachea: Trachea normal. No tracheal deviation.   Cardiovascular:      Rate and Rhythm: Normal rate and regular rhythm.      Pulses: Normal pulses.      Heart sounds: Normal heart sounds.   Pulmonary:      Effort: Pulmonary effort is normal.      Breath sounds: Normal breath sounds. No stridor. No decreased breath sounds, wheezing, rhonchi or rales.   Abdominal:      General: Bowel sounds are normal.      Palpations: Abdomen is soft.      Tenderness: There is no abdominal tenderness.   Musculoskeletal:         General: Normal range of motion.      Cervical back: Normal range of motion. No muscular tenderness.   Lymphadenopathy:      Head:      Right side of head: No submental, submandibular, preauricular, posterior auricular or occipital adenopathy.      Left side of head: No submental, submandibular, preauricular, posterior auricular or occipital adenopathy.      Cervical: No cervical adenopathy.   Skin:     General: Skin is warm and dry.      Findings: No petechiae or rash.      Nails: There is no clubbing.   Neurological:      Mental Status: He is alert and oriented to person, place, and time.      Cranial Nerves: No cranial nerve deficit.      Sensory: No sensory deficit.      Gait: Gait normal.   Psychiatric:         Speech: Speech normal.         Behavior: Behavior normal. Behavior is cooperative.         Thought Content: Thought content normal.         Judgment: Judgment normal.                Assessment:       1.  Non-seasonal allergic rhinitis, unspecified trigger    2. PND (post-nasal drip)    3. Smell or taste sensation disturbance    4. Nonintractable episodic headache, unspecified headache type          Plan:        I  will have the patient use azelastine nasal spray with fluticasone nasal spray twice daily in both nostrils.  I did demonstrate to the patient proper method for using the sprays.  He will continue using ipratropium bromide as needed in montelukast every night.  I will check him in 3 months, or sooner on an as-needed basis.                DISCLAIMER: This note was prepared with [employer] voice recognition transcription software. Garbled syntax, mangled pronouns, and other bizarre constructions may be attributed to that software system. While efforts were made to correct any mistakes made by this voice recognition program, some errors and/or omissions may remain in the note that were missed when the note was originally created.

## 2025-01-29 RX ORDER — DULOXETIN HYDROCHLORIDE 30 MG/1
CAPSULE, DELAYED RELEASE ORAL
Qty: 90 CAPSULE | Refills: 1 | Status: SHIPPED | OUTPATIENT
Start: 2025-01-29

## 2025-02-05 ENCOUNTER — PATIENT MESSAGE (OUTPATIENT)
Dept: ADMINISTRATIVE | Facility: HOSPITAL | Age: 50
End: 2025-02-05
Payer: COMMERCIAL

## 2025-02-05 ENCOUNTER — PATIENT MESSAGE (OUTPATIENT)
Dept: INTERNAL MEDICINE | Facility: CLINIC | Age: 50
End: 2025-02-05
Payer: COMMERCIAL

## 2025-02-05 ENCOUNTER — E-VISIT (OUTPATIENT)
Dept: INTERNAL MEDICINE | Facility: CLINIC | Age: 50
End: 2025-02-05
Payer: COMMERCIAL

## 2025-02-05 DIAGNOSIS — R35.0 URINARY FREQUENCY: ICD-10-CM

## 2025-02-05 DIAGNOSIS — Z12.12 ENCOUNTER FOR COLORECTAL CANCER SCREENING: Primary | ICD-10-CM

## 2025-02-05 DIAGNOSIS — Z12.11 ENCOUNTER FOR COLORECTAL CANCER SCREENING: Primary | ICD-10-CM

## 2025-02-05 PROCEDURE — 99421 OL DIG E/M SVC 5-10 MIN: CPT | Mod: ,,, | Performed by: INTERNAL MEDICINE

## 2025-02-05 RX ORDER — LAMOTRIGINE 25 MG/1
TABLET ORAL
Qty: 90 TABLET | Refills: 0 | Status: SHIPPED | OUTPATIENT
Start: 2025-02-05 | End: 2025-02-13 | Stop reason: SDUPTHER

## 2025-02-05 RX ORDER — DULOXETIN HYDROCHLORIDE 30 MG/1
CAPSULE, DELAYED RELEASE ORAL
Qty: 90 CAPSULE | Refills: 0 | Status: SHIPPED | OUTPATIENT
Start: 2025-02-05 | End: 2025-02-13 | Stop reason: SDUPTHER

## 2025-02-05 RX ORDER — LAMOTRIGINE 100 MG/1
TABLET ORAL
Qty: 90 TABLET | Refills: 0 | Status: SHIPPED | OUTPATIENT
Start: 2025-02-05 | End: 2025-02-13 | Stop reason: SDUPTHER

## 2025-02-06 DIAGNOSIS — Z12.11 SCREENING FOR COLON CANCER: ICD-10-CM

## 2025-02-06 NOTE — TELEPHONE ENCOUNTER
"Pt requesting a refill on Flomax, prescribed by urologist,  on med list 1/15/2025    LOV with urology Dr. Chase 2024- "RTC 6 weeks to discuss Flomax efficacy"    Pt has scheduled an office visit with urology 2/10/2025     LOV with Angela Dacosta MD , 2024 pre-op  "

## 2025-02-07 RX ORDER — TAMSULOSIN HYDROCHLORIDE 0.4 MG/1
0.4 CAPSULE ORAL NIGHTLY
Qty: 90 CAPSULE | Refills: 3 | Status: SHIPPED | OUTPATIENT
Start: 2025-02-07 | End: 2026-02-07

## 2025-02-07 NOTE — PROGRESS NOTES
Patient ID: Jered Contreras is a 49 y.o. male.    Chief Complaint: General Illness (Entered automatically based on patient selection in [employer].)    The patient initiated a request through [employer] on 2/5/2025 for evaluation and management with a chief complaint of General Illness (Entered automatically based on patient selection in [employer].)     I evaluated the questionnaire submission on 02/07/2025  .    [facility]    2/7/2025 10:49 AM CST - Filed by Patient   What do you need help with? Urinary Symptoms   Do you agree to participate in an E-Visit? Yes   If you have any of the following symptoms, please present to your local emergency room or call 911:  I acknowledge   What is the main issue you would like addressed today? I need a refill for my Flomax med.   What symptoms do you currently have? Difficulty passing urine   When did your symptoms first appear? 2/1/2025   List what you have done or taken to help your symptoms. Nothing wrong, i just need a nee flomax prescription. I dont have a way to request more refills.   Please indicate whether you have had the following symptoms during the past 24 hours     Urgent urination (a sudden and uncontrollable urge to urinate) None   Frequent urination of small amounts of urine (going to the toilet very often) None   Burning pain when urinating None   Incomplete bladder emptying (still feel like you need to urinate again after urination) None   Pain not associated with urination in the lower abdomen below the belly button) None   What does your urine look like? Clear   Blood seen in the urine None   Flank pain (pain in one or both sides of the lower back) None   Discharge from the urethra (urinary opening) without urination None   High body temperature/fever? None-<99.5   Please rate how much discomfort you have experience because of the symptoms in the past 24 hours: None   Please indicate how the symptoms have interfered with your  every day activities/work in the past 24 hours: None   Please indicate how these symptoms have interfered with your social activities (visiting people, meeting with friends, etc.) in the past 24 hours? None   Are you a diabetic? No   Provide any additional information you feel is important. Ivana had problems with frequent urination and the flowmax was helping.   Please attach any relevant images or files (if you have performed a home test for UTI, please submit a photo of results)    Are you able to take your vital signs? No         Encounter Diagnosis   Name Primary?    Urinary frequency         No orders of the defined types were placed in this encounter.     Medications Ordered This Encounter   Medications    tamsulosin (FLOMAX) 0.4 mg Cap     Sig: Take 1 capsule (0.4 mg total) by mouth every evening.     Dispense:  90 capsule     Refill:  3        No follow-ups on file.      E-Visit Time Trackin minutes

## 2025-02-13 ENCOUNTER — OFFICE VISIT (OUTPATIENT)
Dept: PSYCHIATRY | Facility: CLINIC | Age: 50
End: 2025-02-13
Payer: COMMERCIAL

## 2025-02-13 DIAGNOSIS — F41.1 GAD (GENERALIZED ANXIETY DISORDER): Primary | ICD-10-CM

## 2025-02-13 DIAGNOSIS — F33.41 RECURRENT MAJOR DEPRESSIVE DISORDER, IN PARTIAL REMISSION: ICD-10-CM

## 2025-02-13 RX ORDER — DULOXETIN HYDROCHLORIDE 30 MG/1
CAPSULE, DELAYED RELEASE ORAL
Qty: 90 CAPSULE | Refills: 0 | Status: SHIPPED | OUTPATIENT
Start: 2025-02-13

## 2025-02-13 RX ORDER — BUPROPION HYDROCHLORIDE 300 MG/1
300 TABLET ORAL DAILY
Qty: 90 TABLET | Refills: 1 | Status: SHIPPED | OUTPATIENT
Start: 2025-02-13 | End: 2025-05-14

## 2025-02-13 RX ORDER — LAMOTRIGINE 100 MG/1
TABLET ORAL
Qty: 90 TABLET | Refills: 0 | Status: SHIPPED | OUTPATIENT
Start: 2025-02-13

## 2025-02-13 RX ORDER — LAMOTRIGINE 25 MG/1
TABLET ORAL
Qty: 90 TABLET | Refills: 0 | Status: SHIPPED | OUTPATIENT
Start: 2025-02-13

## 2025-02-13 NOTE — PROGRESS NOTES
"Outpatient Psychiatry Follow-Up Visit (MD/NP)    2/13/2025   The patient location is: [facility]  The chief complaint leading to consultation is:  Depression, anxiety and history of panic attacks    Visit type: audiovisual    Face to Face time with patient: 30 minutes  35 minutes of total time spent on the encounter, which includes face to face time and non-face to face time preparing to see the patient (eg, review of tests), Obtaining and/or reviewing separately obtained history, Documenting clinical information in the electronic or other health record, Independently interpreting results (not separately reported) and communicating results to the patient/family/caregiver, or Care coordination (not separately reported).         Each patient to whom he or she provides medical services by telemedicine is:  (1) informed of the relationship between the physician and patient and the respective role of any other health care provider with respect to management of the patient; and (2) notified that he or she may decline to receive medical services by telemedicine and may withdraw from such care at any time.    Notes:       Clinical Status of Patient:  Outpatient (Ambulatory)    Chief Complaint:  Jered Contreras is a 49 y.o. male who presents today for follow-up of depression, anxiety and  history of panic attacks .  Met with patient.      Interval History and Content of Current Session:  Interim Events/Subjective Report/Content of Current Session:   Reviewed messages from communication with patient since his last visit      Patient described his mood as "a little stressed" and affect was appropriate with full range.   He stated his wife had a fall and is now paralyzed and is bed bound. He stated she was in rehab and is trying to get her in rehab again but it has been difficult finding a rehab. 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 He stated he is working full time and his supervisor is " understanding.    He reported some increase in anxiety and depression due to his wife's current situation. He stated his current medication regimen is helpful in stabilizing his symptoms but stated his anxiety increases and fluctuates due to environmental stressors and requested increasing duloxetine to 120 mg po daily. We discussed coping skills to manage his anxiety until he sees his PCP, get his vitals rechecked, and gets clearance to increase duloxetine to 120 mg po daily. He stated his BP and hear rate were elevated at his last doctors visit because he took the stairs immediately before his vitals were taken and attributes this to his elevated vitals.    He stated overall duloxetine 90 mg daily has been helpful in improving his depression and anxiety. He stated he is learning how to be mindful of his responses and stated irritability is managed. He stated his depression fluctuates with situational stressors but managed at this time. He didn't report any panic attacks. He stated he is utilizing effective communication and coping skills.     He stated he is taking Lamictal 125 mg po daily, Cymbalta 90 mg po daily. and Wellbutrin xl 300 mg po daily and finds his medications to be helpful in managing his anxiety, mood, and depression. He reported okay sleep and gets about 6 sleep per night at this time (has sleep apnea and uses CPAP). He takes melatonin as needed for insomnia. He is not taking vistaril. We discussed the importance of utilizing effective sleep hygiene skills and melatonin as needed. Provided supportive therapy and encouraged utilization of CBT skills. Patient denied SI/HI/AH/VH and no delusion noted or reported.  Patient denied suicidal ideation and denied suicide plan or intent.  Patient denied symptoms of stuart or hypomania. He denied alcohol abuse or any type of illicit drug use.    He stated he had a revision to his gastric surgery and it went well and no physical complaints. He reported normal  "appetite and no changes in weight. He stated he plans to see his PCP next week and provide us with an update on his vitals and medical health    Rated his depression and anxiety at 6/10 and "sometimes it's higher on stressful days".     Psychiatric Review Of Systems - Is patient experiencing or having changes in:  sleep: better. He gets 6 hours of sleep per night. He takes melatonin as needed  appetite: normal   Weight: no same  energy/anergy: fluctuates but overall normal  interest/pleasure/anhedonia: no  somatic symptoms: no  anxiety/panic: yes increased  guilty/hopelessness: no  concentration: yes  S.I.B.s/risky behavior: no  Irritability: improved   Racing thoughts: worry  Impulsive behaviors: no  Paranoia:no  AVH:no  Suicidal thoughts/plan/intent: no  Se: no  ETOH: no  Drugs: no      Psychotropic medication review  Previous Trials-  -lexapro (worked for a while but stopped working and PCP switched him to Zoloft)  -Zoloft 200 mg po daily  -vistaril 25 mg po prn daily    Current meds-  -Wellbutrin xl 300mg po daily  -Cymbalta 90 mg po daily  -Lamictal 125 mg po daily       Psychotherapy:  Target symptoms: depression, anxiety   Why chosen therapy is appropriate versus another modality: relevant to diagnosis, patient responds to this modality, evidence based practice  Outcome monitoring methods: self-report, observation  Therapeutic intervention type: insight oriented psychotherapy, behavior modifying psychotherapy, supportive psychotherapy  Topics discussed/themes: building skills sets for symptom management, symptom recognition  The patient's response to the intervention is motivated. The patient's progress toward treatment goals is fair.   Duration of intervention: 10 minutes.    Review of Systems   PSYCHIATRIC: Pertinant items are noted in the narrative.  CONSTITUTIONAL: No weight gain or loss.   MUSCULOSKELETAL: No pain or stiffness of the joints.  NEUROLOGIC: No weakness, sensory changes, seizures, confusion, " "memory loss, tremor or other abnormal movements.  ENDOCRINE: No polydipsia or polyuria.  INTEGUMENTARY: No rashes or lacerations.  EYES: No exophthalmos, jaundice or blindness.  ENT: No dizziness, tinnitus or hearing loss.  RESPIRATORY: No shortness of breath.  CARDIOVASCULAR: No tachycardia or chest pain.  GASTROINTESTINAL: No nausea, vomiting, pain, constipation or diarrhea.  GENITOURINARY: No frequency, dysuria or sexual dysfunction.  HEMATOLOGIC/LYMPHATIC: No excessive bleeding, prolonged or excessive bleeding after dental extraction/injury.  ALLERGIC/IMMUNOLOGIC: No allergic response to materials, foods or animals at this time.    Past Medical, Family and Social History: The patient's past medical, family and social history have been reviewed and updated as appropriate within the electronic medical record - see encounter notes.    Compliance: yes    Side effects: None    Risk Parameters:  Patient reports no suicidal ideation  Patient reports no homicidal ideation  Patient reports no self-injurious behavior  Patient reports no violent behavior    Exam (detailed: at least 9 elements; comprehensive: all 15 elements)   Constitutional  Vitals:  Most recent vital signs, dated greater than 90 days prior to this appointment, were reviewed.   There were no vitals filed for this visit.     General:  unremarkable, age appropriate     Musculoskeletal  Muscle Strength/Tone:  no dystonia, no tremor, no tic   Gait & Station:  non-ataxic     Psychiatric  Speech:  no latency; no press   Mood & Affect:  "Okay"  appropriate    Thought Process:  normal and logical   Associations:  intact   Thought Content:  normal, no suicidality, no homicidality, delusions, or paranoia   Insight:  intact, has awareness of illness   Judgement: behavior is adequate to circumstances   Orientation:  grossly intact   Memory: intact for content of interview, grossly intact   Language: grossly intact, able to name, able to repeat   Attention Span & " Concentration:  able to focus   Fund of Knowledge:  intact and appropriate to age and level of education, familiar with aspects of current personal life     Assessment and Diagnosis   Status/Progress: Based on the examination today, the patient's problem(s) is/are improved.  New problems have not been presented today.   Co-morbidities, Diagnostic uncertainty and Lack of compliance are not complicating management of the primary condition.    General Impression: Doing okay and stable. Patient wants to continue with the same dosages of his medications due to their effectiveness in treating his symptoms. 2/13/25 stable and doing okay with her current medication regimen. He reported increased anxiety due to his wife's situation and requested increasing duloxetine to 120 mg po daily. Patient's vitals BP and HR was elevated and plans to see his PCP next week. We discussed if BP and HR are normal will increase Cymbalta to 120 mg po daily to address his depression and anxiety. Patient attributes his elevated BP and HR to taking the stairs and getting his vitals immediately after that. Patient agreed to utilize effective coping skills to manage his anxiety and will give up and update next week.       ICD-10-CM ICD-9-CM   1. Moderate episode of recurrent major depressive disorder  F33.1 296.32   2. WAYNE (generalized anxiety disorder)  F41.1 300.02   3. panic attacks          Intervention/Counseling/Treatment Plan   Medication Management: Continue current medications. The risks and benefits of medication were discussed with the patient.  Medication Management:   Reviewed communication after his last visit  Encouraged patient to get his vitals today and encouraged follow up with his PCP and he agreed.   Continue Wellbutrin xl 300 mg po daily for MDD and WAYNE  Continue Cymbalta 90 mg po daily or WAYNE and MDD. Per patient he wants to increase Cymbalta to 120 mg daily but most recent BP and HR were elevated at and will wait until he  sees his PCP next week for guidance.   Continue Lamictal 125 mg po daily for mood stabilization. Warned against SJS and all the associated side effects and he agreed to take it.   Recommended psychotherapy to learn effective coping skills   Continue utilization of effective CBT skills, positive self-talk, cognitive reframing, meditation, mindfulness, deep breathing, and relaxations skills.  Encouraged effective sleep hygiene and continue melatonin 3-5 mg po qhs prn for insomnia      Labs: reviewed most recent labs and encouraged follow up with his PCP regarding CBC and lipids and he agreed.   The treatment plan and follow up plan were reviewed with the patient.  Discussed with patient informed consent, risks vs. benefits, alternative treatments, side effect profile and the inherent unpredictability of individual responses to these treatments. The patient expresses understanding of the above and displays the capacity to agree with this current plan and had no other questions.  Encouraged Patient to keep future appointments.   Take medications as prescribed   In the event of an emergency patient was advised to go to the emergency room.      Return to Clinic: 2 months, 3 months or earlier as needed    YI Kwon-BC

## 2025-02-20 ENCOUNTER — TELEPHONE (OUTPATIENT)
Dept: INTERNAL MEDICINE | Facility: CLINIC | Age: 50
End: 2025-02-20
Payer: COMMERCIAL

## 2025-02-20 NOTE — TELEPHONE ENCOUNTER
Please call patient  The appointment slot for 1 PM was double booked erroneously  Please see if he can come for noon the same day as that spot is open  Then make sure to close the 1 PM double book

## 2025-02-21 ENCOUNTER — HOSPITAL ENCOUNTER (OUTPATIENT)
Dept: RADIOLOGY | Facility: OTHER | Age: 50
Discharge: HOME | End: 2025-02-21
Attending: STUDENT IN AN ORGANIZED HEALTH CARE EDUCATION/TRAINING PROGRAM
Payer: COMMERCIAL

## 2025-02-21 ENCOUNTER — OFFICE VISIT (OUTPATIENT)
Dept: PAIN MEDICINE | Facility: CLINIC | Age: 50
End: 2025-02-21
Payer: COMMERCIAL

## 2025-02-21 VITALS
BODY MASS INDEX: 47.87 KG/M2 | SYSTOLIC BLOOD PRESSURE: 106 MMHG | RESPIRATION RATE: 18 BRPM | DIASTOLIC BLOOD PRESSURE: 72 MMHG | TEMPERATURE: 99 F | OXYGEN SATURATION: 98 % | WEIGHT: 315 LBS | HEART RATE: 60 BPM

## 2025-02-21 DIAGNOSIS — E66.01 OBESITY, MORBID, BMI 40.0-49.9: ICD-10-CM

## 2025-02-21 DIAGNOSIS — M17.12 PRIMARY OSTEOARTHRITIS OF LEFT KNEE: Primary | ICD-10-CM

## 2025-02-21 DIAGNOSIS — G89.29 CHRONIC PAIN OF LEFT KNEE: ICD-10-CM

## 2025-02-21 DIAGNOSIS — M25.562 CHRONIC PAIN OF LEFT KNEE: ICD-10-CM

## 2025-02-21 PROCEDURE — 73564 X-RAY EXAM KNEE 4 OR MORE: CPT | Mod: TC,FY,LT

## 2025-02-21 PROCEDURE — 73564 X-RAY EXAM KNEE 4 OR MORE: CPT | Mod: 26,LT,, | Performed by: RADIOLOGY

## 2025-02-21 NOTE — PROGRESS NOTES
Chronic Pain Established Patient Note      SUBJECTIVE:  Interval History 02/21/2025  Patient is here for f/u of left knee.  Patient completed PT and pain has been manageable but recently experiencing more pain since he has been taking care of his wife who recently became paralyzed from accident.  Using volteran gel, lidocaine spray which helps somewhat.  Pain worse with walking, standing, pivoting while helping to care for wife. Pain can get up to 9/10. On exam he has audible crepitus.       Interval History 4/17/24:   Patient presents for follow up of left knee pain. LCV he was referred to PT and recommended continued pain medication regimen with APAP 1000mg TID, voltaren gel PRN, and heat. XR of the knee was also ordered that revealed no acute fractures and well-maintained joint spaces. He reports participating in one session of PT that went well. He has been working to increase strength in surrounding knee musculature and working to improve lymphedema of the LEs with compression stockings. He reports mild improvement in his pain since LCV. He has continued attempting to lose weight with diet and exercise; he is also taking Wegovy resulting in reported 20lb weight loss since the beginning of the year. Denies recent falls or new symptoms. Today he rates his pain 4/10.     Interval History 3/27/24:  Patient presents for follow up with complaints of left knee pain. He states that he has had this pain for years, but he occasionally gets flare ups. He reports a basketball related injury when he was younger, which he believes is the cause of his pain; however, he denies any new injuries. He states that his pain is an ache, and he struggles to walk up stairs due to his pain. He has tried voltaren gel, tylenol, ibuprofen, and ice with some relief. He has not done PT, and his pain today is a 6/10.     Interval History 10/16/23:  Notes his plantar fasciitis has returned. He has not done PT, icing, stretching or used a  night splint. He notes his wife has had 2 cervical spine surgeries in the last year so he has not had the capacity to take it easy on his plantar fasciitis.   His primary back pain on the right side between is scapula and his spinous processes. 7/10 currently. 10/10 at worst.  Sharp attacks with dull/aching tail, not every day. Worse if picking something up wrong. Has not done massage, has not used a TENS unit. Used voltaren, which helps to an extent. Peters not have a stretching routine. Denies weakness, numbness, tingling, changes in bowel or bladder function. Has had significant weight loss over prior years from bariatric surgery. Is having gall bladder surgery this coming Thursday.     Interval History 8/23/23:  Jered Contreras presents tele-medicine appointment for a follow-up appointment for right shoulder pain. We have seen him in the past for his plantar fasciitis but his main complaint today was his right shoulder pain Since the last visit, Jered Contreras states the pain has been worsening. Current pain intensity is 6/10. He describes the pain being worse when he wakes up in the morning but also when he rotates his right shoulder. He also noticed the pain flares up when he bends over to  items with his right arm.      Initital HPI 7/28/23: Jered Contreras presents to the clinic for the evaluation of the above pain. The pain started 2 years ago after no particular event. He blames it on his weight.      Original Pain Description:  The pain is located in the L>R plantar fascia and does not radiate. The pain is described as stabbing. Exacerbating factors: Walking, rain. Mitigating factors rest. Symptoms interfere with daily activity and work. The patient feels like symptoms have been unchanged.      Original PAIN SCORES:  Best: Pain is 2  Worst: Pain is 9  Current: Pain is 4    Pain Medications:  Flexeril 5mg QHS     6 weeks of Conservative therapy:  PT: April -May,  2024  Chiro: No  HEP: HEP prescribed by podiatry which he does every morning         Treatments / Medications: (Ice/Heat/NSAIDS/APAP/etc):  Ice  Heat  Ibuprofen  Aleve  Biofreeze  Voltaren gel   APAP        Interventional Pain Procedures: (Previous injections)  None    Imaging:  XR FOOT COMPLETE 3 VIEW LEFT     CLINICAL HISTORY:  5th met head pain;.  Pain in left foot     TECHNIQUE:  AP, lateral and oblique views of the left foot were performed.     COMPARISON:  None     FINDINGS:  No acute fracture or dislocation seen.  Small plantar calcaneal spur.  No significant soft tissue edema or radiopaque retained foreign body.     Impression:     No acute osseous abnormality seen.        Electronically signed by: Ale Sanchez  Date:                                            10/24/2022  Time:                                           15:32     MRI of the LEFT ankle     CLINICAL HISTORY:  Foot pain evaluate for stress fracture     TECHNIQUE:  Multiplanar multisequence MRI examination of the LEFT ankle.  Postcontrast images after 10 cc Gadavist     COMPARISON:  Radiograph of 10/24/2022     FINDINGS:  **MEDIAL COMPARTMENT     Posterior tibial tendon: Intact.No tendinosis.No tenosynovitis.     Flexor digitorum longus tendon: Normal.     Superficial Deltoid: Intact.     Deep Deltoid: Intact.     Tibiospring/ SM Calcaneonavicular (Spring)/Inferoplantar Complex: Intact     **LATERAL COMPARTMENT     Peroneus longus: Intact.     Peroneus brevis: Intact.     Superior peroneal retinaculum: Intact     Ligaments:     Interosseous (syndesmosis): Intact.     Anterior inferior tibiofibular (syndesmosis): Intact.     Posterior inferior tibiofibular (syndesmosis): Intact.     Anterior talofibular ligament: Intact.     Calcaneofibular ligament: Intact.     Posterior talofibular ligament: Intact.     **POSTERIOR COMPARTMENT     Flexor hallucis longus: Normal.     Posterior talus: Normal.     Achilles tendon: Normal.     Plantar fascia:  There is minimal edema of the soft tissues plantar to the plantar fascia.  There is  plantar fascial thickening measuring greater than 4 mm.. There is mild osseous edema at the calcaneal insertion of the plantar fascia and calcaneal spur formation. There is no rupture of the fascia. There is no evidence of plantar fibromatosis. Findings are nonspecific yet can be seen with with plantar fasciitis..Lateral cord of plantar fascia demonstrates normal appearance and insertion upon the base of the 5th MT.     **ANTERIOR COMPARTMENT     Tendons:     Anterior tibial tendon: Normal.  Insertion intact.     Extensor hallucis longus: Normal.     Extensor digitorum longus: Normal.     Ligaments:     Dorsal talonavicular ligament: Intact.     **ARTICULATIONS:     Tibiotalar joint: Normal.  No evidence for anterior osseous spurs.     Subtalar joint: Normal.     Talonavicular joint: Normal.     Calcaneocuboid joint: Normal     Talus and calcaneus: Intact lateral process of talus and anterior process of calcaneus without fracture.     **GENERAL FINDINGS     Osseous Structures: No evidence of fracture     Muscles: Normal.     Nerves and tarsal tunnel: Normal.     Sinus tarsi: Normal.     Diffuse subcutaneous edema.     Impression:     Diffuse subcutaneous edema about the ankle.     Plantar fascial thickening, with minimal edema of calcaneal spur nonspecific finding, can be seen in plantar fasciitis in the appropriate setting       XR KNEE 3/27/24:   EXAMINATION:  XR KNEE 3 VIEW LEFT     CLINICAL HISTORY:  Pain in left knee     TECHNIQUE:  AP, lateral, and Merchant views of the left knee were performed.     COMPARISON:  03/29/2022     FINDINGS:  No evidence of an acute fracture.  The joint spaces appear relatively well maintained.  No large joint effusion.  No radiopaque foreign body.     Impression:     No acute fracture or dislocation.        Electronically signed by:Jamil Brady MD  Date:                                             03/27/2024  Time:                                           15:07       Electronically signed by: Quinten Kelly MD  Date:                                            03/13/2023  Time:                                           11:49     Past Medical History:   Diagnosis Date    Allergic rhinitis     Asthma     last flare up at 21 y/o    Corneal abrasion     Depression     Hx of psychiatric care     Morbid obesity     Obstructive sleep apnea treated with continuous positive airway pressure (CPAP) 07/10/2018    Psychiatric problem     Therapy      Past Surgical History:   Procedure Laterality Date    APPENDECTOMY      APPLICATION OF CARTILAGE GRAFT Bilateral 12/06/2021    Procedure: APPLICATION, CARTILAGE GRAFT;  Surgeon: JAN Arredondo MD;  Location: [facility], [place] OR;  Service: ENT;  Laterality: Bilateral;  from right ear    ENDOSCOPY, NOSE OR PARANASAL SINUS, WITH MAXILLARY SINUS TISSUE REMOVAL Bilateral 5/27/2024    Procedure: ENDOSCOPY, NOSE OR PARANASAL SINUS, WITH MAXILLARY SINUS TISSUE REMOVAL;  Surgeon: JAN Arredondo MD;  Location: [facility], [place];  Service: ENT;  Laterality: Bilateral;    ETHMOIDECTOMY, TOTAL, ENDOSCOPIC Bilateral 5/27/2024    Procedure: ETHMOIDECTOMY, TOTAL, ENDOSCOPIC;  Surgeon: JAN Arredondo MD;  Location: [facility], [place];  Service: ENT;  Laterality: Bilateral;    FESS, WITH IMAGING GUIDANCE Bilateral 5/27/2024    Procedure: FESS, WITH IMAGING GUIDANCE;  Surgeon: JAN Arredondo MD;  Location: [facility], [place];  Service: ENT;  Laterality: Bilateral;    GASTRIC BYPASS  2020    gastric sleeve  09/2019    [facility]    NASAL SEPTOPLASTY Bilateral 12/06/2021    Procedure: SEPTOPLASTY, NOSE;  Surgeon: JAN Arredondo MD;  Location: [facility], [place];  Service: ENT;  Laterality: Bilateral;    NASAL STENOSIS REPAIR Bilateral 12/06/2021    Procedure: REPAIR, STENOSIS, NOSE, VESTIBULE;  Surgeon: JAN Arredondo MD;  Location: [facility], [place];  Service: ENT;  Laterality: Bilateral;    SINUS SURGERY       SURGICAL REMOVAL OF NASAL TURBINATE Bilateral 5/27/2024    Procedure: EXCISION, NASAL TURBINATE;  Surgeon: JAN Arredondo MD;  Location: [facility];  Service: ENT;  Laterality: Bilateral;    VASECTOMY  2017     Social History     Socioeconomic History    Marital status:    Occupational History    Occupation: Information Tech     Employer: [employer]   Tobacco Use    Smoking status: Never     Passive exposure: Never    Smokeless tobacco: Never   Substance and Sexual Activity    Alcohol use: Yes     Alcohol/week: 1.0 standard drink of alcohol     Types: 1 Cans of beer per week     Comment: RARELY    Drug use: No    Sexual activity: Yes     Partners: Female     Social Drivers of Health     Financial Resource Strain: Low Risk  (2/7/2025)    Overall Financial Resource Strain (CARDIA)     Difficulty of Paying Living Expenses: Not hard at all   Food Insecurity: No Food Insecurity (2/7/2025)    Hunger Vital Sign     Worried About Running Out of Food in the Last Year: Never true     Ran Out of Food in the Last Year: Never true   Transportation Needs: No Transportation Needs (11/20/2023)    PRAPARE - Transportation     Lack of Transportation (Medical): No     Lack of Transportation (Non-Medical): No   Physical Activity: Insufficiently Active (2/7/2025)    Exercise Vital Sign     Days of Exercise per Week: 3 days     Minutes of Exercise per Session: 30 min   Stress: Stress Concern Present (2/7/2025)    [employer] - Occupational Stress Questionnaire     Feeling of Stress : To some extent   Housing Stability: Low Risk  (11/20/2023)    Housing Stability Vital Sign     Unable to Pay for Housing in the Last Year: No     Number of Places Lived in the Last Year: 1     Unstable Housing in the Last Year: No     Family History   Problem Relation Name Age of Onset    Allergies Mother      Rheum arthritis Mother      Lung cancer Mother          post lobectomy    Dementia Mother      Glaucoma  Mother      Cataracts Mother      Diabetes Father      Hypertension Father      No Known Problems Maternal Aunt      No Known Problems Maternal Uncle      No Known Problems Paternal Aunt      No Known Problems Paternal Uncle      Allergies Maternal Grandmother      Hypertension Maternal Grandmother      Dementia Maternal Grandmother      Brain cancer Maternal Grandmother          in remission before she     Rheum arthritis Maternal Grandfather      Diabetes Paternal Grandmother      Alzheimer's disease Paternal Grandmother      Hypertension Paternal Grandfather      Obesity Paternal Grandfather      Cerebral aneurysm Paternal Grandfather      No Known Problems Other      Melanoma Neg Hx         Review of patient's allergies indicates:   Allergen Reactions    Ceclor [cefaclor] Anaphylaxis    Penicillins Anaphylaxis     Elevated blood pressure, temp, hives    Calcium alginate Itching     Headache       Current Outpatient Medications   Medication Sig    ASCORBATE CALCIUM (VITAMIN C ORAL) Take by mouth once daily.    azelastine (ASTELIN) 137 mcg (0.1 %) nasal spray Two sprays in each nostril, sniff until absorbed, then follow with 1 spray of fluticasone.  Use both sprays twice daily.    b complex vitamins (B COMPLEX-VITAMIN B12) tablet Take 1 tablet by mouth once daily.    bumetanide (BUMEX) 0.5 MG Tab Take 1 tablet (0.5 mg total) by mouth 2 (two) times daily.    buPROPion (WELLBUTRIN XL) 300 MG 24 hr tablet Take 1 tablet (300 mg total) by mouth once daily.    DULoxetine (CYMBALTA) 30 MG capsule Please give two separate bottles: one for duloxetine 30 mg po daily and one for duloxetine 60 mg po daily, total 90 mg daily    fluticasone propionate (FLONASE) 50 mcg/actuation nasal spray 2 sprays (100 mcg total) by Each Nostril route once daily.    hydrOXYzine HCL (ATARAX) 25 MG tablet TK 1 T PO  TID PRN ANXIETY    ipratropium (ATROVENT) 21 mcg (0.03 %) nasal spray 2 sprays by Each Nostril route 2 (two) times daily. Use  as needed to control runny nose or postnasal drip. May be use more often if needed    lamoTRIgine (LAMICTAL) 100 MG tablet Take one tab of Lamictal 100 mg po daily, plus two tab of Lamictal 25 mg po daily, total lamictal 125 mg po daily.    lamoTRIgine (LAMICTAL) 25 MG tablet Take 2 tab Lamictal 25 mg p.o. daily plus 1 tab Lamictal 100 mg po daily, total Lamictal 150 mg p.o. daily    montelukast (SINGULAIR) 10 mg tablet Take 1 tablet (10 mg total) by mouth every evening. Take during allergy season.    multivitamin capsule Take 1 capsule by mouth once daily.    rOPINIRole (REQUIP) 0.5 MG tablet Take 2 tablets (1 mg total) by mouth every evening.    tamsulosin (FLOMAX) 0.4 mg Cap Take 1 capsule (0.4 mg total) by mouth every evening.     No current facility-administered medications for this visit.       REVIEW OF SYSTEMS:    GENERAL:  No weight loss, malaise or fevers.  HEENT:   No recent changes in vision or hearing  NECK:  Negative for lumps, no difficulty with swallowing.  RESPIRATORY:  Negative for cough, wheezing or shortness of breath, patient denies any recent URI.  CARDIOVASCULAR:  Negative for chest pain, leg swelling or palpitations.  GI:  Negative for abdominal discomfort, blood in stools or black stools or change in bowel habits.  MUSCULOSKELETAL:  +Lt Knee Pain  SKIN:  Negative for lesions, rash, and itching.  PSYCH:  No mood disorder or recent psychosocial stressors.  Patients sleep is not disturbed secondary to pain.  HEMATOLOGY/LYMPHOLOGY:  Negative for prolonged bleeding, bruising easily or swollen nodes.  Patient is not currently taking any anti-coagulants  NEURO:   No history of headaches, syncope, paralysis, seizures or tremors.      OBJECTIVE:  GENERAL: Well appearing, in no acute distress, alert and oriented x3.  PSYCH:  Mood and affect context appropriate.  SKIN: Skin color, texture, turgor normal, no rashes or lesions.  HEENT:  Normocephalic, atraumatic. Cranial nerves grossly intact.  NECK: No  pain to palpation over the cervical paraspinous muscles. No pain to palpation over facets. Pain reproduced with palpation of right rhomboids, middle and inferior trapezius. No pain with neck flexion, extension, or lateral flexion.   PULM: Regular respiratory rate without accessory muscle use and symmetric chest excursion  GI:  Non-distended  BACK: Normal range of motion. No pain to palpation over the spinous processes. No pain to palpation over facet joints. There is no pain with palpation over the sacroiliac joints bilaterally.   EXTREMITIES: No deformities, edema, or skin discoloration.   MUSCULOSKELETAL: No atrophy is noted. Lt: Knee: Full ROM with audible crepitus. Stable Joint. Pain to palpation of the lateral joint line.   NEURO: Sensation is equal and appropriate bilaterally. Bilateral upper and lower extremity strength is normal and symmetric. Bilateral upper and lower extremity coordination and muscle stretch reflexes are physiologic and symmetric. Plantar response are downgoing. Straight leg raising in the supine position is negative to radicular pain.   GAIT: normal.      ASSESSMENT: 49 y.o. year old male with left knee pain, consistent with     1. Primary osteoarthritis of left knee        2. Chronic pain of left knee  X-Ray Knee Complete 4 or More Views Left          DISCUSSION: Mr. Contreras is a pleasant gentleman who came to us with left knee pain. Imaging shows well-maintained joint spaces without evidence of effusion of fracture. He was doing well after PT but has increased pain recently due to caring for his wife who recently became paralyzed due to a fall.         PLAN:   1) Continue Voltaren gel PRN  2) Continue Tylenol 1000mg every 8 hours  3) Order Left knee X-ray  4) Continue home exercise program for strengthening and conditioning of the surrounding knee musculature.   5) Continue to follow with Bariatrics and to focus on weight loss.   6) RTC for synvisc injection into Left knee.      The  above plan and management options were discussed at length with patient. Patient is in agreement with the above and verbalized understanding. It will be communicated with the referring physician via electronic record, fax, or mail.      Diamond Hodge MD  [facility]

## 2025-03-05 ENCOUNTER — PATIENT MESSAGE (OUTPATIENT)
Dept: PAIN MEDICINE | Facility: CLINIC | Age: 50
End: 2025-03-05
Payer: COMMERCIAL

## 2025-03-14 ENCOUNTER — PROCEDURE VISIT (OUTPATIENT)
Dept: PAIN MEDICINE | Facility: CLINIC | Age: 50
End: 2025-03-14
Payer: COMMERCIAL

## 2025-03-14 VITALS
RESPIRATION RATE: 18 BRPM | BODY MASS INDEX: 47.87 KG/M2 | TEMPERATURE: 99 F | OXYGEN SATURATION: 98 % | WEIGHT: 315 LBS | SYSTOLIC BLOOD PRESSURE: 138 MMHG | HEART RATE: 88 BPM | DIASTOLIC BLOOD PRESSURE: 80 MMHG

## 2025-03-14 DIAGNOSIS — M17.12 PRIMARY OSTEOARTHRITIS OF LEFT KNEE: Primary | ICD-10-CM

## 2025-03-14 NOTE — PROCEDURES
Patient Name: Jered Contreras  MRN: [id number]    INFORMED CONSENT: The procedure, risks, benefits and options were discussed with patient. There are no contraindications to the procedure. The patient expressed understanding and agreed to proceed. The personnel performing the procedure was discussed. I verify that I personally obtained Jered's consent prior to the start of the procedure and the signed consent can be found on the patient's chart.    Procedure Date: 03/14/2025    Anesthesia: Topical    Pre Procedure diagnosis: M17.9 Osteoarthritis of knee, unspecified  Post-Procedure diagnosis: same    Sedation: None    PROCEDURE: LEFT KNEE INTRAARTICULAR SYNVISC INJECTION    The patient was seated on the procedure table with the knee hanging at 45 degrees. The Left knee was prepped with chlorhexidine three times. After performing a time out the inferior aspect of the patella was palpated and the skin was marked medial to the patella tendon. A 22G 1.5 inch needle was advanced through the skin while depositing 3mL of Lidocaine 1%. There was a distinct loss of resistance upon entering the knee joint. Aspiration was negative. The needle was held in place but the syringe was removed. The second syringe was then attached. 6 mL of Synvisc was injected without pain or resistance. The needle was removed intact and the site was dressed with a sterile bandage.   There were no complications. Patient tolerated procedure well.    Blood Loss: Nill  Specimen: None    Geronimo Nieves MD  03/14/2025

## 2025-05-12 ENCOUNTER — TELEPHONE (OUTPATIENT)
Dept: OPTOMETRY | Facility: CLINIC | Age: 50
End: 2025-05-12

## 2025-05-12 NOTE — TELEPHONE ENCOUNTER
----- Message from Breann sent at 5/12/2025 10:26 AM CDT -----  Contact: pt @ [phone]  Jered Contreras calling regarding Appointment Access  (message) for #pt is calling to see if possible can be seen at a later time due to pt is running late, asking for call back

## 2025-06-12 RX ORDER — BUMETANIDE 0.5 MG/1
0.5 TABLET ORAL 2 TIMES DAILY
Qty: 180 TABLET | Refills: 1 | Status: SHIPPED | OUTPATIENT
Start: 2025-06-12

## 2025-06-12 NOTE — TELEPHONE ENCOUNTER
Refill Decision Note   Jered Diane  is requesting a refill authorization.  Brief Assessment and Rationale for Refill:  Approve     Medication Therapy Plan:        Comments:     Note composed:3:08 PM 06/12/2025

## 2025-07-08 ENCOUNTER — TELEPHONE (OUTPATIENT)
Dept: INTERNAL MEDICINE | Facility: CLINIC | Age: 50
End: 2025-07-08
Payer: COMMERCIAL

## 2025-07-22 ENCOUNTER — TELEPHONE (OUTPATIENT)
Dept: INTERNAL MEDICINE | Facility: CLINIC | Age: 50
End: 2025-07-22
Payer: COMMERCIAL

## 2025-07-22 ENCOUNTER — PATIENT MESSAGE (OUTPATIENT)
Dept: PSYCHIATRY | Facility: CLINIC | Age: 50
End: 2025-07-22
Payer: COMMERCIAL

## 2025-07-22 RX ORDER — DULOXETIN HYDROCHLORIDE 30 MG/1
CAPSULE, DELAYED RELEASE ORAL
Qty: 90 CAPSULE | Refills: 0 | Status: SHIPPED | OUTPATIENT
Start: 2025-07-22

## 2025-07-22 RX ORDER — LAMOTRIGINE 100 MG/1
TABLET ORAL
Qty: 90 TABLET | Refills: 0 | Status: SHIPPED | OUTPATIENT
Start: 2025-07-22

## 2025-07-22 RX ORDER — BUPROPION HYDROCHLORIDE 300 MG/1
300 TABLET ORAL DAILY
Qty: 90 TABLET | Refills: 0 | Status: SHIPPED | OUTPATIENT
Start: 2025-07-22 | End: 2025-10-20

## 2025-07-22 RX ORDER — LAMOTRIGINE 25 MG/1
TABLET ORAL
Qty: 90 TABLET | Refills: 0 | Status: SHIPPED | OUTPATIENT
Start: 2025-07-22

## 2025-08-08 ENCOUNTER — OFFICE VISIT (OUTPATIENT)
Dept: URGENT CARE | Facility: CLINIC | Age: 50
End: 2025-08-08
Payer: COMMERCIAL

## 2025-08-08 VITALS
RESPIRATION RATE: 19 BRPM | DIASTOLIC BLOOD PRESSURE: 86 MMHG | HEIGHT: 72 IN | TEMPERATURE: 99 F | SYSTOLIC BLOOD PRESSURE: 146 MMHG | BODY MASS INDEX: 42.66 KG/M2 | HEART RATE: 58 BPM | OXYGEN SATURATION: 98 % | WEIGHT: 315 LBS

## 2025-08-08 DIAGNOSIS — B96.89 BACTERIAL SINUSITIS: Primary | ICD-10-CM

## 2025-08-08 DIAGNOSIS — J32.9 BACTERIAL SINUSITIS: Primary | ICD-10-CM

## 2025-08-08 LAB
CTP QC/QA: YES
SARS-COV+SARS-COV-2 AG RESP QL IA.RAPID: NEGATIVE

## 2025-08-08 RX ORDER — AZITHROMYCIN 250 MG/1
TABLET, FILM COATED ORAL
Qty: 6 TABLET | Refills: 0 | Status: SHIPPED | OUTPATIENT
Start: 2025-08-08 | End: 2025-08-13

## 2025-08-08 NOTE — PROGRESS NOTES
Subjective:      Patient ID: Jered Contreras is a 50 y.o. male.    Vitals:  height is 6' (1.829 m) and weight is 160.1 kg (352 lb 15.3 oz) (abnormal). His oral temperature is 98.8 °F (37.1 °C). His blood pressure is 146/86 (abnormal) and his pulse is 58 (abnormal). His respiration is 19 and oxygen saturation is 98%.     Chief Complaint: Nasal Congestion (I think I have a sinus infection - Entered by patient)    This is a 50 y.o. male who presents today with a chief complaint of sinus congestion, fatigue, and post-nasal drip x7 days.   He has concern for uvulitis-in which he had in past with a semi-closed airway that required a steroid injection into his uvula--so he is afraid this prolonged illness might cause that to reoccur.      Sinus Problem  This is a new problem. The current episode started 1 to 4 weeks ago. The problem is unchanged. There has been no fever. Associated symptoms include congestion, headaches and sinus pressure. Pertinent negatives include no chills, coughing, ear pain, neck pain, shortness of breath or sore throat. Treatments tried: sudafed, guy seltzer, claritin, zrytec.       Constitution: Positive for fatigue. Negative for chills and fever.   HENT:  Positive for congestion, postnasal drip and sinus pressure. Negative for ear pain and sore throat.    Neck: Negative for neck pain.   Respiratory:  Positive for chest tightness. Negative for cough, shortness of breath and wheezing.    Gastrointestinal:  Negative for vomiting and diarrhea.   Neurological:  Positive for headaches.      Objective:     Physical Exam   Constitutional: He is oriented to person, place, and time.  Non-toxic appearance. He does not appear ill. No distress.   HENT:   Head: Normocephalic.   Ears:   Right Ear: Tympanic membrane, external ear and ear canal normal.   Left Ear: Tympanic membrane, external ear and ear canal normal.   Nose: Congestion present.   Mouth/Throat: Uvula is midline. Mucous membranes are  moist. Posterior oropharyngeal erythema present. No oropharyngeal exudate. Oropharynx is clear.      Comments: Uvula WNL  Neck: Neck supple. No neck rigidity present.   Cardiovascular: Regular rhythm. Bradycardia present.   Pulmonary/Chest: Effort normal and breath sounds normal. No stridor. No respiratory distress. He has no wheezes. He has no rhonchi. He has no rales. He exhibits no tenderness.   Abdominal: Normal appearance.   Musculoskeletal: Normal range of motion.         General: Normal range of motion.   Lymphadenopathy:     He has cervical adenopathy.   Neurological: He is alert and oriented to person, place, and time.   Skin: Skin is warm, dry and not diaphoretic.   Psychiatric: His behavior is normal. Mood normal.   Nursing note and vitals reviewed.    Results for orders placed or performed in visit on 08/08/25   SARS Coronavirus 2 Antigen, POCT Manual Read    Collection Time: 08/08/25  4:20 PM   Result Value Ref Range    SARS Coronavirus 2 Antigen Negative Negative, Presumptive Negative     Acceptable Yes       Assessment:     1. Bacterial sinusitis        Plan:       Bacterial sinusitis  -     SARS Coronavirus 2 Antigen, POCT Manual Read  -     azithromycin (Z-KELY) 250 MG tablet; Take 2 tablets by mouth on day 1; Take 1 tablet by mouth on days 2-5  Dispense: 6 tablet; Refill: 0

## 2025-08-26 ENCOUNTER — TELEPHONE (OUTPATIENT)
Dept: INTERNAL MEDICINE | Facility: CLINIC | Age: 50
End: 2025-08-26
Payer: COMMERCIAL

## 2025-08-26 ENCOUNTER — OFFICE VISIT (OUTPATIENT)
Dept: INTERNAL MEDICINE | Facility: CLINIC | Age: 50
End: 2025-08-26
Payer: COMMERCIAL

## 2025-08-26 VITALS
SYSTOLIC BLOOD PRESSURE: 128 MMHG | HEIGHT: 72 IN | WEIGHT: 315 LBS | DIASTOLIC BLOOD PRESSURE: 68 MMHG | BODY MASS INDEX: 42.66 KG/M2

## 2025-08-26 DIAGNOSIS — Z12.11 SCREENING FOR COLORECTAL CANCER: ICD-10-CM

## 2025-08-26 DIAGNOSIS — I87.2 VENOUS INSUFFICIENCY OF BOTH LOWER EXTREMITIES: Chronic | ICD-10-CM

## 2025-08-26 DIAGNOSIS — Z12.12 SCREENING FOR COLORECTAL CANCER: ICD-10-CM

## 2025-08-26 DIAGNOSIS — G47.33 OSA (OBSTRUCTIVE SLEEP APNEA): Primary | ICD-10-CM

## 2025-08-26 DIAGNOSIS — F33.1 MDD (MAJOR DEPRESSIVE DISORDER), RECURRENT EPISODE, MODERATE: ICD-10-CM

## 2025-08-26 DIAGNOSIS — F41.1 GAD (GENERALIZED ANXIETY DISORDER): Chronic | ICD-10-CM

## 2025-08-26 DIAGNOSIS — Z00.00 ENCOUNTER FOR PREVENTIVE HEALTH EXAMINATION: ICD-10-CM

## 2025-08-26 PROCEDURE — 99999 PR PBB SHADOW E&M-EST. PATIENT-LVL V: CPT | Mod: PBBFAC,,, | Performed by: PHYSICIAN ASSISTANT

## 2025-08-27 ENCOUNTER — LAB VISIT (OUTPATIENT)
Dept: LAB | Facility: HOSPITAL | Age: 50
End: 2025-08-27
Payer: COMMERCIAL

## 2025-08-27 DIAGNOSIS — Z00.00 ENCOUNTER FOR PREVENTIVE HEALTH EXAMINATION: ICD-10-CM

## 2025-08-27 LAB
ABSOLUTE EOSINOPHIL (OHS): 0.16 K/UL
ABSOLUTE MONOCYTE (OHS): 0.92 K/UL (ref 0.3–1)
ABSOLUTE NEUTROPHIL COUNT (OHS): 4.01 K/UL (ref 1.8–7.7)
ALBUMIN SERPL BCP-MCNC: 3.8 G/DL (ref 3.5–5.2)
ALP SERPL-CCNC: 84 UNIT/L (ref 40–150)
ALT SERPL W/O P-5'-P-CCNC: 23 UNIT/L (ref 0–55)
ANION GAP (OHS): 11 MMOL/L (ref 8–16)
AST SERPL-CCNC: 26 UNIT/L (ref 0–50)
BASOPHILS # BLD AUTO: 0.08 K/UL
BASOPHILS NFR BLD AUTO: 0.9 %
BILIRUB SERPL-MCNC: 0.4 MG/DL (ref 0.1–1)
BUN SERPL-MCNC: 14 MG/DL (ref 6–20)
CALCIUM SERPL-MCNC: 9.2 MG/DL (ref 8.7–10.5)
CHLORIDE SERPL-SCNC: 105 MMOL/L (ref 95–110)
CHOLEST SERPL-MCNC: 145 MG/DL (ref 120–199)
CHOLEST/HDLC SERPL: 3.2 {RATIO} (ref 2–5)
CO2 SERPL-SCNC: 24 MMOL/L (ref 23–29)
CREAT SERPL-MCNC: 0.8 MG/DL (ref 0.5–1.4)
EAG (OHS): 105 MG/DL (ref 68–131)
ERYTHROCYTE [DISTWIDTH] IN BLOOD BY AUTOMATED COUNT: 13.7 % (ref 11.5–14.5)
GFR SERPLBLD CREATININE-BSD FMLA CKD-EPI: >60 ML/MIN/1.73/M2
GLUCOSE SERPL-MCNC: 82 MG/DL (ref 70–110)
HBA1C MFR BLD: 5.3 % (ref 4–5.6)
HCT VFR BLD AUTO: 35.4 % (ref 40–54)
HDLC SERPL-MCNC: 45 MG/DL (ref 40–75)
HDLC SERPL: 31 % (ref 20–50)
HGB BLD-MCNC: 11 GM/DL (ref 14–18)
IMM GRANULOCYTES # BLD AUTO: 0.02 K/UL (ref 0–0.04)
IMM GRANULOCYTES NFR BLD AUTO: 0.2 % (ref 0–0.5)
LDLC SERPL CALC-MCNC: 87 MG/DL (ref 63–159)
LYMPHOCYTES # BLD AUTO: 3.6 K/UL (ref 1–4.8)
MCH RBC QN AUTO: 28.5 PG (ref 27–31)
MCHC RBC AUTO-ENTMCNC: 31.1 G/DL (ref 32–36)
MCV RBC AUTO: 92 FL (ref 82–98)
NONHDLC SERPL-MCNC: 100 MG/DL
NUCLEATED RBC (/100WBC) (OHS): 0 /100 WBC
PLATELET # BLD AUTO: 311 K/UL (ref 150–450)
PMV BLD AUTO: 9.5 FL (ref 9.2–12.9)
POTASSIUM SERPL-SCNC: 4 MMOL/L (ref 3.5–5.1)
PROT SERPL-MCNC: 6.6 GM/DL (ref 6–8.4)
RBC # BLD AUTO: 3.86 M/UL (ref 4.6–6.2)
RELATIVE EOSINOPHIL (OHS): 1.8 %
RELATIVE LYMPHOCYTE (OHS): 41 % (ref 18–48)
RELATIVE MONOCYTE (OHS): 10.5 % (ref 4–15)
RELATIVE NEUTROPHIL (OHS): 45.6 % (ref 38–73)
SODIUM SERPL-SCNC: 140 MMOL/L (ref 136–145)
T4 FREE SERPL-MCNC: 0.88 NG/DL (ref 0.71–1.51)
TRIGL SERPL-MCNC: 65 MG/DL (ref 30–150)
TSH SERPL-ACNC: 4.04 UIU/ML (ref 0.4–4)
WBC # BLD AUTO: 8.79 K/UL (ref 3.9–12.7)

## 2025-08-27 PROCEDURE — 84443 ASSAY THYROID STIM HORMONE: CPT

## 2025-08-27 PROCEDURE — 80061 LIPID PANEL: CPT

## 2025-08-27 PROCEDURE — 36415 COLL VENOUS BLD VENIPUNCTURE: CPT

## 2025-08-27 PROCEDURE — 85025 COMPLETE CBC W/AUTO DIFF WBC: CPT

## 2025-08-27 PROCEDURE — 84439 ASSAY OF FREE THYROXINE: CPT

## 2025-08-27 PROCEDURE — 84450 TRANSFERASE (AST) (SGOT): CPT

## 2025-08-27 PROCEDURE — 83036 HEMOGLOBIN GLYCOSYLATED A1C: CPT

## 2025-08-28 ENCOUNTER — TELEPHONE (OUTPATIENT)
Dept: INTERNAL MEDICINE | Facility: CLINIC | Age: 50
End: 2025-08-28
Payer: COMMERCIAL

## 2025-08-28 PROBLEM — I83.029 VENOUS STASIS ULCERS OF BOTH LOWER EXTREMITIES: Status: RESOLVED | Noted: 2022-08-05 | Resolved: 2025-08-28

## 2025-08-28 PROBLEM — I83.019 VENOUS STASIS ULCERS OF BOTH LOWER EXTREMITIES: Status: RESOLVED | Noted: 2022-08-05 | Resolved: 2025-08-28

## 2025-08-28 PROBLEM — L97.919 VENOUS STASIS ULCERS OF BOTH LOWER EXTREMITIES: Status: RESOLVED | Noted: 2022-08-05 | Resolved: 2025-08-28

## 2025-08-28 PROBLEM — L97.929 VENOUS STASIS ULCERS OF BOTH LOWER EXTREMITIES: Status: RESOLVED | Noted: 2022-08-05 | Resolved: 2025-08-28

## 2025-09-05 ENCOUNTER — OFFICE VISIT (OUTPATIENT)
Dept: PAIN MEDICINE | Facility: CLINIC | Age: 50
End: 2025-09-05
Payer: COMMERCIAL

## 2025-09-05 ENCOUNTER — HOSPITAL ENCOUNTER (OUTPATIENT)
Dept: RADIOLOGY | Facility: OTHER | Age: 50
Discharge: HOME | End: 2025-09-05
Attending: ANESTHESIOLOGY
Payer: COMMERCIAL

## 2025-09-05 VITALS
DIASTOLIC BLOOD PRESSURE: 87 MMHG | SYSTOLIC BLOOD PRESSURE: 138 MMHG | HEART RATE: 66 BPM | BODY MASS INDEX: 42.66 KG/M2 | OXYGEN SATURATION: 96 % | TEMPERATURE: 98 F | RESPIRATION RATE: 18 BRPM | HEIGHT: 72 IN | WEIGHT: 315 LBS

## 2025-09-05 DIAGNOSIS — M76.60 ACHILLES TENDON PAIN: ICD-10-CM

## 2025-09-05 DIAGNOSIS — E66.01 OBESITY, MORBID, BMI 40.0-49.9: ICD-10-CM

## 2025-09-05 DIAGNOSIS — M17.12 PRIMARY OSTEOARTHRITIS OF LEFT KNEE: ICD-10-CM

## 2025-09-05 DIAGNOSIS — M76.60 ACHILLES TENDON PAIN: Primary | ICD-10-CM

## 2025-09-05 PROCEDURE — 99999 PR PBB SHADOW E&M-EST. PATIENT-LVL IV: CPT | Mod: PBBFAC,,, | Performed by: ANESTHESIOLOGY

## 2025-09-05 PROCEDURE — 73610 X-RAY EXAM OF ANKLE: CPT | Mod: TC,RT

## 2025-09-05 PROCEDURE — 73610 X-RAY EXAM OF ANKLE: CPT | Mod: 26,RT,, | Performed by: RADIOLOGY

## (undated) DEVICE — GLOVE BIOGEL SKINSENSE PI 7.0

## (undated) DEVICE — SYR B-D DISP CONTROL 10CC100/C

## (undated) DEVICE — TUBE SUMP NASOGASTRIC 16FR

## (undated) DEVICE — NDL HYPODERMIC 25GX2IN

## (undated) DEVICE — SEE MEDLINE ITEM 156934

## (undated) DEVICE — DRAPE STERI INSTRUMENT 1018

## (undated) DEVICE — GLOVE BIOGEL SKINSENSE PI 7.5

## (undated) DEVICE — TRACKER PATIENT NON INVASIVE

## (undated) DEVICE — SEE MEDLINE ITEM 157110

## (undated) DEVICE — GLOVE BIOGEL SKINSENSE PI 8.0

## (undated) DEVICE — SOL NACL .9P 500ML

## (undated) DEVICE — SLEEVE SCD EXPRESS CALF X-LG

## (undated) DEVICE — POSITIONER HEAD DONUT 9IN FOAM

## (undated) DEVICE — DRAPE SURG W/TWL 17 5/8X23

## (undated) DEVICE — SOL NACL 0.9% INJ 500ML BG

## (undated) DEVICE — Device

## (undated) DEVICE — SUT PLN GUT 4-0 SC-1SC-1 1

## (undated) DEVICE — TRACKER ENT INSTRUMENT

## (undated) DEVICE — POSITIONER IV ARMBOARD FOAM

## (undated) DEVICE — CORD BIPOLAR 12 FOOT

## (undated) DEVICE — SUT 4-0 CHROMIC GUT / P-3

## (undated) DEVICE — CONTAINER SPECIMEN STRL 4OZ

## (undated) DEVICE — SPLINT NASAL AIRWAY SEPTAL SIL

## (undated) DEVICE — SYR 30CC LUER LOCK

## (undated) DEVICE — APPLICATOR STERILE 6IN 100/CA

## (undated) DEVICE — SUT 5/0 18IN PDS II CLR MO

## (undated) DEVICE — TIP YANKAUERS BULB NO VENT

## (undated) DEVICE — DRESSING TRANS 2X2 TEGADERM

## (undated) DEVICE — SOL NACL STRL BOTTLE 1000ML

## (undated) DEVICE — DRESSING TEGADERM 2 3/8 X 2.75

## (undated) DEVICE — SKINMARKER & RULER REGULAR X-F

## (undated) DEVICE — PENCIL ELECTROSURG HOLST W/BLD

## (undated) DEVICE — SUT SILK 2-0 BLK BR FSL 18

## (undated) DEVICE — DRESSING TRANS 4X4 TEGADERM

## (undated) DEVICE — APPLICATOR COTTON TIP 6IN STRL

## (undated) DEVICE — SKIN MARKER DEVON 160

## (undated) DEVICE — TOWEL OR DISP STRL BLUE 4/PK

## (undated) DEVICE — SOL 9P NACL IRR PIC IL

## (undated) DEVICE — CONTAINER SPECIMEN OR STER 4OZ

## (undated) DEVICE — CLOSURE SKIN STERI STRIP 1/2X4

## (undated) DEVICE — SUT 5/0 18IN PROLENE BL MO

## (undated) DEVICE — GOWN NONREINF SET-IN SLV XL

## (undated) DEVICE — ROLL DENTAL REGULAR 3/8X1.5IN

## (undated) DEVICE — CONTAINER SPEC OR STRL 4.5OZ

## (undated) DEVICE — SINU-FOAM STAMMBERGER

## (undated) DEVICE — SEE MEDLINE ITEM 153151

## (undated) DEVICE — ELECTRODE REM PLYHSV RETURN 9

## (undated) DEVICE — SOL POVIDONE SCRUB IODINE 4 OZ

## (undated) DEVICE — SOL IRR SOD CHL .9% POUR